# Patient Record
Sex: FEMALE | Race: WHITE | Employment: OTHER | URBAN - METROPOLITAN AREA
[De-identification: names, ages, dates, MRNs, and addresses within clinical notes are randomized per-mention and may not be internally consistent; named-entity substitution may affect disease eponyms.]

---

## 2017-01-06 ENCOUNTER — OFFICE VISIT (OUTPATIENT)
Dept: INTERNAL MEDICINE CLINIC | Age: 77
End: 2017-01-06

## 2017-01-06 VITALS
WEIGHT: 198 LBS | HEIGHT: 61 IN | HEART RATE: 78 BPM | SYSTOLIC BLOOD PRESSURE: 130 MMHG | BODY MASS INDEX: 37.38 KG/M2 | TEMPERATURE: 98.2 F | RESPIRATION RATE: 14 BRPM | OXYGEN SATURATION: 96 % | DIASTOLIC BLOOD PRESSURE: 70 MMHG

## 2017-01-06 DIAGNOSIS — I48.92 PAROXYSMAL ATRIAL FLUTTER (HCC): ICD-10-CM

## 2017-01-06 DIAGNOSIS — R29.6 FREQUENT FALLS: Primary | ICD-10-CM

## 2017-01-06 NOTE — PATIENT INSTRUCTIONS

## 2017-01-06 NOTE — LETTER
[address] [facility] Proxy Access Authorization Form Name: Andrew Agudelo Patient Email: [email or web address] Patient YOB: 1940 Patient MRN: [id number] Name of Proxy: Rosio Del Rio Proxy Email: [email or web address] Proxy Address: [address]  [address] Proxy : 1965 Proxy SSN:  By signing this Open English Proxy Access Authorization Form (this Authorization), I understand that I am giving permission to the [address] [place] [address], and its controlled affiliates that operate one or more hospitals or physician practices located in [place], [place], [place], [place] or [place] ([facility]) to disclose confidential health information contained about me through Open English to the person whose name is designated above (my Proxy). I understand that [employer] is a web-based service through which some (but not all) of the information contained in my [employer] record [facility]) (to the extent that I have an EMR) may be accessed, and that Open English sometimes shows a summary or description and not the actual entries in my EMR. I understand that by signing this Authorization, my Proxy will be given electronic access through Open English to all confidential health information about me that is available through [address], including confidential health information about me that under most circumstances my Proxy would not be able to access without my permission. I understand that I am not required to name a Proxy or sign this Authorization. I further understand that Bonnie Sis may not condition treatment or payment on my willingness to sign this Authorization unless the specific circumstances under which such conditioning is permitted by law are applicable and are set forth in this Authorization.  
 
I understand that this Authorization is valid unless and until I revoke it.  I understand that I have the right to revoke this Authorization at any time, but that my revocation will not be effective until delivered in writing to [facility] at the following address:  
 
[address] If I choose to revoke this Authorization, I understand that my revocation will not be effective as to any MyChart information already disclosed to my Proxy pursuant to this Authorization. I understand that MyChart access is a privilege, not a right, and that my Proxy must agree to comply with the MyChart Terms and Conditions of Patient Use (the Terms and Conditions). [facility] will provide my Proxy a special activation code and instructions for accessing confidential health information about me in [address] The first time my Proxy uses the special activation code, my Proxy must review and accept the Terms and Conditions and the Proxy Disclaimer. If my Proxy does not accept and at all times comply with the Terms and Conditions or does not accept the Proxy Disclaimer each time my Proxy accesses MyChart, I understand that [facility] may deny my Proxy access or revoke my Proxys to access confidential health information about me in [address] I also understand that [facility] may deny my Proxy access or revoke my Proxys access for any reason and at any time in [facility] sole discretion. I understand that my Proxy must sign the Acknowledgement set forth below if my Proxy is in the office with me at the time I complete this request.  If my Proxy is not in the office with me, I understand that my Proxy will be mailed a Proxy Identification Verification for Access to XO1 form at the address I have designated above, and that my Proxy must complete and return the form to [facility] before [facility] will take any additional steps to give my Proxy access information about me in [address] A copy of this Authorization and a notation concerning my Proxy shall be included in my original health records. I understand that confidential health information about me disclosed in MyChart to my Proxy pursuant to this Authorization might be redisclosed by my Proxy and may, as a result of such disclosure, no longer be protected to the same extent as such confidential health information was protected by law while solely in the possession of Nakul Crump. Signature of Patient or Legal Guardian Date (MM/DD/YYYY) Printed Name of Patient or Legal Guardian Relationship (if not self) ACKNOWLEDGEMENT TO BE COMPLETED BY PROXY IF IN OFFICE: 
I acknowledge and agree that the above information, including my name, e-mail address, date of birth, Social Security Number, and mailing address are true and correct. I further agree to comply with the Terms and Conditions and Proxy Disclaimer. Proxy Signature Date (MM/DD/YYYY) Printed Name of Proxy Identification Document:   
 
__ s License/Government Issued ID   
__ Passport   
__ Picture ID & Social Security Card Identification Document Number _______________________________ Expiration Date ______________

## 2017-01-06 NOTE — PROGRESS NOTES
Chief Complaint   Patient presents with    Staple Removal     s/p fall in [place] seen in ED and had staples placed to laceration on back of scalp

## 2017-01-06 NOTE — MR AVS SNAPSHOT
Visit Information Date & Time Provider Department Dept. Phone Encounter #  
 1/6/2017  2:00 PM Estelle Harris MD [facility] Internal Medicine [phone] [id number] Upcoming Health Maintenance Date Due ZOSTER VACCINE AGE 60> 6/14/1999 Pneumococcal 65+ Low/Medium Risk (2 of 2 - PPSV23) 4/13/2016 MEDICARE YEARLY EXAM 3/25/2017 GLAUCOMA SCREENING Q2Y 10/1/2017 DTaP/Tdap/Td series (2 - Td) 11/1/2026 Allergies as of 1/6/2017  Review Complete On: 1/6/2017 By: Blaze Cowan LPN Severity Noted Reaction Type Reactions Codeine  11/23/2009    Rash Neomycin  11/23/2009    Rash Polysporin [Bacitracin-polymyxin B]  11/23/2009    Rash Protonix [Pantoprazole]  11/23/2009    Other (comments) Gi upset Current Immunizations  Reviewed on 3/26/2015 Name Date Influenza High Dose Vaccine PF 11/4/2016, 10/7/2015 Influenza Vaccine Split 10/14/2012 Influenza Vaccine Whole 10/15/2011 Pneumococcal Conjugate (PCV-13) 4/13/2015 Tdap 11/1/2016 Not reviewed this visit You Were Diagnosed With   
  
 Codes Comments Frequent falls    -  Primary ICD-10-CM: R29.6 ICD-9-CM: V15.88 Paroxysmal atrial flutter (HCC)     ICD-10-CM: I48.92 
ICD-9-CM: 427.32 Vitals BP Pulse Temp Resp Height(growth percentile) Weight(growth percentile) 130/70 78 98.2 °F (36.8 °C) (Oral) 14 5' 0.75\" (1.543 m) 198 lb (89.8 kg) SpO2 BMI OB Status Smoking Status 96% 37.72 kg/m2 Hysterectomy Former Smoker BMI and BSA Data Body Mass Index Body Surface Area  
 37.72 kg/m 2 1.96 m 2 Preferred Pharmacy Pharmacy Name Phone [facility] [phone] [facility] [phone] Your Updated Medication List  
  
   
This list is accurate as of: 1/6/17  2:55 PM.  Always use your most recent med list.  
  
  
  
  
 aspirin 81 mg tablet Take  by mouth. Benefiber Clear 3 gram/3.5 gram Powd Generic drug:  wheat dextrin Take  by mouth.  
  
 calcium citrate-vitamin d3 315-200 mg-unit Tab Commonly known as:  CITRACAL+D Take 1 Tab by mouth daily (with breakfast). DULoxetine 60 mg capsule Commonly known as:  CYMBALTA TAKE 1 CAPSULE EVERY DAY  
  
 famotidine 40 mg tablet Commonly known as:  PEPCID Take 40 mg by mouth daily. FLORASTOR PO Take  by mouth. fluticasone 50 mcg/actuation nasal spray Commonly known as:  Imagene Poot 2 Sprays by Both Nostrils route daily. lisinopril 30 mg tablet Commonly known as:  PRINIVIL, ZESTRIL  
TAKE 1 TABLET BY MOUTH EVERY DAY  
  
 LUTEIN PO Take  by mouth.  
  
 meloxicam 15 mg tablet Commonly known as:  MOBIC  
TAKE ONE TABLET BY MOUTH ONCE DAILY MIRALAX 17 gram packet Generic drug:  polyethylene glycol Take 17 g by mouth daily. MULTIVITAMIN PO Take  by mouth. BRANDON 128 2 % ophthalmic solution Generic drug:  sodium chloride Administer 1 Drop to both eyes as needed. oxybutynin 5 mg tablet Commonly known as:  TVFKUWEI Take 1 Tab by mouth three (3) times daily. sotalol 120 mg tablet Commonly known as:  Mylinda Saint Take 120 mg by mouth two (2) times a day. SYNTHROID 150 mcg tablet Generic drug:  levothyroxine Take 1 Tab by mouth Daily (before breakfast). timolol 0.5 % ophthalmic solution Commonly known as:  TIMOPTIC Administer 1 Drop to right eye daily. VISION-ELTON PRESERVE PO Take  by mouth. We Performed the Following REFERRAL TO PHYSICAL THERAPY [CQF65 Custom] Comments:  
 Please evaluate patient for frequent falls. Referral Information Referral ID Referred By Referred To  
  
 [id number] Juana BLOCK Not Available Visits Status Start Date End Date 1 New Request 1/6/17 1/6/18 If your referral has a status of pending review or denied, additional information will be sent to support the outcome of this decision. Patient Instructions Preventing Falls: Care Instructions Your Care Instructions Getting around your home safely can be a challenge if you have injuries or health problems that make it easy for you to fall. Loose rugs and furniture in walkways are among the dangers for many older people who have problems walking or who have poor eyesight. People who have conditions such as arthritis, osteoporosis, or dementia also have to be careful not to fall. You can make your home safer with a few simple measures. Follow-up care is a key part of your treatment and safety. Be sure to make and go to all appointments, and call your doctor if you are having problems. It's also a good idea to know your test results and keep a list of the medicines you take. How can you care for yourself at home? Taking care of yourself · You may get dizzy if you do not drink enough water. To prevent dehydration, drink plenty of fluids, enough so that your urine is light yellow or clear like water. Choose water and other caffeine-free clear liquids. If you have kidney, heart, or liver disease and have to limit fluids, talk with your doctor before you increase the amount of fluids you drink. · Exercise regularly to improve your strength, muscle tone, and balance. Walk if you can. Swimming may be a good choice if you cannot walk easily. · Have your vision and hearing checked each year or any time you notice a change. If you have trouble seeing and hearing, you might not be able to avoid objects and could lose your balance. · Know the side effects of the medicines you take. Ask your doctor or pharmacist whether the medicines you take can affect your balance. Sleeping pills or sedatives can affect your balance. · Limit the amount of alcohol you drink. Alcohol can impair your balance and other senses.  
· Ask your doctor whether calluses or corns on your feet need to be removed. If you wear loose-fitting shoes because of calluses or corns, you can lose your balance and fall. · Talk to your doctor if you have numbness in your feet. Preventing falls at home · Remove raised doorway thresholds, throw rugs, and clutter. Repair loose carpet or raised areas in the floor. · Move furniture and electrical cords to keep them out of walking paths. · Use nonskid floor wax, and wipe up spills right away, especially on ceramic tile floors. · If you use a walker or cane, put rubber tips on it. If you use crutches, clean the bottoms of them regularly with an abrasive pad, such as steel wool. · Keep your house well lit, especially [place], and outside walkways. Use night-lights in areas such as hallways and bathrooms. Add extra light switches or use remote switches (such as switches that go on or off when you clap your hands) to make it easier to turn lights on if you have to get up during the night. · Install sturdy handrails on stairways. · Move items in your cabinets so that the things you use a lot are on the lower shelves (about waist level). · Keep a cordless phone and a flashlight with new batteries by your bed. If possible, put a phone in each of the main rooms of your house, or carry a cell phone in case you fall and cannot reach a phone. Or, you can wear a device around your neck or wrist. You push a button that sends a signal for help. · Wear low-heeled shoes that fit well and give your feet good support. Use footwear with nonskid soles. Check the heels and soles of your shoes for wear. Repair or replace worn heels or soles. · Do not wear socks without shoes on wood floors. · Walk on the grass when the sidewalks are slippery. If you live in an area that gets snow and ice in the winter, sprinkle salt on slippery steps and sidewalks. Preventing falls in the bath · Install grab bars and nonskid mats inside and outside your shower or tub and near the toilet and sinks. · Use shower chairs and bath benches. · Use a hand-held shower head that will allow you to sit while showering. · Get into a tub or shower by putting the weaker leg in first. Get out of a tub or shower with your strong side first. 
· Repair loose toilet seats and consider installing a raised toilet seat to make getting on and off the toilet easier. · Keep your bathroom door unlocked while you are in the shower. Where can you learn more? Go to http://franki-genet.info/. Enter [phone] in the search box to learn more about \"Preventing Falls: Care Instructions. \" Current as of: August 4, 2016 Content Version: 11.1 © 6920-8038 [employer]WARE, [employer]. Care instructions adapted under license by [employer] (which disclaims liability or warranty for this information). If you have questions about a medical condition or this instruction, always ask your healthcare professional. [place] [address] any warranty or liability for your use of this information. Introducing [facility]! Dear Dayna Rico: Thank you for requesting a MakeGamesWithUs account. Our records indicate that you have previously registered for a MakeGamesWithUs account but its currently inactive. Please call our MakeGamesWithUs support line at [phone]. Additional Information If you have questions, please visit the Frequently Asked Questions section of the MakeGamesWithUs website at [email or web address] Remember, MakeGamesWithUs is NOT to be used for urgent needs. For medical emergencies, dial 911. Now available from your iPhone and Android! Please provide this summary of care documentation to your next provider. Your primary care clinician is listed as Lyssa [phone] If you have any questions after today's visit, please call [phone].

## 2017-01-06 NOTE — LETTER
January 6, 2017 Dani Foley [phone] Dear Hunter Vu: Thank you for requesting access to Movea. Please follow the instructions below to securely access and download your online medical record. Movea allows you to send messages to your doctor, view your test results, renew your prescriptions, schedule appointments, and more. How Do I Sign Up? 1. In your internet browser, go to [email or web address]. 2. Click on the First Time User? Click Here link in the Sign In box. You will see the New Member Sign Up page. 3. Enter your Movea Access Code exactly as it appears below. You will not need to use this code after youve completed the sign-up process. If you do not sign up before the expiration date, you must request a new code. Movea Access Code: [email or web address] Expires: 4/6/2017  3:09 PM  
 
4. Enter the last four digits of your Social Security Number (xxxx) and Date of Birth (mm/dd/yyyy) as indicated and click Submit. You will be taken to the next sign-up page. 5. Create a Movea ID. This will be your Movea login ID and cannot be changed, so think of one that is secure and easy to remember. 6. Create a Movea password. You can change your password at any time. 7. Enter your Password Reset Question and Answer. This can be used at a later time if you forget your password. 8. Enter your e-mail address. You will receive e-mail notification when new information is available in [address]. 9. Click Sign Up. You can now view and download portions of your medical record. 10. Click the Download Summary menu link to download a portable copy of your medical information. Additional Information If you have questions, please visit the Frequently Asked Questions section of the Movea website at [email or web address] Remember, Movea is NOT to be used for urgent needs. For medical emergencies, dial 911. Now available from your iPhone and Android! Sincerely, [employer]

## 2017-01-07 NOTE — PROGRESS NOTES
HPI:  Claudio Mckinney is a 67y.o. year old female who is here for a visit after a recent ER visit for a fall. She was in [place] and fell backwards in the tub and hit her head on 12/28. She suffered a laceration to the head and 10 staples were placed there. She did not lose her consciousness and felt OK otherwise. Has suffered 10 falls or so over the last 12 months when she has lost her balance on each occasion. Feels unsteady often. No headache or dizziness. No chest pain or SOB. No cough or wheeze. No change in bowels or bladder. No numbness or tingling or weakness. Some ongoing pain in the lower back asn well as the knees. Has seen the cardiology MD as well as ortho. She is somewhat confused about her meds and her list does not include sotalol but she is taking 1-2 tramadol at night for pain as well as melatonin. Several days of cough with clear sputum and no SOB or wheeze. Past Medical History   Diagnosis Date    Arthritis     Basal cell carcinoma 05/20/2012    Calculus of kidney     Cancer ([facility] [address]      skin    Cancer ([facility] [address]      thyroid    Glaucoma 4/30/2010    Hypertension     OA (osteoarthritis) 4/30/2010    Psoriasis 4/30/2010       Past Surgical History   Procedure Laterality Date    Hx knee replacement      Pr thyroidectomy      Hx cholecystectomy      Hx tonsillectomy      Hx cataract removal       bilat    Hx dilation and curettage      Hx hysterectomy      Hx craniotomy      Hx knee arthroscopy      Hx wrist fracture tx Left 8/21/15       Prior to Admission medications    Medication Sig Start Date End Date Taking? Authorizing Provider   oxybutynin (DITROPAN) 5 mg tablet Take 1 Tab by mouth three (3) times daily.  11/17/16  Yes Alonso Calderón III, MD   DULoxetine (CYMBALTA) 60 mg capsule TAKE 1 CAPSULE EVERY DAY 11/3/16  Yes Alonso Calderón III, MD   meloxicam (MOBIC) 15 mg tablet TAKE ONE TABLET BY MOUTH ONCE DAILY 10/24/16  Yes Alonso Calderón III, MD   famotidine (PEPCID) 40 mg tablet Take 40 mg by mouth daily. Yes Historical Provider   LUTEIN PO Take  by mouth. Yes Historical Provider   lisinopril (PRINIVIL, ZESTRIL) 30 mg tablet TAKE 1 TABLET BY MOUTH EVERY DAY 12/20/15  Yes Aspen Alex III, MD   sotalol (BETAPACE) 120 mg tablet Take 120 mg by mouth two (2) times a day. Yes Emi Osler, MD   calcium citrate-vitamin d3 (CITRACAL+D) 315-200 mg-unit tab Take 1 Tab by mouth daily (with breakfast). Yes Historical Provider   SACCHAROMYCES BOULARDII (FLORASTOR PO) Take  by mouth. Yes Historical Provider   BETA-CAROTENE,A, W-C & E/ZN/CU (VISION-ELTON PRESERVE PO) Take  by mouth. Yes Historical Provider   wheat dextrin (BENEFIBER CLEAR) 3 gram/3.5 gram powd Take  by mouth. Yes Historical Provider   polyethylene glycol (MIRALAX) 17 gram packet Take 17 g by mouth daily. Yes Historical Provider   fluticasone (FLONASE) 50 mcg/actuation nasal spray 2 Sprays by Both Nostrils route daily. 9/9/15  Yes Aspen Alex III, MD   sodium chloride (BRANDON 128) 2 % ophthalmic solution Administer 1 Drop to both eyes as needed. Yes Historical Provider   SYNTHROID 150 mcg tablet Take 1 Tab by mouth Daily (before breakfast). 5/16/13  Yes Aspen Alex III, MD   timolol (TIMOPTIC) 0.5 % ophthalmic solution Administer 1 Drop to right eye daily. 12/17/10  Yes Historical Provider   aspirin 81 mg Tab Take  by mouth. Yes Historical Provider   MULTIVITAMINS (MULTIVITAMIN PO) Take  by mouth. Yes Historical Provider       Social History     Social History    Marital status:      Spouse name: N/A    Number of children: N/A    Years of education: N/A     Occupational History    Not on file.      Social History Main Topics    Smoking status: Former Smoker     Packs/day: 0.50     Years: 4.00     Types: Cigarettes     Quit date: 1/1/1961    Smokeless tobacco: Never Used    Alcohol use No    Drug use: No    Sexual activity: Not on file     Other Topics Concern    Not on file Social History Narrative          ROS  Per HPI    Visit Vitals    /70    Pulse 78    Temp 98.2 °F (36.8 °C) (Oral)    Resp 14    Ht 5' 0.75\" (1.543 m)    Wt 198 lb (89.8 kg)    SpO2 96%    BMI 37.72 kg/m2         Physical Exam   Physical Examination: General appearance - alert, well appearing, and in no distress  Mouth - mucous membranes moist, pharynx normal without lesions  Neck - supple, no significant adenopathy  Lymphatics - no palpable lymphadenopathy, no hepatosplenomegaly  Chest - clear to auscultation, no wheezes, rales or rhonchi, symmetric air entry  Heart - normal rate and regular rhythm  Abdomen - soft, nontender, nondistended, no masses or organomegaly  Back exam - tenderness noted along the muscles of the back. Neurological - alert, oriented, normal speech, no focal findings or movement disorder noted, motor and sensory grossly normal bilaterally  Musculoskeletal - abnormal exam of both knees with DJD changes  Extremities - peripheral pulses normal, no pedal edema, no clubbing or cyanosis  8 large staples removed from the posterior scalp, wound healed well and no bleeding. Assessment/Plan:  Walker Castillo was seen today for staple removal.    Diagnoses and all orders for this visit:    Frequent falls - will stop the tramadol and the melatonin and discussed this in detail with the daughter. Also instructed her to go through her mom's meds and be sure the list is correct and she is taking them correctly. -     REFERRAL TO PHYSICAL THERAPY    Paroxysmal atrial flutter (HCC) - in [place]. Viral URI - will use mucinex as needed    DJD of the knee and back - referral back to ortho to see. Follow-up Disposition: as needed. Advised her to call back or return to office if symptoms worsen/change/persist.  Discussed expected course/resolution/complications of diagnosis in detail with patient. Medication risks/benefits/costs/interactions/alternatives discussed with patient.   She was given an after visit summary which includes diagnoses, current medications, & vitals. She expressed understanding with the diagnosis and plan.

## 2017-01-12 ENCOUNTER — OFFICE VISIT (OUTPATIENT)
Dept: INTERNAL MEDICINE CLINIC | Age: 77
End: 2017-01-12

## 2017-01-12 VITALS
WEIGHT: 198 LBS | OXYGEN SATURATION: 98 % | TEMPERATURE: 98.4 F | SYSTOLIC BLOOD PRESSURE: 122 MMHG | RESPIRATION RATE: 16 BRPM | BODY MASS INDEX: 37.38 KG/M2 | DIASTOLIC BLOOD PRESSURE: 70 MMHG | HEIGHT: 61 IN | HEART RATE: 80 BPM

## 2017-01-12 DIAGNOSIS — J06.9 VIRAL URI: Primary | ICD-10-CM

## 2017-01-12 RX ORDER — CODEINE PHOSPHATE AND GUAIFENESIN 10; 100 MG/5ML; MG/5ML
10 SOLUTION ORAL
Qty: 200 ML | Refills: 0 | Status: SHIPPED | OUTPATIENT
Start: 2017-01-12 | End: 2017-02-03 | Stop reason: SDUPTHER

## 2017-01-12 NOTE — PROGRESS NOTES
HISTORY OF PRESENT ILLNESS  Eloy Roberts is a 67 y.o. female. URI    The history is provided by the patient. This is a new problem. Episode onset: 5 d. There has been no fever. Associated symptoms include rhinorrhea and cough. Pertinent negatives include no abdominal pain, no diarrhea, no nausea, no vomiting, no sinus pain, no sore throat and no wheezing. Treatments tried: robitussin dm. The treatment provided mild relief. Review of Systems   Constitutional: Positive for fatigue. HENT: Positive for rhinorrhea. Negative for sore throat. Respiratory: Positive for cough. Negative for wheezing. Gastrointestinal: Negative for abdominal pain, diarrhea, nausea and vomiting. Physical Exam   Constitutional: No distress. HENT:   Right Ear: Tympanic membrane and ear canal normal.   Left Ear: Tympanic membrane and ear canal normal.   Nose: Right sinus exhibits no maxillary sinus tenderness and no frontal sinus tenderness. Left sinus exhibits no maxillary sinus tenderness and no frontal sinus tenderness. Mouth/Throat: Posterior oropharyngeal edema and posterior oropharyngeal erythema present. No oropharyngeal exudate. Eyes: Conjunctivae are normal. Right eye exhibits no discharge. Left eye exhibits no discharge. Neck: Neck supple. Cardiovascular: Normal rate and regular rhythm. Exam reveals no gallop and no friction rub. No murmur heard. Pulmonary/Chest: Effort normal and breath sounds normal.   Lymphadenopathy:     She has no cervical adenopathy. Nursing note and vitals reviewed. ASSESSMENT and PLAN  Nery Clifford was seen today for cough and fatigue. Diagnoses and all orders for this visit:    Viral URI  -     guaiFENesin-codeine (ROBITUSSIN AC) 100-10 mg/5 mL solution; Take 10 mL by mouth four (4) times daily as needed for Cough (severe cough). Max Daily Amount: 40 mL.    - Continue other medications in addition to current changes , robitussin dm for milder cough    This has been fully explained to the patient, who indicates understanding.

## 2017-01-12 NOTE — PATIENT INSTRUCTIONS

## 2017-01-24 ENCOUNTER — OFFICE VISIT (OUTPATIENT)
Dept: NEUROLOGY | Age: 77
End: 2017-01-24

## 2017-01-24 VITALS
RESPIRATION RATE: 18 BRPM | SYSTOLIC BLOOD PRESSURE: 136 MMHG | BODY MASS INDEX: 36.96 KG/M2 | DIASTOLIC BLOOD PRESSURE: 70 MMHG | TEMPERATURE: 97 F | WEIGHT: 194 LBS | HEART RATE: 59 BPM | OXYGEN SATURATION: 98 %

## 2017-01-24 DIAGNOSIS — R20.2 NUMBNESS AND TINGLING OF BOTH LEGS: ICD-10-CM

## 2017-01-24 DIAGNOSIS — R29.6 FALLS FREQUENTLY: ICD-10-CM

## 2017-01-24 DIAGNOSIS — S06.0X0A CONCUSSION, WITHOUT LOSS OF CONSCIOUSNESS, INITIAL ENCOUNTER: ICD-10-CM

## 2017-01-24 DIAGNOSIS — R20.0 NUMBNESS AND TINGLING OF BOTH LEGS: ICD-10-CM

## 2017-01-24 DIAGNOSIS — R29.6 FALLS FREQUENTLY: Primary | ICD-10-CM

## 2017-01-24 DIAGNOSIS — E55.9 VITAMIN D DEFICIENCY: ICD-10-CM

## 2017-01-24 NOTE — PROGRESS NOTES
Chief Complaint   Patient presents with    Fall       Referred by: Dr. Daniel Han      HPI    79-year-old woman here because of falls. She is here with her . They tell me in the past 18-24 months she has had recurrent falls without trigger or prodrome. She has had falls walking up the stairs or stepping down from curbs. She has hit her head several times but no loss of consciousness. She has fell down a flight of stairs before. Tendency to fall forwards or backwards  randomly. She takes Ditropan for nearly 18 months or longer. Last fall was end of December for which she went to a [place] emergency room. 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 She is denying any complaints of headache but does feel numbness and tingling in both feet. Review of Systems   Musculoskeletal: Positive for falls. Neurological: Positive for tingling and sensory change. All other systems reviewed and are negative. Past Medical History   Diagnosis Date    Arthritis     Basal cell carcinoma 05/20/2012    Calculus of kidney     Cancer ([facility] [address]      skin    Cancer ([facility] [address]      thyroid    Glaucoma 4/30/2010    Hypertension     OA (osteoarthritis) 4/30/2010    Psoriasis 4/30/2010     Family History   Problem Relation Age of Onset    Hypertension Mother     Heart Disease Mother     Heart Disease Father     Hypertension Father     Elevated Lipids Father     Heart Disease Brother     Psychiatric Disorder Brother     Diabetes Brother      Social History     Social History    Marital status:      Spouse name: N/A    Number of children: N/A    Years of education: N/A     Occupational History    Not on file.      Social History Main Topics    Smoking status: Former Smoker     Packs/day: 0.50     Years: 4.00     Types: Cigarettes     Quit date: 1/1/1961    Smokeless tobacco: Never Used    Alcohol use No    Drug use: No    Sexual activity: Not on file     Other Topics Concern    Not on file     Social History Narrative     Current Outpatient Prescriptions   Medication Sig    oxybutynin (DITROPAN) 5 mg tablet Take 1 Tab by mouth three (3) times daily.  DULoxetine (CYMBALTA) 60 mg capsule TAKE 1 CAPSULE EVERY DAY    meloxicam (MOBIC) 15 mg tablet TAKE ONE TABLET BY MOUTH ONCE DAILY    famotidine (PEPCID) 40 mg tablet Take 40 mg by mouth daily.  LUTEIN PO Take  by mouth.  lisinopril (PRINIVIL, ZESTRIL) 30 mg tablet TAKE 1 TABLET BY MOUTH EVERY DAY    sotalol (BETAPACE) 120 mg tablet Take 120 mg by mouth two (2) times a day.  calcium citrate-vitamin d3 (CITRACAL+D) 315-200 mg-unit tab Take 1 Tab by mouth daily (with breakfast).  SACCHAROMYCES BOULARDII (FLORASTOR PO) Take  by mouth.  wheat dextrin (BENEFIBER CLEAR) 3 gram/3.5 gram powd Take  by mouth.  polyethylene glycol (MIRALAX) 17 gram packet Take 17 g by mouth daily.  fluticasone (FLONASE) 50 mcg/actuation nasal spray 2 Sprays by Both Nostrils route daily.  sodium chloride (BRANDON 128) 2 % ophthalmic solution Administer 1 Drop to both eyes as needed.  SYNTHROID 150 mcg tablet Take 1 Tab by mouth Daily (before breakfast).  timolol (TIMOPTIC) 0.5 % ophthalmic solution Administer 1 Drop to right eye daily.  aspirin 81 mg Tab Take  by mouth.  MULTIVITAMINS (MULTIVITAMIN PO) Take  by mouth.  GUAIFENESIN/DEXTROMETHORPHAN (ROBITUSSIN DM MAX PO) Take  by mouth.  guaiFENesin-codeine (ROBITUSSIN AC) 100-10 mg/5 mL solution Take 10 mL by mouth four (4) times daily as needed for Cough (severe cough). Max Daily Amount: 40 mL.  BETA-CAROTENE,A, W-C & E/ZN/CU (VISION-ELTON PRESERVE PO) Take  by mouth. No current facility-administered medications for this visit.       Allergies   Allergen Reactions    Codeine Rash    Neomycin Rash    Polysporin [Bacitracin-Polymyxin B] Rash    Protonix [Pantoprazole] Other (comments)     Gi upset         Neurologic Exam     Mental Status   Oriented to person, place, and time.   Attention: normal.   Speech: speech is normal   Level of consciousness: alert    Cranial Nerves   Cranial nerves II through XII intact. CN III, IV, VI   Nystagmus: right (also torsional in upgaze)   Nystagmus type: horizontal and slow    Motor Exam   Muscle bulk: normal  Overall muscle tone: normal    Strength   Strength 5/5 throughout. Sensory Exam   Right leg light touch: decreased from toes  Left leg light touch: decreased from toes    Gait, Coordination, and Reflexes     Gait  Gait: (Good tempo but slightly ataxic especially on the turns non-shuffling)    Coordination   Romberg: negative    Tremor   Resting tremor: absent    Reflexes   Right brachioradialis: 2+  Left brachioradialis: 2+  Right biceps: 2+  Left biceps: 2+  Right triceps: 2+  Left triceps: 2+  Right patellar: 2+  Left patellar: 2+  Right achilles: 1+  Left achilles: 1+    Physical Exam   Constitutional: She is oriented to person, place, and time. She appears well-developed and well-nourished. Cardiovascular: Normal rate. Pulmonary/Chest: Effort normal.   Neurological: She is oriented to person, place, and time. She has normal strength. She has a normal Romberg Test.   Reflex Scores:       Tricep reflexes are 2+ on the right side and 2+ on the left side. Bicep reflexes are 2+ on the right side and 2+ on the left side. Brachioradialis reflexes are 2+ on the right side and 2+ on the left side. Patellar reflexes are 2+ on the right side and 2+ on the left side. Achilles reflexes are 1+ on the right side and 1+ on the left side. Skin: Skin is warm and dry. Psychiatric: Her speech is normal.   Vitals reviewed.     Visit Vitals    /70    Pulse (!) 59    Temp 97 °F (36.1 °C)    Resp 18    Wt 88 kg (194 lb)    SpO2 98%    BMI 36.96 kg/m2       Lab Results  Component Value Date/Time   WBC 6.7 06/07/2014 10:27 AM   HGB 13.1 06/07/2014 10:27 AM   HCT 40.5 06/07/2014 10:27 AM   PLATELET 160 76/43/8951 10:27 AM   MCV 96 06/07/2014 10:27 AM       Lab Results  Component Value Date/Time   Hemoglobin A1c, External 6.1 11/19/2014   Glucose 103 06/07/2014 10:27 AM   LDL, calculated 93 06/07/2014 10:27 AM   Creatinine (POC) 1.0 11/25/2009 10:00 AM   Creatinine 0.78 06/07/2014 10:27 AM      Lab Results  Component Value Date/Time   Cholesterol, total 166 06/07/2014 10:27 AM   HDL Cholesterol 51 06/07/2014 10:27 AM   LDL, calculated 93 06/07/2014 10:27 AM   Triglyceride 112 06/07/2014 10:27 AM   CHOL/HDL Ratio 3.8 01/08/2010 10:35 AM       Lab Results  Component Value Date/Time   ALT 20 06/07/2014 10:27 AM   AST 16 06/07/2014 10:27 AM   Alk. phosphatase 72 06/07/2014 10:27 AM   Bilirubin, total 0.3 06/07/2014 10:27 AM          CT Results (maximum last 3): Results from Abstract encounter on 01/13/17   CT HEAD WO CONT  Results from Abstract encounter on 06/20/12   CT ABD PELV W CONT    MRI Results (maximum last 3): Results from [place] encounter on 07/25/14   MRI LUMB SPINE WO CONT   Narrative **Final Report**      ICD Codes / Adm. Diagnosis: 724.02  733.90 / Spinal stenosis, lumbar region    Examination:  MRI L SPINE WO CON  - [id number] - Jul 25 2014  1:27PM  Accession No:  [id number]  Reason:  Spinal stenosis      REPORT:  CLINICAL HISTORY: Pain    INDICATION: Lower back pain    COMPARISON: None    TECHNIQUE: MR imaging of the lumbar spine was performed with sagittal T1,   T2, STIR;  axial T1, T2. Contrast was not administered. FINDINGS:  Disc degenerative changes in the lower thoracic spine with disc desiccation   loss of disc height at T11-T12, T12-L1. There are mild disc protrusions at   T10-11, T11-T12 and T12-L1. There is no spinal canal or foraminal stenosis. Abdominal aorta is normal in caliber. No fracture or dislocation. . Vertebral   body heights are maintained. Degenerative marrow signal changes. .  The conus   medullaris terminates at L1-L2. Grade 1 anterolisthesis of L5 on S1   measuring 9 mm. Increase signal intensity at L1-L2 adjacent to the disc   space likely degenerative in nature. L1/2:  Facet arthropathy. Moderate broad-based disc protrusion which extends   into the foramina. Mild central canal stenosis. The foramina are patent. L2/3:  The spinal canal and neuroforamina are widely patent. L3/4:  The spinal canal and neuroforamina are widely patent. L4/5:  Disc desiccation and loss of disc height. Facet arthropathy. The   spinal canal is patent. The foramina are patent. L5/S1:  Grade 1 anterolisthesis of L5 on S1 9 mm. Uncovering of the   intervertebral disc space. Disc desiccation and loss of disc height. Severe   bilateral foraminal stenoses. The spinal canal is widely patent. .    The visualized musculature and intraperitoneal structures are within normal   limits       IMPRESSION:  Mild multilevel disc and facet degenerative change with grade 1   anterolisthesis of L5 on S1. There is mild central canal stenosis at L1-L2. Grade 1 anterolisthesis of L5 on S1 9 mm with severe bilateral foraminal   stenosis          Signing/Reading Doctor: Hoda Figueroa ([id number])    Approved: Hoda Figueroa ([id number])  Jul 26 2014  5:29PM                                    Results from Hospital Encounter encounter on 07/28/10   MRI SPINE CERVICAL WITHOUT CONTRAST   Narrative Final Report      ICD Codes / Adm. Diagnosis:    /   723. 1BF CERVICAL PAIN (NECK) P  Examination:  MRI C SPINE  CON  - [id number] - Jul 28 2010  9:36AM  Accession No:  [id number]  Reason:  723. 1BF CERVICAL PAIN (NECK) P      REPORT:  Multiplanar noncontrast MRI of the cervical spine is performed with T1, T2   and STIR sequences. Direct comparison is made to prior plain radiographs of the cervical spine   dated October 2007. The cervicomedullary junction is normal without evidence of tonsillar   ectopia. Cord signal is normal throughout the cervical spine and visualized   upper thoracic levels.  Vertebral body height, marrow signal and alignment is   normal and there is no acute fracture or subluxation. C2-C3: There is no significant central canal stenosis or neural foraminal   narrowing. C3-C4: There is no significant central canal stenosis or neural foraminal   narrowing. C4-C5: There is a broad-based disc osteophyte complex which effaces the   ventral thecal sac. There is uncovertebral joint hypertrophy. There is mild   to moderate moderate bilateral neural foraminal narrowing. C5-C6: There is a small broad-based posterior disc osteophyte complex which   partially effaces the ventral thecal sac. There is no neural foraminal   narrowing. C6-C7: There is no significant central canal stenosis or neural foraminal   narrowing. C7-T1:There is no significant central canal stenosis or neural foraminal   narrowing. IMPRESSION: C4-C5 and C5-C6 degenerative changes, as described above. Interpreting/Reading Doctor: BRUNA HIGUERA ([id number])  Transcribed: n/a on 07/28/2010  Approved: BRUNA Daniels ([id number])  07/28/2010          Distribution:  Attending Doctor: Bindu Cheek  Alternate Doctor: Bindu Cheek            PET Results (maximum last 3): No results found for this or any previous visit. Assessment and Plan   Torey Harding was seen today for fall. Diagnoses and all orders for this visit:    Falls frequently  -     MRI BRAIN W WO CONT; Future  -     VITAMIN B12 & FOLATE  -     METHYLMALONIC ACID  -     VITAMIN D, 1, 25 DIHYDROXY  -     GAMMOPATHY EVAL, SPEP/MATTHEW, IG QT/FLC; Future    Concussion, without loss of consciousness, initial encounter  -     MRI BRAIN W WO CONT; Future  -     VITAMIN B12 & FOLATE  -     METHYLMALONIC ACID  -     VITAMIN D, 1, 25 DIHYDROXY  -     GAMMOPATHY EVAL, SPEP/MATTHEW, IG QT/FLC;  Future    Numbness and tingling of both legs  -     MRI BRAIN W WO CONT; Future  -     VITAMIN B12 & FOLATE  -     METHYLMALONIC ACID  -     VITAMIN D, 1, 25 DIHYDROXY  -     GAMMOPATHY EVAL, SPEP/MATTHEW, IG QT/FLC; Future    Vitamin D deficiency   -     VITAMIN D, 1, 25 DIHYDROXY      58-year-old woman with frequent falls in the past 2 years. This could coincide with Ditropan use. I would recommend her primary care doctor discontinue this medication and consider alternative methods for urinary control. This medication has significant anticholinergic effects that can lead to falls in the elderly. She is also complaining of symptoms suggestive of a neuropathy in both feet. Blood work ordered looking for typical causes. She may be developing a sensory ataxia of her gait as a result of this. Her gait today is a little unstable but appropriate. No signs of neurodegenerative disease on exam status Parkinson's. Will consider EMG after blood work done. I am going to order an MRI given the multiple falls and head injuries. Head CT was abnormal done last month. Need to clarify this further. She also has some nystagmus on exam that could be central in origin. I would like to see her in about 6 weeks. A notice of this visit/encounter being completed has been sent electronically to the patient's PCP and/or referring provider.      [address] [place] [address], [facility] [address]  Diplomate [employer]

## 2017-01-24 NOTE — LETTER
1/24/2017 Patient:  Reva Soto YOB: 1940 Date of Visit: 1/24/2017 Dear Terrance Gibson MD 
[address] [place] [address] Dr Mar[address] [facility] [phone] VIA In Basket : 
 
 
I was requested by Terrance Gibson MD to evaluate Ms. Nimesh Cisse  for Chief Complaint Patient presents with  Fall Lisa Torres I am recommending the following:  
 
Tameka Ocasio was seen today for fall. Diagnoses and all orders for this visit: 
 
Falls frequently -     MRI BRAIN W WO CONT; Future -     VITAMIN B12 & FOLATE 
-     METHYLMALONIC ACID 
-     VITAMIN D, 1, 25 DIHYDROXY 
-     GAMMOPATHY EVAL, SPEP/MATTHEW, IG QT/FLC; Future Concussion, without loss of consciousness, initial encounter -     MRI BRAIN W WO CONT; Future -     VITAMIN B12 & FOLATE 
-     METHYLMALONIC ACID 
-     VITAMIN D, 1, 25 DIHYDROXY 
-     GAMMOPATHY EVAL, SPEP/MATTHEW, IG QT/FLC; Future Numbness and tingling of both legs -     MRI BRAIN W WO CONT; Future -     VITAMIN B12 & FOLATE 
-     METHYLMALONIC ACID 
-     VITAMIN D, 1, 25 DIHYDROXY 
-     GAMMOPATHY EVAL, SPEP/MATTHEW, IG QT/FLC; Future Vitamin D deficiency  
-     VITAMIN D, 1, 25 DIHYDROXY 
 
 
 
---------------------------------------------------------------------------------------------------------------------- Below is my encounter: Chief Complaint Patient presents with  Fall Referred by: Dr. Anabel Jaramillo HPI 
 
25-year-old woman here because of falls. She is here with her . They tell me in the past 18-24 months she has had recurrent falls without trigger or prodrome. She has had falls walking up the stairs or stepping down from curbs. She has hit her head several times but no loss of consciousness. She has fell down a flight of stairs before. Tendency to fall forwards or backwards  randomly. She takes Ditropan for nearly 18 months or longer.   Last fall was end of December for which she went to a [facility] emergency room. 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 She is denying any complaints of headache but does feel numbness and tingling in both feet. Review of Systems Musculoskeletal: Positive for falls. Neurological: Positive for tingling and sensory change. All other systems reviewed and are negative. Past Medical History Diagnosis Date  Arthritis  Basal cell carcinoma 05/20/2012  Calculus of kidney  Cancer ([facility] [address] skin  Cancer ([facility] [address] thyroid  Glaucoma 4/30/2010  Hypertension  OA (osteoarthritis) 4/30/2010  Psoriasis 4/30/2010 Family History Problem Relation Age of Onset  Hypertension Mother  Heart Disease Mother  Heart Disease Father  Hypertension Father  Elevated Lipids Father  Heart Disease Brother  Psychiatric Disorder Brother  Diabetes Brother Social History Social History  Marital status:  Spouse name: N/A  
 Number of children: N/A  
 Years of education: N/A Occupational History  Not on file. Social History Main Topics  Smoking status: Former Smoker Packs/day: 0.50 Years: 4.00 Types: Cigarettes Quit date: 1/1/1961  Smokeless tobacco: Never Used  Alcohol use No  
 Drug use: No  
 Sexual activity: Not on file Other Topics Concern  Not on file Social History Narrative Current Outpatient Prescriptions Medication Sig  
 oxybutynin (DITROPAN) 5 mg tablet Take 1 Tab by mouth three (3) times daily.  DULoxetine (CYMBALTA) 60 mg capsule TAKE 1 CAPSULE EVERY DAY  meloxicam (MOBIC) 15 mg tablet TAKE ONE TABLET BY MOUTH ONCE DAILY  famotidine (PEPCID) 40 mg tablet Take 40 mg by mouth daily.  LUTEIN PO Take  by mouth.  lisinopril (PRINIVIL, ZESTRIL) 30 mg tablet TAKE 1 TABLET BY MOUTH EVERY DAY  sotalol (BETAPACE) 120 mg tablet Take 120 mg by mouth two (2) times a day.  calcium citrate-vitamin d3 (CITRACAL+D) 315-200 mg-unit tab Take 1 Tab by mouth daily (with breakfast).  SACCHAROMYCES BOULARDII (FLORASTOR PO) Take  by mouth.  wheat dextrin (BENEFIBER CLEAR) 3 gram/3.5 gram powd Take  by mouth.  polyethylene glycol (MIRALAX) 17 gram packet Take 17 g by mouth daily.  fluticasone (FLONASE) 50 mcg/actuation nasal spray 2 Sprays by Both Nostrils route daily.  sodium chloride (BRANDON 128) 2 % ophthalmic solution Administer 1 Drop to both eyes as needed.  SYNTHROID 150 mcg tablet Take 1 Tab by mouth Daily (before breakfast).  timolol (TIMOPTIC) 0.5 % ophthalmic solution Administer 1 Drop to right eye daily.  aspirin 81 mg Tab Take  by mouth.  MULTIVITAMINS (MULTIVITAMIN PO) Take  by mouth.  GUAIFENESIN/DEXTROMETHORPHAN (ROBITUSSIN DM MAX PO) Take  by mouth.  guaiFENesin-codeine (ROBITUSSIN AC) 100-10 mg/5 mL solution Take 10 mL by mouth four (4) times daily as needed for Cough (severe cough). Max Daily Amount: 40 mL.  BETA-CAROTENE,A, W-C & E/ZN/CU (VISION-ELTON PRESERVE PO) Take  by mouth. No current facility-administered medications for this visit. Allergies Allergen Reactions  Codeine Rash  Neomycin Rash  Polysporin [Bacitracin-Polymyxin B] Rash  Protonix [Pantoprazole] Other (comments) Gi upset Neurologic Exam  
 
Mental Status Oriented to person, place, and time. Attention: normal.  
Speech: speech is normal  
Level of consciousness: alert Cranial Nerves Cranial nerves II through XII intact. CN III, IV, VI Nystagmus: right (also torsional in upgaze) Nystagmus type: horizontal and slow Motor Exam  
Muscle bulk: normal 
Overall muscle tone: normal 
 
Strength Strength 5/5 throughout. Sensory Exam  
Right leg light touch: decreased from toes Left leg light touch: decreased from toes Gait, Coordination, and Reflexes Gait Gait: (Good tempo but slightly ataxic especially on the turns non-shuffling) Coordination Romberg: negative Tremor Resting tremor: absent Reflexes Right brachioradialis: 2+ Left brachioradialis: 2+ Right biceps: 2+ Left biceps: 2+ Right triceps: 2+ Left triceps: 2+ Right patellar: 2+ Left patellar: 2+ Right achilles: 1+ Left achilles: 1+ Physical Exam  
Constitutional: She is oriented to person, place, and time. She appears well-developed and well-nourished. Cardiovascular: Normal rate. Pulmonary/Chest: Effort normal.  
Neurological: She is oriented to person, place, and time. She has normal strength. She has a normal Romberg Test.  
Reflex Scores: 
     Tricep reflexes are 2+ on the right side and 2+ on the left side. Bicep reflexes are 2+ on the right side and 2+ on the left side. Brachioradialis reflexes are 2+ on the right side and 2+ on the left side. Patellar reflexes are 2+ on the right side and 2+ on the left side. Achilles reflexes are 1+ on the right side and 1+ on the left side. Skin: Skin is warm and dry. Psychiatric: Her speech is normal.  
Vitals reviewed. Visit Vitals  /70  Pulse (!) 59  Temp 97 °F (36.1 °C)  Resp 18  Wt 88 kg (194 lb)  SpO2 98%  BMI 36.96 kg/m2 Lab Results Component Value Date/Time WBC 6.7 06/07/2014 10:27 AM  
HGB 13.1 06/07/2014 10:27 AM  
HCT 40.5 06/07/2014 10:27 AM  
PLATELET 443 59/02/8786 10:27 AM  
MCV 96 06/07/2014 10:27 AM  
 
 
Lab Results Component Value Date/Time Hemoglobin A1c, External 6.1 11/19/2014 Glucose 103 06/07/2014 10:27 AM  
LDL, calculated 93 06/07/2014 10:27 AM  
Creatinine (POC) 1.0 11/25/2009 10:00 AM  
Creatinine 0.78 06/07/2014 10:27 AM  
  
Lab Results Component Value Date/Time Cholesterol, total 166 06/07/2014 10:27 AM  
HDL Cholesterol 51 06/07/2014 10:27 AM  
LDL, calculated 93 06/07/2014 10:27 AM  
Triglyceride 112 06/07/2014 10:27 AM  
CHOL/HDL Ratio 3.8 01/08/2010 10:35 AM  
 
 
Lab Results Component Value Date/Time ALT 20 06/07/2014 10:27 AM  
AST 16 06/07/2014 10:27 AM  
Alk. phosphatase 72 06/07/2014 10:27 AM  
Bilirubin, total 0.3 06/07/2014 10:27 AM  
 
  
 
CT Results (maximum last 3): Results from Abstract encounter on 01/13/17 CT HEAD WO CONT Results from Abstract encounter on 06/20/12 CT ABD PELV W CONT 
 
MRI Results (maximum last 3): Results from [facility] Encounter encounter on 07/25/14 MRI LUMB SPINE WO CONT Narrative **Final Report** 
 
 
ICD Codes / Adm. Diagnosis: 724.02  733.90 / Spinal stenosis, lumbar region Examination:  MRI L SPINE WO CON  - [id number] - Jul 25 2014  1:27PM 
Accession No:  [id number] Reason:  Spinal stenosis REPORT: 
CLINICAL HISTORY: Pain INDICATION: Lower back pain COMPARISON: None TECHNIQUE: MR imaging of the lumbar spine was performed with sagittal T1,  
T2, STIR;  axial T1, T2. Contrast was not administered. FINDINGS: 
Disc degenerative changes in the lower thoracic spine with disc desiccation  
loss of disc height at T11-T12, T12-L1. There are mild disc protrusions at  
T10-11, T11-T12 and T12-L1. There is no spinal canal or foraminal stenosis. Abdominal aorta is normal in caliber. No fracture or dislocation. . Vertebral  
body heights are maintained. Degenerative marrow signal changes. .  The conus  
medullaris terminates at L1-L2. Grade 1 anterolisthesis of L5 on S1  
measuring 9 mm. Increase signal intensity at L1-L2 adjacent to the disc  
space likely degenerative in nature. L1/2:  Facet arthropathy. Moderate broad-based disc protrusion which extends  
into the foramina. Mild central canal stenosis. The foramina are patent. L2/3:  The spinal canal and neuroforamina are widely patent. L3/4:  The spinal canal and neuroforamina are widely patent. L4/5:  Disc desiccation and loss of disc height. Facet arthropathy. The  
spinal canal is patent. The foramina are patent. L5/S1:  Grade 1 anterolisthesis of L5 on S1 9 mm. Uncovering of the  
intervertebral disc space. Disc desiccation and loss of disc height. Severe  
bilateral foraminal stenoses. The spinal canal is widely patent. Gretel Boo The visualized musculature and intraperitoneal structures are within normal  
limits IMPRESSION: 
Mild multilevel disc and facet degenerative change with grade 1  
anterolisthesis of L5 on S1. There is mild central canal stenosis at L1-L2. Grade 1 anterolisthesis of L5 on S1 9 mm with severe bilateral foraminal  
stenosis Signing/Reading Doctor: Tony Whitaker ([id number]) Kelsey Loya ([id number])  Jul 26 2014  5:29PM                         
 
 
   
 
Results from [facility] [place] on 07/28/10 MRI SPINE CERVICAL WITHOUT CONTRAST Narrative Final Report ICD Codes / Adm. Diagnosis:    /   723. 1BF CERVICAL PAIN (NECK) P Examination:  MRI C SPINE WO CON  - [id number] - Jul 28 2010  9:36AM 
Accession No:  [id number] Reason:  723. 1BF CERVICAL PAIN (NECK) P 
 
 
REPORT: 
Multiplanar noncontrast MRI of the cervical spine is performed with T1, T2  
and STIR sequences. Direct comparison is made to prior plain radiographs of the cervical spine  
dated October 2007. The cervicomedullary junction is normal without evidence of tonsillar  
ectopia. Cord signal is normal throughout the cervical spine and visualized  
upper thoracic levels. Vertebral body height, marrow signal and alignment is  
normal and there is no acute fracture or subluxation. C2-C3: There is no significant central canal stenosis or neural foraminal  
narrowing. C3-C4: There is no significant central canal stenosis or neural foraminal  
narrowing. C4-C5: There is a broad-based disc osteophyte complex which effaces the  
ventral thecal sac. There is uncovertebral joint hypertrophy. There is mild  
to moderate moderate bilateral neural foraminal narrowing. C5-C6: There is a small broad-based posterior disc osteophyte complex which  
partially effaces the ventral thecal sac. There is no neural foraminal  
narrowing. C6-C7: There is no significant central canal stenosis or neural foraminal  
narrowing. C7-T1:There is no significant central canal stenosis or neural foraminal  
narrowing. IMPRESSION: C4-C5 and C5-C6 degenerative changes, as described above. Interpreting/Reading Doctor: BRUNA De La Cruz ([id number]) Transcribed: n/a on 07/28/2010 Approved: BRUNA De La rCuz ([id number])  07/28/2010 Distribution:  Attending Doctor: Ana Harper Alternate Doctor: Ana Harper 
 
   
 
 
PET Results (maximum last 3): No results found for this or any previous visit. Assessment and Plan Falls frequently -     MRI BRAIN W WO CONT; Future -     VITAMIN B12 & FOLATE 
-     METHYLMALONIC ACID 
-     VITAMIN D, 1, 25 DIHYDROXY 
-     GAMMOPATHY EVAL, SPEP/MATTHEW, IG QT/FLC; Future Concussion, without loss of consciousness, initial encounter -     MRI BRAIN W WO CONT; Future -     VITAMIN B12 & FOLATE 
-     METHYLMALONIC ACID 
-     VITAMIN D, 1, 25 DIHYDROXY 
-     GAMMOPATHY EVAL, SPEP/MATTHEW, IG QT/FLC; Future Numbness and tingling of both legs -     MRI BRAIN W WO CONT; Future -     VITAMIN B12 & FOLATE 
-     METHYLMALONIC ACID 
-     VITAMIN D, 1, 25 DIHYDROXY 
-     GAMMOPATHY EVAL, SPEP/MATTHEW, IG QT/FLC; Future Vitamin D deficiency  
-     VITAMIN D, 1, 25 DIHYDROXY 
 
48-year-old woman with frequent falls in the past 2 years. This could coincide with Ditropan use. I would recommend her primary care doctor discontinue this medication and consider alternative methods for urinary control. This medication has significant anticholinergic effects that can lead to falls in the elderly. She is also complaining of symptoms suggestive of a neuropathy in both feet. Blood work ordered looking for typical causes.   She may be developing a sensory ataxia of her gait as a result of this. Her gait today is a little unstable but appropriate. No signs of neurodegenerative disease on exam status Parkinson's. Will consider EMG after blood work done. I am going to order an MRI given the multiple falls and head injuries. Head CT was abnormal done last month. Need to clarify this further. She also has some nystagmus on exam that could be central in origin. I would like to see her in about 6 weeks. A notice of this visit/encounter being completed has been sent electronically to the patient's PCP and/or referring provider. Thank you for giving me the opportunity to assist in the care of Ms. Nimesh Cisse. If you have questions, please do not hesitate to contact me. Sincerely, [phone] [facility], DO Neurologist 
[employer]

## 2017-01-24 NOTE — MR AVS SNAPSHOT
Visit Information Date & Time Provider Department Dept. Phone Encounter #  
 1/24/2017  3:00  [facility], [address] [facility] at [address] [id number] Follow-up Instructions Return in about 6 weeks (around 3/7/2017). Routing History Upcoming Health Maintenance Date Due ZOSTER VACCINE AGE 60> 6/14/1999 Pneumococcal 65+ Low/Medium Risk (2 of 2 - PPSV23) 4/13/2016 MEDICARE YEARLY EXAM 3/25/2017 GLAUCOMA SCREENING Q2Y 10/1/2017 DTaP/Tdap/Td series (2 - Td) 11/1/2026 Allergies as of 1/24/2017  Review Complete On: 1/24/2017 By: Richard Otero LPN Severity Noted Reaction Type Reactions Codeine  11/23/2009    Rash Neomycin  11/23/2009    Rash Polysporin [Bacitracin-polymyxin B]  11/23/2009    Rash Protonix [Pantoprazole]  11/23/2009    Other (comments) Gi upset Current Immunizations  Reviewed on 3/26/2015 Name Date Influenza High Dose Vaccine PF 11/4/2016, 10/7/2015 Influenza Vaccine Split 10/14/2012 Influenza Vaccine Whole 10/15/2011 Pneumococcal Conjugate (PCV-13) 4/13/2015 Tdap 11/1/2016 Not reviewed this visit You Were Diagnosed With   
  
 Codes Comments Falls frequently    -  Primary ICD-10-CM: R29.6 ICD-9-CM: V15.88 Concussion, without loss of consciousness, initial encounter     ICD-10-CM: S06.0X0A 
ICD-9-CM: 850.0 Numbness and tingling of both legs     ICD-10-CM: R20.2 ICD-9-CM: 930. 0 Vitamin D deficiency     ICD-10-CM: E55.9 ICD-9-CM: 268.9 Vitals BP Pulse Temp Resp Weight(growth percentile) SpO2  
 136/70 (!) 59 97 °F (36.1 °C) 18 194 lb (88 kg) 98% BMI OB Status Smoking Status 36.96 kg/m2 Hysterectomy Former Smoker Vitals History BMI and BSA Data Body Mass Index Body Surface Area  
 36.96 kg/m 2 1.94 m 2 Preferred Pharmacy Pharmacy Name Phone [id number] [place] [id number] [facility] AT [address] [place] [address] [phone] Your Updated Medication List  
  
   
This list is accurate as of: 1/24/17  3:45 PM.  Always use your most recent med list.  
  
  
  
  
 aspirin 81 mg tablet Take  by mouth. Benefiber Clear 3 gram/3.5 gram Powd Generic drug:  wheat dextrin Take  by mouth.  
  
 calcium citrate-vitamin d3 315-200 mg-unit Tab Commonly known as:  CITRACAL+D Take 1 Tab by mouth daily (with breakfast). DULoxetine 60 mg capsule Commonly known as:  CYMBALTA TAKE 1 CAPSULE EVERY DAY  
  
 famotidine 40 mg tablet Commonly known as:  PEPCID Take 40 mg by mouth daily. FLORASTOR PO Take  by mouth. fluticasone 50 mcg/actuation nasal spray Commonly known as:  Alfredo Cyphers 2 Sprays by Both Nostrils route daily. guaiFENesin-codeine 100-10 mg/5 mL solution Commonly known as:  ROBITUSSIN AC Take 10 mL by mouth four (4) times daily as needed for Cough (severe cough). Max Daily Amount: 40 mL. lisinopril 30 mg tablet Commonly known as:  PRINIVIL, ZESTRIL  
TAKE 1 TABLET BY MOUTH EVERY DAY  
  
 LUTEIN PO Take  by mouth.  
  
 meloxicam 15 mg tablet Commonly known as:  MOBIC  
TAKE ONE TABLET BY MOUTH ONCE DAILY MIRALAX 17 gram packet Generic drug:  polyethylene glycol Take 17 g by mouth daily. MULTIVITAMIN PO Take  by mouth. BRANDON 128 2 % ophthalmic solution Generic drug:  sodium chloride Administer 1 Drop to both eyes as needed. oxybutynin 5 mg tablet Commonly known as:  BLWARJOO Take 1 Tab by mouth three (3) times daily. ROBITUSSIN DM MAX PO Take  by mouth.  
  
 sotalol 120 mg tablet Commonly known as:  [place] Morale Take 120 mg by mouth two (2) times a day. SYNTHROID 150 mcg tablet Generic drug:  levothyroxine Take 1 Tab by mouth Daily (before breakfast). timolol 0.5 % ophthalmic solution Commonly known as:  TIMOPTIC  
 Administer 1 Drop to right eye daily. VISION-ELTON PRESERVE PO Take  by mouth. We Performed the Following METHYLMALONIC ACID [55398 CPT(R)] VITAMIN B12 & FOLATE [79926 CPT(R)] VITAMIN D, 1, 25 DIHYDROXY [58942 CPT(R)] Follow-up Instructions Return in about 6 weeks (around 3/7/2017). To-Do List   
 01/24/2017 Lab:  GAMMOPATHY EVAL, SPEP/MATTHEW, IG QT/FLC   
  
 01/24/2017 Imaging:  MRI BRAIN W WO CONT Patient Instructions PRESCRIPTION REFILL POLICY [facility] Statement to Patients April 1, 2014 In an effort to ensure the large volume of patient prescription refills is processed in the most efficient and expeditious manner, we are asking our patients to assist us by calling your Pharmacy for all prescription refills, this will include also your  Mail Order Pharmacy. The pharmacy will contact our office electronically to continue the refill process. Please do not wait until the last minute to call your pharmacy. We need at least 48 hours (2days) to fill prescriptions. We also encourage you to call your pharmacy before going to  your prescription to make sure it is ready. With regard to controlled substance prescription refill requests (narcotic refills) that need to be picked up at our office, we ask your cooperation by providing us with at least 72 hours (3days) notice that you will need a refill. We will not refill narcotic prescription refill requests after 4:00pm on any weekday, Monday through Thursday, or after 2:00pm on Fridays, or on the weekends. We encourage everyone to explore another way of getting your prescription refill request processed using [employer], our patient web portal through our electronic medical record system.  [employer] srlt is an efficient and effective way to communicate your medication request directly to the office and downloadable as an lyndsay on your smart phone . M[employer] also features a review functionality that allows you to view your medication list as well as leave messages for your physician. Are you ready to get connected? If so please review the attatched instructions or speak to any of our staff to get you set up right away! Thank you so much for your cooperation. Should you have any questions please contact our Practice Administrator. The Physicians and Staff,  [facility] Thank you for choosing [facility] and [facility] for your  
 
care. You may receive a survey about your visit. We appreciate you taking time  
 
to complete this survey as we use your feedback to improve our services. We  
 
realize we are not perfect, but we strive to provide excellent care. Preventing Falls: After Your Visit Your Care Instructions Getting around your home safely can be a challenge if you have injuries or health problems that make it easy for you to fall. Loose rugs and furniture in walkways are among the dangers for many older people who have problems walking or who have poor eyesight. People who have conditions such as arthritis, osteoporosis, or dementia also have to be careful not to fall. You can make your home safer with a few simple measures. Follow-up care is a key part of your treatment and safety. Be sure to make and go to all appointments, and call your doctor if you are having problems. It's also a good idea to know your test results and keep a list of the medicines you take. How can you care for yourself at home? Taking care of yourself You may get dizzy if you do not drink enough water. To prevent dehydration, drink plenty of fluids, enough so that your urine is light yellow or clear like water. Choose water and other caffeine-free clear liquids.  If you have kidney, heart, or liver disease and have to limit fluids, talk with your doctor before you increase the amount of fluids you drink. Exercise regularly to improve your strength, muscle tone, and balance. Walk if you can. Swimming may be a good choice if you cannot walk easily. Have your vision and hearing checked each year or any time you notice a change. If you have trouble seeing and hearing, you might not be able to avoid objects and could lose your balance. Know the side effects of the medicines you take. Ask your doctor or pharmacist whether the medicines you take can affect your balance. Sleeping pills or sedatives can affect your balance. Limit the amount of alcohol you drink. Alcohol can impair your balance and other senses. Ask your doctor whether calluses or corns on your feet need to be removed. If you wear loose-fitting shoes because of calluses or corns, you can lose your balance and fall. Talk to your doctor if you have numbness in your feet. Preventing falls at home Remove raised doorway thresholds, throw rugs, and clutter. Repair loose carpet or raised areas in the floor. Move furniture and electrical cords to keep them out of walking paths. Use nonskid floor wax, and wipe up spills right away, especially on ceramic tile floors. If you use a walker or cane, put rubber tips on it. If you use crutches, clean the bottoms of them regularly with an abrasive pad, such as steel wool. Keep your house well lit, especially stairways, porches, and outside walkways. Use night-lights in areas such as hallways and bathrooms. Add extra light switches or use remote switches (such as switches that go on or off when you clap your hands) to make it easier to turn lights on if you have to get up during the night. Install sturdy handrails on stairways. Move items in your cabinets so that the things you use a lot are on the lower shelves (about waist level). Keep a cordless phone and a flashlight with new batteries by your bed.  If possible, put a phone in each of the main rooms of your house, or carry a cell phone in case you fall and cannot reach a phone. Or, you can wear a device around your neck or wrist. You push a button that sends a signal for help. Wear low-heeled shoes that fit well and give your feet good support. Use footwear with nonskid soles. Check the heels and soles of your shoes for wear. Repair or replace worn heels or soles. Do not wear socks without shoes on wood floors. Walk on the grass when the sidewalks are slippery. If you live in an area that gets snow and ice in the winter, sprinkle salt on slippery steps and sidewalks. Preventing falls in the bath Install grab bars and nonskid mats inside and outside your shower or tub and near the toilet and sinks. Use shower chairs and bath benches. Use a hand-held shower head that will allow you to sit while showering. Get into a tub or shower by putting the weaker leg in first. Get out of a tub or shower with your strong side first.  
Repair loose toilet seats and consider installing a raised toilet seat to make getting on and off the toilet easier. Keep your bathroom door unlocked while you are in the shower. Where can you learn more? Go to Click Bus.be Enter [phone] [id number] [phone] in the search box to learn more about \"Preventing Falls: After Your Visit. \"   
© 7144-5837 [employer]. Care instructions adapted under license by Ludmila Carreon (which disclaims liability or warranty for this information). This care instruction is for use with your licensed healthcare professional. If you have questions about a medical condition or this instruction, always ask your healthcare professional. [place] [address] any warranty or liability for your use of this information. Content Version: 10.1.999094; Current as of: May 15, 2013 Preventing Falls: Care Instructions Your Care Instructions Getting around your home safely can be a challenge if you have injuries or health problems that make it easy for you to fall. Loose rugs and furniture in walkways are among the dangers for many older people who have problems walking or who have poor eyesight. People who have conditions such as arthritis, osteoporosis, or dementia also have to be careful not to fall. You can make your home safer with a few simple measures. Follow-up care is a key part of your treatment and safety. Be sure to make and go to all appointments, and call your doctor if you are having problems. It's also a good idea to know your test results and keep a list of the medicines you take. How can you care for yourself at home? Taking care of yourself · You may get dizzy if you do not drink enough water. To prevent dehydration, drink plenty of fluids, enough so that your urine is light yellow or clear like water. Choose water and other caffeine-free clear liquids. If you have kidney, heart, or liver disease and have to limit fluids, talk with your doctor before you increase the amount of fluids you drink. · Exercise regularly to improve your strength, muscle tone, and balance. Walk if you can. Swimming may be a good choice if you cannot walk easily. · Have your vision and hearing checked each year or any time you notice a change. If you have trouble seeing and hearing, you might not be able to avoid objects and could lose your balance. · Know the side effects of the medicines you take. Ask your doctor or pharmacist whether the medicines you take can affect your balance. Sleeping pills or sedatives can affect your balance. · Limit the amount of alcohol you drink. Alcohol can impair your balance and other senses. · Ask your doctor whether calluses or corns on your feet need to be removed. If you wear loose-fitting shoes because of calluses or corns, you can lose your balance and fall. · Talk to your doctor if you have numbness in your feet. Preventing falls at home · Remove raised doorway thresholds, throw rugs, and clutter. Repair loose carpet or raised areas in the floor. · Move furniture and electrical cords to keep them out of walking paths. · Use nonskid floor wax, and wipe up spills right away, especially on ceramic tile floors. · If you use a walker or cane, put rubber tips on it. If you use crutches, clean the bottoms of them regularly with an abrasive pad, such as steel wool. · Keep your house well lit, especially Cloretta Thuy, and outside walkways. Use night-lights in areas such as hallways and bathrooms. Add extra light switches or use remote switches (such as switches that go on or off when you clap your hands) to make it easier to turn lights on if you have to get up during the night. · Install sturdy handrails on stairways. · Move items in your cabinets so that the things you use a lot are on the lower shelves (about waist level). · Keep a cordless phone and a flashlight with new batteries by your bed. If possible, put a phone in each of the main rooms of your house, or carry a cell phone in case you fall and cannot reach a phone. Or, you can wear a device around your neck or wrist. You push a button that sends a signal for help. · Wear low-heeled shoes that fit well and give your feet good support. Use footwear with nonskid soles. Check the heels and soles of your shoes for wear. Repair or replace worn heels or soles. · Do not wear socks without shoes on wood floors. · Walk on the grass when the sidewalks are slippery. If you live in an area that gets snow and ice in the winter, sprinkle salt on slippery steps and sidewalks. Preventing falls in the bath · Install grab bars and nonskid mats inside and outside your shower or tub and near the toilet and sinks. · Use shower chairs and bath benches. · Use a hand-held shower head that will allow you to sit while showering. · Get into a tub or shower by putting the weaker leg in first. Get out of a tub or shower with your strong side first. 
· Repair loose toilet seats and consider installing a raised toilet seat to make getting on and off the toilet easier. · Keep your bathroom door unlocked while you are in the shower. Where can you learn more? Go to http://franki-genet.info/. Enter [id number] [id number] [phone] in the search box to learn more about \"Preventing Falls: Care Instructions. \" Current as of: August 4, 2016 Content Version: 11.1 © 2859-0174 [employer]. Care instructions adapted under license by [employer] (which disclaims liability or warranty for this information). If you have questions about a medical condition or this instruction, always ask your healthcare professional. [facility] any warranty or liability for your use of this information. Introducing [facility]! Estrella Wright introduces [employer] patient portal. Now you can access parts of your medical record, email your doctor's office, and request medication refills online. 1. In your internet browser, go to [email or web address] - [email or web address]. [email or web address]/flux - neutrinity 2. Click on the First Time User? Click Here link in the Sign In box. You will see the New Member Sign Up page. 3. Enter your CaseRails Access Code exactly as it appears below. You will not need to use this code after youve completed the sign-up process. If you do not sign up before the expiration date, you must request a new code. · CaseRails Access Code: [email or web address] Expires: 4/6/2017  3:09 PM 
 
4. Enter the last four digits of your Social Security Number (xxxx) and Date of Birth (mm/dd/yyyy) as indicated and click Submit. You will be taken to the next sign-up page. 5. Create a CaseRails ID.  This will be your CaseRails login ID and cannot be changed, so think of one that is secure and easy to remember. 6. Create a [employer] password. You can change your password at any time. 7. Enter your Password Reset Question and Answer. This can be used at a later time if you forget your password. 8. Enter your e-mail address. You will receive e-mail notification when new information is available in [address] 9. Click Sign Up. You can now view and download portions of your medical record. 10. Click the Download Summary menu link to download a portable copy of your medical information. If you have questions, please visit the Frequently Asked Questions section of the [employer] website. Remember, [employer] is NOT to be used for urgent needs. For medical emergencies, dial 911. Now available from your iPhone and Android! Please provide this summary of care documentation to your next provider. Your primary care clinician is listed as [place] [phone] If you have any questions after today's visit, please call [phone].

## 2017-01-24 NOTE — PATIENT INSTRUCTIONS
[facility]   Statement to Patients  April 1, 2014      In an effort to ensure the large volume of patient prescription refills is processed in the most efficient and expeditious manner, we are asking our patients to assist us by calling your Pharmacy for all prescription refills, this will include also your  Mail Order Pharmacy. The pharmacy will contact our office electronically to continue the refill process. Please do not wait until the last minute to call your pharmacy. We need at least 48 hours (2days) to fill prescriptions. We also encourage you to call your pharmacy before going to  your prescription to make sure it is ready. With regard to controlled substance prescription refill requests (narcotic refills) that need to be picked up at our office, we ask your cooperation by providing us with at least 72 hours (3days) notice that you will need a refill. We will not refill narcotic prescription refill requests after 4:00pm on any weekday, Monday through Thursday, or after 2:00pm on Fridays, or on the weekends. We encourage everyone to explore another way of getting your prescription refill request processed using [employer]max[employer], our patient web portal through our electronic medical record system. [employer]maxio is an efficient and effective way to communicate your medication request directly to the office and  downloadable as an lyndsay on your smart phone . [employer]io also features a review functionality that allows you to view your medication list as well as leave messages for your physician. Are you ready to get connected? If so please review the attatched instructions or speak to any of our staff to get you set up right away! Thank you so much for your cooperation. Should you have any questions please contact our Practice Administrator.     The Physicians and Staff,  [facility]     Thank you for choosing [facility] for your     care. You may receive a survey about your visit. We appreciate you taking time     to complete this survey as we use your feedback to improve our services. We     realize we are not perfect, but we strive to provide excellent care. Preventing Falls: After Your Visit   Your Care Instructions   Getting around your home safely can be a challenge if you have injuries or health problems that make it easy for you to fall. Loose rugs and furniture in walkways are among the dangers for many older people who have problems walking or who have poor eyesight. People who have conditions such as arthritis, osteoporosis, or dementia also have to be careful not to fall. You can make your home safer with a few simple measures. Follow-up care is a key part of your treatment and safety. Be sure to make and go to all appointments, and call your doctor if you are having problems. It's also a good idea to know your test results and keep a list of the medicines you take. How can you care for yourself at home? Taking care of yourself   You may get dizzy if you do not drink enough water. To prevent dehydration, drink plenty of fluids, enough so that your urine is light yellow or clear like water. Choose water and other caffeine-free clear liquids. If you have kidney, heart, or liver disease and have to limit fluids, talk with your doctor before you increase the amount of fluids you drink. Exercise regularly to improve your strength, muscle tone, and balance. Walk if you can. Swimming may be a good choice if you cannot walk easily. Have your vision and hearing checked each year or any time you notice a change. If you have trouble seeing and hearing, you might not be able to avoid objects and could lose your balance. Know the side effects of the medicines you take. Ask your doctor or pharmacist whether the medicines you take can affect your balance.  Sleeping pills or sedatives can affect your balance. Limit the amount of alcohol you drink. Alcohol can impair your balance and other senses. Ask your doctor whether calluses or corns on your feet need to be removed. If you wear loose-fitting shoes because of calluses or corns, you can lose your balance and fall. Talk to your doctor if you have numbness in your feet. Preventing falls at home   Remove raised doorway thresholds, throw rugs, and clutter. Repair loose carpet or raised areas in the floor. Move furniture and electrical cords to keep them out of walking paths. Use nonskid floor wax, and wipe up spills right away, especially on ceramic tile floors. If you use a walker or cane, put rubber tips on it. If you use crutches, clean the bottoms of them regularly with an abrasive pad, such as steel wool. Keep your house well lit, especially stairways, porches, and outside walkways. Use night-lights in areas such as hallways and bathrooms. Add extra light switches or use remote switches (such as switches that go on or off when you clap your hands) to make it easier to turn lights on if you have to get up during the night. Install sturdy handrails on stairways. Move items in your cabinets so that the things you use a lot are on the lower shelves (about waist level). Keep a cordless phone and a flashlight with new batteries by your bed. If possible, put a phone in each of the main rooms of your house, or carry a cell phone in case you fall and cannot reach a phone. Or, you can wear a device around your neck or wrist. You push a button that sends a signal for help. Wear low-heeled shoes that fit well and give your feet good support. Use footwear with nonskid soles. Check the heels and soles of your shoes for wear. Repair or replace worn heels or soles. Do not wear socks without shoes on wood floors. Walk on the grass when the sidewalks are slippery.  If you live in an area that gets snow and ice in the winter, sprinkle salt on slippery steps and sidewalks. Preventing falls in the bath   Install grab bars and nonskid mats inside and outside your shower or tub and near the toilet and sinks. Use shower chairs and bath benches. Use a hand-held shower head that will allow you to sit while showering. Get into a tub or shower by putting the weaker leg in first. Get out of a tub or shower with your strong side first.   Repair loose toilet seats and consider installing a raised toilet seat to make getting on and off the toilet easier. Keep your bathroom door unlocked while you are in the shower. Where can you learn more? Go to "[employer].be   Enter G117 in the search box to learn more about \"Preventing Falls: After Your Visit. \"    © 1523-1640 [employer]. Care instructions adapted under license by [employer] (which disclaims liability or warranty for this information). This care instruction is for use with your licensed healthcare professional. If you have questions about a medical condition or this instruction, always ask your healthcare professional. [address] any warranty or liability for your use of this information. Content Version: 10.1.098204; Current as of: May 15, 2013                 Preventing Falls: Care Instructions  Your Care Instructions  Getting around your home safely can be a challenge if you have injuries or health problems that make it easy for you to fall. Loose rugs and furniture in walkways are among the dangers for many older people who have problems walking or who have poor eyesight. People who have conditions such as arthritis, osteoporosis, or dementia also have to be careful not to fall. You can make your home safer with a few simple measures. Follow-up care is a key part of your treatment and safety. Be sure to make and go to all appointments, and call your doctor if you are having problems.  It's also a good idea to know your test results and keep a list of the medicines you take. How can you care for yourself at home? Taking care of yourself  · You may get dizzy if you do not drink enough water. To prevent dehydration, drink plenty of fluids, enough so that your urine is light yellow or clear like water. Choose water and other caffeine-free clear liquids. If you have kidney, heart, or liver disease and have to limit fluids, talk with your doctor before you increase the amount of fluids you drink. · Exercise regularly to improve your strength, muscle tone, and balance. Walk if you can. Swimming may be a good choice if you cannot walk easily. · Have your vision and hearing checked each year or any time you notice a change. If you have trouble seeing and hearing, you might not be able to avoid objects and could lose your balance. · Know the side effects of the medicines you take. Ask your doctor or pharmacist whether the medicines you take can affect your balance. Sleeping pills or sedatives can affect your balance. · Limit the amount of alcohol you drink. Alcohol can impair your balance and other senses. · Ask your doctor whether calluses or corns on your feet need to be removed. If you wear loose-fitting shoes because of calluses or corns, you can lose your balance and fall. · Talk to your doctor if you have numbness in your feet. Preventing falls at home  · Remove raised doorway thresholds, throw rugs, and clutter. Repair loose carpet or raised areas in the floor. · Move furniture and electrical cords to keep them out of walking paths. · Use nonskid floor wax, and wipe up spills right away, especially on ceramic tile floors. · If you use a walker or cane, put rubber tips on it. If you use crutches, clean the bottoms of them regularly with an abrasive pad, such as steel wool. · Keep your house well lit, especially [place], and outside walkways. Use night-lights in areas such as hallways and bathrooms.  Add extra light switches or use remote switches (such as switches that go on or off when you clap your hands) to make it easier to turn lights on if you have to get up during the night. · Install sturdy handrails on stairways. · Move items in your cabinets so that the things you use a lot are on the lower shelves (about waist level). · Keep a cordless phone and a flashlight with new batteries by your bed. If possible, put a phone in each of the main rooms of your house, or carry a cell phone in case you fall and cannot reach a phone. Or, you can wear a device around your neck or wrist. You push a button that sends a signal for help. · Wear low-heeled shoes that fit well and give your feet good support. Use footwear with nonskid soles. Check the heels and soles of your shoes for wear. Repair or replace worn heels or soles. · Do not wear socks without shoes on wood floors. · Walk on the grass when the sidewalks are slippery. If you live in an area that gets snow and ice in the winter, sprinkle salt on slippery steps and sidewalks. Preventing falls in the bath  · Install grab bars and nonskid mats inside and outside your shower or tub and near the toilet and sinks. · Use shower chairs and bath benches. · Use a hand-held shower head that will allow you to sit while showering. · Get into a tub or shower by putting the weaker leg in first. Get out of a tub or shower with your strong side first.  · Repair loose toilet seats and consider installing a raised toilet seat to make getting on and off the toilet easier. · Keep your bathroom door unlocked while you are in the shower. Where can you learn more? Go to http://franki-genet.info/. Enter 0476 [id number] in the search box to learn more about \"Preventing Falls: Care Instructions. \"  Current as of: August 4, 2016  Content Version: 11.1  © 0189-4184 [employer].  Care instructions adapted under license by [employer] (which disclaims liability or warranty for this information). If you have questions about a medical condition or this instruction, always ask your healthcare professional. [place] [address] any warranty or liability for your use of this information.

## 2017-01-24 NOTE — COMMUNICATION BODY
Chief Complaint   Patient presents with    Fall       Referred by: Dr. Bill Manzano      HPI    65-year-old woman here because of falls. She is here with her . They tell me in the past 18-24 months she has had recurrent falls without trigger or prodrome. She has had falls walking up the stairs or stepping down from curbs. She has hit her head several times but no loss of consciousness. She has fell down a flight of stairs before. Tendency to fall forwards or backwards  randomly. She takes Ditropan for nearly 18 months or longer. Last fall was end of December for which she went to a [place] emergency room. 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 She is denying any complaints of headache but does feel numbness and tingling in both feet. Review of Systems   Musculoskeletal: Positive for falls. Neurological: Positive for tingling and sensory change. All other systems reviewed and are negative. Past Medical History   Diagnosis Date    Arthritis     Basal cell carcinoma 05/20/2012    Calculus of kidney     Cancer ([facility] [address]      skin    Cancer ([facility] [address]      thyroid    Glaucoma 4/30/2010    Hypertension     OA (osteoarthritis) 4/30/2010    Psoriasis 4/30/2010     Family History   Problem Relation Age of Onset    Hypertension Mother     Heart Disease Mother     Heart Disease Father     Hypertension Father     Elevated Lipids Father     Heart Disease Brother     Psychiatric Disorder Brother     Diabetes Brother      Social History     Social History    Marital status:      Spouse name: N/A    Number of children: N/A    Years of education: N/A     Occupational History    Not on file.      Social History Main Topics    Smoking status: Former Smoker     Packs/day: 0.50     Years: 4.00     Types: Cigarettes     Quit date: 1/1/1961    Smokeless tobacco: Never Used    Alcohol use No    Drug use: No    Sexual activity: Not on file     Other Topics Concern    Not on file     Social History Narrative     Current Outpatient Prescriptions   Medication Sig    oxybutynin (DITROPAN) 5 mg tablet Take 1 Tab by mouth three (3) times daily.  DULoxetine (CYMBALTA) 60 mg capsule TAKE 1 CAPSULE EVERY DAY    meloxicam (MOBIC) 15 mg tablet TAKE ONE TABLET BY MOUTH ONCE DAILY    famotidine (PEPCID) 40 mg tablet Take 40 mg by mouth daily.  LUTEIN PO Take  by mouth.  lisinopril (PRINIVIL, ZESTRIL) 30 mg tablet TAKE 1 TABLET BY MOUTH EVERY DAY    sotalol (BETAPACE) 120 mg tablet Take 120 mg by mouth two (2) times a day.  calcium citrate-vitamin d3 (CITRACAL+D) 315-200 mg-unit tab Take 1 Tab by mouth daily (with breakfast).  SACCHAROMYCES BOULARDII (FLORASTOR PO) Take  by mouth.  wheat dextrin (BENEFIBER CLEAR) 3 gram/3.5 gram powd Take  by mouth.  polyethylene glycol (MIRALAX) 17 gram packet Take 17 g by mouth daily.  fluticasone (FLONASE) 50 mcg/actuation nasal spray 2 Sprays by Both Nostrils route daily.  sodium chloride (BRANDON 128) 2 % ophthalmic solution Administer 1 Drop to both eyes as needed.  SYNTHROID 150 mcg tablet Take 1 Tab by mouth Daily (before breakfast).  timolol (TIMOPTIC) 0.5 % ophthalmic solution Administer 1 Drop to right eye daily.  aspirin 81 mg Tab Take  by mouth.  MULTIVITAMINS (MULTIVITAMIN PO) Take  by mouth.  GUAIFENESIN/DEXTROMETHORPHAN (ROBITUSSIN DM MAX PO) Take  by mouth.  guaiFENesin-codeine (ROBITUSSIN AC) 100-10 mg/5 mL solution Take 10 mL by mouth four (4) times daily as needed for Cough (severe cough). Max Daily Amount: 40 mL.  BETA-CAROTENE,A, W-C & E/ZN/CU (VISION-ELTON PRESERVE PO) Take  by mouth. No current facility-administered medications for this visit.       Allergies   Allergen Reactions    Codeine Rash    Neomycin Rash    Polysporin [Bacitracin-Polymyxin B] Rash    Protonix [Pantoprazole] Other (comments)     Gi upset         Neurologic Exam     Mental Status   Oriented to person, place, and time.   Attention: normal.   Speech: speech is normal   Level of consciousness: alert    Cranial Nerves   Cranial nerves II through XII intact. CN III, IV, VI   Nystagmus: right (also torsional in upgaze)   Nystagmus type: horizontal and slow    Motor Exam   Muscle bulk: normal  Overall muscle tone: normal    Strength   Strength 5/5 throughout. Sensory Exam   Right leg light touch: decreased from toes  Left leg light touch: decreased from toes    Gait, Coordination, and Reflexes     Gait  Gait: (Good tempo but slightly ataxic especially on the turns non-shuffling)    Coordination   Romberg: negative    Tremor   Resting tremor: absent    Reflexes   Right brachioradialis: 2+  Left brachioradialis: 2+  Right biceps: 2+  Left biceps: 2+  Right triceps: 2+  Left triceps: 2+  Right patellar: 2+  Left patellar: 2+  Right achilles: 1+  Left achilles: 1+    Physical Exam   Constitutional: She is oriented to person, place, and time. She appears well-developed and well-nourished. Cardiovascular: Normal rate. Pulmonary/Chest: Effort normal.   Neurological: She is oriented to person, place, and time. She has normal strength. She has a normal Romberg Test.   Reflex Scores:       Tricep reflexes are 2+ on the right side and 2+ on the left side. Bicep reflexes are 2+ on the right side and 2+ on the left side. Brachioradialis reflexes are 2+ on the right side and 2+ on the left side. Patellar reflexes are 2+ on the right side and 2+ on the left side. Achilles reflexes are 1+ on the right side and 1+ on the left side. Skin: Skin is warm and dry. Psychiatric: Her speech is normal.   Vitals reviewed.     Visit Vitals    /70    Pulse (!) 59    Temp 97 °F (36.1 °C)    Resp 18    Wt 88 kg (194 lb)    SpO2 98%    BMI 36.96 kg/m2       Lab Results  Component Value Date/Time   WBC 6.7 06/07/2014 10:27 AM   HGB 13.1 06/07/2014 10:27 AM   HCT 40.5 06/07/2014 10:27 AM   PLATELET 647 11/14/7518 10:27 AM   MCV 96 06/07/2014 10:27 AM       Lab Results  Component Value Date/Time   Hemoglobin A1c, External 6.1 11/19/2014   Glucose 103 06/07/2014 10:27 AM   LDL, calculated 93 06/07/2014 10:27 AM   Creatinine (POC) 1.0 11/25/2009 10:00 AM   Creatinine 0.78 06/07/2014 10:27 AM      Lab Results  Component Value Date/Time   Cholesterol, total 166 06/07/2014 10:27 AM   HDL Cholesterol 51 06/07/2014 10:27 AM   LDL, calculated 93 06/07/2014 10:27 AM   Triglyceride 112 06/07/2014 10:27 AM   CHOL/HDL Ratio 3.8 01/08/2010 10:35 AM       Lab Results  Component Value Date/Time   ALT 20 06/07/2014 10:27 AM   AST 16 06/07/2014 10:27 AM   Alk. phosphatase 72 06/07/2014 10:27 AM   Bilirubin, total 0.3 06/07/2014 10:27 AM          CT Results (maximum last 3): Results from Abstract encounter on 01/13/17   CT HEAD WO CONT  Results from Abstract encounter on 06/20/12   CT ABD PELV W CONT    MRI Results (maximum last 3): Results from [place] encounter on 07/25/14   MRI LUMB SPINE WO CONT   Narrative **Final Report**      ICD Codes / Adm. Diagnosis: 724.02  733.90 / Spinal stenosis, lumbar region    Examination:  MRI L SPINE WO CON  - [id number] - Jul 25 2014  1:27PM  Accession No:  [id number]  Reason:  Spinal stenosis      REPORT:  CLINICAL HISTORY: Pain    INDICATION: Lower back pain    COMPARISON: None    TECHNIQUE: MR imaging of the lumbar spine was performed with sagittal T1,   T2, STIR;  axial T1, T2. Contrast was not administered. FINDINGS:  Disc degenerative changes in the lower thoracic spine with disc desiccation   loss of disc height at T11-T12, T12-L1. There are mild disc protrusions at   T10-11, T11-T12 and T12-L1. There is no spinal canal or foraminal stenosis. Abdominal aorta is normal in caliber. No fracture or dislocation. . Vertebral   body heights are maintained. Degenerative marrow signal changes. .  The conus   medullaris terminates at L1-L2. Grade 1 anterolisthesis of L5 on S1   measuring 9 mm. Increase signal intensity at L1-L2 adjacent to the disc   space likely degenerative in nature. L1/2:  Facet arthropathy. Moderate broad-based disc protrusion which extends   into the foramina. Mild central canal stenosis. The foramina are patent. L2/3:  The spinal canal and neuroforamina are widely patent. L3/4:  The spinal canal and neuroforamina are widely patent. L4/5:  Disc desiccation and loss of disc height. Facet arthropathy. The   spinal canal is patent. The foramina are patent. L5/S1:  Grade 1 anterolisthesis of L5 on S1 9 mm. Uncovering of the   intervertebral disc space. Disc desiccation and loss of disc height. Severe   bilateral foraminal stenoses. The spinal canal is widely patent. .    The visualized musculature and intraperitoneal structures are within normal   limits       IMPRESSION:  Mild multilevel disc and facet degenerative change with grade 1   anterolisthesis of L5 on S1. There is mild central canal stenosis at L1-L2. Grade 1 anterolisthesis of L5 on S1 9 mm with severe bilateral foraminal   stenosis          Signing/Reading Doctor: Everardo Epstein ([id number])    Approved: Everardo Epstein ([id number])  Jul 26 2014  5:29PM                                    Results from Hospital Encounter encounter on 07/28/10   MRI SPINE CERVICAL WITHOUT CONTRAST   Narrative Final Report      ICD Codes / Adm. Diagnosis:    /   723. 1BF CERVICAL PAIN (NECK) P  Examination:  MRI C SPINE  CON  - [id number] - Jul 28 2010  9:36AM  Accession No:  [id number]  Reason:  723. 1BF CERVICAL PAIN (NECK) P      REPORT:  Multiplanar noncontrast MRI of the cervical spine is performed with T1, T2   and STIR sequences. Direct comparison is made to prior plain radiographs of the cervical spine   dated October 2007. The cervicomedullary junction is normal without evidence of tonsillar   ectopia. Cord signal is normal throughout the cervical spine and visualized   upper thoracic levels.  Vertebral body height, marrow signal and alignment is   normal and there is no acute fracture or subluxation. C2-C3: There is no significant central canal stenosis or neural foraminal   narrowing. C3-C4: There is no significant central canal stenosis or neural foraminal   narrowing. C4-C5: There is a broad-based disc osteophyte complex which effaces the   ventral thecal sac. There is uncovertebral joint hypertrophy. There is mild   to moderate moderate bilateral neural foraminal narrowing. C5-C6: There is a small broad-based posterior disc osteophyte complex which   partially effaces the ventral thecal sac. There is no neural foraminal   narrowing. C6-C7: There is no significant central canal stenosis or neural foraminal   narrowing. C7-T1:There is no significant central canal stenosis or neural foraminal   narrowing. IMPRESSION: C4-C5 and C5-C6 degenerative changes, as described above. Interpreting/Reading Doctor: BRUNA HIGUERA ([id number])  Transcribed: n/a on 07/28/2010  Approved: BRUNA Bowens ([id number])  07/28/2010          Distribution:  Attending Doctor: Sonia Crain  Alternate Doctor: Sonia Crain            PET Results (maximum last 3): No results found for this or any previous visit. Assessment and Plan   Nery Clifford was seen today for fall. Diagnoses and all orders for this visit:    Falls frequently  -     MRI BRAIN W WO CONT; Future  -     VITAMIN B12 & FOLATE  -     METHYLMALONIC ACID  -     VITAMIN D, 1, 25 DIHYDROXY  -     GAMMOPATHY EVAL, SPEP/MATTHEW, IG QT/FLC; Future    Concussion, without loss of consciousness, initial encounter  -     MRI BRAIN W WO CONT; Future  -     VITAMIN B12 & FOLATE  -     METHYLMALONIC ACID  -     VITAMIN D, 1, 25 DIHYDROXY  -     GAMMOPATHY EVAL, SPEP/MATTHEW, IG QT/FLC;  Future    Numbness and tingling of both legs  -     MRI BRAIN W WO CONT; Future  -     VITAMIN B12 & FOLATE  -     METHYLMALONIC ACID  -     VITAMIN D, 1, 25 DIHYDROXY  -     GAMMOPATHY EVAL, SPEP/MATTHEW, IG QT/FLC; Future    Vitamin D deficiency   -     VITAMIN D, 1, 25 DIHYDROXY      65-year-old woman with frequent falls in the past 2 years. This could coincide with Ditropan use. I would recommend her primary care doctor discontinue this medication and consider alternative methods for urinary control. This medication has significant anticholinergic effects that can lead to falls in the elderly. She is also complaining of symptoms suggestive of a neuropathy in both feet. Blood work ordered looking for typical causes. She may be developing a sensory ataxia of her gait as a result of this. Her gait today is a little unstable but appropriate. No signs of neurodegenerative disease on exam status Parkinson's. Will consider EMG after blood work done. I am going to order an MRI given the multiple falls and head injuries. Head CT was abnormal done last month. Need to clarify this further. She also has some nystagmus on exam that could be central in origin. I would like to see her in about 6 weeks. A notice of this visit/encounter being completed has been sent electronically to the patient's PCP and/or referring provider.      [address] [place] [address], [facility] [address]  Diplomate GE

## 2017-01-27 LAB
1,25(OH)2D3 SERPL-MCNC: 17.2 PG/ML (ref 19.9–79.3)
ALBUMIN SERPL ELPH-MCNC: 3.6 G/DL (ref 2.9–4.4)
ALBUMIN/GLOB SERPL: 1.3 {RATIO} (ref 0.7–1.7)
ALPHA1 GLOB SERPL ELPH-MCNC: 0.3 G/DL (ref 0–0.4)
ALPHA2 GLOB SERPL ELPH-MCNC: 0.8 G/DL (ref 0.4–1)
B-GLOBULIN SERPL ELPH-MCNC: 1.1 G/DL (ref 0.7–1.3)
FOLATE SERPL-MCNC: >20 NG/ML
GAMMA GLOB SERPL ELPH-MCNC: 0.8 G/DL (ref 0.4–1.8)
GLOBULIN SER-MCNC: 3 G/DL (ref 2.2–3.9)
IGA SERPL-MCNC: 229 MG/DL (ref 64–422)
IGG SERPL-MCNC: 750 MG/DL (ref 700–1600)
IGM SERPL-MCNC: 45 MG/DL (ref 26–217)
INTERPRETATION SERPL IEP-IMP: ABNORMAL
KAPPA LC FREE SER-MCNC: 22.56 MG/L (ref 3.3–19.4)
KAPPA LC FREE/LAMBDA FREE SER: 1.28 {RATIO} (ref 0.26–1.65)
LAMBDA LC FREE SERPL-MCNC: 17.64 MG/L (ref 5.71–26.3)
M PROTEIN SERPL ELPH-MCNC: ABNORMAL G/DL
METHYLMALONATE SERPL-SCNC: 164 NMOL/L (ref 0–378)
PLEASE NOTE:, 149534: ABNORMAL
PROT SERPL-MCNC: 6.6 G/DL (ref 6–8.5)
VIT B12 SERPL-MCNC: 855 PG/ML (ref 211–946)

## 2017-01-31 ENCOUNTER — HOSPITAL ENCOUNTER (OUTPATIENT)
Dept: MRI IMAGING | Age: 77
Discharge: HOME | End: 2017-01-31
Attending: PSYCHIATRY & NEUROLOGY
Payer: MEDICARE

## 2017-01-31 ENCOUNTER — HOSPITAL ENCOUNTER (OUTPATIENT)
Dept: MAMMOGRAPHY | Age: 77
Discharge: HOME | End: 2017-01-31
Attending: INTERNAL MEDICINE
Payer: MEDICARE

## 2017-01-31 VITALS — BODY MASS INDEX: 35.24 KG/M2 | WEIGHT: 185 LBS

## 2017-01-31 DIAGNOSIS — R20.0 NUMBNESS AND TINGLING OF BOTH LEGS: ICD-10-CM

## 2017-01-31 DIAGNOSIS — R20.2 NUMBNESS AND TINGLING OF BOTH LEGS: ICD-10-CM

## 2017-01-31 DIAGNOSIS — Z12.31 ENCOUNTER FOR MAMMOGRAM TO ESTABLISH BASELINE MAMMOGRAM: ICD-10-CM

## 2017-01-31 DIAGNOSIS — S06.0X0A CONCUSSION, WITHOUT LOSS OF CONSCIOUSNESS, INITIAL ENCOUNTER: ICD-10-CM

## 2017-01-31 DIAGNOSIS — R29.6 FALLS FREQUENTLY: ICD-10-CM

## 2017-01-31 LAB — CREAT BLD-MCNC: 0.7 MG/DL (ref 0.6–1.3)

## 2017-01-31 PROCEDURE — 77067 SCR MAMMO BI INCL CAD: CPT

## 2017-01-31 PROCEDURE — 74011250636 HC RX REV CODE- 250/636: Performed by: PSYCHIATRY & NEUROLOGY

## 2017-01-31 PROCEDURE — A9585 GADOBUTROL INJECTION: HCPCS | Performed by: PSYCHIATRY & NEUROLOGY

## 2017-01-31 PROCEDURE — 82565 ASSAY OF CREATININE: CPT

## 2017-01-31 PROCEDURE — 70553 MRI BRAIN STEM W/O & W/DYE: CPT

## 2017-01-31 RX ADMIN — GADOBUTROL 8.5 ML: 604.72 INJECTION INTRAVENOUS at 15:08

## 2017-02-03 ENCOUNTER — TELEPHONE (OUTPATIENT)
Dept: INTERNAL MEDICINE CLINIC | Age: 77
End: 2017-02-03

## 2017-02-03 ENCOUNTER — TELEPHONE (OUTPATIENT)
Dept: NEUROLOGY | Age: 77
End: 2017-02-03

## 2017-02-03 DIAGNOSIS — J06.9 VIRAL URI: ICD-10-CM

## 2017-02-03 RX ORDER — CODEINE PHOSPHATE AND GUAIFENESIN 10; 100 MG/5ML; MG/5ML
10 SOLUTION ORAL
Qty: 100 ML | Refills: 0 | OUTPATIENT
Start: 2017-02-03 | End: 2017-08-10 | Stop reason: ALTCHOICE

## 2017-02-03 NOTE — TELEPHONE ENCOUNTER
Patient asking for a refill of the robitussin for nighttime coughing. Saw Dr. Avila Gone for bronchitis on 1/12 and was given this med. He refused the refill request on 2/2. Please let patient know if Dr. Raejean Collet will refill.

## 2017-02-08 RX ORDER — MELOXICAM 15 MG/1
15 TABLET ORAL DAILY
Qty: 90 TAB | Refills: 0 | Status: SHIPPED | OUTPATIENT
Start: 2017-02-08 | End: 2017-05-27 | Stop reason: SDUPTHER

## 2017-02-16 RX ORDER — ACETAMINOPHEN 500 MG
2000 TABLET ORAL 2 TIMES DAILY
Qty: 90 CAP | Refills: 1 | COMMUNITY
Start: 2017-02-16

## 2017-02-16 NOTE — TELEPHONE ENCOUNTER
Spoke with pt  Inez De La Cruz who is on HIPPA. 2 pt identifiers. Advised that pt does not need rx for Vit D, to take 2000 iu Vit D daily.

## 2017-02-17 ENCOUNTER — TELEPHONE (OUTPATIENT)
Dept: INTERNAL MEDICINE CLINIC | Age: 77
End: 2017-02-17

## 2017-02-24 ENCOUNTER — TELEPHONE (OUTPATIENT)
Dept: INTERNAL MEDICINE CLINIC | Age: 77
End: 2017-02-24

## 2017-02-24 NOTE — TELEPHONE ENCOUNTER
Patient was told not to take the incontinence medication by her neurologist.  Had the MRI and is now going to PT. Is having to get up every hour to hour and a half at night to urinate and sometimes her bowels move. Wonders if there is anything she can take/use to help.

## 2017-02-27 NOTE — TELEPHONE ENCOUNTER
Spoke with pt and advised that she needs to contact her Neurologist to discuss why med was d/c. She will contact them.

## 2017-03-03 ENCOUNTER — OFFICE VISIT (OUTPATIENT)
Dept: NEUROLOGY | Age: 77
End: 2017-03-03

## 2017-03-03 VITALS
DIASTOLIC BLOOD PRESSURE: 76 MMHG | OXYGEN SATURATION: 98 % | RESPIRATION RATE: 18 BRPM | SYSTOLIC BLOOD PRESSURE: 140 MMHG | HEART RATE: 58 BPM

## 2017-03-03 DIAGNOSIS — N39.498 OTHER URINARY INCONTINENCE: ICD-10-CM

## 2017-03-03 DIAGNOSIS — E55.9 VITAMIN D DEFICIENCY: ICD-10-CM

## 2017-03-03 DIAGNOSIS — R20.0 NUMBNESS AND TINGLING OF BOTH LEGS: Primary | ICD-10-CM

## 2017-03-03 DIAGNOSIS — R20.2 NUMBNESS AND TINGLING OF BOTH LEGS: Primary | ICD-10-CM

## 2017-03-03 DIAGNOSIS — S06.0X0S CONCUSSION, WITHOUT LOSS OF CONSCIOUSNESS, SEQUELA: ICD-10-CM

## 2017-03-03 NOTE — MR AVS SNAPSHOT
Visit Information Date & Time Provider Department Dept. Phone Encounter #  
 3/3/2017 11:15 AM DO Ric Alvarenga [facility] at [place] [id number]  Follow-up Instructions Return in about 3 months (around 6/3/2017), or for EMG and at 3 mow. Routing History Upcoming Health Maintenance Date Due ZOSTER VACCINE AGE 60> 6/14/1999 Pneumococcal 65+ Low/Medium Risk (2 of 2 - PPSV23) 4/13/2016 MEDICARE YEARLY EXAM 3/25/2017 GLAUCOMA SCREENING Q2Y 2/21/2019 DTaP/Tdap/Td series (2 - Td) 11/1/2026 Allergies as of 3/3/2017  Review Complete On: 3/3/2017 By: Kenny Zepeda LPN Severity Noted Reaction Type Reactions Codeine  11/23/2009    Rash Neomycin  11/23/2009    Rash Polysporin [Bacitracin-polymyxin B]  11/23/2009    Rash Protonix [Pantoprazole]  11/23/2009    Other (comments) Gi upset Current Immunizations  Reviewed on 3/26/2015 Name Date Influenza High Dose Vaccine PF 11/4/2016, 10/7/2015 Influenza Vaccine Split 10/14/2012 Influenza Vaccine Whole 10/15/2011 Pneumococcal Conjugate (PCV-13) 4/13/2015 Tdap 11/1/2016 Not reviewed this visit You Were Diagnosed With   
  
 Codes Comments Numbness and tingling of both legs    -  Primary ICD-10-CM: R20.2 ICD-9-CM: 206. 0 Vitamin D deficiency     ICD-10-CM: E55.9 ICD-9-CM: 268.9 Concussion, without loss of consciousness, sequela ([facility] [address]     ICD-10-CM: S06.0X0S 
ICD-9-CM: 907.0 Other urinary incontinence     ICD-10-CM: A44.970 ICD-9-CM: 788.39 Vitals BP  
  
  
  
  
  
 [address] Preferred Pharmacy Pharmacy Name Phone [phone] [facility] [phone] Your Updated Medication List  
  
   
This list is accurate as of: 3/3/17 11:22 AM.  Always use your most recent med list.  
  
  
  
  
 aspirin 81 mg tablet Take  by mouth. Benefiber Clear 3 gram/3.5 gram Powd Generic drug:  wheat dextrin Take  by mouth.  
  
 calcium citrate-vitamin d3 315-200 mg-unit Tab Commonly known as:  CITRACAL+D Take 1 Tab by mouth daily (with breakfast). Cholecalciferol (Vitamin D3) 2,000 unit Cap capsule Commonly known as:  VITAMIN D3 Take 2,000 Units by mouth two (2) times a day. DULoxetine 60 mg capsule Commonly known as:  CYMBALTA TAKE 1 CAPSULE EVERY DAY  
  
 famotidine 40 mg tablet Commonly known as:  PEPCID Take 40 mg by mouth daily. FLORASTOR PO Take  by mouth. fluticasone 50 mcg/actuation nasal spray Commonly known as:  Renny Riser 2 Sprays by Both Nostrils route daily. guaiFENesin-codeine 100-10 mg/5 mL solution Commonly known as:  ROBITUSSIN AC Take 10 mL by mouth four (4) times daily as needed for Cough (severe cough). Max Daily Amount: 40 mL. lisinopril 30 mg tablet Commonly known as:  PRINIVIL, ZESTRIL  
TAKE 1 TABLET BY MOUTH EVERY DAY  
  
 LUTEIN PO Take  by mouth.  
  
 meloxicam 15 mg tablet Commonly known as:  MOBIC Take 1 Tab by mouth daily. MIRALAX 17 gram packet Generic drug:  polyethylene glycol Take 17 g by mouth daily. MULTIVITAMIN PO Take  by mouth. BRANDON 128 2 % ophthalmic solution Generic drug:  sodium chloride Administer 1 Drop to both eyes as needed. oxybutynin 5 mg tablet Commonly known as:  WVNQLZTG Take 1 Tab by mouth three (3) times daily. ROBITUSSIN DM MAX PO Take  by mouth.  
  
 sotalol 120 mg tablet Commonly known as:  Danielle Ricks Take 120 mg by mouth two (2) times a day. SYNTHROID 150 mcg tablet Generic drug:  levothyroxine Take 1 Tab by mouth Daily (before breakfast). timolol 0.5 % ophthalmic solution Commonly known as:  TIMOPTIC Administer 1 Drop to right eye daily. VISION-ELTON PRESERVE PO Take  by mouth. We Performed the Following REFERRAL TO UROGYNECOLOGY [id number] CPT(R)] Comments:  
 Patient with urinary incontinence. To discuss any other possible intervention beyond medication. Follow-up Instructions Return in about 3 months (around 6/3/2017), or for EMG and at 3 mow. To-Do List   
 03/03/2017 Neurology:  EMG NCV MOTOR WITH F/WAVE PER NERVE Referral Information Referral ID Referred By Referred To  
  
 [id number] Leila Lizama MD   
   [address] [facility] [place], [address] Phone: [phone] Fax: [phone] Visits Status Start Date End Date 1 New Request 3/3/17 3/3/18 If your referral has a status of pending review or denied, additional information will be sent to support the outcome of this decision. Patient Instructions PRESCRIPTION REFILL POLICY [facility] Statement to Patients April 1, 2014 In an effort to ensure the large volume of patient prescription refills is processed in the most efficient and expeditious manner, we are asking our patients to assist us by calling your Pharmacy for all prescription refills, this will include also your  Mail Order Pharmacy. The pharmacy will contact our office electronically to continue the refill process. Please do not wait until the last minute to call your pharmacy. We need at least 48 hours (2days) to fill prescriptions. We also encourage you to call your pharmacy before going to  your prescription to make sure it is ready. With regard to controlled substance prescription refill requests (narcotic refills) that need to be picked up at our office, we ask your cooperation by providing us with at least 72 hours (3days) notice that you will need a refill. We will not refill narcotic prescription refill requests after 4:00pm on any weekday, Monday through Thursday, or after 2:00pm on Fridays, or on the weekends. We encourage everyone to explore another way of getting your prescription refill request processed using [employer], our patient web portal through our electronic medical record system. [employer] is an efficient and effective way to communicate your medication request directly to the office and  downloadable as an lyndsay on your smart phone . [employer] also features a review functionality that allows you to view your medication list as well as leave messages for your physician. Are you ready to get connected? If so please review the attatched instructions or speak to any of our staff to get you set up right away! Thank you so much for your cooperation. Should you have any questions please contact our Practice Administrator. The Physicians and Staff,  [facility] Neurology [facility] Thank you for choosing [facility] and [facility] for your  
 
care. You may receive a survey about your visit. We appreciate you taking time  
 
to complete this survey as we use your feedback to improve our services. We  
 
realize we are not perfect, but we strive to provide excellent care. Introducing [facility] & HEALTH SERVICES! Dear Liana Burton: Thank you for requesting a [employer] account. Our records indicate that you already have an active [employer] account. You can access your account anytime at [email or web address] Did you know that you can access your hospital and ER discharge instructions at any time in [employer]? You can also review all of your test results from your hospital stay or ER visit. Additional Information If you have questions, please visit the Frequently Asked Questions section of the [employer] website at [email or web address] Remember, [employer] is NOT to be used for urgent needs. For medical emergencies, dial 911. Now available from your iPhone and Android! Please provide this summary of care documentation to your next provider. Your primary care clinician is listed as [phone] If you have any questions after today's visit, please call [phone].

## 2017-03-03 NOTE — PATIENT INSTRUCTIONS
[facility] Neurology Clinic   Statement to Patients  April 1, 2014      In an effort to ensure the large volume of patient prescription refills is processed in the most efficient and expeditious manner, we are asking our patients to assist us by calling your Pharmacy for all prescription refills, this will include also your  Mail Order Pharmacy. The pharmacy will contact our office electronically to continue the refill process. Please do not wait until the last minute to call your pharmacy. We need at least 48 hours (2days) to fill prescriptions. We also encourage you to call your pharmacy before going to  your prescription to make sure it is ready. With regard to controlled substance prescription refill requests (narcotic refills) that need to be picked up at our office, we ask your cooperation by providing us with at least 72 hours (3days) notice that you will need a refill. We will not refill narcotic prescription refill requests after 4:00pm on any weekday, Monday through Thursday, or after 2:00pm on Fridays, or on the weekends. We encourage everyone to explore another way of getting your prescription refill request processed using [employer], our patient web portal through our electronic medical record system. [employer] is an efficient and effective way to communicate your medication request directly to the office and  downloadable as an lyndsay on your smart phone . [employer] also features a review functionality that allows you to view your medication list as well as leave messages for your physician. Are you ready to get connected? If so please review the attatched instructions or speak to any of our staff to get you set up right away! Thank you so much for your cooperation. Should you have any questions please contact our Practice Administrator.     The Physicians and Staff,  [facility]     Thank you for choosing [facility] and [facility] for your     care. You may receive a survey about your visit. We appreciate you taking time     to complete this survey as we use your feedback to improve our services. We     realize we are not perfect, but we strive to provide excellent care.

## 2017-03-03 NOTE — PROGRESS NOTES
Chief Complaint   Patient presents with    Fall       HPI    Davion Mars is a 54-year-old woman here with her  to follow-up. I began evaluating her after she had multiple falls with subsequent concussions. She is currently participating in concussion rehab. Since her last visit she completed the MRI brain which shows some mild age-related changes only. Blood work revealing a notable vitamin D deficiency for which she is replacing over-the-counter. She did stop Ditropan and she thinks it might have helped her balance slightly. She still has numbness in her feet that sometimes can become painful or uncomfortable. Sometimes she uses Vicks topical and that seems to have some benefit. Review of Systems   Genitourinary: Positive for urgency. Neurological: Positive for sensory change. All other systems reviewed and are negative. Past Medical History:   Diagnosis Date    Arthritis     Basal cell carcinoma 05/20/2012    Calculus of kidney     Cancer ([facility] [address]     skin    Cancer ([facility] [address]     thyroid    Glaucoma 4/30/2010    Hypertension     OA (osteoarthritis) 4/30/2010    Psoriasis 4/30/2010     Family History   Problem Relation Age of Onset    Hypertension Mother     Heart Disease Mother     Heart Disease Father     Hypertension Father     Elevated Lipids Father     Heart Disease Brother     Psychiatric Disorder Brother     Diabetes Brother     Breast Cancer Maternal Aunt     Breast Cancer Paternal Aunt      Social History     Social History    Marital status:      Spouse name: N/A    Number of children: N/A    Years of education: N/A     Occupational History    Not on file.      Social History Main Topics    Smoking status: Former Smoker     Packs/day: 0.50     Years: 4.00     Types: Cigarettes     Quit date: 1/1/1961    Smokeless tobacco: Never Used    Alcohol use No    Drug use: No    Sexual activity: Not on file     Other Topics Concern    Not on file     Social History Narrative     Allergies   Allergen Reactions    Codeine Rash    Neomycin Rash    Polysporin [Bacitracin-Polymyxin B] Rash    Protonix [Pantoprazole] Other (comments)     Gi upset         Current Outpatient Prescriptions   Medication Sig    Cholecalciferol, Vitamin D3, (VITAMIN D3) 2,000 unit cap capsule Take 2,000 Units by mouth two (2) times a day.  meloxicam (MOBIC) 15 mg tablet Take 1 Tab by mouth daily.  guaiFENesin-codeine (ROBITUSSIN AC) 100-10 mg/5 mL solution Take 10 mL by mouth four (4) times daily as needed for Cough (severe cough). Max Daily Amount: 40 mL.  GUAIFENESIN/DEXTROMETHORPHAN (ROBITUSSIN DM MAX PO) Take  by mouth.  oxybutynin (DITROPAN) 5 mg tablet Take 1 Tab by mouth three (3) times daily.  DULoxetine (CYMBALTA) 60 mg capsule TAKE 1 CAPSULE EVERY DAY    famotidine (PEPCID) 40 mg tablet Take 40 mg by mouth daily.  LUTEIN PO Take  by mouth.  lisinopril (PRINIVIL, ZESTRIL) 30 mg tablet TAKE 1 TABLET BY MOUTH EVERY DAY    sotalol (BETAPACE) 120 mg tablet Take 120 mg by mouth two (2) times a day.  calcium citrate-vitamin d3 (CITRACAL+D) 315-200 mg-unit tab Take 1 Tab by mouth daily (with breakfast).  SACCHAROMYCES BOULARDII (FLORASTOR PO) Take  by mouth.  BETA-CAROTENE,A, W-C & E/ZN/CU (VISION-ELTON PRESERVE PO) Take  by mouth.  wheat dextrin (BENEFIBER CLEAR) 3 gram/3.5 gram powd Take  by mouth.  polyethylene glycol (MIRALAX) 17 gram packet Take 17 g by mouth daily.  fluticasone (FLONASE) 50 mcg/actuation nasal spray 2 Sprays by Both Nostrils route daily.  sodium chloride (BRANDON 128) 2 % ophthalmic solution Administer 1 Drop to both eyes as needed.  SYNTHROID 150 mcg tablet Take 1 Tab by mouth Daily (before breakfast).  timolol (TIMOPTIC) 0.5 % ophthalmic solution Administer 1 Drop to right eye daily.  aspirin 81 mg Tab Take  by mouth.  MULTIVITAMINS (MULTIVITAMIN PO) Take  by mouth.      No current facility-administered medications for this visit. Neurologic Exam     Mental Status        WD/WN adult in NAD, normal grooming  VSS  A&O x 3    PERRL, nonicteric  Face is symmetric, tongue midline  Speech is fluent and clear  No limb ataxia. No abnl movements. Moving all extemities spontaneously and symmetric  Slightly wide cautious gait with single-point cane, good tempo. Absent vibratory sensation distal toes bilaterally      CVS RRR  Lungs nonlabored  Skin is warm and dry         Visit Vitals    /76    Pulse (!) 58    Resp 18    SpO2 98%       Assessment and Plan   Fabby Fair was seen today for fall. Diagnoses and all orders for this visit:    Numbness and tingling of both legs  -     EMG NCV MOTOR WITH F/WAVE PER NERVE; Future    Vitamin D deficiency     Concussion, without loss of consciousness, sequela ([address]    Other urinary incontinence  -     REFERRAL TO UROGYNECOLOGY      76-year-old woman who is having several falls and subsequent concussions. By discontinuing Ditropan for period of time she did feel some increased ability. Ditropan has anticholinergic effect which can lead to increased falls in the elderly. However, I also suspect an underlying peripheral neuropathy contributing. We will obtain a nerve conduction study. I am going to send her to urogynecology for any opinion if there is other intervention to control her incontinence aside from medication. She would prefer to avoid returning Ditropan if possible but I did discuss with her that if she needs to return to it for quality of life purposes it is reasonable but to be aware of the falls risk. She will follow-up for the EMG and for medication management. I reviewed and decided to continue the current medications.       [address] [facility], [facility] [address]  Diplomate BERTN

## 2017-03-08 ENCOUNTER — TELEPHONE (OUTPATIENT)
Dept: NEUROLOGY | Age: 77
End: 2017-03-08

## 2017-03-08 NOTE — TELEPHONE ENCOUNTER
Pt calling, received a phone call from [facility] to schedule an emg, and scheduled an appt for 3/14 @ 11:30 also has an emg scheduled for 3/13 with Dr. Randee Osler on 3/13 @ 8am, would rather take the later appt time at [facility] but wanted what Dr. Randee Osler would rather have her do. Please call to discuss.

## 2017-03-09 NOTE — TELEPHONE ENCOUNTER
Pt would like to keep her 8:00am appt with Dr. Richard Mallory unless something later come up that day.

## 2017-03-13 ENCOUNTER — OFFICE VISIT (OUTPATIENT)
Dept: NEUROLOGY | Age: 77
End: 2017-03-13

## 2017-03-13 VITALS
RESPIRATION RATE: 18 BRPM | HEIGHT: 60 IN | HEART RATE: 60 BPM | BODY MASS INDEX: 36.32 KG/M2 | WEIGHT: 185 LBS | OXYGEN SATURATION: 98 % | SYSTOLIC BLOOD PRESSURE: 140 MMHG | DIASTOLIC BLOOD PRESSURE: 76 MMHG | TEMPERATURE: 98.3 F

## 2017-03-13 DIAGNOSIS — G60.8 POLYNEUROPATHY, PERIPHERAL SENSORIMOTOR AXONAL: Primary | ICD-10-CM

## 2017-03-13 NOTE — MR AVS SNAPSHOT
Visit Information Date & Time Provider Department Dept. Phone Encounter #  
 3/13/2017  8:00 AM  [facility] at [address] [id number] Follow-up Instructions Routing History Your Appointments 6/9/2017  9:40 AM  
Follow Up with DO Cole Hickman [facility] at [facility] [address]) Appt Note: 3 months F/U. .$0CP. .3/3/17. Oz donato  
 [address] [facility] [address]  
[phone]  
  
   
 [address]  [address] Upcoming Health Maintenance Date Due ZOSTER VACCINE AGE 60> 6/14/1999 Pneumococcal 65+ Low/Medium Risk (2 of 2 - PPSV23) 4/13/2016 MEDICARE YEARLY EXAM 3/25/2017 GLAUCOMA SCREENING Q2Y 2/21/2019 DTaP/Tdap/Td series (2 - Td) 11/1/2026 Allergies as of 3/13/2017  Review Complete On: 3/3/2017 By: Raul Dinero DO Severity Noted Reaction Type Reactions Codeine  11/23/2009    Rash Neomycin  11/23/2009    Rash Polysporin [Bacitracin-polymyxin B]  11/23/2009    Rash Protonix [Pantoprazole]  11/23/2009    Other (comments) Gi upset Current Immunizations  Reviewed on 3/26/2015 Name Date Influenza High Dose Vaccine PF 11/4/2016, 10/7/2015 Influenza Vaccine Split 10/14/2012 Influenza Vaccine Whole 10/15/2011 Pneumococcal Conjugate (PCV-13) 4/13/2015 Tdap 11/1/2016 Not reviewed this visit You Were Diagnosed With   
  
 Codes Comments Polyneuropathy, peripheral sensorimotor axonal    -  Primary ICD-10-CM: G60.8 ICD-9-CM: 356.8 Vitals BP Pulse Temp Resp Height(growth percentile) Weight(growth percentile) 140/76 (BP 1 Location: Left arm, BP Patient Position: Sitting) 60 98.3 °F (36.8 °C) (Oral) 18 5' (1.524 m) 185 lb (83.9 kg) SpO2 BMI OB Status Smoking Status 98% 36.13 kg/m2 Hysterectomy Former Smoker BMI and BSA Data Body Mass Index Body Surface Area  
 36.13 kg/m 2 1.88 m 2 Preferred Pharmacy Pharmacy Name Phone [phone] [facility] [phone] Your Updated Medication List  
  
   
This list is accurate as of: 3/13/17  9:02 AM.  Always use your most recent med list.  
  
  
  
  
 aspirin 81 mg tablet Take  by mouth. Benefiber Clear 3 gram/3.5 gram Powd Generic drug:  wheat dextrin Take  by mouth.  
  
 calcium citrate-vitamin d3 315-200 mg-unit Tab Commonly known as:  CITRACAL+D Take 1 Tab by mouth daily (with breakfast). Cholecalciferol (Vitamin D3) 2,000 unit Cap capsule Commonly known as:  VITAMIN D3 Take 2,000 Units by mouth two (2) times a day. DULoxetine 60 mg capsule Commonly known as:  CYMBALTA TAKE 1 CAPSULE EVERY DAY  
  
 famotidine 40 mg tablet Commonly known as:  PEPCID Take 40 mg by mouth daily. FLORASTOR PO Take  by mouth. fluticasone 50 mcg/actuation nasal spray Commonly known as:  Imagene Poot 2 Sprays by Both Nostrils route daily. guaiFENesin-codeine 100-10 mg/5 mL solution Commonly known as:  ROBITUSSIN AC Take 10 mL by mouth four (4) times daily as needed for Cough (severe cough). Max Daily Amount: 40 mL. lisinopril 30 mg tablet Commonly known as:  PRINIVIL, ZESTRIL  
TAKE 1 TABLET BY MOUTH EVERY DAY  
  
 LUTEIN PO Take  by mouth.  
  
 meloxicam 15 mg tablet Commonly known as:  MOBIC Take 1 Tab by mouth daily. MIRALAX 17 gram packet Generic drug:  polyethylene glycol Take 17 g by mouth daily. MULTIVITAMIN PO Take  by mouth. BRANDON 128 2 % ophthalmic solution Generic drug:  sodium chloride Administer 1 Drop to both eyes as needed. oxybutynin 5 mg tablet Commonly known as:  IEQVBVQW Take 1 Tab by mouth three (3) times daily. ROBITUSSIN DM MAX PO Take  by mouth.  
  
 sotalol 120 mg tablet Commonly known as:  Mylinda Saint  
 Take 120 mg by mouth two (2) times a day. SYNTHROID 150 mcg tablet Generic drug:  levothyroxine Take 1 Tab by mouth Daily (before breakfast). timolol 0.5 % ophthalmic solution Commonly known as:  TIMOPTIC Administer 1 Drop to right eye daily. VISION-ELTON PRESERVE PO Take  by mouth. Introducing \[facility] [place] [facility]\"" & HEALTH SERVICES! Dear Walker Castillo: Thank you for requesting a [employer] account. Our records indicate that you have previously registered for a [employer] account but its currently inactive. Please call our [employer] support line at [phone]. Additional Information If you have questions, please visit the Frequently Asked Questions section of the [employer] website at [email or web address] Remember, [employer] is NOT to be used for urgent needs. For medical emergencies, dial 911. Now available from your iPhone and Android! Please provide this summary of care documentation to your next provider. Your primary care clinician is listed as Lyssa [phone] If you have any questions after today's visit, please call [phone].

## 2017-03-13 NOTE — LETTER
3/13/2017 Patient:  Omero Leung YOB: 1940 Date of Visit: 3/13/2017 Dear Nicole Lazo MD 
[address] [place] [address] [address] [place] [address] VIA In Basket : 
 
 
I was requested by Nicole Lazo MD to evaluate Ms. Sarah Balderas  for Chief Complaint Patient presents with  Procedure EMG Oris Bell I am recommending the following:  
 
Prisca Ledesma was seen today for procedure. Diagnoses and all orders for this visit: 
 
Polyneuropathy, peripheral sensorimotor axonal 
 
 
 
---------------------------------------------------------------------------------------------------------------------- Below is my encounter: 
 
  
 
 
[facility] [address] [place] [address], [address] [facility] [address] Phone [phone] Fax [phone] Test Date:  3/13/2017 Patient: Sarah Balderas : 1940 Physician: Baltazar Goodpasture. Edmonson, DO Sex: Female Height: 5' 0\" Ref Phys: Baltazar Goodpasture. Edmonson, DO ID#: [id number] Weight: 185 lbs. Technician: Flor Carroll Patient Complaints: 
b/l lower w/f wave Patient History / Exam: BLE painful numbness. See neuro notes NCV & EMG Findings: 
Evaluation of the right peroneal motor nerve showed prolonged distal onset latency (7.9 ms), reduced amplitude (1.2 mV), and decreased conduction velocity (Poplt-B Fib, 27 m/s). The left tibial motor and the right tibial motor nerves showed decreased conduction velocity (Knee-Ankle, L32, R33 m/s). Absent BL SNAPs. All F Wave latencies were within normal limits. All F Wave left vs. right side latency differences were within normal limits. Impression: 
There is EDx e/o a sensorimotor axonal, R>L, with demyelinating features. Recommendations: 
Cont current management. F/u in 2-3 mos 
 
___________________________ Elizabeth Pradhan, DO 
NEUROLOGIST Nerve Conduction Studies Anti Sensory Summary Table Stim Site NR Peak (ms) Norm Peak (ms) P-T Amp (µV) Norm P-T Amp Site1 Site2 Delta-P (ms) Dist (cm) Keaton (m/s) Norm Keaton (m/s) Left Sup Peroneal Anti Sensory (Ant Lat Mall)  21.4°C  
14 cm NR  <4.4  >5.0 14 cm Ant Lat Mall  14.0  >32 Right Sup Peroneal Anti Sensory (Ant Lat Mall)  21.4°C  
14 cm NR  <4.4  >5.0 14 cm Ant Lat Mall  14.0  >32 Left Sural Anti Sensory (Lat Mall)  21.3°C Calf NR  <4.0  >5.0 Calf Lat Mall  14.0  >35 Right Sural Anti Sensory (Lat Mall)  21.5°C Calf NR  <4.0  >5.0 Calf Lat Mall  14.0  >35 Motor Summary Table Stim Site NR Onset (ms) Norm Onset (ms) O-P Amp (mV) Norm O-P Amp Site1 Site2 Delta-0 (ms) Dist (cm) Keaton (m/s) Norm Keaton (m/s) Left Peroneal Motor (Ext Dig Brev)  21.3°C Ankle    3.6 <6.1 2.5 >2.5 B Fib Ankle 7.1 34.0 48 >38 B Fib    10.7  2.1  Poplt B Fib 2.2 10.0 45 >40 Poplt    12.9  1.5 Right Peroneal Motor (Ext Dig Brev)  21.6°C Ankle    7.9 <6.1 1.2 >2.5 B Fib Ankle 5.9 32.0 54 >38 B Fib    13.8  0.6  Poplt B Fib 3.7 10.0 27 >40 Poplt    17.5  0.5 Left Tibial Motor (Abd King Brev)  21.3°C Ankle    3.6 <6.1 9.0 >3.0 Knee Ankle 10.4 33.0 32 >35 Knee    14.0  5.6 Right Tibial Motor (Abd King Brev)  21.6°C Ankle    4.1 <6.1 7.3 >3.0 Knee Ankle 10.0 33.0 33 >35 Knee    14.1  2.9 F Wave Studies NR F-Lat (ms) Lat Norm (ms) L-R F-Lat (ms) L-R Lat Norm Left Tibial (Mrkrs) (Abd Hallucis)  21.3°C  
   52.55 <61 0.85 <5.7 Right Tibial (Mrkrs) (Abd Hallucis)  21.5°C  
   51.71 <61 0.85 <5.7 Waveforms: Thank you for giving me the opportunity to assist in the care of Ms. Marijo Brittle. If you have questions, please do not hesitate to contact me. Sincerely, Venkat Yang,  Neurologist 
Diplomate ABPN

## 2017-03-13 NOTE — COMMUNICATION BODY
[address] [facility] Neurology [facility] [place]  [address] [place] [address] [address] V[address]  Phone [phone] Fax [phone]  Test Date:  3/13/2017    Patient: Ej Knapp : 1940 Physician: Pardeep Pradhan DO   Sex: Female Height: 5' 0\" Ref Phys: Pardeep Pradhan DO   ID#: [id number] Weight: 185 lbs. Technician: Dottie Sandoval     Patient Complaints:  b/l lower w/f wave    Patient History / Exam:  BLE painful numbness. See neuro notes      NCV & EMG Findings:  Evaluation of the right peroneal motor nerve showed prolonged distal onset latency (7.9 ms), reduced amplitude (1.2 mV), and decreased conduction velocity (Poplt-B Fib, 27 m/s). The left tibial motor and the right tibial motor nerves showed decreased conduction velocity (Knee-Ankle, L32, R33 m/s). Absent BL SNAPs. All F Wave latencies were within normal limits. All F Wave left vs. right side latency differences were within normal limits. Impression:  There is EDx e/o a sensorimotor axonal, R>L, with demyelinating features. Recommendations:  Cont current management. F/u in 2-3 mos    ___________________________  [id number] LISSA Pradhan DO  NEUROLOGIST        Nerve Conduction Studies  Anti Sensory Summary Table     Stim Site NR Peak (ms) Norm Peak (ms) P-T Amp (µV) Norm P-T Amp Site1 Site2 Delta-P (ms) Dist (cm) Keaton (m/s) Norm Keaton (m/s)   Left Sup Peroneal Anti Sensory (Ant Lat Mall)  21.4°C   14 cm NR  <4.4  >5.0 14 cm Ant Lat Mall  14.0  >32   Right Sup Peroneal Anti Sensory (Ant Lat Mall)  21.4°C   14 cm NR  <4.4  >5.0 14 cm Ant Lat Mall  14.0  >32   Left Sural Anti Sensory (Lat Mall)  21.3°C   Calf NR  <4.0  >5.0 Calf Lat Mall  14.0  >35   Right Sural Anti Sensory (Lat Mall)  21.5°C   Calf NR  <4.0  >5.0 Calf Lat Mall  14.0  >35     Motor Summary Table     Stim Site NR Onset (ms) Norm Onset (ms) O-P Amp (mV) Norm O-P Amp Site1 Site2 Delta-0 (ms) Dist (cm) Keaton (m/s) Norm Keaton (m/s) Left Peroneal Motor (Ext Dig Brev)  21.3°C   Ankle    3.6 <6.1 2.5 >2.5 B Fib Ankle 7.1 34.0 48 >38   B Fib    10.7  2.1  Poplt B Fib 2.2 10.0 45 >40   Poplt    12.9  1.5          Right Peroneal Motor (Ext Dig Brev)  21.6°C   Ankle    7.9 <6.1 1.2 >2.5 B Fib Ankle 5.9 32.0 54 >38   B Fib    13.8  0.6  Poplt B Fib 3.7 10.0 27 >40   Poplt    17.5  0.5          Left Tibial Motor (Abd King Brev)  21.3°C   Ankle    3.6 <6.1 9.0 >3.0 Knee Ankle 10.4 33.0 32 >35   Knee    14.0  5.6          Right Tibial Motor (Abd King Brev)  21.6°C   Ankle    4.1 <6.1 7.3 >3.0 Knee Ankle 10.0 33.0 33 >35   Knee    14.1  2.9            F Wave Studies     NR F-Lat (ms) Lat Norm (ms) L-R F-Lat (ms) L-R Lat Norm   Left Tibial (Mrkrs) (Abd Hallucis)  21.3°C      52.55 <61 0.85 <5.7   Right Tibial (Mrkrs) (Abd Hallucis)  21.5°C      51.71 <61 0.85 <5.7         Waveforms:

## 2017-03-13 NOTE — PROCEDURES
93 [facility]  [address] [facility] , [address] [place] [address]  Phone [phone] Fax [phone]  Test Date:  3/13/2017    Patient: Jo-Ann Laguerre : 1940 Physician: Hannah Pradhan DO   Sex: Female Height: 5' 0\" Ref Phys: Hannah Pradhan DO   ID#: [id number] Weight: 185 lbs. Technician: Anahy Benjamin     Patient Complaints:  b/l lower w/f wave    Patient History / Exam:  BLE painful numbness. See neuro notes      NCV & EMG Findings:  Evaluation of the right peroneal motor nerve showed prolonged distal onset latency (7.9 ms), reduced amplitude (1.2 mV), and decreased conduction velocity (Poplt-B Fib, 27 m/s). The left tibial motor and the right tibial motor nerves showed decreased conduction velocity (Knee-Ankle, L32, R33 m/s). Absent BL SNAPs. All F Wave latencies were within normal limits. All F Wave left vs. right side latency differences were within normal limits. Impression:  There is EDx e/o a sensorimotor axonal, R>L, with demyelinating features. Recommendations:  Cont current management. F/u in 2-3 mos    ___________________________  [id number] LISSA Pradhan DO  NEUROLOGIST        Nerve Conduction Studies  Anti Sensory Summary Table     Stim Site NR Peak (ms) Norm Peak (ms) P-T Amp (µV) Norm P-T Amp Site1 Site2 Delta-P (ms) Dist (cm) Keaton (m/s) Norm Keaton (m/s)   Left Sup Peroneal Anti Sensory (Ant Lat Mall)  21.4°C   14 cm NR  <4.4  >5.0 14 cm Ant Lat Mall  14.0  >32   Right Sup Peroneal Anti Sensory (Ant Lat Mall)  21.4°C   14 cm NR  <4.4  >5.0 14 cm Ant Lat Mall  14.0  >32   Left Sural Anti Sensory (Lat Mall)  21.3°C   Calf NR  <4.0  >5.0 Calf Lat Mall  14.0  >35   Right Sural Anti Sensory (Lat Mall)  21.5°C   Calf NR  <4.0  >5.0 Calf Lat Mall  14.0  >35     Motor Summary Table     Stim Site NR Onset (ms) Norm Onset (ms) O-P Amp (mV) Norm O-P Amp Site1 Site2 Delta-0 (ms) Dist (cm) Keaton (m/s) Norm Keaton (m/s) Left Peroneal Motor (Ext Dig Brev)  21.3°C   Ankle    3.6 <6.1 2.5 >2.5 B Fib Ankle 7.1 34.0 48 >38   B Fib    10.7  2.1  Poplt B Fib 2.2 10.0 45 >40   Poplt    12.9  1.5          Right Peroneal Motor (Ext Dig Brev)  21.6°C   Ankle    7.9 <6.1 1.2 >2.5 B Fib Ankle 5.9 32.0 54 >38   B Fib    13.8  0.6  Poplt B Fib 3.7 10.0 27 >40   Poplt    17.5  0.5          Left Tibial Motor (Abd King Brev)  21.3°C   Ankle    3.6 <6.1 9.0 >3.0 Knee Ankle 10.4 33.0 32 >35   Knee    14.0  5.6          Right Tibial Motor (Abd King Brev)  21.6°C   Ankle    4.1 <6.1 7.3 >3.0 Knee Ankle 10.0 33.0 33 >35   Knee    14.1  2.9            F Wave Studies     NR F-Lat (ms) Lat Norm (ms) L-R F-Lat (ms) L-R Lat Norm   Left Tibial (Mrkrs) (Abd Hallucis)  21.3°C      52.55 <61 0.85 <5.7   Right Tibial (Mrkrs) (Abd Hallucis)  21.5°C      51.71 <61 0.85 <5.7         Waveforms:

## 2017-03-14 ENCOUNTER — TELEPHONE (OUTPATIENT)
Dept: NEUROLOGY | Age: 77
End: 2017-03-14

## 2017-04-05 RX ORDER — FLUTICASONE PROPIONATE 50 MCG
2 SPRAY, SUSPENSION (ML) NASAL DAILY
Qty: 3 BOTTLE | Refills: 1 | Status: SHIPPED | OUTPATIENT
Start: 2017-04-05 | End: 2018-03-14 | Stop reason: SDUPTHER

## 2017-04-05 NOTE — TELEPHONE ENCOUNTER
Orders Placed This Encounter    fluticasone (FLONASE) 50 mcg/actuation nasal spray     Si Sprays by Both Nostrils route daily. Dispense:  3 Bottle     Refill:  1     The above medication refills were approved via verbal order by Dr. Daniel Jones III.

## 2017-04-20 ENCOUNTER — TELEPHONE (OUTPATIENT)
Dept: NEUROLOGY | Age: 77
End: 2017-04-20

## 2017-04-20 NOTE — TELEPHONE ENCOUNTER
Pt is having headaches ever since she had her MRI, almost constant. Pt wanted to call to see what the Dr suggested. Takes tylenol twice per day. Never had headaches before.

## 2017-05-08 DIAGNOSIS — J06.9 VIRAL URI: ICD-10-CM

## 2017-05-08 RX ORDER — CODEINE PHOSPHATE AND GUAIFENESIN 10; 100 MG/5ML; MG/5ML
10 SOLUTION ORAL
Qty: 100 ML | Refills: 0 | OUTPATIENT
Start: 2017-05-08

## 2017-05-29 RX ORDER — MELOXICAM 15 MG/1
TABLET ORAL
Qty: 90 TAB | Refills: 0 | Status: SHIPPED | OUTPATIENT
Start: 2017-05-29 | End: 2017-09-11 | Stop reason: SDUPTHER

## 2017-06-09 ENCOUNTER — OFFICE VISIT (OUTPATIENT)
Dept: NEUROLOGY | Age: 77
End: 2017-06-09

## 2017-06-09 VITALS
RESPIRATION RATE: 18 BRPM | HEART RATE: 65 BPM | WEIGHT: 185 LBS | DIASTOLIC BLOOD PRESSURE: 80 MMHG | BODY MASS INDEX: 36.13 KG/M2 | SYSTOLIC BLOOD PRESSURE: 142 MMHG | OXYGEN SATURATION: 98 %

## 2017-06-09 DIAGNOSIS — E55.9 VITAMIN D DEFICIENCY: ICD-10-CM

## 2017-06-09 DIAGNOSIS — R29.6 FALLS FREQUENTLY: ICD-10-CM

## 2017-06-09 DIAGNOSIS — N39.498 OTHER URINARY INCONTINENCE: ICD-10-CM

## 2017-06-09 DIAGNOSIS — G60.8 POLYNEUROPATHY, PERIPHERAL SENSORIMOTOR AXONAL: Primary | ICD-10-CM

## 2017-06-09 NOTE — PATIENT INSTRUCTIONS

## 2017-06-09 NOTE — PROGRESS NOTES
Chief Complaint   Patient presents with    Neuropathy       HPI    Treva Kelly is a 42-year-old woman here to follow-up. I have been seeing her for neuropathy. She has also vitamin D deficiency. We completed a nerve conduction study last visit which showed sensorimotor axonal neuropathy. Since her last visit she has been participating in physical therapy with some improvement. She is also seeing urogynecology. No longer taking Ditropan. 1 of her physicians prescribed her nitroglycerin to use rectally and for some reason she thinks it is helping her neuropathy particularly tingling. One new event has occurred and in the last 2 weeks she actually fell out of bed. She was having some type of vivid dream and rolled off the bed landing she thinks on her left side. Ever since then she has had some low-grade achiness to the left hip and thigh. She is also been feeling somewhat unsteady more than normal and has resorted to using her walker for security. She still gets occasional mild headaches but they are dull and nagging. No vision changes. Responds to pressure to temples. Review of Systems   Musculoskeletal: Positive for falls. Gillie Shadia from bed   Neurological: Positive for tingling. All other systems reviewed and are negative.       Past Medical History:   Diagnosis Date    Arthritis     Basal cell carcinoma 05/20/2012    Calculus of kidney     Cancer ([address]     skin    Cancer ([facility] [address] [facility]     thyroid    Glaucoma 4/30/2010    Hypertension     OA (osteoarthritis) 4/30/2010    Psoriasis 4/30/2010     Family History   Problem Relation Age of Onset    Hypertension Mother     Heart Disease Mother     Heart Disease Father     Hypertension Father     Elevated Lipids Father     Heart Disease Brother     Psychiatric Disorder Brother     Diabetes Brother     Breast Cancer Maternal Aunt     Breast Cancer Paternal Aunt      Social History     Social History    Marital status:      Spouse name: N/A    Number of children: N/A    Years of education: N/A     Occupational History    Not on file. Social History Main Topics    Smoking status: Former Smoker     Packs/day: 0.50     Years: 4.00     Types: Cigarettes     Quit date: 1/1/1961    Smokeless tobacco: Never Used    Alcohol use No    Drug use: No    Sexual activity: Not on file     Other Topics Concern    Not on file     Social History Narrative     Allergies   Allergen Reactions    Codeine Rash    Neomycin Rash    Polysporin [Bacitracin-Polymyxin B] Rash    Protonix [Pantoprazole] Other (comments)     Gi upset         Current Outpatient Prescriptions   Medication Sig    meloxicam (MOBIC) 15 mg tablet TAKE ONE TABLET BY MOUTH ONCE DAILY    fluticasone (FLONASE) 50 mcg/actuation nasal spray 2 Sprays by Both Nostrils route daily.  Cholecalciferol, Vitamin D3, (VITAMIN D3) 2,000 unit cap capsule Take 2,000 Units by mouth two (2) times a day.  guaiFENesin-codeine (ROBITUSSIN AC) 100-10 mg/5 mL solution Take 10 mL by mouth four (4) times daily as needed for Cough (severe cough). Max Daily Amount: 40 mL.  GUAIFENESIN/DEXTROMETHORPHAN (ROBITUSSIN DM MAX PO) Take  by mouth.  oxybutynin (DITROPAN) 5 mg tablet Take 1 Tab by mouth three (3) times daily.  DULoxetine (CYMBALTA) 60 mg capsule TAKE 1 CAPSULE EVERY DAY    famotidine (PEPCID) 40 mg tablet Take 40 mg by mouth daily.  LUTEIN PO Take  by mouth.  lisinopril (PRINIVIL, ZESTRIL) 30 mg tablet TAKE 1 TABLET BY MOUTH EVERY DAY    sotalol (BETAPACE) 120 mg tablet Take 120 mg by mouth two (2) times a day.  calcium citrate-vitamin d3 (CITRACAL+D) 315-200 mg-unit tab Take 1 Tab by mouth daily (with breakfast).  SACCHAROMYCES BOULARDII (FLORASTOR PO) Take  by mouth.  BETA-CAROTENE,A, W-C & E/ZN/CU (VISION-ELTON PRESERVE PO) Take  by mouth.  wheat dextrin (BENEFIBER CLEAR) 3 gram/3.5 gram powd Take  by mouth.     polyethylene glycol (MIRALAX) 17 gram packet Take 17 g by mouth daily.  sodium chloride (BRANDON 128) 2 % ophthalmic solution Administer 1 Drop to both eyes as needed.  SYNTHROID 150 mcg tablet Take 1 Tab by mouth Daily (before breakfast). (Patient taking differently: Take 137 mcg by mouth Daily (before breakfast). )    timolol (TIMOPTIC) 0.5 % ophthalmic solution Administer 1 Drop to right eye daily.  aspirin 81 mg Tab Take  by mouth.  MULTIVITAMINS (MULTIVITAMIN PO) Take  by mouth. No current facility-administered medications for this visit. Neurologic Exam     Mental Status        WD/WN adult in NAD, normal grooming  VSS  A&O x 3    PERRL, nonicteric  Face is symmetric, tongue midline  Speech is fluent and clear  No limb ataxia. No abnl movements. Moving all extemities spontaneously and symmetric  Wide cautious gait. Using a 4 point walker today since her fall. CVS RRR  Lungs nonlabored  Skin is warm and dry         Visit Vitals    /80    Pulse 65    Resp 18    Wt 83.9 kg (185 lb)    SpO2 98%    BMI 36.13 kg/m2       Assessment and Plan   Treva Kelly was seen today for neuropathy. Diagnoses and all orders for this visit:    Polyneuropathy, peripheral sensorimotor axonal    Vitamin D deficiency     Other urinary incontinence    Falls frequently      54-year-old woman with peripheral neuropathy likely idiopathic. Evaluation for neuropathy has been unrevealing with the exception of vitamin D deficiency. She is having some relief unsure why if this is due to nitroglycerin or discontinuation of Ditropan or other factors. She is going to continue with her therapies ongoing. We discussed her left hip and thigh pain. I would like her to see her primary care doctor about that soon. She does have vitamin D deficiency and after a fall I will be worried for an occult fracture. No need for medication changes from neurology. She still seeing urogynecology for intervention of her urinary symptoms.   I would like to see her in 6 months. I reviewed and decided to continue the current medications.       2 [facility], [address]  Diplomate ABPN

## 2017-06-09 NOTE — MR AVS SNAPSHOT
Visit Information Date & Time Provider Department Dept. Phone Encounter #  
 6/9/2017  9:40 AM DO [facility] at [address] [id number] Follow-up Instructions Return in about 6 months (around 12/9/2017). Routing History Upcoming Health Maintenance Date Due ZOSTER VACCINE AGE 60> 6/14/1999 Pneumococcal 65+ Low/Medium Risk (2 of 2 - PPSV23) 4/13/2016 MEDICARE YEARLY EXAM 3/25/2017 INFLUENZA AGE 9 TO ADULT 8/1/2017 GLAUCOMA SCREENING Q2Y 2/21/2019 DTaP/Tdap/Td series (2 - Td) 11/1/2026 Allergies as of 6/9/2017  Review Complete On: 3/13/2017 By: Ghazala Mcnally DO Severity Noted Reaction Type Reactions Codeine  11/23/2009    Rash Neomycin  11/23/2009    Rash Polysporin [Bacitracin-polymyxin B]  11/23/2009    Rash Protonix [Pantoprazole]  11/23/2009    Other (comments) Gi upset Current Immunizations  Reviewed on 3/26/2015 Name Date Influenza High Dose Vaccine PF 11/4/2016, 10/7/2015 Influenza Vaccine Split 10/14/2012 Influenza Vaccine Whole 10/15/2011 Pneumococcal Conjugate (PCV-13) 4/13/2015 Tdap 11/1/2016 Not reviewed this visit You Were Diagnosed With   
  
 Codes Comments Polyneuropathy, peripheral sensorimotor axonal    -  Primary ICD-10-CM: G60.8 ICD-9-CM: 356.8 Vitamin D deficiency     ICD-10-CM: E55.9 ICD-9-CM: 268.9 Other urinary incontinence     ICD-10-CM: K38.783 ICD-9-CM: 788.39 Falls frequently     ICD-10-CM: R29.6 ICD-9-CM: V15.88 Vitals BP Pulse Resp Weight(growth percentile) SpO2 BMI  
 142/80 65 18 185 lb (83.9 kg) 98% 36.13 kg/m2 OB Status Smoking Status Hysterectomy Former Smoker BMI and BSA Data Body Mass Index Body Surface Area  
 36.13 kg/m 2 1.88 m 2 Preferred Pharmacy Pharmacy Name Phone  [facility] [id number] [facility], [address] [phone] Your Updated Medication List  
  
   
This list is accurate as of: 6/9/17  9:53 AM.  Always use your most recent med list.  
  
  
  
  
 aspirin 81 mg tablet Take  by mouth. Benefiber Clear 3 gram/3.5 gram Powd Generic drug:  wheat dextrin Take  by mouth.  
  
 calcium citrate-vitamin d3 315-200 mg-unit Tab Commonly known as:  CITRACAL+D Take 1 Tab by mouth daily (with breakfast). Cholecalciferol (Vitamin D3) 2,000 unit Cap capsule Commonly known as:  VITAMIN D3 Take 2,000 Units by mouth two (2) times a day. DULoxetine 60 mg capsule Commonly known as:  CYMBALTA TAKE 1 CAPSULE EVERY DAY  
  
 famotidine 40 mg tablet Commonly known as:  PEPCID Take 40 mg by mouth daily. FLORASTOR PO Take  by mouth. fluticasone 50 mcg/actuation nasal spray Commonly known as:  Kassy Stapleton 2 Sprays by Both Nostrils route daily. guaiFENesin-codeine 100-10 mg/5 mL solution Commonly known as:  ROBITUSSIN AC Take 10 mL by mouth four (4) times daily as needed for Cough (severe cough). Max Daily Amount: 40 mL. lisinopril 30 mg tablet Commonly known as:  PRINIVIL, ZESTRIL  
TAKE 1 TABLET BY MOUTH EVERY DAY  
  
 LUTEIN PO Take  by mouth.  
  
 meloxicam 15 mg tablet Commonly known as:  MOBIC  
TAKE ONE TABLET BY MOUTH ONCE DAILY MIRALAX 17 gram packet Generic drug:  polyethylene glycol Take 17 g by mouth daily. MULTIVITAMIN PO Take  by mouth. BRANDON 128 2 % ophthalmic solution Generic drug:  sodium chloride Administer 1 Drop to both eyes as needed. oxybutynin 5 mg tablet Commonly known as:  TRBKOWOM Take 1 Tab by mouth three (3) times daily. ROBITUSSIN DM MAX PO Take  by mouth.  
  
 sotalol 120 mg tablet Commonly known as:  Leliseon Jaun Take 120 mg by mouth two (2) times a day. SYNTHROID 150 mcg tablet Generic drug:  levothyroxine Take 1 Tab by mouth Daily (before breakfast). timolol 0.5 % ophthalmic solution Commonly known as:  TIMOPTIC Administer 1 Drop to right eye daily. VISION-ELTON PRESERVE PO Take  by mouth. Follow-up Instructions Return in about 6 months (around 12/9/2017). Patient Instructions A Healthy Lifestyle: Care Instructions Your Care Instructions A healthy lifestyle can help you feel good, stay at a healthy weight, and have plenty of energy for both work and play. A healthy lifestyle is something you can share with your whole family. A healthy lifestyle also can lower your risk for serious health problems, such as high blood pressure, heart disease, and diabetes. You can follow a few steps listed below to improve your health and the health of your family. Follow-up care is a key part of your treatment and safety. Be sure to make and go to all appointments, and call your doctor if you are having problems. Its also a good idea to know your test results and keep a list of the medicines you take. How can you care for yourself at home? · Do not eat too much sugar, fat, or fast foods. You can still have dessert and treats now and then. The goal is moderation. · Start small to improve your eating habits. Pay attention to portion sizes, drink less juice and soda pop, and eat more fruits and vegetables. ¨ Eat a healthy amount of food. A 3-ounce serving of meat, for example, is about the size of a deck of cards. Fill the rest of your plate with vegetables and whole grains. ¨ Limit the amount of soda and sports drinks you have every day. Drink more water when you are thirsty. ¨ Eat at least 5 servings of fruits and vegetables every day. It may seem like a lot, but it is not hard to reach this goal. A serving or helping is 1 piece of fruit, 1 cup of vegetables, or 2 cups of leafy, raw vegetables.  Have an apple or some carrot sticks as an afternoon snack instead of a candy bar. Try to have fruits and/or vegetables at every meal. 
· Make exercise part of your daily routine. You may want to start with simple activities, such as walking, bicycling, or slow swimming. Try to be active 30 to 60 minutes every day. You do not need to do all 30 to 60 minutes all at once. For example, you can exercise 3 times a day for 10 or 20 minutes. Moderate exercise is safe for most people, but it is always a good idea to talk to your doctor before starting an exercise program. 
· Keep moving. Yam[employer] the lawn, work in the garden, or [employer]. Take the stairs instead of the elevator at work. · If you smoke, quit. People who smoke have an increased risk for heart attack, stroke, cancer, and other lung illnesses. Quitting is hard, but there are ways to boost your chance of quitting tobacco for good. ¨ Use nicotine gum, patches, or lozenges. ¨ Ask your doctor about stop-smoking programs and medicines. ¨ Keep trying. In addition to reducing your risk of diseases in the future, you will notice some benefits soon after you stop using tobacco. If you have shortness of breath or asthma symptoms, they will likely get better within a few weeks after you quit. · Limit how much alcohol you drink. Moderate amounts of alcohol (up to 2 drinks a day for men, 1 drink a day for women) are okay. But drinking too much can lead to liver problems, high blood pressure, and other health problems. Family health If you have a family, there are many things you can do together to improve your health. · Eat meals together as a family as often as possible. · Eat healthy foods. This includes fruits, vegetables, lean meats and dairy, and whole grains. · Include your family in your fitness plan. Most people think of activities such as jogging or tennis as the way to fitness, but there are many ways you and your family can be more active.  Anything that makes you breathe hard and gets your heart pumping is exercise. Here are some tips: 
¨ Walk to do errands or to take your child to school or the bus. ¨ Go for a family bike ride after dinner instead of watching TV. Where can you learn more? Go to http://franki-genet.info/. Enter G907 in the search box to learn more about \"A Healthy Lifestyle: Care Instructions. \" Current as of: July 26, 2016 Content Version: 11.2 © 3133-3322 [employer]. Care instructions adapted under license by [employer] (which disclaims liability or warranty for this information). If you have questions about a medical condition or this instruction, always ask your healthcare professional. [address] any warranty or liability for your use of this information. Introducing [facility]! Dear Missael Lamb: Thank you for requesting a [employer] account. Our records indicate that you have previously registered for a SingOn account but its currently inactive. Please call our [employer]On support line at [phone]. Additional Information If you have questions, please visit the Frequently Asked Questions section of the [employer]On website at [email or web address] Remember, SingOn is NOT to be used for urgent needs. For medical emergencies, dial 911. Now available from your iPhone and Android! Please provide this summary of care documentation to your next provider. Your primary care clinician is listed as Lyssa [phone] If you have any questions after today's visit, please call [phone].

## 2017-07-05 RX ORDER — DULOXETIN HYDROCHLORIDE 60 MG/1
CAPSULE, DELAYED RELEASE ORAL
Qty: 90 CAP | Refills: 1 | Status: SHIPPED | COMMUNITY
Start: 2017-07-05 | End: 2018-01-07 | Stop reason: SDUPTHER

## 2017-07-05 NOTE — TELEPHONE ENCOUNTER
Orders Placed This Encounter    DULoxetine (CYMBALTA) 60 mg capsule     Sig: TAKE 1 CAPSULE EVERY DAY     Dispense:  90 Cap     Refill:  1     The above medication refills were approved via verbal order by Dr. Cleveland Alonso III.

## 2017-07-24 ENCOUNTER — TELEPHONE (OUTPATIENT)
Dept: INTERNAL MEDICINE CLINIC | Age: 77
End: 2017-07-24

## 2017-07-24 NOTE — TELEPHONE ENCOUNTER
Pt would like to see if you could call her in some pain medication for her back and hip pain.  Please call to discuss

## 2017-08-09 ENCOUNTER — TELEPHONE (OUTPATIENT)
Dept: INTERNAL MEDICINE CLINIC | Age: 77
End: 2017-08-09

## 2017-08-10 ENCOUNTER — OFFICE VISIT (OUTPATIENT)
Dept: INTERNAL MEDICINE CLINIC | Age: 77
End: 2017-08-10

## 2017-08-10 ENCOUNTER — HOSPITAL ENCOUNTER (OUTPATIENT)
Dept: LAB | Age: 77
Discharge: HOME | End: 2017-08-10
Payer: MEDICARE

## 2017-08-10 VITALS
OXYGEN SATURATION: 99 % | TEMPERATURE: 97.8 F | DIASTOLIC BLOOD PRESSURE: 72 MMHG | SYSTOLIC BLOOD PRESSURE: 142 MMHG | WEIGHT: 209 LBS | RESPIRATION RATE: 16 BRPM | HEIGHT: 60 IN | BODY MASS INDEX: 41.03 KG/M2 | HEART RATE: 58 BPM

## 2017-08-10 DIAGNOSIS — R51.9 CHRONIC NONINTRACTABLE HEADACHE, UNSPECIFIED HEADACHE TYPE: ICD-10-CM

## 2017-08-10 DIAGNOSIS — I10 ESSENTIAL HYPERTENSION: Primary | ICD-10-CM

## 2017-08-10 DIAGNOSIS — G89.29 CHRONIC NONINTRACTABLE HEADACHE, UNSPECIFIED HEADACHE TYPE: ICD-10-CM

## 2017-08-10 DIAGNOSIS — E03.9 ACQUIRED HYPOTHYROIDISM: ICD-10-CM

## 2017-08-10 PROCEDURE — 85651 RBC SED RATE NONAUTOMATED: CPT

## 2017-08-10 PROCEDURE — 86141 C-REACTIVE PROTEIN HS: CPT

## 2017-08-10 PROCEDURE — 85025 COMPLETE CBC W/AUTO DIFF WBC: CPT

## 2017-08-10 PROCEDURE — 84439 ASSAY OF FREE THYROXINE: CPT

## 2017-08-10 PROCEDURE — 80053 COMPREHEN METABOLIC PANEL: CPT

## 2017-08-10 PROCEDURE — 36415 COLL VENOUS BLD VENIPUNCTURE: CPT

## 2017-08-10 PROCEDURE — 84443 ASSAY THYROID STIM HORMONE: CPT

## 2017-08-10 NOTE — MR AVS SNAPSHOT
Visit Information Date & Time Provider Department Dept. Phone Encounter #  
 8/10/2017 10:00 AM Quirino Khan MD [address] [address] Internal Medicine [phone] [id number] Your Appointments 12/1/2017  1:40 PM  
Follow Up with [facility] DO Rashida Dinero [facility]) Appt Note: 6 month f/u 6/9/17  
 [address] [place] [address]  
[phone]  
  
   
 [address]  [address] Upcoming Health Maintenance Date Due ZOSTER VACCINE AGE 60> 4/14/1999 Pneumococcal 65+ Low/Medium Risk (2 of 2 - PPSV23) 4/13/2016 MEDICARE YEARLY EXAM 3/25/2017 INFLUENZA AGE 9 TO ADULT 8/1/2017 GLAUCOMA SCREENING Q2Y 2/21/2019 DTaP/Tdap/Td series (2 - Td) 11/1/2026 Allergies as of 8/10/2017  Review Complete On: 8/10/2017 By: Yovany Elmore LPN Severity Noted Reaction Type Reactions Codeine  11/23/2009    Rash Neomycin  11/23/2009    Rash Polysporin [Bacitracin-polymyxin B]  11/23/2009    Rash Protonix [Pantoprazole]  11/23/2009    Other (comments) Gi upset Current Immunizations  Reviewed on 3/26/2015 Name Date Influenza High Dose Vaccine PF 11/4/2016, 10/7/2015 Influenza Vaccine Split 10/14/2012 Influenza Vaccine Whole 10/15/2011 Pneumococcal Conjugate (PCV-13) 4/13/2015 Tdap 11/1/2016 Not reviewed this visit You Were Diagnosed With   
  
 Codes Comments Essential hypertension    -  Primary ICD-10-CM: I10 
ICD-9-CM: 401.9 Acquired hypothyroidism     ICD-10-CM: E03.9 ICD-9-CM: 333. 9 Chronic nonintractable headache, unspecified headache type     ICD-10-CM: R51 ICD-9-CM: 983. 0 Vitals BP Pulse Temp Resp Height(growth percentile) Weight(growth percentile) 142/72 (!) 58 97.8 °F (36.6 °C) (Oral) 16 5' (1.524 m) 209 lb (94.8 kg) SpO2 BMI OB Status Smoking Status 99% 40.82 kg/m2 Hysterectomy Former Smoker Vitals History BMI and BSA Data Body Mass Index Body Surface Area  
 40.82 kg/m 2 2 m 2 Preferred Pharmacy [facility] [place] [facility] [place] [id number] [facility] [address] [place] [address] [phone] Your Updated Medication List  
  
   
This list is accurate as of: 8/10/17 11:02 AM.  Always use your most recent med list.  
  
  
  
  
 aspirin 81 mg tablet Take  by mouth. Benefiber Clear 3 gram/3.5 gram Powd Generic drug:  wheat dextrin Take  by mouth.  
  
 calcium citrate-vitamin d3 315-200 mg-unit Tab Commonly known as:  CITRACAL+D Take 1 Tab by mouth daily (with breakfast). Cholecalciferol (Vitamin D3) 2,000 unit Cap capsule Commonly known as:  VITAMIN D3 Take 2,000 Units by mouth two (2) times a day. DULoxetine 60 mg capsule Commonly known as:  CYMBALTA TAKE 1 CAPSULE EVERY DAY  
  
 FLORASTOR PO Take  by mouth. fluticasone 50 mcg/actuation nasal spray Commonly known as:  Jeanine Courser 2 Sprays by Both Nostrils route daily. lisinopril 30 mg tablet Commonly known as:  PRINIVIL, ZESTRIL  
TAKE 1 TABLET BY MOUTH EVERY DAY  
  
 LUTEIN PO Take  by mouth.  
  
 meloxicam 15 mg tablet Commonly known as:  MOBIC  
TAKE ONE TABLET BY MOUTH ONCE DAILY MIRALAX 17 gram packet Generic drug:  polyethylene glycol Take 17 g by mouth daily. MULTIVITAMIN PO Take  by mouth. BRANDON 128 2 % ophthalmic solution Generic drug:  sodium chloride Administer 1 Drop to both eyes as needed. MYRBETRIQ 50 mg ER tablet Generic drug:  mirabegron ER Take 50 mg by mouth daily. nitroglycerin 0.4 % (w/w) ointment Commonly known as:  RECTIV Insert  into rectum every twelve (12) hours every twelve (12) hours. sotalol 120 mg tablet Commonly known as:  Daniel Christine Take 120 mg by mouth two (2) times a day. SYNTHROID 150 mcg tablet Generic drug:  levothyroxine Take 1 Tab by mouth Daily (before breakfast). timolol 0.5 % ophthalmic solution Commonly known as:  TIMOPTIC Administer 1 Drop to right eye daily. VISION-ELTON PRESERVE PO Take  by mouth. We Performed the Following CBC WITH AUTOMATED DIFF [53402 CPT(R)] CRP, HIGH SENSITIVITY [24012 CPT(R)] METABOLIC PANEL, COMPREHENSIVE [82168 CPT(R)] SED RATE (ESR) [id number] CPT(R)] T4, FREE [id number] CPT(R)] TSH 3RD GENERATION [62572 CPT(R)] Patient Instructions Neuropathic Pain: Care Instructions Your Care Instructions Neuropathic pain is caused by pressure on or damage to your nerves. It's often simply called nerve pain. Some people feel this type of pain all the time. For others, it comes and goes. Diabetes, shingles, or an injury can cause nerve pain. Many people say the pain feels sharp, burning, or stabbing. But some people feel it as a dull ache. In some cases, it makes your skin very sensitive. So touch, pressure, and other sensations that did not hurt before may now cause pain. It's important to know that this kind of pain is real and can affect your quality of life. It's also important to know that treatment can help. Treatment includes pain medicines, exercise, and physical therapy. Medicines can help reduce the number of pain signals that travel over the nerves. This can make the painful areas less sensitive. It can also help you sleep better and improve your mood. But medicines are only one part of successful treatment. Most people do best with more than one kind of treatment. Your doctor may recommend that you try cognitive-behavioral therapy and stress management. Or, if needed, you may decide to try to quit smoking, lower your blood pressure, or better control blood sugar. These kinds of healthy changes can also make a difference. If you feel that your treatment is not working, talk to your doctor.  And be sure to tell your doctor if you think you might be depressed or anxious. These are common problems that can also be treated. Follow-up care is a key part of your treatment and safety. Be sure to make and go to all appointments, and call your doctor if you are having problems. It's also a good idea to know your test results and keep a list of the medicines you take. How can you care for yourself at home? · Be safe with medicines. Read and follow all instructions on the label. ¨ If the doctor gave you a prescription medicine for pain, take it as prescribed. ¨ If you are not taking a prescription pain medicine, ask your doctor if you can take an over-the-counter medicine. · Save hard tasks for days when you have less pain. Follow a hard task with an easy task. And remember to take breaks. · Relax, and reduce stress. You may want to try deep breathing or meditation. These can help. · Keep moving. Gentle, daily exercise can help reduce pain. Your doctor or physical therapist can tell you what type of exercise is best for you. This may include walking, swimming, and stationary biking. It may also include stretches and range-of-motion exercises. · Try heat, cold packs, and massage. · Get enough sleep. Constant pain can make you more tired. If the pain makes it hard to sleep, talk with your doctor. · Think positively. Your thoughts can affect your pain. Do fun things to distract yourself from the pain. See a movie, read a book, listen to music, or spend time with a friend. · Keep a pain diary. Try to write down how strong your pain is and what it feels like. Also try to notice and write down how your moods, thoughts, sleep, activities, and medicine affect your pain. These notes can help you and your doctor find the best ways to treat your pain. Reducing constipation caused by pain medicine Pain medicines often cause constipation. To reduce constipation: · Include fruits, vegetables, beans, and whole grains in your diet each day. These foods are high in fiber. · Drink plenty of fluids, enough so that your urine is light yellow or clear like water. If you have kidney, heart, or liver disease and have to limit fluids, talk with your doctor before you increase the amount of fluids you drink. · Get some exercise every day. Build up slowly to 30 to 60 minutes a day on 5 or more days of the week. · Take a fiber supplement, such as Citrucel or Metamucil, every day if needed. Read and follow all instructions on the label. · Schedule time each day for a bowel movement. Having a daily routine may help. Take your time and do not strain when having a bowel movement. · Ask your doctor about a laxative. The goal is to have one easy bowel movement every 1 to 2 days. Do not let constipation go untreated for more than 3 days. When should you call for help? Call your doctor now or seek immediate medical care if: 
· You feel sad, anxious, or hopeless for more than a few days. This could mean you are depressed. Depression is common in people who have a lot of pain. But it can be treated. · You have trouble with bowel movements, such as: 
¨ No bowel movement in 3 days. ¨ Blood in the anal area, in your stool, or on the toilet paper. ¨ Diarrhea for more than 24 hours. Watch closely for changes in your health, and be sure to contact your doctor if: 
· Your pain is getting worse. · You can't sleep because of pain. · You are very worried or anxious about your pain. · You have trouble taking your pain medicine. · You have any concerns about your pain medicine or its side effects. · You have vomiting or cramps for more than 2 hours. Where can you learn more? Go to http://franki-genet.info/. Enter E069 in the search box to learn more about \"Neuropathic Pain: Care Instructions. \" Current as of: October 14, 2016 Content Version: 11.3 © 1435-0856 Healthwise, Incorporated. Care instructions adapted under license by [employer] (which disclaims liability or warranty for this information). If you have questions about a medical condition or this instruction, always ask your healthcare professional. [address] any warranty or liability for your use of this information. Knee (Pes Anserine) Bursitis: Exercises Your Care Instructions Here are some examples of typical rehabilitation exercises for your condition. Start each exercise slowly. Ease off the exercise if you start to have pain. Your doctor or physical therapist will tell you when you can start these exercises and which ones will work best for you. How to do the exercises Heel slide 1. Lie on your back with your affected knee straight. Your good knee should be bent. 2. Bend your affected knee by sliding your heel across the floor and toward your buttock until you feel a gentle stretch in your knee. 3. Hold for about 6 seconds, and then slowly straighten your knee. 4. Repeat 8 to 12 times. [employer] 1. Sit with your affected leg straight and supported on the floor or a firm bed. Place a small, rolled-up towel under your affected knee. Your other leg should be bent, with that foot flat on the floor. 2. Tighten the thigh muscles of your affected leg by pressing the back of your knee down into the towel. 3. Hold for about 6 seconds, then rest for up to 10 seconds. 4. Repeat 8 to 12 times. Straight-leg raises to the front 1. Lie on your back with your good knee bent so that your foot rests flat on the floor. Your affected leg should be straight. Make sure that your low back has a normal curve. You should be able to slip your hand in between the floor and the small of your back, with your palm touching the floor and your back touching the back of your hand.  
2. Tighten the thigh muscles in your affected leg by pressing the back of your knee flat down to the floor. Hold your knee straight. 3. Keeping the thigh muscles tight and your leg straight, lift your affected leg up so that your heel is about 12 inches off the floor. 4. Hold for about 6 seconds, then lower slowly. Rest for up to 10 seconds between repetitions. 5. Repeat 8 to 12 times. Follow-up care is a key part of your treatment and safety. Be sure to make and go to all appointments, and call your doctor if you are having problems. It's also a good idea to know your test results and keep a list of the medicines you take. Where can you learn more? Go to http://franki-genet.info/. Enter Q408 in the search box to learn more about \"Knee (Pes Anserine) Bursitis: Exercises. \" Current as of: March 21, 2017 Content Version: 11.3 © 4395-4986 [employer]. Care instructions adapted under license by [employer] (which disclaims liability or warranty for this information). If you have questions about a medical condition or this instruction, always ask your healthcare professional. [place] [address] any warranty or liability for your use of this information. Introducing [facility] & HEALTH [facility]! Dear Korey Guerrero: Thank you for requesting a [employer] (acquired by [employer]) account. Our records indicate that you have previously registered for a [employer] (ac[employer]) account but its currently inactive. Please call our [employer] ([employer]) support line at [phone]. Additional Information If you have questions, please visit the Frequently Asked Questions section of the [employer] (ac[employer]) website at [email or web address] Remember, [employer] (acquired by [employer]) is NOT to be used for urgent needs. For medical emergencies, dial 911. Now available from your iPhone and Android! Please provide this summary of care documentation to your next provider. Your primary care clinician is listed as [place] [phone]  If you have any questions after today's visit, please call [phone].

## 2017-08-10 NOTE — PROGRESS NOTES
HPI:  Raji Black is a 65y.o. year old female who is here for a routine visit:    Has been diagnosed with neuropathy in the feet recently from neurology but no meds tried. Has ongoing issues with pain in the feet as well as in the medial knees bilaterally. Has not been taking tylenol recently. Her recent B12 levels were fine and low vitamin D level. She has seen the cardiology MD as well. No headache or dizziness. No chest pain or SOB. No change in bowels or bladder. No fever or chills. Past Medical History:   Diagnosis Date    Arthritis     Basal cell carcinoma 05/20/2012    Calculus of kidney     [facility] ([facility]     skin    [facility]     thyroid    Glaucoma 4/30/2010    Hypertension     OA (osteoarthritis) 4/30/2010    Psoriasis 4/30/2010       Past Surgical History:   Procedure Laterality Date    HX CATARACT REMOVAL      bilat    HX CHOLECYSTECTOMY      HX CRANIOTOMY      HX DILATION AND CURETTAGE      HX HYSTERECTOMY      HX KNEE ARTHROSCOPY      HX KNEE REPLACEMENT      HX TONSILLECTOMY      HX WRIST FRACTURE TX Left 8/21/15    THYROIDECTOMY         Prior to Admission medications    Medication Sig Start Date End Date Taking? Authorizing Provider   mirabegron ER (MYRBETRIQ) 50 mg ER tablet Take 50 mg by mouth daily. Yes Historical Provider   nitroglycerin (RECTIV) 0.4 % (w/w) ointment Insert  into rectum every twelve (12) hours every twelve (12) hours. Yes Historical Provider   DULoxetine (CYMBALTA) 60 mg capsule TAKE 1 CAPSULE EVERY DAY 7/5/17  Yes Marcell Montiel III, MD   meloxicam (MOBIC) 15 mg tablet TAKE ONE TABLET BY MOUTH ONCE DAILY 5/29/17  Yes Stevie Weir MD   fluticasone (FLONASE) 50 mcg/actuation nasal spray 2 Sprays by Both Nostrils route daily. 4/5/17  Yes Stevie Weir MD   Cholecalciferol, Vitamin D3, (VITAMIN D3) 2,000 unit cap capsule Take 2,000 Units by mouth two (2) times a day. 2/16/17  Yes Stevie Weir MD   LUTEIN PO Take  by mouth. Yes Historical Provider   lisinopril (PRINIVIL, ZESTRIL) 30 mg tablet TAKE 1 TABLET BY MOUTH EVERY DAY  Patient taking differently: TAKE 10mg BY MOUTH EVERY DAY 12/20/15  Yes Lai Fletcher III, MD   sotalol (BETAPACE) 120 mg tablet Take 120 mg by mouth two (2) times a day. Yes Tushar Leung MD   calcium citrate-vitamin d3 (CITRACAL+D) 315-200 mg-unit tab Take 1 Tab by mouth daily (with breakfast). Yes Historical Provider   SACCHAROMYCES BOULARDII (FLORASTOR PO) Take  by mouth. Yes Historical Provider   BETA-CAROTENE,A, W-C & E/ZN/CU (VISION-ELTON PRESERVE PO) Take  by mouth. Yes Historical Provider   wheat dextrin (BENEFIBER CLEAR) 3 gram/3.5 gram powd Take  by mouth. Yes Historical Provider   polyethylene glycol (MIRALAX) 17 gram packet Take 17 g by mouth daily. Yes Historical Provider   sodium chloride (BRANDON 128) 2 % ophthalmic solution Administer 1 Drop to both eyes as needed. Yes Historical Provider   SYNTHROID 150 mcg tablet Take 1 Tab by mouth Daily (before breakfast). Patient taking differently: Take 137 mcg by mouth Daily (before breakfast). 5/16/13  Yes Lai Fletcher III, MD   timolol (TIMOPTIC) 0.5 % ophthalmic solution Administer 1 Drop to right eye daily. 12/17/10  Yes Historical Provider   aspirin 81 mg Tab Take  by mouth. Yes Historical Provider   MULTIVITAMINS (MULTIVITAMIN PO) Take  by mouth. Yes Historical Provider       Social History     Social History    Marital status:      Spouse name: N/A    Number of children: N/A    Years of education: N/A     Occupational History    Not on file. Social History Main Topics    Smoking status: Former Smoker     Packs/day: 0.50     Years: 4.00     Types: Cigarettes     Quit date: 1/1/1961    Smokeless tobacco: Never Used    Alcohol use No    Drug use: No    Sexual activity: Not on file     Other Topics Concern    Not on file     Social History Narrative          ROS  Per HPI.  Some ongoing neck and shoulder pain with daily headache. Visit Vitals    /72    Pulse (!) 58    Temp 97.8 °F (36.6 °C) (Oral)    Resp 16    Ht 5' (1.524 m)    Wt 209 lb (94.8 kg)    SpO2 99%    BMI 40.82 kg/m2         Physical Exam   Physical Examination: General appearance - alert, well appearing, and in no distress  Chest - clear to auscultation, no wheezes, rales or rhonchi, symmetric air entry  Heart - normal rate, regular rhythm, normal S1, S2, no murmurs, rubs, clicks or gallops  Abdomen - soft, nontender, nondistended, no masses or organomegaly  Musculoskeletal - abnormal exam of both knees with tenderness over the pes anserine bursa bilaterally as well as mild medial joint line tenderness and crepitus. Extremities - peripheral pulses normal, no pedal edema, no clubbing or cyanosis      Assessment/Plan:  Diagnoses and all orders for this visit:    1. Essential hypertension - BP well controlled. -     METABOLIC PANEL, COMPREHENSIVE  -     CBC WITH AUTOMATED DIFF    2. Acquired hypothyroidism - appears euthyroid. Check levels and adjust meds  -     TSH 3RD GENERATION  -     T4, FREE    3. Chronic nonintractable headache, unspecified headache type - Likely DJD in the neck but will get labs for PMR and consider further evaluation.  -     SED RATE (ESR)  -     CRP, HIGH SENSITIVITY  4. Neuropathy - will continue cymbalta for now  5, Pes anserine bursa - will use stretches and let me know if not better. Continue the PT she is doing now. Follow-up Disposition: Not on File       Advised her to call back or return to office if symptoms worsen/change/persist.  Discussed expected course/resolution/complications of diagnosis in detail with patient. Medication risks/benefits/costs/interactions/alternatives discussed with patient. She was given an after visit summary which includes diagnoses, current medications, & vitals. She expressed understanding with the diagnosis and plan.

## 2017-08-10 NOTE — PATIENT INSTRUCTIONS
Neuropathic Pain: Care Instructions  Your Care Instructions  Neuropathic pain is caused by pressure on or damage to your nerves. It's often simply called nerve pain. Some people feel this type of pain all the time. For others, it comes and goes. Diabetes, shingles, or an injury can cause nerve pain. Many people say the pain feels sharp, burning, or stabbing. But some people feel it as a dull ache. In some cases, it makes your skin very sensitive. So touch, pressure, and other sensations that did not hurt before may now cause pain. It's important to know that this kind of pain is real and can affect your quality of life. It's also important to know that treatment can help. Treatment includes pain medicines, exercise, and physical therapy. Medicines can help reduce the number of pain signals that travel over the nerves. This can make the painful areas less sensitive. It can also help you sleep better and improve your mood. But medicines are only one part of successful treatment. Most people do best with more than one kind of treatment. Your doctor may recommend that you try cognitive-behavioral therapy and stress management. Or, if needed, you may decide to try to quit smoking, lower your blood pressure, or better control blood sugar. These kinds of healthy changes can also make a difference. If you feel that your treatment is not working, talk to your doctor. And be sure to tell your doctor if you think you might be depressed or anxious. These are common problems that can also be treated. Follow-up care is a key part of your treatment and safety. Be sure to make and go to all appointments, and call your doctor if you are having problems. It's also a good idea to know your test results and keep a list of the medicines you take. How can you care for yourself at home? · Be safe with medicines. Read and follow all instructions on the label.   ¨ If the doctor gave you a prescription medicine for pain, take it as prescribed. ¨ If you are not taking a prescription pain medicine, ask your doctor if you can take an over-the-counter medicine. · Save hard tasks for days when you have less pain. Follow a hard task with an easy task. And remember to take breaks. · Relax, and reduce stress. You may want to try deep breathing or meditation. These can help. · Keep moving. Gentle, daily exercise can help reduce pain. Your doctor or physical therapist can tell you what type of exercise is best for you. This may include walking, swimming, and stationary biking. It may also include stretches and range-of-motion exercises. · Try heat, cold packs, and massage. · Get enough sleep. Constant pain can make you more tired. If the pain makes it hard to sleep, talk with your doctor. · Think positively. Your thoughts can affect your pain. Do fun things to distract yourself from the pain. See a movie, read a book, listen to music, or spend time with a friend. · Keep a pain diary. Try to write down how strong your pain is and what it feels like. Also try to notice and write down how your moods, thoughts, sleep, activities, and medicine affect your pain. These notes can help you and your doctor find the best ways to treat your pain. Reducing constipation caused by pain medicine  Pain medicines often cause constipation. To reduce constipation:  · Include fruits, vegetables, beans, and whole grains in your diet each day. These foods are high in fiber. · Drink plenty of fluids, enough so that your urine is light yellow or clear like water. If you have kidney, heart, or liver disease and have to limit fluids, talk with your doctor before you increase the amount of fluids you drink. · Get some exercise every day. Build up slowly to 30 to 60 minutes a day on 5 or more days of the week. · Take a fiber supplement, such as Citrucel or Metamucil, every day if needed. Read and follow all instructions on the label.   · Schedule time each day for a bowel movement. Having a daily routine may help. Take your time and do not strain when having a bowel movement. · Ask your doctor about a laxative. The goal is to have one easy bowel movement every 1 to 2 days. Do not let constipation go untreated for more than 3 days. When should you call for help? Call your doctor now or seek immediate medical care if:  · You feel sad, anxious, or hopeless for more than a few days. This could mean you are depressed. Depression is common in people who have a lot of pain. But it can be treated. · You have trouble with bowel movements, such as:  ¨ No bowel movement in 3 days. ¨ Blood in the anal area, in your stool, or on the toilet paper. ¨ Diarrhea for more than 24 hours. Watch closely for changes in your health, and be sure to contact your doctor if:  · Your pain is getting worse. · You can't sleep because of pain. · You are very worried or anxious about your pain. · You have trouble taking your pain medicine. · You have any concerns about your pain medicine or its side effects. · You have vomiting or cramps for more than 2 hours. Where can you learn more? Go to http://franki-genet.info/. Enter H667 in the search box to learn more about \"Neuropathic Pain: Care Instructions. \"  Current as of: October 14, 2016  Content Version: 11.3  © 2695-0469 [employer]. Care instructions adapted under license by [employer] (which disclaims liability or warranty for this information). If you have questions about a medical condition or this instruction, always ask your healthcare professional. [place] [address] any warranty or liability for your use of this information. Knee (Pes Anserine) Bursitis: Exercises  Your Care Instructions  Here are some examples of typical rehabilitation exercises for your condition. Start each exercise slowly. Ease off the exercise if you start to have pain.   Your doctor or physical therapist will tell you when you can start these exercises and which ones will work best for you. How to do the exercises  Heel slide    1. Lie on your back with your affected knee straight. Your good knee should be bent. 2. Bend your affected knee by sliding your heel across the floor and toward your buttock until you feel a gentle stretch in your knee. 3. Hold for about 6 seconds, and then slowly straighten your knee. 4. Repeat 8 to 12 times. Quad sets    1. Sit with your affected leg straight and supported on the floor or a firm bed. Place a small, rolled-up towel under your affected knee. Your other leg should be bent, with that foot flat on the floor. 2. Tighten the thigh muscles of your affected leg by pressing the back of your knee down into the towel. 3. Hold for about 6 seconds, then rest for up to 10 seconds. 4. Repeat 8 to 12 times. Straight-leg raises to the front    1. Lie on your back with your good knee bent so that your foot rests flat on the floor. Your affected leg should be straight. Make sure that your low back has a normal curve. You should be able to slip your hand in between the floor and the small of your back, with your palm touching the floor and your back touching the back of your hand. 2. Tighten the thigh muscles in your affected leg by pressing the back of your knee flat down to the floor. Hold your knee straight. 3. Keeping the thigh muscles tight and your leg straight, lift your affected leg up so that your heel is about 12 inches off the floor. 4. Hold for about 6 seconds, then lower slowly. Rest for up to 10 seconds between repetitions. 5. Repeat 8 to 12 times. Follow-up care is a key part of your treatment and safety. Be sure to make and go to all appointments, and call your doctor if you are having problems. It's also a good idea to know your test results and keep a list of the medicines you take. Where can you learn more?   Go to http://franki-genet.info/. Enter J646 in the search box to learn more about \"Knee (Pes Anserine) Bursitis: Exercises. \"  Current as of: March 21, 2017  Content Version: 11.3  © 6363-1054 [employer]. Care instructions adapted under license by [employer] (which disclaims liability or warranty for this information). If you have questions about a medical condition or this instruction, always ask your healthcare professional. [place] [address] any warranty or liability for your use of this information.

## 2017-08-10 NOTE — PROGRESS NOTES
Chief Complaint   Patient presents with    Foot Pain    Hip Pain     1. Have you been to the ER, urgent care clinic since your last visit? Hospitalized since your last visit? No    2. Have you seen or consulted any other health care providers outside of the [address] [place] [address] since your last visit? Include any pap smears or colon screening.  Yes When: June 2017 Where: Dr. Justice Cortes, Dr. Marika Valentino Reason for visit: Neuro, Uro-Gyn

## 2017-08-11 LAB
ALBUMIN SERPL-MCNC: 4.4 G/DL (ref 3.5–4.8)
ALBUMIN/GLOB SERPL: 1.9 {RATIO} (ref 1.2–2.2)
ALP SERPL-CCNC: 71 IU/L (ref 39–117)
ALT SERPL-CCNC: 14 IU/L (ref 0–32)
AST SERPL-CCNC: 14 IU/L (ref 0–40)
BASOPHILS # BLD AUTO: 0.1 X10E3/UL (ref 0–0.2)
BASOPHILS NFR BLD AUTO: 1 %
BILIRUB SERPL-MCNC: 0.4 MG/DL (ref 0–1.2)
BUN SERPL-MCNC: 28 MG/DL (ref 8–27)
BUN/CREAT SERPL: 39 (ref 12–28)
CALCIUM SERPL-MCNC: 9.7 MG/DL (ref 8.7–10.3)
CHLORIDE SERPL-SCNC: 99 MMOL/L (ref 96–106)
CO2 SERPL-SCNC: 25 MMOL/L (ref 18–29)
CREAT SERPL-MCNC: 0.72 MG/DL (ref 0.57–1)
CRP SERPL HS-MCNC: 5.14 MG/L (ref 0–3)
EOSINOPHIL # BLD AUTO: 0.1 X10E3/UL (ref 0–0.4)
EOSINOPHIL NFR BLD AUTO: 2 %
ERYTHROCYTE [DISTWIDTH] IN BLOOD BY AUTOMATED COUNT: 14.1 % (ref 12.3–15.4)
ERYTHROCYTE [SEDIMENTATION RATE] IN BLOOD BY WESTERGREN METHOD: 4 MM/HR (ref 0–40)
GLOBULIN SER CALC-MCNC: 2.3 G/DL (ref 1.5–4.5)
GLUCOSE SERPL-MCNC: 95 MG/DL (ref 65–99)
HCT VFR BLD AUTO: 40.2 % (ref 34–46.6)
HGB BLD-MCNC: 13.3 G/DL (ref 11.1–15.9)
IMM GRANULOCYTES # BLD: 0 X10E3/UL (ref 0–0.1)
IMM GRANULOCYTES NFR BLD: 0 %
LYMPHOCYTES # BLD AUTO: 1.5 X10E3/UL (ref 0.7–3.1)
LYMPHOCYTES NFR BLD AUTO: 20 %
MCH RBC QN AUTO: 31.8 PG (ref 26.6–33)
MCHC RBC AUTO-ENTMCNC: 33.1 G/DL (ref 31.5–35.7)
MCV RBC AUTO: 96 FL (ref 79–97)
MONOCYTES # BLD AUTO: 0.5 X10E3/UL (ref 0.1–0.9)
MONOCYTES NFR BLD AUTO: 7 %
NEUTROPHILS # BLD AUTO: 5.5 X10E3/UL (ref 1.4–7)
NEUTROPHILS NFR BLD AUTO: 70 %
PLATELET # BLD AUTO: 242 X10E3/UL (ref 150–379)
POTASSIUM SERPL-SCNC: 4.9 MMOL/L (ref 3.5–5.2)
PROT SERPL-MCNC: 6.7 G/DL (ref 6–8.5)
RBC # BLD AUTO: 4.18 X10E6/UL (ref 3.77–5.28)
SODIUM SERPL-SCNC: 141 MMOL/L (ref 134–144)
T4 FREE SERPL-MCNC: 1.78 NG/DL (ref 0.82–1.77)
TSH SERPL DL<=0.005 MIU/L-ACNC: 0.65 UIU/ML (ref 0.45–4.5)
WBC # BLD AUTO: 7.7 X10E3/UL (ref 3.4–10.8)

## 2017-08-14 ENCOUNTER — TELEPHONE (OUTPATIENT)
Dept: INTERNAL MEDICINE CLINIC | Age: 77
End: 2017-08-14

## 2017-08-14 NOTE — TELEPHONE ENCOUNTER
The patient would like a call right away she said she feels  Hot all the time.  She would like to discuss labs

## 2017-09-06 ENCOUNTER — TELEPHONE (OUTPATIENT)
Dept: INTERNAL MEDICINE CLINIC | Age: 77
End: 2017-09-06

## 2017-09-11 RX ORDER — MELOXICAM 15 MG/1
TABLET ORAL
Qty: 90 TAB | Refills: 0 | Status: SHIPPED | OUTPATIENT
Start: 2017-09-11 | End: 2017-12-03 | Stop reason: SDUPTHER

## 2017-11-06 ENCOUNTER — TELEPHONE (OUTPATIENT)
Dept: NEUROLOGY | Age: 77
End: 2017-11-06

## 2017-11-07 ENCOUNTER — TELEPHONE (OUTPATIENT)
Dept: NEUROLOGY | Age: 77
End: 2017-11-07

## 2017-11-07 NOTE — TELEPHONE ENCOUNTER
I assume she is referring to \"prevagen\" and this is a supplement over-the-counter that I do not know the exact ingredients.  I do have some patients to take it. Hermelinda Pablo I cannot comment how it will interfere with other medications since it has never been studied.  The website for this medication states the same as they do not know how it may interact with prescription medications.  If she does take this I would recommend she do so at her own risk and if she has any side effects she should stop the medication.  If she does take it, when she comes for her appointment please bring the bottle.  (Routing comment)

## 2017-11-13 NOTE — TELEPHONE ENCOUNTER
Pt said she is wondering information about Prevagen. She said she is having a hard time with her short term memory.  Please call back

## 2017-11-13 NOTE — TELEPHONE ENCOUNTER
I have had many patients ask me about this over-the-counter supplement. I do not know the exact ingredients nor the risk of interaction with prescription medications. I do have patients who have chosen to take this supplement. Unfortunately I cannot comment on the effectiveness or side effect profile. If her memory is becoming a concern she is welcome to discuss here in clinic.

## 2017-11-28 ENCOUNTER — OFFICE VISIT (OUTPATIENT)
Dept: NEUROLOGY | Age: 77
End: 2017-11-28

## 2017-11-28 VITALS
TEMPERATURE: 98.9 F | HEIGHT: 60 IN | WEIGHT: 210 LBS | HEART RATE: 59 BPM | BODY MASS INDEX: 41.23 KG/M2 | OXYGEN SATURATION: 96 % | RESPIRATION RATE: 18 BRPM | SYSTOLIC BLOOD PRESSURE: 130 MMHG | DIASTOLIC BLOOD PRESSURE: 68 MMHG

## 2017-11-28 DIAGNOSIS — R41.3 MEMORY LOSS, SHORT TERM: Primary | ICD-10-CM

## 2017-11-28 DIAGNOSIS — G60.8 POLYNEUROPATHY, PERIPHERAL SENSORIMOTOR AXONAL: ICD-10-CM

## 2017-11-28 DIAGNOSIS — E55.9 VITAMIN D DEFICIENCY: ICD-10-CM

## 2017-11-28 PROBLEM — E66.01 OBESITY, MORBID (HCC): Status: ACTIVE | Noted: 2017-11-28

## 2017-11-28 NOTE — PROGRESS NOTES
Chief Complaint   Patient presents with    Headache    Memory Loss    Extremity Weakness     neuropathy follow up     Pt reports she is receiving Percutaneous Tibial Nerve Stimulation for incontienence.

## 2017-11-28 NOTE — MR AVS SNAPSHOT
Visit Information Date & Time Provider Department Dept. Phone Encounter #  
 11/28/2017 11:40 AM Stacy Leal DO [facility] at [address] [id number] Follow-up Instructions Return in about 3 months (around 2/28/2018). Routing History Upcoming Health Maintenance Date Due ZOSTER VACCINE AGE 60> 4/14/1999 Pneumococcal 65+ Low/Medium Risk (2 of 2 - PPSV23) 4/13/2016 MEDICARE YEARLY EXAM 3/25/2017 GLAUCOMA SCREENING Q2Y 2/21/2019 DTaP/Tdap/Td series (2 - Td) 11/1/2026 Allergies as of 11/28/2017  Review Complete On: 11/28/2017 By: Dimas Sol LPN Severity Noted Reaction Type Reactions Codeine  11/23/2009    Rash Neomycin  11/23/2009    Rash Polysporin [Bacitracin-polymyxin B]  11/23/2009    Rash Protonix [Pantoprazole]  11/23/2009    Other (comments) Gi upset Current Immunizations  Reviewed on 3/26/2015 Name Date Influenza High Dose Vaccine PF 9/26/2017 12:00 AM, 11/4/2016, 10/7/2015 Influenza Vaccine Split 10/14/2012 Influenza Vaccine Whole 10/15/2011 Pneumococcal Conjugate (PCV-13) 4/13/2015 Tdap 11/1/2016 Not reviewed this visit You Were Diagnosed With   
  
 Codes Comments Memory loss, short term    -  Primary ICD-10-CM: R41.3 ICD-9-CM: 780.93 Vitamin D deficiency     ICD-10-CM: E55.9 ICD-9-CM: 268.9 Polyneuropathy, peripheral sensorimotor axonal     ICD-10-CM: G60.8 ICD-9-CM: 356.8 Vitals BP Pulse Temp Resp Height(growth percentile) Weight(growth percentile) 130/68 (!) 59 98.9 °F (37.2 °C) (Oral) 18 5' (1.524 m) 210 lb (95.3 kg) SpO2 BMI OB Status Smoking Status 96% 41.01 kg/m2 Hysterectomy Former Smoker BMI and BSA Data Body Mass Index Body Surface Area 41.01 kg/m 2 2.01 m 2 Preferred Pharmacy Pharmacy Name Phone [facility] [phone] [facility] [phone] Your Updated Medication List  
  
   
This list is accurate as of: 11/28/17 12:03 PM.  Always use your most recent med list.  
  
  
  
  
 aspirin 81 mg tablet Take  by mouth. Benefiber Clear 3 gram/3.5 gram Powd Generic drug:  wheat dextrin Take  by mouth.  
  
 calcium citrate-vitamin d3 315-200 mg-unit Tab Commonly known as:  CITRACAL+D Take 1 Tab by mouth daily (with breakfast). Cholecalciferol (Vitamin D3) 2,000 unit Cap capsule Commonly known as:  VITAMIN D3 Take 2,000 Units by mouth two (2) times a day. DULoxetine 60 mg capsule Commonly known as:  CYMBALTA TAKE 1 CAPSULE EVERY DAY  
  
 FLORASTOR PO Take  by mouth. fluticasone 50 mcg/actuation nasal spray Commonly known as:  Raymond Landis 2 Sprays by Both Nostrils route daily. lisinopril 30 mg tablet Commonly known as:  PRINIVIL, ZESTRIL  
TAKE 1 TABLET BY MOUTH EVERY DAY  
  
 LUTEIN PO Take  by mouth.  
  
 meloxicam 15 mg tablet Commonly known as:  MOBIC  
TAKE ONE TABLET BY MOUTH ONCE DAILY MIRALAX 17 gram packet Generic drug:  polyethylene glycol Take 17 g by mouth daily. MULTIVITAMIN PO Take  by mouth. BRANDON 128 2 % ophthalmic solution Generic drug:  sodium chloride Administer 1 Drop to both eyes as needed. MYRBETRIQ 50 mg ER tablet Generic drug:  mirabegron ER Take 50 mg by mouth daily. nitroglycerin 0.4 % (w/w) ointment Commonly known as:  RECTIV Insert  into rectum every twelve (12) hours every twelve (12) hours. sotalol 120 mg tablet Commonly known as:  Gabrielle Maria Take 120 mg by mouth two (2) times a day. SYNTHROID 150 mcg tablet Generic drug:  levothyroxine Take 1 Tab by mouth Daily (before breakfast). timolol 0.5 % ophthalmic solution Commonly known as:  TIMOPTIC Administer 1 Drop to right eye daily. VISION-ELTON PRESERVE PO Take  by mouth. We Performed the Following REFERRAL TO PSYCHOLOGY [AMO51 Custom] VITAMIN B12 & FOLATE [63934 CPT(R)] VITAMIN D, 1, 25 DIHYDROXY [90693 CPT(R)] Follow-up Instructions Return in about 3 months (around 2/28/2018). Referral Information Referral ID Referred By Referred To  
  
 [id number] [facility]UN[facility]   
   [place] [address] [address], [place] [address] Phone: [phone] Fax: [phone] Visits Status Start Date End Date 1 New Request 11/28/17 11/28/18 If your referral has a status of pending review or denied, additional information will be sent to support the outcome of this decision. Introducing \[facility]\"" & HEALTH SERVICES! Dear Lanie Ruffin: Thank you for requesting a [employer] account. Our records indicate that you already have an active [employer] account. You can access your account anytime at [email or web address] Did you know that you can access your hospital and ER discharge instructions at any time in [employer]? You can also review all of your test results from your hospital stay or ER visit. Additional Information If you have questions, please visit the Frequently Asked Questions section of the [employer] website at [email or web address] Remember, [employer] is NOT to be used for urgent needs. For medical emergencies, dial 911. Now available from your iPhone and Android! Please provide this summary of care documentation to your next provider. Your primary care clinician is listed as Lyssa [id number] If you have any questions after today's visit, please call [phone].

## 2017-11-28 NOTE — PROGRESS NOTES
Chief Complaint   Patient presents with    Headache    Memory Loss    Extremity Weakness     neuropathy follow up       HPI    Torey Harding is a 74-year-old woman here to follow-up. I typically see her for peripheral neuropathy. She is here today because of a new concern which is memory loss. Her  is also here which tells me that he has noticed more difficulty with short-term memory. She may ask the same question more than once. She forgets things easily. ADLs are intact. Conversations were the same. She does have a history of vitamin D deficiency. MRI done earlier this year with some mild ischemic changes. She is getting occasional headaches but not severe enough she wants to take medication. She has a lot of aches and pains in her back at night. Review of Systems   Neurological: Positive for tingling and headaches. Psychiatric/Behavioral: Positive for memory loss. All other systems reviewed and are negative. Past Medical History:   Diagnosis Date    Arthritis     Basal cell carcinoma 05/20/2012    Calculus of kidney     Cancer ([facility] [address]     skin    Cancer ([facility] [address] [facility]     thyroid    Glaucoma 4/30/2010    Hypertension     OA (osteoarthritis) 4/30/2010    Psoriasis 4/30/2010     Family History   Problem Relation Age of Onset    Hypertension Mother     Heart Disease Mother     Heart Disease Father     Hypertension Father     Elevated Lipids Father     Heart Disease Brother     Psychiatric Disorder Brother     Diabetes Brother     Breast Cancer Maternal Aunt     Breast Cancer Paternal Aunt      Social History     Social History    Marital status:      Spouse name: N/A    Number of children: N/A    Years of education: N/A     Occupational History    Not on file.      Social History Main Topics    Smoking status: Former Smoker     Packs/day: 0.50     Years: 4.00     Types: Cigarettes     Quit date: 1/1/1961    Smokeless tobacco: Never Used    Alcohol use No    Drug use: No    Sexual activity: Not on file     Other Topics Concern    Not on file     Social History Narrative     Allergies   Allergen Reactions    Codeine Rash    Neomycin Rash    Polysporin [Bacitracin-Polymyxin B] Rash    Protonix [Pantoprazole] Other (comments)     Gi upset         Current Outpatient Prescriptions   Medication Sig    meloxicam (MOBIC) 15 mg tablet TAKE ONE TABLET BY MOUTH ONCE DAILY    mirabegron ER (MYRBETRIQ) 50 mg ER tablet Take 50 mg by mouth daily.  DULoxetine (CYMBALTA) 60 mg capsule TAKE 1 CAPSULE EVERY DAY    fluticasone (FLONASE) 50 mcg/actuation nasal spray 2 Sprays by Both Nostrils route daily.  Cholecalciferol, Vitamin D3, (VITAMIN D3) 2,000 unit cap capsule Take 2,000 Units by mouth two (2) times a day.  LUTEIN PO Take  by mouth.  lisinopril (PRINIVIL, ZESTRIL) 30 mg tablet TAKE 1 TABLET BY MOUTH EVERY DAY (Patient taking differently: TAKE 10mg BY MOUTH EVERY DAY)    sotalol (BETAPACE) 120 mg tablet Take 120 mg by mouth two (2) times a day.  calcium citrate-vitamin d3 (CITRACAL+D) 315-200 mg-unit tab Take 1 Tab by mouth daily (with breakfast).  BETA-CAROTENE,A, W-C & E/ZN/CU (VISION-ELTON PRESERVE PO) Take  by mouth.  SYNTHROID 150 mcg tablet Take 1 Tab by mouth Daily (before breakfast). (Patient taking differently: Take 137 mcg by mouth Daily (before breakfast). )    aspirin 81 mg Tab Take  by mouth.  nitroglycerin (RECTIV) 0.4 % (w/w) ointment Insert  into rectum every twelve (12) hours every twelve (12) hours.  SACCHAROMYCES BOULARDII (FLORASTOR PO) Take  by mouth.  wheat dextrin (BENEFIBER CLEAR) 3 gram/3.5 gram powd Take  by mouth.  polyethylene glycol (MIRALAX) 17 gram packet Take 17 g by mouth daily.  sodium chloride (BRANDON 128) 2 % ophthalmic solution Administer 1 Drop to both eyes as needed.  timolol (TIMOPTIC) 0.5 % ophthalmic solution Administer 1 Drop to right eye daily.  MULTIVITAMINS (MULTIVITAMIN PO) Take  by mouth. No current facility-administered medications for this visit. Neurologic Exam     Mental Status        WD/WN adult in NAD, normal grooming  VSS  A&O x 3    PERRL, nonicteric  Face is symmetric, tongue midline  Speech is fluent and clear  No limb ataxia. No abnl movements. Moving all extemities spontaneously and symmetric  Normal gait    CVS RRR  Lungs nonlabored  Skin is warm and dry         Visit Vitals    /68    Pulse (!) 59    Temp 98.9 °F (37.2 °C) (Oral)    Resp 18    Ht 5' (1.524 m)    Wt 95.3 kg (210 lb)    SpO2 96%    BMI 41.01 kg/m2       Assessment and Plan   Diagnoses and all orders for this visit:    1. Memory loss, short term  -     VITAMIN B12 & FOLATE  -     VITAMIN D, 1, 25 DIHYDROXY  -     REFERRAL TO PSYCHOLOGY    2. Vitamin D deficiency   -     VITAMIN B12 & FOLATE  -     VITAMIN D, 1, 25 DIHYDROXY  -     REFERRAL TO PSYCHOLOGY    3. Polyneuropathy, peripheral sensorimotor axonal      16-year-old woman complaining of memory loss worsening since our last visit. These seem to be short-term issues possibly more retrieval in origin. MRI was done earlier this year which is acceptable. I am going to send her for formal neuropsychological testing which she agrees. Check B12 and vitamin D since she is deficient. I would like to see her after the testing is done.         Tanisha Vargas, [address]  Diplomate ABPN

## 2017-11-29 LAB
1,25(OH)2D3 SERPL-MCNC: 21.5 PG/ML (ref 19.9–79.3)
FOLATE SERPL-MCNC: >20 NG/ML
VIT B12 SERPL-MCNC: 808 PG/ML (ref 211–946)

## 2017-12-04 RX ORDER — MELOXICAM 15 MG/1
15 TABLET ORAL DAILY
Qty: 90 TAB | Refills: 0 | Status: SHIPPED | OUTPATIENT
Start: 2017-12-04 | End: 2018-03-28 | Stop reason: SDUPTHER

## 2017-12-04 NOTE — TELEPHONE ENCOUNTER
From: Anastacia Morales  To: Daniel Colvin MD  Sent: 12/3/2017 10:51 PM EST  Subject: Medication Renewal Request    Original authorizing provider: MD Anastacia Oneal.  Andrew would like a refill of the following medications:  meloxicam (MOBIC) 15 mg tablet Daniel Colvin MD]    Preferred pharmacy: [facility] [address] [place] [address]    Comment:

## 2018-01-08 RX ORDER — DULOXETIN HYDROCHLORIDE 60 MG/1
CAPSULE, DELAYED RELEASE ORAL
Qty: 90 CAP | Refills: 1 | Status: SHIPPED | OUTPATIENT
Start: 2018-01-08 | End: 2018-08-04 | Stop reason: SDUPTHER

## 2018-03-14 RX ORDER — FLUTICASONE PROPIONATE 50 MCG
2 SPRAY, SUSPENSION (ML) NASAL DAILY
Qty: 3 BOTTLE | Refills: 1 | Status: SHIPPED | COMMUNITY
Start: 2018-03-14 | End: 2019-02-20 | Stop reason: SDUPTHER

## 2018-03-14 NOTE — TELEPHONE ENCOUNTER
Orders Placed This Encounter    fluticasone (FLONASE) 50 mcg/actuation nasal spray     Si Sprays by Both Nostrils route daily. Dispense:  3 Bottle     Refill:  1     The above medication refills were approved via verbal order by Dr. Inessa Cespedes III.

## 2018-03-28 RX ORDER — MELOXICAM 15 MG/1
TABLET ORAL
Qty: 90 TAB | Refills: 0 | Status: SHIPPED | OUTPATIENT
Start: 2018-03-28 | End: 2018-07-19 | Stop reason: SDUPTHER

## 2018-07-18 RX ORDER — LEVOTHYROXINE SODIUM 150 MCG
150 TABLET ORAL
Qty: 90 TAB | Refills: 0 | Status: SHIPPED | COMMUNITY
Start: 2018-07-18 | End: 2018-07-19 | Stop reason: CLARIF

## 2018-07-18 NOTE — TELEPHONE ENCOUNTER
Orders Placed This Encounter    SYNTHROID 150 mcg tablet     Sig: Take 1 Tab by mouth Daily (before breakfast). Dispense:  90 Tab     Refill:  0     The above medication refills were approved via verbal order by Dr. Tamanna Johnson III.

## 2018-07-19 RX ORDER — LEVOTHYROXINE SODIUM 137 UG/1
137 TABLET ORAL
Qty: 90 TAB | Refills: 1 | Status: SHIPPED | OUTPATIENT
Start: 2018-07-19 | End: 2018-10-29 | Stop reason: CLARIF

## 2018-07-19 RX ORDER — MELOXICAM 15 MG/1
TABLET ORAL
Qty: 90 TAB | Refills: 0 | Status: SHIPPED | OUTPATIENT
Start: 2018-07-19 | End: 2018-10-11 | Stop reason: SDUPTHER

## 2018-07-19 NOTE — TELEPHONE ENCOUNTER
Orders Placed This Encounter    SYNTHROID 137 mcg tablet     Sig: Take 137 mcg by mouth Daily (before breakfast). Dispense:  90 Tab     Refill:  1     The above medication refills were approved via verbal order by Dr. Carol Ann Alcazar III.

## 2018-08-06 RX ORDER — DULOXETIN HYDROCHLORIDE 60 MG/1
CAPSULE, DELAYED RELEASE ORAL
Qty: 90 CAP | Refills: 1 | Status: SHIPPED | COMMUNITY
Start: 2018-08-06 | End: 2018-11-02 | Stop reason: ALTCHOICE

## 2018-08-06 NOTE — TELEPHONE ENCOUNTER
Orders Placed This Encounter    DULoxetine (CYMBALTA) 60 mg capsule     Sig: TAKE 1 CAPSULE EVERY DAY     Dispense:  90 Cap     Refill:  1     The above medication refills were approved via verbal order by Dr. Apolinar Cruzist III.

## 2018-10-12 RX ORDER — MELOXICAM 15 MG/1
15 TABLET ORAL DAILY
Qty: 90 TAB | Refills: 1 | Status: SHIPPED | COMMUNITY
Start: 2018-10-12 | End: 2019-04-15 | Stop reason: SDUPTHER

## 2018-10-12 NOTE — TELEPHONE ENCOUNTER
Orders Placed This Encounter    meloxicam (MOBIC) 15 mg tablet     Sig: Take 1 Tab by mouth daily. Dispense:  90 Tab     Refill:  1     The above medication refills were approved via verbal order by Dr. Zackary Armenta III.

## 2018-10-29 RX ORDER — LEVOTHYROXINE SODIUM 137 UG/1
137 TABLET ORAL
Qty: 90 TAB | Refills: 1 | Status: CANCELLED | OUTPATIENT
Start: 2018-10-29

## 2018-10-29 RX ORDER — LEVOTHYROXINE SODIUM 137 UG/1
137 TABLET ORAL
Qty: 90 TAB | Refills: 0 | Status: SHIPPED | OUTPATIENT
Start: 2018-10-29 | End: 2018-11-28 | Stop reason: DRUGHIGH

## 2018-10-29 NOTE — TELEPHONE ENCOUNTER
Orders Placed This Encounter    levothyroxine (SYNTHROID) 137 mcg tablet     Sig: Take 137 mcg by mouth Daily (before breakfast). Dispense:  90 Tab     Refill:  0     The above medication refills were approved via verbal order by Dr. Whitley Brown III.

## 2018-10-29 NOTE — TELEPHONE ENCOUNTER
Pt states she needs the gernaric brand is less expensive than Name Brand.   Pt needs to p/u at local store and wants to be advise - [phone]

## 2018-11-02 ENCOUNTER — OFFICE VISIT (OUTPATIENT)
Dept: INTERNAL MEDICINE CLINIC | Age: 78
End: 2018-11-02

## 2018-11-02 VITALS
RESPIRATION RATE: 16 BRPM | DIASTOLIC BLOOD PRESSURE: 80 MMHG | BODY MASS INDEX: 41.43 KG/M2 | TEMPERATURE: 98.2 F | HEIGHT: 60 IN | HEART RATE: 58 BPM | OXYGEN SATURATION: 96 % | SYSTOLIC BLOOD PRESSURE: 143 MMHG | WEIGHT: 211 LBS

## 2018-11-02 DIAGNOSIS — E78.2 MIXED HYPERLIPIDEMIA: ICD-10-CM

## 2018-11-02 DIAGNOSIS — H40.10X0 OPEN-ANGLE GLAUCOMA, UNSPECIFIED GLAUCOMA STAGE, UNSPECIFIED LATERALITY, UNSPECIFIED OPEN-ANGLE GLAUCOMA TYPE: ICD-10-CM

## 2018-11-02 DIAGNOSIS — I10 ESSENTIAL HYPERTENSION: ICD-10-CM

## 2018-11-02 DIAGNOSIS — G89.29 CHRONIC NONINTRACTABLE HEADACHE, UNSPECIFIED HEADACHE TYPE: ICD-10-CM

## 2018-11-02 DIAGNOSIS — E55.9 VITAMIN D DEFICIENCY: ICD-10-CM

## 2018-11-02 DIAGNOSIS — E03.9 ACQUIRED HYPOTHYROIDISM: ICD-10-CM

## 2018-11-02 DIAGNOSIS — R51.9 CHRONIC NONINTRACTABLE HEADACHE, UNSPECIFIED HEADACHE TYPE: ICD-10-CM

## 2018-11-02 DIAGNOSIS — I48.92 PAROXYSMAL ATRIAL FLUTTER (HCC): ICD-10-CM

## 2018-11-02 DIAGNOSIS — L40.9 PSORIASIS: Primary | ICD-10-CM

## 2018-11-02 RX ORDER — DULOXETIN HYDROCHLORIDE 30 MG/1
30 CAPSULE, DELAYED RELEASE ORAL DAILY
Qty: 30 CAP | Refills: 2 | Status: SHIPPED | OUTPATIENT
Start: 2018-11-02 | End: 2018-11-08 | Stop reason: DRUGHIGH

## 2018-11-02 RX ORDER — SODIUM CHLORIDE 50 MG/ML
1 SOLUTION/ DROPS OPHTHALMIC 2 TIMES DAILY
Refills: 4 | COMMUNITY
Start: 2018-09-10 | End: 2022-06-05

## 2018-11-03 NOTE — PROGRESS NOTES
HPI: 
Radha Rick is a 77y.o. year old female who is here for a routine visit: 
 
Here for routine follow-up visit after a years absence. She has been generally doing fairly well. She has noted some increased issues with neuropathy in her feet. She is been off of Cymbalta now for quite some time and stopped it due to cost.  She is seeing her dermatologist regularly. They have adjusted she try Remicade. She is used that in the past with good success. She wanted to get my opinion about its use. She has not been checking her blood pressures. Her weight is up a pound. She has not been exercising regularly. Denies any change in bowel or bladder habits. She is up-to-date with urology, gynecology, dermatology, cardiology, and gastroenterology. Past Medical History:  
Diagnosis Date  Arthritis  Basal cell carcinoma 05/20/2012  Calculus of kidney  Cancer ([facility] [address] skin  Cancer ([facility] [address] thyroid  Glaucoma 4/30/2010  Hypertension  OA (osteoarthritis) 4/30/2010  Psoriasis 4/30/2010 Past Surgical History:  
Procedure Laterality Date  HX CATARACT REMOVAL    
 bilat  HX CHOLECYSTECTOMY  HX CRANIOTOMY  HX DILATION AND CURETTAGE    
 HX HYSTERECTOMY  HX KNEE ARTHROSCOPY    
 HX KNEE REPLACEMENT    
 HX TONSILLECTOMY  HX WRIST FRACTURE TX Left 8/21/15  THYROIDECTOMY Prior to Admission medications Medication Sig Start Date End Date Taking? Authorizing Provider  
varicella-zoster recombinant, PF, (SHINGRIX, PF,) 50 mcg/0.5 mL susr injection 0.5 mL by IntraMUSCular route once for 1 dose. 11/2/18 11/2/18 Yes Michele Salinas MD  
krill/om-3/dha/epa/phospho/ast (MEGARED OMEGA-3 KRILL OIL PO) Take  by mouth. Yes Provider, Historical  
glucosam/chond/hyalu/CF borate (MOVE FREE JOINT HEALTH PO) Take  by mouth.    Yes Provider, Historical  
sodium chloride (BRANDON-5) 5 % ophthalmic solution INSTILL 1 DROP INTO LEFT EYE 3 TIMES A DAY 9/10/18  Yes Provider, Historical  
DULoxetine (CYMBALTA) 30 mg capsule Take 1 Cap by mouth daily. 11/2/18  Yes Fan Quinteros MD  
levothyroxine (SYNTHROID) 137 mcg tablet Take 137 mcg by mouth Daily (before breakfast). 10/29/18  Yes Fan Quinteros MD  
meloxicam (MOBIC) 15 mg tablet Take 1 Tab by mouth daily. 10/12/18  Yes Fan Quinteros MD  
fluticasone (FLONASE) 50 mcg/actuation nasal spray 2 Sprays by Both Nostrils route daily. 3/14/18  Yes Fan Quinteros MD  
Cholecalciferol, Vitamin D3, (VITAMIN D3) 2,000 unit cap capsule Take 2,000 Units by mouth two (2) times a day. 2/16/17  Yes Fan Quinteros MD  
lisinopril (PRINIVIL, ZESTRIL) 30 mg tablet TAKE 1 TABLET BY MOUTH EVERY DAY Patient taking differently: TAKE 10mg BY MOUTH EVERY DAY 12/20/15  Yes Fan Quinteros MD  
sotalol (BETAPACE) 120 mg tablet Take 120 mg by mouth two (2) times a day. Yes Daphne Larkin MD  
timolol (TIMOPTIC) 0.5 % ophthalmic solution Administer 1 Drop to right eye daily. 12/17/10  Yes Provider, Historical  
aspirin 81 mg Tab Take  by mouth. Yes Provider, Historical  
MULTIVITAMINS (MULTIVITAMIN PO) Take  by mouth. Yes Provider, Historical  
nitroglycerin (RECTIV) 0.4 % (w/w) ointment Insert  into rectum every twelve (12) hours every twelve (12) hours. Provider, Historical  
SACCHAROMYCES BOULARDII (FLORASTOR PO) Take  by mouth. Provider, Historical  
wheat dextrin (BENEFIBER CLEAR) 3 gram/3.5 gram powd Take  by mouth. Provider, Historical  
polyethylene glycol (MIRALAX) 17 gram packet Take 17 g by mouth daily. Provider, Historical  
 
 
Social History Socioeconomic History  Marital status:  Spouse name: Not on file  Number of children: Not on file  Years of education: Not on file  Highest education level: Not on file Social Needs  Financial resource strain: Not on file  Food insecurity - worry: Not on file  Food insecurity - inability: Not on file  Transportation needs - medical: Not on file  Transportation needs - non-medical: Not on file Occupational History  Not on file Tobacco Use  Smoking status: Former Smoker Packs/day: 0.50 Years: 4.00 Pack years: 2.00 Types: Cigarettes Last attempt to quit: 1961 Years since quittin.9  Smokeless tobacco: Never Used Substance and Sexual Activity  Alcohol use: No  
  Alcohol/week: 0.0 oz  Drug use: No  
 Sexual activity: Not on file Other Topics Concern  Not on file Social History Narrative  Not on file ROS Per HPI Visit Vitals /80 Pulse (!) 58 Temp 98.2 °F (36.8 °C) (Oral) Resp 16 Ht 5' (1.524 m) Wt 211 lb (95.7 kg) SpO2 96% BMI 41.21 kg/m² Physical Exam  
Physical Examination: General appearance - alert, well appearing, and in no distress Mouth - mucous membranes moist, pharynx normal without lesions Neck - supple, no significant adenopathy Lymphatics - no palpable lymphadenopathy, no hepatosplenomegaly Chest - clear to auscultation, no wheezes, rales or rhonchi, symmetric air entry Heart - normal rate, regular rhythm, normal S1, S2, no murmurs, rubs, clicks or gallops Abdomen - soft, nontender, nondistended, no masses or organomegaly Neurological - alert, oriented, normal speech, no focal findings or movement disorder noted Musculoskeletal - no joint tenderness, deformity or swelling Extremities - peripheral pulses normal, no pedal edema, no clubbing or cyanosis Assessment/Plan: 
Diagnoses and all orders for this visit: 1. Psoriasis -per dermatology. I did suggest to her that Remicade was not a bad option and she might consider restarting. 2. Essential hypertension -pressure reasonably controlled. Continue current meds she is tolerating well.  
-     CBC WITH AUTOMATED DIFF 
-     METABOLIC PANEL, COMPREHENSIVE 
 
 3. Acquired hypothyroidism -appears euthyroid. Check levels and adjust meds. -     TSH AND FREE T4 
 
4. Mixed hyperlipidemia -LDL goal of 100. Tolerating statins well. Will check labs to be sure that are stable. -     LIPID PANEL 5. Open-angle glaucoma, unspecified glaucoma stage, unspecified laterality, unspecified open-angle glaucoma type 6. Paroxysmal atrial flutter (HCC) -no recurrence. Seeing cardiology for follow-up. 7. Chronic nonintractable headache, unspecified headache type -discussed Botox injections and she will consider. 8. Vitamin D deficiency 
-     VITAMIN D, 25 HYDROXY 9.  Neuropathywill restart low-dose dose Cymbalta. It appears to be much less expensive now and she will let me know how that goes in the next month. Other orders 
-     varicella-zoster recombinant, PF, (SHINGRIX, PF,) 50 mcg/0.5 mL susr injection; 0.5 mL by IntraMUSCular route once for 1 dose. -     DULoxetine (CYMBALTA) 30 mg capsule; Take 1 Cap by mouth daily. Follow-up Disposition: 
Return for Wellness Visit. Advised her to call back or return to office if symptoms worsen/change/persist. 
Discussed expected course/resolution/complications of diagnosis in detail with patient. Medication risks/benefits/costs/interactions/alternatives discussed with patient. She was given an after visit summary which includes diagnoses, current medications, & vitals. She expressed understanding with the diagnosis and plan.

## 2018-11-05 ENCOUNTER — TELEPHONE (OUTPATIENT)
Dept: INTERNAL MEDICINE CLINIC | Age: 78
End: 2018-11-05

## 2018-11-05 NOTE — TELEPHONE ENCOUNTER
Patient states Dr. Corinne Kim sent in a rx for 30mg Duloxetine when she was here on Friday. She is already taking 60mg which he prescribed. She thought Dr. Corinne Kim said something about putting her on Evista. Could someone please call and let her know what to do.

## 2018-11-08 ENCOUNTER — TELEPHONE (OUTPATIENT)
Dept: INTERNAL MEDICINE CLINIC | Age: 78
End: 2018-11-08

## 2018-11-08 RX ORDER — DULOXETIN HYDROCHLORIDE 60 MG/1
60 CAPSULE, DELAYED RELEASE ORAL DAILY
Qty: 90 CAP | Refills: 1 | COMMUNITY
Start: 2018-11-08 | End: 2019-03-08 | Stop reason: SDUPTHER

## 2018-11-08 NOTE — TELEPHONE ENCOUNTER
Pt called back to state the other medicine she use to take was:  RX: Evista 60 mg  This is one Pt thinks  was going [facility]  Pt - [phone]

## 2018-11-08 NOTE — TELEPHONE ENCOUNTER
[phone]    Please call to discuss the medication Duloxetine 30mg .  She was already taking this med and is wondering should she be taking it twice

## 2018-11-08 NOTE — TELEPHONE ENCOUNTER
Spoke with pt who states that at her visit she did not tell [facility] at her appt that the cymbalta was too expensive and she stopped it. She didn't understand why a rx for 30 mg was sent to [facility]. [facility] checked the price for the 30 mg while she was here found it to be less expensive and they discussed starting back on that. She just received a mailorder of [facility] for the 60 mg.  Per [facility], that is fine to start back or continue whatever the real case may be???

## 2018-11-19 ENCOUNTER — HOSPITAL ENCOUNTER (OUTPATIENT)
Dept: LAB | Age: 78
Discharge: HOME | End: 2018-11-19
Payer: MEDICARE

## 2018-11-19 PROCEDURE — 80053 COMPREHEN METABOLIC PANEL: CPT

## 2018-11-19 PROCEDURE — 85025 COMPLETE CBC W/AUTO DIFF WBC: CPT

## 2018-11-19 PROCEDURE — 82306 VITAMIN D 25 HYDROXY: CPT

## 2018-11-19 PROCEDURE — 80061 LIPID PANEL: CPT

## 2018-11-19 PROCEDURE — 36415 COLL VENOUS BLD VENIPUNCTURE: CPT

## 2018-11-19 PROCEDURE — 84443 ASSAY THYROID STIM HORMONE: CPT

## 2018-11-20 LAB
25(OH)D3+25(OH)D2 SERPL-MCNC: 34.8 NG/ML (ref 30–100)
ALBUMIN SERPL-MCNC: 4.4 G/DL (ref 3.5–4.8)
ALBUMIN/GLOB SERPL: 1.9 {RATIO} (ref 1.2–2.2)
ALP SERPL-CCNC: 71 IU/L (ref 39–117)
ALT SERPL-CCNC: 17 IU/L (ref 0–32)
AST SERPL-CCNC: 18 IU/L (ref 0–40)
BASOPHILS # BLD AUTO: 0 X10E3/UL (ref 0–0.2)
BASOPHILS NFR BLD AUTO: 1 %
BILIRUB SERPL-MCNC: 0.5 MG/DL (ref 0–1.2)
BUN SERPL-MCNC: 27 MG/DL (ref 8–27)
BUN/CREAT SERPL: 36 (ref 12–28)
CALCIUM SERPL-MCNC: 9.7 MG/DL (ref 8.7–10.3)
CHLORIDE SERPL-SCNC: 99 MMOL/L (ref 96–106)
CHOLEST SERPL-MCNC: 200 MG/DL (ref 100–199)
CO2 SERPL-SCNC: 27 MMOL/L (ref 20–29)
CREAT SERPL-MCNC: 0.74 MG/DL (ref 0.57–1)
EOSINOPHIL # BLD AUTO: 0.1 X10E3/UL (ref 0–0.4)
EOSINOPHIL NFR BLD AUTO: 2 %
ERYTHROCYTE [DISTWIDTH] IN BLOOD BY AUTOMATED COUNT: 14 % (ref 12.3–15.4)
GLOBULIN SER CALC-MCNC: 2.3 G/DL (ref 1.5–4.5)
GLUCOSE SERPL-MCNC: 90 MG/DL (ref 65–99)
HCT VFR BLD AUTO: 39.4 % (ref 34–46.6)
HDLC SERPL-MCNC: 51 MG/DL
HGB BLD-MCNC: 13.3 G/DL (ref 11.1–15.9)
IMM GRANULOCYTES # BLD: 0 X10E3/UL (ref 0–0.1)
IMM GRANULOCYTES NFR BLD: 0 %
LDLC SERPL CALC-MCNC: 121 MG/DL (ref 0–99)
LYMPHOCYTES # BLD AUTO: 1.4 X10E3/UL (ref 0.7–3.1)
LYMPHOCYTES NFR BLD AUTO: 18 %
MCH RBC QN AUTO: 32.5 PG (ref 26.6–33)
MCHC RBC AUTO-ENTMCNC: 33.8 G/DL (ref 31.5–35.7)
MCV RBC AUTO: 96 FL (ref 79–97)
MONOCYTES # BLD AUTO: 0.7 X10E3/UL (ref 0.1–0.9)
MONOCYTES NFR BLD AUTO: 9 %
NEUTROPHILS # BLD AUTO: 5.3 X10E3/UL (ref 1.4–7)
NEUTROPHILS NFR BLD AUTO: 70 %
PLATELET # BLD AUTO: 230 X10E3/UL (ref 150–379)
POTASSIUM SERPL-SCNC: 4.9 MMOL/L (ref 3.5–5.2)
PROT SERPL-MCNC: 6.7 G/DL (ref 6–8.5)
RBC # BLD AUTO: 4.09 X10E6/UL (ref 3.77–5.28)
SODIUM SERPL-SCNC: 141 MMOL/L (ref 134–144)
T4 FREE SERPL-MCNC: 1.68 NG/DL (ref 0.82–1.77)
TRIGL SERPL-MCNC: 139 MG/DL (ref 0–149)
TSH SERPL DL<=0.005 MIU/L-ACNC: 4.83 UIU/ML (ref 0.45–4.5)
VLDLC SERPL CALC-MCNC: 28 MG/DL (ref 5–40)
WBC # BLD AUTO: 7.5 X10E3/UL (ref 3.4–10.8)

## 2018-11-28 ENCOUNTER — TELEPHONE (OUTPATIENT)
Dept: INTERNAL MEDICINE CLINIC | Age: 78
End: 2018-11-28

## 2018-11-28 RX ORDER — LEVOTHYROXINE SODIUM 150 UG/1
150 TABLET ORAL
Qty: 90 TAB | Refills: 1 | Status: SHIPPED | COMMUNITY
Start: 2018-11-28 | End: 2019-05-11 | Stop reason: SDUPTHER

## 2018-11-28 NOTE — TELEPHONE ENCOUNTER
Spoke with pt spouse who is on hipaa. 2 pt identifiers. Advised that thyroid not at goal. To increase levothyroxine to 150 mcg daily and repeat labs in 6 mos. Rx for new dose sent to [facility] per request. Verbalized understanding. Orders Placed This Encounter    levothyroxine (SYNTHROID) 150 mcg tablet     Sig: Take 1 Tab by mouth Daily (before breakfast). Dispense:  90 Tab     Refill:  1     The above orders were approved via [facility] per Dr. Brett Tai III.

## 2018-11-28 NOTE — TELEPHONE ENCOUNTER
----- Message from Felix Mantilla MD sent at 11/20/2018 11:42 AM EST -----  Increase synthroid to 150. Labs in 6 months. Other labs are OK.

## 2018-12-20 ENCOUNTER — TELEPHONE (OUTPATIENT)
Dept: INTERNAL MEDICINE CLINIC | Age: 78
End: 2018-12-20

## 2018-12-20 NOTE — TELEPHONE ENCOUNTER
[phone]    The patient is not sure if she took her Synthroid tablet this morning.  Should she take 1 just in case

## 2018-12-20 NOTE — TELEPHONE ENCOUNTER
Verified patient identity with two identifiers. Spoke with patient by phone. Per Dr. Judith Muñoz: Fine to take an extra, resume normal routine tomorrow. Patient verbalized understanding.

## 2018-12-20 NOTE — TELEPHONE ENCOUNTER
Patient states she wakes up @ 6 AM to take her thyroid pill. She then turns the bottle upside down to let her know she took it and resumes sleep. This morning she woke up late and the bottle was turned upside down but she has no memory of actually waking up @ 6 to take the medication. She is afraid with her history of thyroid cancer that she will harm something if she misses a dose and wants to know if she can go ahead and possibly take it again or possibly just want to resume normal routine tomorrow.

## 2019-02-20 RX ORDER — FLUTICASONE PROPIONATE 50 MCG
SPRAY, SUSPENSION (ML) NASAL
Qty: 48 G | Refills: 1 | Status: SHIPPED | OUTPATIENT
Start: 2019-02-20 | End: 2020-05-18 | Stop reason: SDUPTHER

## 2019-03-02 ENCOUNTER — TELEPHONE (OUTPATIENT)
Dept: INTERNAL MEDICINE CLINIC | Age: 79
End: 2019-03-02

## 2019-03-08 RX ORDER — DULOXETIN HYDROCHLORIDE 60 MG/1
60 CAPSULE, DELAYED RELEASE ORAL DAILY
Qty: 30 CAP | Refills: 0 | Status: SHIPPED | OUTPATIENT
Start: 2019-03-08 | End: 2019-03-08 | Stop reason: SDUPTHER

## 2019-03-08 RX ORDER — DULOXETIN HYDROCHLORIDE 60 MG/1
CAPSULE, DELAYED RELEASE ORAL
Qty: 90 CAP | Refills: 1 | Status: SHIPPED | OUTPATIENT
Start: 2019-03-08 | End: 2019-10-22 | Stop reason: SDUPTHER

## 2019-03-08 NOTE — TELEPHONE ENCOUNTER
Pt wants this script sent to [facility].  Pt will not get Mail Order till about Next Friday. Needs short script.   Advise - 113-

## 2019-03-08 NOTE — TELEPHONE ENCOUNTER
Orders Placed This Encounter    DULoxetine (CYMBALTA) 60 mg capsule     Sig: Take 1 Cap by mouth daily. Dispense:  30 Cap     Refill:  0     The above orders were approved via [facility] per Dr. Rachel Ulloa, III.

## 2019-04-15 RX ORDER — MELOXICAM 15 MG/1
TABLET ORAL
Qty: 90 TAB | Refills: 1 | Status: SHIPPED | OUTPATIENT
Start: 2019-04-15 | End: 2019-10-29 | Stop reason: SDUPTHER

## 2019-05-13 RX ORDER — LEVOTHYROXINE SODIUM 150 MCG
TABLET ORAL
Qty: 90 TAB | Refills: 1 | Status: SHIPPED | OUTPATIENT
Start: 2019-05-13 | End: 2019-11-19 | Stop reason: SDUPTHER

## 2019-07-11 RX ORDER — DULOXETIN HYDROCHLORIDE 60 MG/1
CAPSULE, DELAYED RELEASE ORAL
Qty: 30 CAP | Refills: 0 | Status: SHIPPED | OUTPATIENT
Start: 2019-07-11 | End: 2019-08-27 | Stop reason: SDUPTHER

## 2019-08-28 RX ORDER — DULOXETIN HYDROCHLORIDE 60 MG/1
CAPSULE, DELAYED RELEASE ORAL
Qty: 30 CAP | Refills: 0 | Status: SHIPPED | OUTPATIENT
Start: 2019-08-28 | End: 2019-09-16 | Stop reason: SDUPTHER

## 2019-09-17 RX ORDER — DULOXETIN HYDROCHLORIDE 60 MG/1
CAPSULE, DELAYED RELEASE ORAL
Qty: 30 CAP | Refills: 0 | Status: SHIPPED | OUTPATIENT
Start: 2019-09-17 | End: 2019-10-09 | Stop reason: SDUPTHER

## 2019-10-07 NOTE — PATIENT INSTRUCTIONS

## 2019-10-09 ENCOUNTER — HOSPITAL ENCOUNTER (OUTPATIENT)
Dept: LAB | Age: 79
Discharge: HOME | End: 2019-10-09
Payer: MEDICARE

## 2019-10-09 ENCOUNTER — OFFICE VISIT (OUTPATIENT)
Dept: INTERNAL MEDICINE CLINIC | Age: 79
End: 2019-10-09

## 2019-10-09 VITALS
HEART RATE: 59 BPM | WEIGHT: 216 LBS | SYSTOLIC BLOOD PRESSURE: 169 MMHG | BODY MASS INDEX: 42.41 KG/M2 | TEMPERATURE: 98.2 F | RESPIRATION RATE: 20 BRPM | OXYGEN SATURATION: 97 % | HEIGHT: 60 IN | DIASTOLIC BLOOD PRESSURE: 75 MMHG

## 2019-10-09 DIAGNOSIS — I10 ESSENTIAL HYPERTENSION: Primary | ICD-10-CM

## 2019-10-09 DIAGNOSIS — E03.9 ACQUIRED HYPOTHYROIDISM: ICD-10-CM

## 2019-10-09 DIAGNOSIS — E78.2 MIXED HYPERLIPIDEMIA: ICD-10-CM

## 2019-10-09 DIAGNOSIS — R41.3 MEMORY LOSS: ICD-10-CM

## 2019-10-09 PROCEDURE — 85025 COMPLETE CBC W/AUTO DIFF WBC: CPT

## 2019-10-09 PROCEDURE — 82607 VITAMIN B-12: CPT

## 2019-10-09 PROCEDURE — 84443 ASSAY THYROID STIM HORMONE: CPT

## 2019-10-09 PROCEDURE — 36415 COLL VENOUS BLD VENIPUNCTURE: CPT

## 2019-10-09 PROCEDURE — 80053 COMPREHEN METABOLIC PANEL: CPT

## 2019-10-09 RX ORDER — OMEPRAZOLE 20 MG/1
20 CAPSULE, DELAYED RELEASE ORAL DAILY
COMMUNITY
End: 2021-07-07 | Stop reason: ALTCHOICE

## 2019-10-09 RX ORDER — BETAMETHASONE DIPROPIONATE 0.5 MG/G
CREAM TOPICAL
Refills: 4 | COMMUNITY
Start: 2019-09-25 | End: 2021-07-07 | Stop reason: ALTCHOICE

## 2019-10-09 NOTE — PROGRESS NOTES
HPI:  Spring De Dios is a [de-identified]y.o. year old female who is here for a routine visit:    Presents for follow-up visit. She has not been seen here in quite some time. She notes some headaches she describes as an ache across the back of the neck. They are mild and she does not take any medication for it. She has had a CT scan of the head in the past revealing chronic small vessel changes otherwise negative. Her  and she both note some short-term memory issues and she is concerned about that. Denies any falls or injuries. Denies chest pains or shortness of breath. Denies any change in bowel habits. She is seeing urology for her bladder and recently was treated for urinary tract infection. Her  noted no change in her memory with that. She does have a follow-up visit coming up in December with urology. Denies any dysuria or hematuria. No unusual muscle aches or joint aches. Past Medical History:   Diagnosis Date    Arthritis     Basal cell carcinoma 05/20/2012    Calculus of kidney     Cancer ([facility] [address]     skin    Cancer ([facility] [address]     thyroid    Glaucoma 4/30/2010    Hypertension     OA (osteoarthritis) 4/30/2010    Psoriasis 4/30/2010       Past Surgical History:   Procedure Laterality Date    HX CATARACT REMOVAL      bilat    HX CHOLECYSTECTOMY      HX CRANIOTOMY      HX DILATION AND CURETTAGE      HX HYSTERECTOMY      HX KNEE ARTHROSCOPY      HX KNEE REPLACEMENT      HX TONSILLECTOMY      HX WRIST FRACTURE TX Left 8/21/15    THYROIDECTOMY         Prior to Admission medications    Medication Sig Start Date End Date Taking? Authorizing Provider   omeprazole (PRILOSEC) 20 mg capsule Take 20 mg by mouth daily. Yes Provider, Historical   vit A/vit C/vit E/zinc/copper (PRESERVISION AREDS PO) Take  by mouth.    Yes Provider, Historical   betamethasone dipropionate (DIPROSONE) 0.05 % topical cream APPLY CREAM TOPICALLY TO PSORIASIS AREAS ONCE DAILY FOR 14 DAYS; REPEAT AS NEEDED; DO NOT APPLY TO FACE. 9/25/19  Yes Provider, Historical   SYNTHROID 150 mcg tablet TAKE 1 TABLET EVERY DAY BEFORE BREAKFAST 5/13/19  Yes Lara Bertrand MD   meloxicam (MOBIC) 15 mg tablet TAKE 1 TABLET EVERY DAY 4/15/19  Yes Romayne Lynn III, MD   DULoxetine (CYMBALTA) 60 mg capsule TAKE 1 CAPSULE EVERY DAY 3/8/19  Yes Lara Bertrand MD   glucosam/chond/hyalu/CF borate ([facility] FREE JOINT [facility] PO) Take  by mouth. Yes Provider, Historical   nitroglycerin (RECTIV) 0.4 % (w/w) ointment Insert  into rectum every twelve (12) hours every twelve (12) hours. Yes Provider, Historical   Cholecalciferol, Vitamin D3, (VITAMIN D3) 2,000 unit cap capsule Take 2,000 Units by mouth two (2) times a day. 2/16/17  Yes Lara Bertrand MD   lisinopril (PRINIVIL, ZESTRIL) 30 mg tablet TAKE 1 TABLET BY MOUTH EVERY DAY  Patient taking differently: TAKE 10mg BY MOUTH EVERY DAY 12/20/15  Yes Romayne Lynn III, MD   sotalol (BETAPACE) 120 mg tablet Take 120 mg by mouth two (2) times a day. Yes Susy Sam MD   aspirin 81 mg Tab Take  by mouth. Yes Provider, Historical   MULTIVITAMINS (MULTIVITAMIN PO) Take  by mouth. Yes Provider, Historical   DULoxetine (CYMBALTA) 60 mg capsule TAKE 1 CAPSULE BY MOUTH ONCE DAILY (  PT  NEEDS  APPT  ) 9/17/19 10/9/19  Romayne Lynn III, MD   fluticasone (FLONASE) 50 mcg/actuation nasal spray USE 2 SPRAYS IN EACH NOSTRIL EVERY DAY 2/20/19   Romayne Lynn III, MD   krill/om-3/dha/epa/phospho/ast (MEGARED OMEGA-3 KRILL OIL PO) Take  by mouth. Provider, Historical   sodium chloride (BRANDON-5) 5 % ophthalmic solution INSTILL 1 DROP INTO LEFT EYE 3 TIMES A DAY 9/10/18   Provider, Historical   SACCHAROMYCES BOULARDII (FLORASTOR PO) Take  by mouth. Provider, Historical   wheat dextrin (BENEFIBER CLEAR) 3 gram/3.5 gram powd Take  by mouth. Provider, Historical   polyethylene glycol (MIRALAX) 17 gram packet Take 17 g by mouth daily.     Provider, Historical   timolol (TIMOPTIC) 0.5 % ophthalmic solution Administer 1 Drop to right eye daily.  12/17/10   Provider, Historical       Social History     Socioeconomic History    Marital status:      Spouse name: Not on file    Number of children: Not on file    Years of education: Not on file    Highest education level: Not on file   Occupational History    Not on file   Social Needs    Financial resource strain: Not on file    Food insecurity:     Worry: Not on file     Inability: Not on file    Transportation needs:     Medical: Not on file     Non-medical: Not on file   Tobacco Use    Smoking status: Former Smoker     Packs/day: 0.50     Years: 4.00     Pack years: 2.00     Types: Cigarettes     Last attempt to quit: 1961     Years since quittin.8    Smokeless tobacco: Never Used   Substance and Sexual Activity    Alcohol use: No     Alcohol/week: 0.0 standard drinks    Drug use: No    Sexual activity: Not on file   Lifestyle    Physical activity:     Days per week: Not on file     Minutes per session: Not on file    Stress: Not on file   Relationships    Social connections:     Talks on phone: Not on file     Gets together: Not on file     Attends Evangelical service: Not on file     Active member of club or organization: Not on file     Attends meetings of clubs or organizations: Not on file     Relationship status: Not on file    Intimate partner violence:     Fear of current or ex partner: Not on file     Emotionally abused: Not on file     Physically abused: Not on file     Forced sexual activity: Not on file   Other Topics Concern    Not on file   Social History Narrative    Not on file          ROS  Per HPI    Visit Vitals  /75   Pulse (!) 59   Temp 98.2 °F (36.8 °C) (Oral)   Resp 20   Ht 5' (1.524 m)   Wt 216 lb (98 kg)   SpO2 97%   BMI 42.18 kg/m²         Physical Exam   Physical Examination: General appearance - alert, well appearing, and in no distress  Mouth - mucous membranes moist, pharynx normal without lesions  Neck - supple, no significant adenopathy  Lymphatics - no palpable lymphadenopathy, no hepatosplenomegaly  Chest - clear to auscultation, no wheezes, rales or rhonchi, symmetric air entry  Heart - normal rate, regular rhythm, normal S1, S2, no murmurs, rubs, clicks or gallops  Abdomen - soft, nontender, nondistended, no masses or organomegaly  Musculoskeletal - no joint tenderness, deformity or swelling  Extremities - peripheral pulses normal, no pedal edema, no clubbing or cyanosis      Assessment/Plan:  Diagnoses and all orders for this visit:    1. Essential hypertension -blood pressure up today. She will monitor this at home and let me know if readings are high.  -     METABOLIC PANEL, COMPREHENSIVE    2. Acquired hypothyroidism-previous TSH elevated. Will recheck levels and adjust as needed. -     METABOLIC PANEL, COMPREHENSIVE  -     TSH AND FREE T4  -     CBC WITH AUTOMATED DIFF    3. Mixed hyperlipidemia -diet controlled. 4. Memory loss-likely multifactorial but includes small vessel disease. Check B12 and neuropsych testing. Consider medication pending those results. -     METABOLIC PANEL, COMPREHENSIVE  -     VITAMIN B12  -     REFERRAL TO NEUROPSYCHOLOGY       Follow-up and Dispositions    · Return if symptoms worsen or fail to improve. Advised her to call back or return to office if symptoms worsen/change/persist.  Discussed expected course/resolution/complications of diagnosis in detail with patient. Medication risks/benefits/costs/interactions/alternatives discussed with patient. She was given an after visit summary which includes diagnoses, current medications, & vitals. She expressed understanding with the diagnosis and plan.

## 2019-10-10 LAB
ALBUMIN SERPL-MCNC: 4.3 G/DL (ref 3.5–4.7)
ALBUMIN/GLOB SERPL: 2 {RATIO} (ref 1.2–2.2)
ALP SERPL-CCNC: 69 IU/L (ref 39–117)
ALT SERPL-CCNC: 14 IU/L (ref 0–32)
AST SERPL-CCNC: 14 IU/L (ref 0–40)
BASOPHILS # BLD AUTO: 0.1 X10E3/UL (ref 0–0.2)
BASOPHILS NFR BLD AUTO: 1 %
BILIRUB SERPL-MCNC: 0.3 MG/DL (ref 0–1.2)
BUN SERPL-MCNC: 28 MG/DL (ref 8–27)
BUN/CREAT SERPL: 39 (ref 12–28)
CALCIUM SERPL-MCNC: 9.6 MG/DL (ref 8.7–10.3)
CHLORIDE SERPL-SCNC: 99 MMOL/L (ref 96–106)
CO2 SERPL-SCNC: 26 MMOL/L (ref 20–29)
CREAT SERPL-MCNC: 0.72 MG/DL (ref 0.57–1)
EOSINOPHIL # BLD AUTO: 0.1 X10E3/UL (ref 0–0.4)
EOSINOPHIL NFR BLD AUTO: 2 %
ERYTHROCYTE [DISTWIDTH] IN BLOOD BY AUTOMATED COUNT: 13.1 % (ref 12.3–15.4)
GLOBULIN SER CALC-MCNC: 2.1 G/DL (ref 1.5–4.5)
GLUCOSE SERPL-MCNC: 98 MG/DL (ref 65–99)
HCT VFR BLD AUTO: 38 % (ref 34–46.6)
HGB BLD-MCNC: 13 G/DL (ref 11.1–15.9)
IMM GRANULOCYTES # BLD AUTO: 0 X10E3/UL (ref 0–0.1)
IMM GRANULOCYTES NFR BLD AUTO: 1 %
LYMPHOCYTES # BLD AUTO: 1.3 X10E3/UL (ref 0.7–3.1)
LYMPHOCYTES NFR BLD AUTO: 16 %
MCH RBC QN AUTO: 31.7 PG (ref 26.6–33)
MCHC RBC AUTO-ENTMCNC: 34.2 G/DL (ref 31.5–35.7)
MCV RBC AUTO: 93 FL (ref 79–97)
MONOCYTES # BLD AUTO: 0.7 X10E3/UL (ref 0.1–0.9)
MONOCYTES NFR BLD AUTO: 9 %
NEUTROPHILS # BLD AUTO: 6.1 X10E3/UL (ref 1.4–7)
NEUTROPHILS NFR BLD AUTO: 71 %
PLATELET # BLD AUTO: 208 X10E3/UL (ref 150–450)
POTASSIUM SERPL-SCNC: 4.8 MMOL/L (ref 3.5–5.2)
PROT SERPL-MCNC: 6.4 G/DL (ref 6–8.5)
RBC # BLD AUTO: 4.1 X10E6/UL (ref 3.77–5.28)
SODIUM SERPL-SCNC: 140 MMOL/L (ref 134–144)
T4 FREE SERPL-MCNC: 1.62 NG/DL (ref 0.82–1.77)
TSH SERPL DL<=0.005 MIU/L-ACNC: 1.72 UIU/ML (ref 0.45–4.5)
VIT B12 SERPL-MCNC: 945 PG/ML (ref 232–1245)
WBC # BLD AUTO: 8.4 X10E3/UL (ref 3.4–10.8)

## 2019-10-22 NOTE — TELEPHONE ENCOUNTER
Dandy Rhodes (Self) [phone] (M)     Pt calling back and would like this rx sent to [facility] mail order not [facility].

## 2019-10-23 RX ORDER — DULOXETIN HYDROCHLORIDE 60 MG/1
60 CAPSULE, DELAYED RELEASE ORAL DAILY
Qty: 90 CAP | Refills: 1 | Status: SHIPPED | COMMUNITY
Start: 2019-10-23 | End: 2020-04-30

## 2019-10-23 NOTE — TELEPHONE ENCOUNTER
Orders Placed This Encounter    DULoxetine (CYMBALTA) 60 mg capsule     Sig: Take 1 Cap by mouth daily. Dispense:  90 Cap     Refill:  1     The above orders were approved via [facility] per Dr. Lissett Jensen III.

## 2019-10-29 RX ORDER — MELOXICAM 15 MG/1
TABLET ORAL
Qty: 90 TAB | Refills: 1 | Status: SHIPPED | OUTPATIENT
Start: 2019-10-29 | End: 2020-04-30

## 2019-11-19 RX ORDER — LEVOTHYROXINE SODIUM 150 MCG
TABLET ORAL
Qty: 90 TAB | Refills: 0 | Status: SHIPPED | OUTPATIENT
Start: 2019-11-19 | End: 2020-02-18

## 2020-01-23 ENCOUNTER — TELEPHONE (OUTPATIENT)
Dept: INTERNAL MEDICINE CLINIC | Age: 80
End: 2020-01-23

## 2020-01-23 NOTE — TELEPHONE ENCOUNTER
[phone]      Pt can not get a appt with Dr Vina Brunner until May.  Please call to discuss what she should do

## 2020-01-23 NOTE — TELEPHONE ENCOUNTER
Spoke with pt and advised that she should keep her appt with Dr. Vina Brunner. She could call every week to see if there is a cancellation. Verbalized understanding.

## 2020-02-18 RX ORDER — LEVOTHYROXINE SODIUM 150 MCG
TABLET ORAL
Qty: 90 TAB | Refills: 0 | Status: SHIPPED | OUTPATIENT
Start: 2020-02-18 | End: 2020-05-20

## 2020-04-30 RX ORDER — MELOXICAM 15 MG/1
TABLET ORAL
Qty: 90 TAB | Refills: 1 | Status: SHIPPED | OUTPATIENT
Start: 2020-04-30 | End: 2020-10-30

## 2020-04-30 RX ORDER — DULOXETIN HYDROCHLORIDE 60 MG/1
CAPSULE, DELAYED RELEASE ORAL
Qty: 90 CAP | Refills: 1 | Status: SHIPPED | OUTPATIENT
Start: 2020-04-30 | End: 2020-10-30

## 2020-05-02 ENCOUNTER — TELEPHONE (OUTPATIENT)
Dept: INTERNAL MEDICINE CLINIC | Age: 80
End: 2020-05-02

## 2020-05-02 NOTE — TELEPHONE ENCOUNTER
Sat. 6:38 PM  Call from patient c/o earache, and message states she wants something sent to her pharmacy. Attempted to return call 6:53 PM, but call went straight to voicemail and mail-box is full. Another attempt several minutes later - same result. Again an attempt at 7 pm - same result. Next attempt : 7:15 PM - still goes straight to voicemail which is full. 7:25 pm - patient called me. She reports her R ear has been sore for several days, and that \"I pick at it\". No bleeding or drainage. No feeling of swelling. It \"just hurts\". This is a recurrent problem, although not frequently. Hearing is not effected. No recent cold. She tried heat one time which did help. Plan: Heat and/or cold compress   Her  will check with pharmacist about a topical ointment (she is allergic to Polysporin, Bacitracin and Neomycin).

## 2020-05-18 RX ORDER — FLUTICASONE PROPIONATE 50 MCG
SPRAY, SUSPENSION (ML) NASAL
Qty: 48 G | Refills: 1 | Status: SHIPPED | OUTPATIENT
Start: 2020-05-18 | End: 2021-04-13

## 2020-05-18 NOTE — TELEPHONE ENCOUNTER
Requested Prescriptions     Pending Prescriptions Disp Refills    fluticasone propionate (FLONASE) 50 mcg/actuation nasal spray 48 g 1     [address] [facility], . [address]      Pt  said that they would like a copy of the results from dr jose office for the test that his wife did     [phone]

## 2020-05-20 RX ORDER — LEVOTHYROXINE SODIUM 150 MCG
TABLET ORAL
Qty: 90 TAB | Refills: 0 | Status: SHIPPED | OUTPATIENT
Start: 2020-05-20 | End: 2020-08-25

## 2020-07-31 DIAGNOSIS — I10 ESSENTIAL HYPERTENSION: Primary | ICD-10-CM

## 2020-07-31 RX ORDER — LISINOPRIL 30 MG/1
TABLET ORAL
Qty: 90 TAB | Refills: 1 | Status: CANCELLED | OUTPATIENT
Start: 2020-07-31

## 2020-07-31 RX ORDER — LISINOPRIL 30 MG/1
TABLET ORAL
Qty: 90 TAB | Refills: 1 | Status: SHIPPED | OUTPATIENT
Start: 2020-07-31 | End: 2020-08-05 | Stop reason: SDUPTHER

## 2020-07-31 NOTE — TELEPHONE ENCOUNTER
Requested Prescriptions     Pending Prescriptions Disp Refills    lisinopriL (PRINIVIL, ZESTRIL) 30 mg tablet 90 Tab [address]    appt 08/12/20

## 2020-08-05 RX ORDER — LISINOPRIL 30 MG/1
30 TABLET ORAL DAILY
Qty: 90 TAB | Refills: 1 | Status: SHIPPED | OUTPATIENT
Start: 2020-08-05 | End: 2020-08-14 | Stop reason: SDUPTHER

## 2020-08-05 NOTE — TELEPHONE ENCOUNTER
Orders Placed This Encounter    lisinopriL (PRINIVIL, ZESTRIL) 30 mg tablet     Sig: Take 1 Tab by mouth daily. TAKE 10mg BY MOUTH EVERY DAY     Dispense:  90 Tab     Refill:  1     Corrected Rx. The above orders were approved via [facility] per Dr. Vicki Fajardo III.

## 2020-08-14 RX ORDER — LISINOPRIL 30 MG/1
30 TABLET ORAL DAILY
Qty: 90 TAB | Refills: 1 | Status: SHIPPED | OUTPATIENT
Start: 2020-08-14 | End: 2020-08-17 | Stop reason: SDUPTHER

## 2020-08-17 ENCOUNTER — TELEPHONE (OUTPATIENT)
Dept: INTERNAL MEDICINE CLINIC | Age: 80
End: 2020-08-17

## 2020-08-17 RX ORDER — LISINOPRIL 30 MG/1
30 TABLET ORAL DAILY
Qty: 90 TAB | Refills: 1 | Status: SHIPPED | OUTPATIENT
Start: 2020-08-17 | End: 2020-10-15 | Stop reason: SDUPTHER

## 2020-08-17 NOTE — TELEPHONE ENCOUNTER
Patient's  called to get wife's lisinopril script straight. She has never taken 30mg, only 10mg, 1 tab per day. Why was it changed to 30mg? Please call.

## 2020-08-17 NOTE — TELEPHONE ENCOUNTER
Spoke with patients . Reviewed with MD and chart that patient has been prescribed 30 mg for years and patient to be taking 30 mg daily per Dr. Joshua Huggins. He thinks he must have an old bottle. Advised pt reported at visit she was only taking 10 mg but she may have been confused due to her dementia. He denies any pill cutting. Pt  verbalized understanding.

## 2020-08-25 RX ORDER — LEVOTHYROXINE SODIUM 150 MCG
TABLET ORAL
Qty: 90 TAB | Refills: 0 | Status: SHIPPED | OUTPATIENT
Start: 2020-08-25 | End: 2020-11-09

## 2020-08-31 ENCOUNTER — TELEPHONE (OUTPATIENT)
Dept: INTERNAL MEDICINE CLINIC | Age: 80
End: 2020-08-31

## 2020-08-31 NOTE — TELEPHONE ENCOUNTER
Butch Morales [facility]) [phone] (M)     pt's  wants dr Glenny Currie to know that pt had a fall and is [facility] neurosurgery dept and needs to stay a few more nights. He says that he has never gotten the results of test she had for dementia and wonders what they were? Do not respond via my chart.

## 2020-09-02 ENCOUNTER — TELEPHONE (OUTPATIENT)
Dept: INTERNAL MEDICINE CLINIC | Age: 80
End: 2020-09-02

## 2020-09-02 NOTE — TELEPHONE ENCOUNTER
Spoke with [facility] and advised that yes Dr. Bejarano Alma will follow pt also advised pt is scheduled for next week to see PCP.     Future Appointments   Date Time Provider Heide Diaz   9/9/2020  3:00 PM Kimber Alanis MD [facility]

## 2020-09-02 NOTE — TELEPHONE ENCOUNTER
Patient scheduled for 30 minute hospital f/u and to go over neuropysch testing below:    Future Appointments   Date Time Provider Heide Diaz   9/9/2020  3:00 PM Joshua Sanchez MD Located within [facility]

## 2020-09-04 ENCOUNTER — OFFICE VISIT (OUTPATIENT)
Dept: INTERNAL MEDICINE CLINIC | Age: 80
End: 2020-09-04
Payer: MEDICARE

## 2020-09-04 VITALS
BODY MASS INDEX: 43.39 KG/M2 | WEIGHT: 221 LBS | TEMPERATURE: 97.8 F | HEIGHT: 60 IN | RESPIRATION RATE: 20 BRPM | OXYGEN SATURATION: 95 % | DIASTOLIC BLOOD PRESSURE: 64 MMHG | SYSTOLIC BLOOD PRESSURE: 117 MMHG | HEART RATE: 58 BPM

## 2020-09-04 DIAGNOSIS — E78.2 MIXED HYPERLIPIDEMIA: ICD-10-CM

## 2020-09-04 DIAGNOSIS — F03.90 DEMENTIA WITHOUT BEHAVIORAL DISTURBANCE, UNSPECIFIED DEMENTIA TYPE: Primary | ICD-10-CM

## 2020-09-04 DIAGNOSIS — Z23 NEEDS FLU SHOT: ICD-10-CM
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
## 2020-09-04 DIAGNOSIS — E03.9 ACQUIRED HYPOTHYROIDISM: ICD-10-CM

## 2020-09-04 DIAGNOSIS — I10 ESSENTIAL HYPERTENSION: ICD-10-CM

## 2020-09-04 PROCEDURE — 1090F PRES/ABSN URINE INCON ASSESS: CPT | Performed by: INTERNAL MEDICINE

## 2020-09-04 PROCEDURE — G0008 ADMIN INFLUENZA VIRUS VAC: HCPCS | Performed by: INTERNAL MEDICINE

## 2020-09-04 PROCEDURE — 99214 OFFICE O/P EST MOD 30 MIN: CPT | Performed by: INTERNAL MEDICINE

## 2020-09-04 PROCEDURE — G8432 DEP SCR NOT DOC, RNG: HCPCS | Performed by: INTERNAL MEDICINE

## 2020-09-04 PROCEDURE — 90694 VACC AIIV4 NO PRSRV 0.5ML IM: CPT | Performed by: INTERNAL MEDICINE

## 2020-09-04 PROCEDURE — G8427 DOCREV CUR MEDS BY ELIG CLIN: HCPCS | Performed by: INTERNAL MEDICINE

## 2020-09-04 PROCEDURE — 1100F PTFALLS ASSESS-DOCD GE2>/YR: CPT | Performed by: INTERNAL MEDICINE

## 2020-09-04 PROCEDURE — G0463 HOSPITAL OUTPT CLINIC VISIT: HCPCS | Performed by: INTERNAL MEDICINE

## 2020-09-04 PROCEDURE — G8752 SYS BP LESS 140: HCPCS | Performed by: INTERNAL MEDICINE

## 2020-09-04 PROCEDURE — G8754 DIAS BP LESS 90: HCPCS | Performed by: INTERNAL MEDICINE

## 2020-09-04 PROCEDURE — G8536 NO DOC ELDER MAL SCRN: HCPCS | Performed by: INTERNAL MEDICINE

## 2020-09-04 PROCEDURE — 3288F FALL RISK ASSESSMENT DOCD: CPT | Performed by: INTERNAL MEDICINE

## 2020-09-04 PROCEDURE — G8417 CALC BMI ABV UP PARAM F/U: HCPCS | Performed by: INTERNAL MEDICINE

## 2020-09-04 PROCEDURE — G8399 PT W/DXA RESULTS DOCUMENT: HCPCS | Performed by: INTERNAL MEDICINE

## 2020-09-04 RX ORDER — DONEPEZIL HYDROCHLORIDE 5 MG/1
5 TABLET, FILM COATED ORAL
Qty: 30 TAB | Refills: 0 | Status: SHIPPED | OUTPATIENT
Start: 2020-09-04 | End: 2020-10-14 | Stop reason: DRUGHIGH

## 2020-09-04 RX ORDER — CAPSAICIN 0.033 %
CREAM (GRAM) TOPICAL
COMMUNITY
Start: 2020-09-04 | End: 2022-06-04

## 2020-09-05 NOTE — PROGRESS NOTES
HPI:  Ronnell Ruiz is a 79y.o. year old female who is here for a routine visit:    Presents for a follow-up visit. She recently had a fall striking the left side of her head when she got up in the middle of the night for an unknown reason. She was hospitalized and had an extensive evaluation which was negative. She previously had neuropsych testing about 3 months ago revealing some significant memory loss. She has had previous lab work revealing no metabolic causes for dementia. She has had previous CT scan of the head revealing some volume loss but no other acute changes. Her daughter and  have noted increasing issues with short-term memory loss. She does have physical therapy, Occupational Therapy, and nursing that is treating her at home right now. This is the first fall she has had in many months. A review of systems by her history is completely negative. Her daughter does note some dyspnea on exertion with activity. No PND or orthopnea. No nausea or vomiting. Her  is not sure when she saw her cardiologist or dermatologist last.  She does have a dermatology visit scheduled for next month. Past Medical History:   Diagnosis Date    Arthritis     Basal cell carcinoma 05/20/2012    Calculus of kidney     Cancer ([facility]     skin    Cancer ([facility]     thyroid    Glaucoma 4/30/2010    Hypertension     OA (osteoarthritis) 4/30/2010    Psoriasis 4/30/2010       Past Surgical History:   Procedure Laterality Date    HX CATARACT REMOVAL      bilat    HX CHOLECYSTECTOMY      HX CRANIOTOMY      HX DILATION AND CURETTAGE      HX HYSTERECTOMY      HX KNEE ARTHROSCOPY      HX KNEE REPLACEMENT      HX TONSILLECTOMY      HX WRIST FRACTURE TX Left 8/21/15    THYROIDECTOMY         Prior to Admission medications    Medication Sig Start Date End Date Taking? Authorizing Provider   donepeziL (ARICEPT) 5 mg tablet Take 1 Tab by mouth nightly.  9/4/20  Yes Zhen Bruno MD capsaicin (Zostrix) 0.033 % crea by Apply Externally route. 9/4/20  Yes Cal Oshea MD   Synthroid 150 mcg tablet TAKE 1 TABLET EVERY DAY BEFORE BREAKFAST 8/25/20  Yes Cal Oshea MD   lisinopriL (PRINIVIL, ZESTRIL) 30 mg tablet Take 1 Tab by mouth daily. 8/17/20  Yes Cal Oshea MD   DULoxetine (CYMBALTA) 60 mg capsule TAKE 1 CAPSULE EVERY DAY 4/30/20  Yes Esequiel Sanchez III, MD   meloxicam (MOBIC) 15 mg tablet TAKE 1 TABLET EVERY DAY 4/30/20  Yes Cal Oshea MD   vit A/vit C/vit E/zinc/copper (PRESERVISION AREDS PO) Take 2 Tabs by mouth. Yes Provider, Historical   betamethasone dipropionate (DIPROSONE) 0.05 % topical cream APPLY CREAM TOPICALLY TO PSORIASIS AREAS ONCE DAILY FOR 14 DAYS; REPEAT AS NEEDED; DO NOT APPLY TO FACE. 9/25/19  Yes Provider, Historical   glucosam/chond/hyalu/CF borate (MOVE FREE [employer] PO) Take  by mouth. Yes Provider, Historical   nitroglycerin (RECTIV) 0.4 % (w/w) ointment Insert  into rectum every twelve (12) hours every twelve (12) hours. Yes Provider, Historical   Cholecalciferol, Vitamin D3, (VITAMIN D3) 2,000 unit cap capsule Take 2,000 Units by mouth two (2) times a day. 2/16/17  Yes Cal Oshea MD   sotalol (BETAPACE) 120 mg tablet Take 120 mg by mouth two (2) times a day. Yes Felicia Cisse MD   timolol (TIMOPTIC) 0.5 % ophthalmic solution Administer 1 Drop to right eye daily. 12/17/10  Yes Provider, Historical   MULTIVITAMINS (MULTIVITAMIN PO) Take  by mouth. Yes Provider, Historical   fluticasone propionate (FLONASE) 50 mcg/actuation nasal spray USE 2 SPRAYS IN EACH NOSTRIL EVERY DAY 5/18/20   Esequiel Sanchez III, MD   omeprazole (PRILOSEC) 20 mg capsule Take 20 mg by mouth daily. Provider, Historical   krill/om-3/dha/epa/phospho/ast (MEGARED OMEGA-3 KRILL OIL PO) Take  by mouth.     Provider, Historical   sodium chloride (BRANDON-5) 5 % ophthalmic solution INSTILL 1 DROP INTO LEFT EYE 3 TIMES A DAY 9/10/18 Provider, Historical   SACCHAROMYCES BOULARDII (FLORASTOR PO) Take  by mouth. Provider, Historical   wheat dextrin (BENEFIBER CLEAR) 3 gram/3.5 gram powd Take  by mouth. Provider, Historical   polyethylene glycol (MIRALAX) 17 gram packet Take 17 g by mouth daily. Provider, Historical   aspirin 81 mg Tab Take  by mouth.     Provider, Historical       Social History     Socioeconomic History    Marital status:      Spouse name: Not on file    Number of children: Not on file    Years of education: Not on file    Highest education level: Not on file   Occupational History    Not on file   Social Needs    Financial resource strain: Not on file    Food insecurity     Worry: Not on file     Inability: Not on file    Transportation needs     Medical: Not on file     Non-medical: Not on file   Tobacco Use    Smoking status: Former Smoker     Packs/day: 0.50     Years: 4.00     Pack years: 2.00     Types: Cigarettes     Last attempt to quit: 1961     Years since quittin.7    Smokeless tobacco: Never Used   Substance and Sexual Activity    Alcohol use: No     Alcohol/week: 0.0 standard drinks    Drug use: No    Sexual activity: Not on file   Lifestyle    Physical activity     Days per week: Not on file     Minutes per session: Not on file    Stress: Not on file   Relationships    Social connections     Talks on phone: Not on file     Gets together: Not on file     Attends Yarsani service: Not on file     Active member of club or organization: Not on file     Attends meetings of clubs or organizations: Not on file     Relationship status: Not on file    Intimate partner violence     Fear of current or ex partner: Not on file     Emotionally abused: Not on file     Physically abused: Not on file     Forced sexual activity: Not on file   Other Topics Concern    Not on file   Social History Narrative    Not on file          ROS  Per HPI    Visit Vitals  /64   Pulse (!) 58   Temp 97.8 °F (36.6 °C) (Temporal)   Resp 20   Ht 5' (1.524 m)   Wt 221 lb (100.2 kg)   SpO2 95%   BMI 43.16 kg/m²         Physical Exam   Physical Examination: General appearance - alert, well appearing, and in no distress  Mouth - mucous membranes moist, pharynx normal without lesions  Neck - supple, no significant adenopathy  Chest - clear to auscultation, no wheezes, rales or rhonchi, symmetric air entry  Heart - normal rate and regular rhythm  Abdomen - soft, nontender, nondistended, no masses or organomegaly  Neurological - alert, oriented, normal speech, no focal findings or movement disorder noted  Extremities - peripheral pulses normal, no pedal edema, no clubbing or cyanosis      Assessment/Plan:  Diagnoses and all orders for this visit:    1. Dementia without behavioral disturbance, unspecified dementia type (HCC)discussed options for treatment. Will like to do Aricept 5 mg for a month. Increase the dose if she is tolerating that in 1 month. 2. Needs flu shot  -     FLU (FLUAD QUAD INFLUENZA VACCINE,QUAD,ADJUVANTED)    3. Fall, subsequent encountercontinue physical therapy, OT, and follow-up with the surgeon who saw her for laceration of her scalp in 1 week. 4. Mixed hyperlipidemiafollow-up labs every year. 5. Essential hypertensionblood pressure well controlled. Encouraged him to make a follow-up visit with cardiology for her dyspnea. 6. Acquired hypothyroidism recent labs revealing euthyroid status. Continue current meds for that. Other orders  -     donepeziL (ARICEPT) 5 mg tablet; Take 1 Tab by mouth nightly. Follow-up and Dispositions    · Return in about 1 month (around 10/4/2020). Advised her to call back or return to office if symptoms worsen/change/persist.  Discussed expected course/resolution/complications of diagnosis in detail with patient. Medication risks/benefits/costs/interactions/alternatives discussed with patient.   She was given an after visit summary which includes diagnoses, current medications, & vitals. She expressed understanding with the diagnosis and plan.

## 2020-10-11 ENCOUNTER — TELEPHONE (OUTPATIENT)
Dept: INTERNAL MEDICINE CLINIC | Age: 80
End: 2020-10-11

## 2020-10-11 NOTE — TELEPHONE ENCOUNTER
On Call Note         Reason for call:   Patient has been discharged from the hospital and [facility]  requests verbal order to resume visits. Verbal order given, they will follow up Monday.

## 2020-10-14 RX ORDER — DONEPEZIL HYDROCHLORIDE 10 MG/1
10 TABLET, FILM COATED ORAL
Qty: 30 TAB | Refills: 1 | Status: SHIPPED | OUTPATIENT
Start: 2020-10-14 | End: 2020-12-04 | Stop reason: SDUPTHER

## 2020-10-15 ENCOUNTER — TELEPHONE (OUTPATIENT)
Dept: INTERNAL MEDICINE CLINIC | Age: 80
End: 2020-10-15

## 2020-10-15 RX ORDER — LISINOPRIL 30 MG/1
30 TABLET ORAL DAILY
Qty: 90 TAB | Refills: 1 | Status: SHIPPED | OUTPATIENT
Start: 2020-10-15 | End: 2021-04-13

## 2020-10-15 RX ORDER — LISINOPRIL 30 MG/1
30 TABLET ORAL DAILY
Qty: 90 TAB | Refills: 1 | Status: CANCELLED | OUTPATIENT
Start: 2020-10-15

## 2020-10-15 NOTE — TELEPHONE ENCOUNTER
Dr Adore Ko   Received: Yesterday   Message Contents   [facility] Front Office Pool               General Message/Vendor Calls     Caller's first and last name: [facility]       Reason for call: Brent Lindsay is reporting that pt's BP reading today was 170/60 and was given a Lisinopril tablet and will monitor again tomorrow, if there any changes made,caller would like to be notified.  Erika Souza is reporting that pt was hospitalized at [facility], Discharged 10/09/20 Dx- UTI       Callback required yes/no and why: yes       Best contact number(s):[phone]       Details to clarify the request:      From karmen

## 2020-10-22 ENCOUNTER — TELEPHONE (OUTPATIENT)
Dept: INTERNAL MEDICINE CLINIC | Age: 80
End: 2020-10-22

## 2020-10-22 NOTE — TELEPHONE ENCOUNTER
Spoke with Lesia Flores with [facility]. She wanted to make Dr. Johnson Renner aware of patients high blood pressure today of 170/70 says she is taking her lisinopril 30 mg in the mornings. Also noted swelling of legs yesterday after working all day they advised rest and elevation and Lesia Flores states they are better today. She also reports that the patient is urinating more frequently only at night. Does Dr. Johnson Renner want to make any medication changes or give any orders? Patient has f/u with PCP on 10/29.

## 2020-10-28 ENCOUNTER — TELEPHONE (OUTPATIENT)
Dept: INTERNAL MEDICINE CLINIC | Age: 80
End: 2020-10-28

## 2020-10-28 NOTE — TELEPHONE ENCOUNTER
Wyatt Mandujano with [facility] [phone]     Patient has a skin breakdown in groin area. Would Dr. Yony Hays agree with ordering Nystatin powder?

## 2020-10-29 NOTE — TELEPHONE ENCOUNTER
Patient seen for virtual visit today - rx sent to [facility] on [address] per patient request.

## 2020-10-29 NOTE — TELEPHONE ENCOUNTER
Attempted to contact [facility], unsuccessful.    -Left message advising rx done and pt aware - to return call if questions.

## 2020-10-30 RX ORDER — DULOXETIN HYDROCHLORIDE 60 MG/1
CAPSULE, DELAYED RELEASE ORAL
Qty: 90 CAP | Refills: 1 | Status: SHIPPED | OUTPATIENT
Start: 2020-10-30 | End: 2021-04-20

## 2020-10-30 RX ORDER — MELOXICAM 15 MG/1
TABLET ORAL
Qty: 90 TAB | Refills: 1 | Status: SHIPPED | OUTPATIENT
Start: 2020-10-30 | End: 2021-05-26

## 2020-11-09 RX ORDER — LEVOTHYROXINE SODIUM 150 MCG
TABLET ORAL
Qty: 90 TAB | Refills: 0 | Status: SHIPPED | OUTPATIENT
Start: 2020-11-09 | End: 2021-02-23

## 2020-11-10 ENCOUNTER — TELEPHONE (OUTPATIENT)
Dept: INTERNAL MEDICINE CLINIC | Age: 80
End: 2020-11-10

## 2020-11-10 NOTE — TELEPHONE ENCOUNTER
Spoke with Milan Khalil with [facility], she states pt jsut completed course of Bactrim prescribed by urologist.  Pt is \"depressed\", sleeping a lot and does not get out of bed in morning. No fever or cough or signs of UTI. Milan Khalil has spoken with urology and obtained orders from them for urine and bloodwork as pt has upcoming procedure- botox in bladder. Aricept recently increased to 10mg. Will pass on concerns to [facility].

## 2020-11-10 NOTE — TELEPHONE ENCOUNTER
yeon with [address] [place] [address] [phone]     Requesting a call back regarding meds urologist put pt on for uti and some depression she is having.

## 2020-11-11 NOTE — TELEPHONE ENCOUNTER
Spoke with Olvin shabazz/ Agustina - advised Dr. Jeffrey Zazueta will await lab results of labs before decision on making changes - Olvin Thomson will have results forwarded to PCP when they are back.

## 2020-11-16 ENCOUNTER — TELEPHONE (OUTPATIENT)
Dept: INTERNAL MEDICINE CLINIC | Age: 80
End: 2020-11-16

## 2020-11-16 NOTE — TELEPHONE ENCOUNTER
Returned call to patients  [facility] [employer]. Advised no UTI, pt to force fluids and HH to repeat BMP in 2 weeks - he verbalized understanding.

## 2020-11-16 NOTE — TELEPHONE ENCOUNTER
Results reviewed by Dr. Caitlin Jean Baptiste - pt to force fluids and HH to repeat BMP in 2 weeks. Spoke with Diann Hodge and advised of above - they will obtain labs in 2 weeks. Spoke with patient and  in background - advised to force fluids and will repeat labs in 2 weeks. They verbalized understanding.

## 2020-12-04 ENCOUNTER — TELEPHONE (OUTPATIENT)
Dept: INTERNAL MEDICINE CLINIC | Age: 80
End: 2020-12-04

## 2020-12-04 RX ORDER — DONEPEZIL HYDROCHLORIDE 10 MG/1
10 TABLET, FILM COATED ORAL
Qty: 30 TAB | Refills: 1 | Status: SHIPPED | OUTPATIENT
Start: 2020-12-04 | End: 2021-02-08

## 2020-12-04 NOTE — TELEPHONE ENCOUNTER
Patients  returned call - advised she is on max dose, he verbalized understanding and requested a refill. Orders Placed This Encounter    donepeziL (ARICEPT) 10 mg tablet     Sig: Take 1 Tab by mouth nightly. Dispense:  30 Tab     Refill:  1     The above orders were approved via [facility] per Dr. Beatriz Marmolejo, III.

## 2020-12-04 NOTE — TELEPHONE ENCOUNTER
Pt wants to know if you want to refill aricept 10mg or increase the dosage her spouse said he did not see a difference  You can call him at [phone]

## 2020-12-15 DIAGNOSIS — G62.9 NEUROPATHY: Primary | ICD-10-CM

## 2020-12-15 RX ORDER — GABAPENTIN 100 MG/1
100 CAPSULE ORAL
Qty: 60 CAP | Refills: 0 | Status: SHIPPED
Start: 2020-12-15 | End: 2021-07-07 | Stop reason: SINTOL

## 2021-01-12 RX ORDER — SOTALOL HYDROCHLORIDE 120 MG/1
120 TABLET ORAL 2 TIMES DAILY
Qty: 180 TAB | Refills: 0 | Status: SHIPPED | OUTPATIENT
Start: 2021-01-12 | End: 2021-04-13

## 2021-01-12 NOTE — TELEPHONE ENCOUNTER
Requested Prescriptions     Pending Prescriptions Disp Refills    sotaloL (BETAPACE) 120 mg tablet       Sig: Take 1 Tab by mouth two (2) times a day.                [address], [address] [address]

## 2021-01-15 RX ORDER — SOTALOL HYDROCHLORIDE 120 MG/1
120 TABLET ORAL 2 TIMES DAILY
Qty: 180 TAB | Refills: 0 | OUTPATIENT
Start: 2021-01-15

## 2021-01-15 NOTE — TELEPHONE ENCOUNTER
Requested Prescriptions     Pending Prescriptions Disp Refills    sotaloL (BETAPACE) 120 mg tablet 180 Tab 0     Sig: Take 1 Tab by mouth two (2) times a day.                      [address], [address] [place] [address]

## 2021-02-08 RX ORDER — DONEPEZIL HYDROCHLORIDE 10 MG/1
TABLET, FILM COATED ORAL
Qty: 30 TAB | Refills: 0 | Status: SHIPPED | OUTPATIENT
Start: 2021-02-08 | End: 2021-03-08

## 2021-02-23 RX ORDER — LEVOTHYROXINE SODIUM 150 MCG
TABLET ORAL
Qty: 90 TAB | Refills: 0 | Status: SHIPPED | OUTPATIENT
Start: 2021-02-23 | End: 2021-07-02

## 2021-03-08 RX ORDER — DONEPEZIL HYDROCHLORIDE 10 MG/1
TABLET, FILM COATED ORAL
Qty: 30 TAB | Refills: 0 | Status: SHIPPED | OUTPATIENT
Start: 2021-03-08 | End: 2021-04-20

## 2021-04-13 RX ORDER — FLUTICASONE PROPIONATE 50 MCG
SPRAY, SUSPENSION (ML) NASAL
Qty: 48 G | Refills: 1 | Status: SHIPPED | OUTPATIENT
Start: 2021-04-13 | End: 2022-04-25 | Stop reason: SDUPTHER

## 2021-04-13 RX ORDER — SOTALOL HYDROCHLORIDE 120 MG/1
TABLET ORAL
Qty: 180 TAB | Refills: 0 | Status: SHIPPED | OUTPATIENT
Start: 2021-04-13 | End: 2021-07-07 | Stop reason: ALTCHOICE

## 2021-04-13 RX ORDER — LISINOPRIL 30 MG/1
TABLET ORAL
Qty: 90 TAB | Refills: 1 | Status: SHIPPED | OUTPATIENT
Start: 2021-04-13 | End: 2021-07-07 | Stop reason: ALTCHOICE

## 2021-04-20 RX ORDER — DONEPEZIL HYDROCHLORIDE 10 MG/1
TABLET, FILM COATED ORAL
Qty: 30 TAB | Refills: 0 | Status: SHIPPED | OUTPATIENT
Start: 2021-04-20 | End: 2021-04-29

## 2021-04-20 RX ORDER — DULOXETIN HYDROCHLORIDE 60 MG/1
CAPSULE, DELAYED RELEASE ORAL
Qty: 90 CAP | Refills: 1 | Status: SHIPPED | OUTPATIENT
Start: 2021-04-20 | End: 2021-11-15 | Stop reason: SDUPTHER

## 2021-04-29 RX ORDER — DONEPEZIL HYDROCHLORIDE 10 MG/1
TABLET, FILM COATED ORAL
Qty: 30 TAB | Refills: 0 | Status: SHIPPED | OUTPATIENT
Start: 2021-04-29 | End: 2021-06-20

## 2021-05-24 ENCOUNTER — PATIENT MESSAGE (OUTPATIENT)
Dept: INTERNAL MEDICINE CLINIC | Age: 81
End: 2021-05-24

## 2021-05-24 ENCOUNTER — TELEPHONE (OUTPATIENT)
Dept: INTERNAL MEDICINE CLINIC | Age: 81
End: 2021-05-24

## 2021-05-24 NOTE — TELEPHONE ENCOUNTER
Me  to Marychuy Muhammad    ST    5/24/21 10:24 AM  We have the following allergies listed on file for you:  Codeine, Neomycin, Polysporin, and Protonix  Thank you,  Orlin Mathew LPN      This MyChart message has not been read.   Me     ST    5/24/21 10:24 AM  Note           Allergies   Allergen Reactions    Codeine Rash    Neomycin Rash    Polysporin [Bacitracin-Polymyxin B] Rash    Protonix [Pantoprazole] Other (comments)       Gi upset

## 2021-05-24 NOTE — TELEPHONE ENCOUNTER
Patient would like to know what we have listed as what she is allergic to. Please send her a [employer] message with this information. Patient states we MAY respond via [employer].

## 2021-05-24 NOTE — TELEPHONE ENCOUNTER
Allergies   Allergen Reactions    Codeine Rash    Neomycin Rash    Polysporin [Bacitracin-Polymyxin B] Rash    Protonix [Pantoprazole] Other (comments)     Gi upset

## 2021-05-26 RX ORDER — MELOXICAM 15 MG/1
TABLET ORAL
Qty: 90 TABLET | Refills: 1 | Status: SHIPPED | OUTPATIENT
Start: 2021-05-26 | End: 2022-06-04

## 2021-06-20 RX ORDER — DONEPEZIL HYDROCHLORIDE 10 MG/1
TABLET, FILM COATED ORAL
Qty: 30 TABLET | Refills: 0 | Status: SHIPPED | OUTPATIENT
Start: 2021-06-20 | End: 2021-07-16

## 2021-07-02 RX ORDER — LEVOTHYROXINE SODIUM 150 MCG
TABLET ORAL
Qty: 90 TABLET | Refills: 0 | Status: SHIPPED | OUTPATIENT
Start: 2021-07-02 | End: 2021-09-27

## 2021-07-07 ENCOUNTER — OFFICE VISIT (OUTPATIENT)
Dept: INTERNAL MEDICINE CLINIC | Age: 81
End: 2021-07-07
Payer: MEDICARE

## 2021-07-07 VITALS
WEIGHT: 214 LBS | HEIGHT: 60 IN | OXYGEN SATURATION: 95 % | RESPIRATION RATE: 18 BRPM | BODY MASS INDEX: 42.01 KG/M2 | DIASTOLIC BLOOD PRESSURE: 70 MMHG | TEMPERATURE: 97.1 F | HEART RATE: 53 BPM | SYSTOLIC BLOOD PRESSURE: 138 MMHG

## 2021-07-07 DIAGNOSIS — F03.90 DEMENTIA WITHOUT BEHAVIORAL DISTURBANCE, UNSPECIFIED DEMENTIA TYPE: ICD-10-CM

## 2021-07-07 DIAGNOSIS — E03.9 ACQUIRED HYPOTHYROIDISM: ICD-10-CM

## 2021-07-07 DIAGNOSIS — I48.92 PAROXYSMAL ATRIAL FLUTTER (HCC): ICD-10-CM

## 2021-07-07 DIAGNOSIS — I10 ESSENTIAL HYPERTENSION: ICD-10-CM

## 2021-07-07 DIAGNOSIS — L40.9 PSORIASIS: ICD-10-CM

## 2021-07-07 DIAGNOSIS — E78.2 MIXED HYPERLIPIDEMIA: ICD-10-CM

## 2021-07-07 DIAGNOSIS — E66.01 OBESITY, CLASS III, BMI 40-49.9 (MORBID OBESITY) (HCC): ICD-10-CM

## 2021-07-07 DIAGNOSIS — R39.9 UTI SYMPTOMS: ICD-10-CM

## 2021-07-07 DIAGNOSIS — Z00.00 MEDICARE ANNUAL WELLNESS VISIT, SUBSEQUENT: Primary | ICD-10-CM

## 2021-07-07 LAB
BILIRUB UR QL STRIP: NEGATIVE
GLUCOSE UR-MCNC: NEGATIVE MG/DL
KETONES P FAST UR STRIP-MCNC: NORMAL MG/DL
PH UR STRIP: 5.5 [PH] (ref 4.6–8)
PROT UR QL STRIP: NORMAL
SP GR UR STRIP: NORMAL (ref 1–1.03)
UA UROBILINOGEN AMB POC: NORMAL (ref 0.2–1)
URINALYSIS CLARITY POC: CLEAR
URINALYSIS COLOR POC: YELLOW
URINE BLOOD POC: NEGATIVE
URINE LEUKOCYTES POC: NEGATIVE
URINE NITRITES POC: NEGATIVE

## 2021-07-07 PROCEDURE — 1100F PTFALLS ASSESS-DOCD GE2>/YR: CPT | Performed by: INTERNAL MEDICINE

## 2021-07-07 PROCEDURE — G8417 CALC BMI ABV UP PARAM F/U: HCPCS | Performed by: INTERNAL MEDICINE

## 2021-07-07 PROCEDURE — 81003 URINALYSIS AUTO W/O SCOPE: CPT | Performed by: INTERNAL MEDICINE

## 2021-07-07 PROCEDURE — G0439 PPPS, SUBSEQ VISIT: HCPCS | Performed by: INTERNAL MEDICINE

## 2021-07-07 PROCEDURE — 3288F FALL RISK ASSESSMENT DOCD: CPT | Performed by: INTERNAL MEDICINE

## 2021-07-07 PROCEDURE — 1090F PRES/ABSN URINE INCON ASSESS: CPT | Performed by: INTERNAL MEDICINE

## 2021-07-07 PROCEDURE — G0463 HOSPITAL OUTPT CLINIC VISIT: HCPCS | Performed by: INTERNAL MEDICINE

## 2021-07-07 PROCEDURE — G8754 DIAS BP LESS 90: HCPCS | Performed by: INTERNAL MEDICINE

## 2021-07-07 PROCEDURE — 99214 OFFICE O/P EST MOD 30 MIN: CPT | Performed by: INTERNAL MEDICINE

## 2021-07-07 PROCEDURE — G8752 SYS BP LESS 140: HCPCS | Performed by: INTERNAL MEDICINE

## 2021-07-07 PROCEDURE — G8432 DEP SCR NOT DOC, RNG: HCPCS | Performed by: INTERNAL MEDICINE

## 2021-07-07 PROCEDURE — G8427 DOCREV CUR MEDS BY ELIG CLIN: HCPCS | Performed by: INTERNAL MEDICINE

## 2021-07-07 PROCEDURE — G8536 NO DOC ELDER MAL SCRN: HCPCS | Performed by: INTERNAL MEDICINE

## 2021-07-07 PROCEDURE — G8399 PT W/DXA RESULTS DOCUMENT: HCPCS | Performed by: INTERNAL MEDICINE

## 2021-07-07 RX ORDER — SOTALOL HYDROCHLORIDE 120 MG/1
120 TABLET ORAL 2 TIMES DAILY
COMMUNITY
Start: 2021-07-07 | End: 2021-07-16

## 2021-07-07 RX ORDER — VALSARTAN 320 MG/1
320 TABLET ORAL DAILY
Qty: 90 TABLET | Refills: 1 | Status: SHIPPED | OUTPATIENT
Start: 2021-07-07 | End: 2021-12-28

## 2021-07-07 NOTE — PROGRESS NOTES
Chief Complaint   Patient presents with   [facility] Annual Wellness Visit         1. Have you been to the ER, urgent care clinic since your last visit? Hospitalized since your last visit? No    2. Have you seen or consulted any other health care providers outside of the [address] [place] [address] since your last visit? Include any pap smears or colon screening.  No

## 2021-07-08 LAB
AMORPH CRY URNS QL MICRO: ABNORMAL
APPEARANCE UR: ABNORMAL
BACTERIA URNS QL MICRO: ABNORMAL /HPF
BILIRUB UR QL: NEGATIVE
CAOX CRY URNS QL MICRO: ABNORMAL
COLOR UR: ABNORMAL
EPITH CASTS URNS QL MICRO: ABNORMAL /LPF
GLUCOSE UR STRIP.AUTO-MCNC: NEGATIVE MG/DL
HGB UR QL STRIP: NEGATIVE
KETONES UR QL STRIP.AUTO: NEGATIVE MG/DL
LEUKOCYTE ESTERASE UR QL STRIP.AUTO: ABNORMAL
NITRITE UR QL STRIP.AUTO: NEGATIVE
PH UR STRIP: 5.5 [PH] (ref 5–8)
PROT UR STRIP-MCNC: 300 MG/DL
RBC #/AREA URNS HPF: ABNORMAL /HPF (ref 0–5)
SP GR UR REFRACTOMETRY: 1.03 (ref 1–1.03)
UROBILINOGEN UR QL STRIP.AUTO: 0.2 EU/DL (ref 0.2–1)
WBC URNS QL MICRO: ABNORMAL /HPF (ref 0–4)

## 2021-07-08 NOTE — PROGRESS NOTES
This is the Subsequent Medicare Annual Wellness Exam, performed 12 months or more after the Initial AWV or the last Subsequent AWV    I have reviewed the patient's medical history in detail and updated the computerized patient record. Also here for follow-up of her health issues. Most of her history is provided by her daughter and her . Dementia has progressed to the point that she is unable to provide much meaningful history. She is eating and drinking well. She does have some dyspnea on exertion. They note a dry hacking cough that has been on a daily basis. No sputum production. No shortness of breath or wheeze other than with walking. No nausea or vomiting. Her bowel movements have been okay as best they can tell. She did recently see the dermatologist and has been treated for her psoriasis and a rash in her groin. ROS - Per HPi    Physical Examination: General appearance - alert, well appearing, and in no distress  Mouth - mucous membranes moist, pharynx normal without lesions  Neck - supple, no significant adenopathy  Lymphatics - no palpable lymphadenopathy, no hepatosplenomegaly  Chest - clear to auscultation, no wheezes, rales or rhonchi, symmetric air entry  Heart - normal rate and regular rhythm  Abdomen - soft, nontender, nondistended, no masses or organomegaly  Neurological - motor and sensory grossly normal bilaterally  Musculoskeletal - no joint tenderness, deformity or swelling  Extremities - peripheral pulses normal, no pedal edema, no clubbing or cyanosis        Assessment/Plan   Education and counseling provided:  Are appropriate based on today's review and evaluation  Influenza Vaccine  Screening Mammography -discussed whether to do more mammograms. Explained to the daughter and  that mammograms would continue until no intervention could be done for this due to her dementia.   Family will consider this and let me know if she is willing to proceed with more mammograms. Diabetes screening test    1. Mixed hyperlipidemiaLDL goal of 100. Check labs to be sure that that is met. -     METABOLIC PANEL, COMPREHENSIVE; Future  -     CBC WITH AUTOMATED DIFF; Future  -     LIPID PANEL; Future  2. Dementia without behavioral disturbance, unspecified dementia type (HCC)continue Aricept at current dose. -     AMB POC URINALYSIS DIP STICK AUTO W/O MICRO  3. Paroxysmal atrial flutter (HCC)stable on sotalol. We will see cardiology for follow-up. Provided the name of Dr. Cate Archuleta to her daughter. 4. Essential hypertensionblood pressure initially elevated. Cough likely related to ACE inhibitor. Will try switching that to an ARB and they will let me know if cough does not resolve. -     AMB POC URINALYSIS DIP STICK AUTO W/O MICRO  -     valsartan (DIOVAN) 320 mg tablet; Take 1 Tablet by mouth daily. , Normal, Disp-90 Tablet, R-1  5. Psoriasisper dermatology. 6. Acquired hypothyroidismappears euthyroid. Check levels and adjust meds as needed. -     TSH 3RD GENERATION; Future  -     T4, FREE; Future  7. Obesity, Class III, BMI 40-49.9 (morbid obesity) (HCC)improved on her diet. Depression Risk Factor Screening     3 most recent PHQ Screens 7/7/2021   PHQ Not Done -   Little interest or pleasure in doing things Not at all   Feeling down, depressed, irritable, or hopeless Not at all   Total Score PHQ 2 0       Alcohol Risk Screen    Do you average more than 1 drink per night or more than 7 drinks a week:  No    On any one occasion in the past three months have you have had more than 3 drinks containing alcohol:  No        Functional Ability and Level of Safety    Hearing: Hearing is good. Activities of Daily Living:   The home contains: handrails and grab bars  Patient needs help with:  phone, transportation, shopping, preparing meals, laundry, housework, managing medications, managing money, dressing, bathing, hygiene, bathroom needs and walking Ambulation: with mild difficulty     Fall Risk:  Fall Risk Assessment, last 12 mths 7/7/2021   Able to walk? Yes   Fall in past 12 months? 1   Do you feel unsteady? 0   Are you worried about falling 0   Number of falls in past 12 months 2   Fall with injury?  1      Abuse Screen:  Patient is not abused       Cognitive Screening    Has your family/caregiver stated any concerns about your memory: yes - on meds for this and progressive         Health Maintenance Due     Health Maintenance Due   Topic Date Due    Pneumococcal 65+ years (2 of 2 - PPSV23) 04/13/2016    Medicare Yearly Exam  03/14/2018       Patient Care Team   Patient Care Team:  Jose Joshi MD as PCP - General  Jose Joshi MD as PCP - [facility]  [facility] Provider  Chris Briggs MD (Ophthalmology)  Scarlett Lockhart MD as Physician (Orthopedic Surgery)  Raven Berman MD as Physician (Urology)  García Paredes MD as Physician (Gastroenterology)  Rodrick Khan MD as Physician (Dermatology)  Sam Gaspar MD as Physician (Endocrinology)  Gideon Lim MD as Physician (Cardiology)  Katy Carrasco MD (Ophthalmology)    History     Patient Active Problem List   Diagnosis Code    Psoriasis L40.9    Essential hypertension I10    Allergic rhinitis, cause unspecified J30.9    OA (osteoarthritis) M19.90    Glaucoma H40.9    Headache R51.9    Acquired hypothyroidism E03.9    Mixed hyperlipidemia E78.2    Paroxysmal atrial flutter ([address] I48.92    Advance care planning Z71.89    Advanced directives, counseling/discussion Z71.89    Obesity, morbid ([address] E66.01     Past Medical History:   Diagnosis Date    Arthritis     Basal cell carcinoma 05/20/2012    Calculus of kidney     Cancer ([address]     skin    Cancer ([address]     thyroid    Glaucoma 4/30/2010    Hypertension     OA (osteoarthritis) 4/30/2010    Psoriasis 4/30/2010      Past Surgical History:   Procedure Laterality Date    HX CATARACT REMOVAL      bilat    HX CHOLECYSTECTOMY      HX CRANIOTOMY      HX DILATION AND CURETTAGE      HX HYSTERECTOMY      HX KNEE ARTHROSCOPY      HX KNEE REPLACEMENT      HX TONSILLECTOMY      HX WRIST FRACTURE TX Left 8/21/15    ND THYROIDECTOMY       Current Outpatient Medications   Medication Sig Dispense Refill    sotaloL (BETAPACE) 120 mg tablet Take 1 Tablet by mouth two (2) times a day.  valsartan (DIOVAN) 320 mg tablet Take 1 Tablet by mouth daily. 90 Tablet 1    Synthroid 150 mcg tablet TAKE 1 TABLET EVERY DAY BEFORE BREAKFAST 90 Tablet 0    donepeziL (ARICEPT) 10 mg tablet TAKE ONE TABLET BY MOUTH ONCE NIGHTLY 30 Tablet 0    meloxicam (MOBIC) 15 mg tablet TAKE 1 TABLET EVERY DAY 90 Tablet 1    DULoxetine (CYMBALTA) 60 mg capsule TAKE 1 CAPSULE EVERY DAY 90 Cap 1    fluticasone propionate (FLONASE) 50 mcg/actuation nasal spray USE 2 SPRAYS IN EACH NOSTRIL EVERY DAY 48 g 1    capsaicin (Zostrix) 0.033 % crea by Apply Externally route.  vit A/vit C/vit E/zinc/copper (PRESERVISION AREDS PO) Take 2 Tabs by mouth.  glucosam/chond/hyalu/CF borate (MOVE FREE [employer] PO) Take  by mouth.  sodium chloride (BRANDON-5) 5 % ophthalmic solution INSTILL 1 DROP INTO LEFT EYE 3 TIMES A DAY  4    Cholecalciferol, Vitamin D3, (VITAMIN D3) 2,000 unit cap capsule Take 2,000 Units by mouth two (2) times a day. 90 Cap 1    timolol (TIMOPTIC) 0.5 % ophthalmic solution Administer 1 Drop to right eye daily.  aspirin 81 mg Tab Take  by mouth.  MULTIVITAMINS (MULTIVITAMIN PO) Take  by mouth.        Allergies   Allergen Reactions    Cephalexin Itching    Codeine Rash    Gabapentin Other (comments)    Neomycin Rash    Polysporin [Bacitracin-Polymyxin B] Rash    Protonix [Pantoprazole] Other (comments)     Gi upset       Family History   Problem Relation Age of Onset    Hypertension Mother     Heart Disease Mother     Heart Disease Father     Hypertension Father     Elevated Lipids Father     Heart Disease Brother     Psychiatric Disorder Brother     Diabetes Brother     Breast Cancer Maternal Aunt     Breast Cancer Paternal Aunt      Social History     Tobacco Use    Smoking status: Former Smoker     Packs/day: 0.50     Years: 4.00     Pack years: 2.00     Types: Cigarettes     Quit date: 1961     Years since quittin.5    Smokeless tobacco: Never Used   Substance Use Topics    Alcohol use: No     Alcohol/week: 0.0 standard drinks         Edi Valencia MD

## 2021-07-08 NOTE — PATIENT INSTRUCTIONS
Medicare Wellness Visit, Female     The best way to live healthy is to have a lifestyle where you eat a well-balanced diet, exercise regularly, limit alcohol use, and quit all forms of tobacco/nicotine, if applicable. Regular preventive services are another way to keep healthy. Preventive services (vaccines, screening tests, monitoring & exams) can help personalize your care plan, which helps you manage your own care. Screening tests can find health problems at the earliest stages, when they are easiest to treat. [facility] follows the current, evidence-based guidelines published by the [facility] ([facility]) when recommending preventive services for our patients. Because we follow these guidelines, sometimes recommendations change over time as research supports it. (For example, mammograms used to be recommended annually. Even though Medicare will still pay for an annual mammogram, the newer guidelines recommend a mammogram every two years for women of average risk). Of course, you and your doctor may decide to screen more often for some diseases, based on your risk and your co-morbidities (chronic disease you are already diagnosed with). Preventive services for you include:  - Medicare offers their members a free annual wellness visit, which is time for you and your primary care provider to discuss and plan for your preventive service needs. Take advantage of this benefit every year!  -All adults over the age of 72 should receive the recommended pneumonia vaccines. Current USPSTF guidelines recommend a series of two vaccines for the best pneumonia protection.   -All adults should have a flu vaccine yearly and a tetanus vaccine every 10 years.   -All adults age 48 and older should receive the shingles vaccines (series of two vaccines).       -All adults age 38-68 who are overweight should have a diabetes screening test once every three years.   -All adults born between [birth year] and 1966 should be screened once for Hepatitis C.  -Other screening tests and preventive services for persons with diabetes include: an eye exam to screen for diabetic retinopathy, a kidney function test, a foot exam, and stricter control over your cholesterol.   -Cardiovascular screening for adults with routine risk involves an electrocardiogram (ECG) at intervals determined by your doctor.   -Colorectal cancer screenings should be done for adults age 54-65 with no increased risk factors for colorectal cancer. There are a number of acceptable methods of screening for this type of cancer. Each test has its own benefits and drawbacks. Discuss with your doctor what is most appropriate for you during your annual wellness visit. The different tests include: colonoscopy (considered the best screening method), a fecal occult blood test, a fecal DNA test, and sigmoidoscopy.    -A bone mass density test is recommended when a woman turns 65 to screen for osteoporosis. This test is only recommended one time, as a screening. Some providers will use this same test as a disease monitoring tool if you already have osteoporosis. -Breast cancer screenings are recommended every other year for women of normal risk, age 54-69.  -Cervical cancer screenings for women over age 72 are only recommended with certain risk factors.      Here is a list of your current Health Maintenance items (your personalized list of preventive services) with a due date:  Health Maintenance Due   Topic Date Due    Pneumococcal Vaccine (2 of 2 - PPSV23) 04/13/2016    Annual Well Visit  03/14/2018

## 2021-07-09 LAB
BACTERIA SPEC CULT: NORMAL
CC UR VC: NORMAL
SERVICE CMNT-IMP: NORMAL

## 2021-07-15 ENCOUNTER — PATIENT MESSAGE (OUTPATIENT)
Dept: INTERNAL MEDICINE CLINIC | Age: 81
End: 2021-07-15

## 2021-07-15 ENCOUNTER — TELEPHONE (OUTPATIENT)
Dept: INTERNAL MEDICINE CLINIC | Age: 81
End: 2021-07-15

## 2021-07-16 RX ORDER — DONEPEZIL HYDROCHLORIDE 10 MG/1
TABLET, FILM COATED ORAL
Qty: 30 TABLET | Refills: 0 | Status: SHIPPED | OUTPATIENT
Start: 2021-07-16 | End: 2021-08-13

## 2021-07-16 RX ORDER — SOTALOL HYDROCHLORIDE 120 MG/1
TABLET ORAL
Qty: 180 TABLET | Refills: 2 | Status: SHIPPED | OUTPATIENT
Start: 2021-07-16 | End: 2022-06-04

## 2021-07-20 ENCOUNTER — TELEPHONE (OUTPATIENT)
Dept: INTERNAL MEDICINE CLINIC | Age: 81
End: 2021-07-20

## 2021-07-20 NOTE — TELEPHONE ENCOUNTER
Returned call to patient daughter Jamel Stratton. She wanted to know pt urine results. Advised it was negative for UTI. Pt having occasional incontinence. Daughter will call her Dr. Yasmany shabazz/ urology to see if she can be seen for this and ?botox.

## 2021-07-20 NOTE — TELEPHONE ENCOUNTER
Kaitlynn Dooley [facility]) [phone] (M)     Lab results. Patient states we MAY NOT respond via [employer].

## 2021-07-22 LAB
ALBUMIN SERPL-MCNC: 3.9 G/DL (ref 3.6–4.6)
ALBUMIN/GLOB SERPL: 1.8 {RATIO} (ref 1.2–2.2)
ALP SERPL-CCNC: 87 IU/L (ref 48–121)
ALT SERPL-CCNC: 17 IU/L (ref 0–32)
AST SERPL-CCNC: 14 IU/L (ref 0–40)
BASOPHILS # BLD AUTO: 0.1 X10E3/UL (ref 0–0.2)
BASOPHILS NFR BLD AUTO: 1 %
BILIRUB SERPL-MCNC: 0.3 MG/DL (ref 0–1.2)
BUN SERPL-MCNC: 25 MG/DL (ref 8–27)
BUN/CREAT SERPL: 33 (ref 12–28)
CALCIUM SERPL-MCNC: 9.3 MG/DL (ref 8.7–10.3)
CHLORIDE SERPL-SCNC: 98 MMOL/L (ref 96–106)
CHOLEST SERPL-MCNC: 209 MG/DL (ref 100–199)
CO2 SERPL-SCNC: 30 MMOL/L (ref 20–29)
CREAT SERPL-MCNC: 0.76 MG/DL (ref 0.57–1)
EOSINOPHIL # BLD AUTO: 0.1 X10E3/UL (ref 0–0.4)
EOSINOPHIL NFR BLD AUTO: 1 %
ERYTHROCYTE [DISTWIDTH] IN BLOOD BY AUTOMATED COUNT: 13.1 % (ref 11.7–15.4)
GLOBULIN SER CALC-MCNC: 2.2 G/DL (ref 1.5–4.5)
GLUCOSE SERPL-MCNC: 106 MG/DL (ref 65–99)
HCT VFR BLD AUTO: 37.6 % (ref 34–46.6)
HDLC SERPL-MCNC: 51 MG/DL
HGB BLD-MCNC: 13.1 G/DL (ref 11.1–15.9)
IMM GRANULOCYTES # BLD AUTO: 0 X10E3/UL (ref 0–0.1)
IMM GRANULOCYTES NFR BLD AUTO: 0 %
LDLC SERPL CALC-MCNC: 134 MG/DL (ref 0–99)
LYMPHOCYTES # BLD AUTO: 1.3 X10E3/UL (ref 0.7–3.1)
LYMPHOCYTES NFR BLD AUTO: 14 %
MCH RBC QN AUTO: 33.2 PG (ref 26.6–33)
MCHC RBC AUTO-ENTMCNC: 34.8 G/DL (ref 31.5–35.7)
MCV RBC AUTO: 95 FL (ref 79–97)
MONOCYTES # BLD AUTO: 0.7 X10E3/UL (ref 0.1–0.9)
MONOCYTES NFR BLD AUTO: 7 %
NEUTROPHILS # BLD AUTO: 7 X10E3/UL (ref 1.4–7)
NEUTROPHILS NFR BLD AUTO: 77 %
PLATELET # BLD AUTO: 233 X10E3/UL (ref 150–450)
POTASSIUM SERPL-SCNC: 4.7 MMOL/L (ref 3.5–5.2)
PROT SERPL-MCNC: 6.1 G/DL (ref 6–8.5)
RBC # BLD AUTO: 3.95 X10E6/UL (ref 3.77–5.28)
SODIUM SERPL-SCNC: 140 MMOL/L (ref 134–144)
T4 FREE SERPL-MCNC: 1.78 NG/DL (ref 0.82–1.77)
TRIGL SERPL-MCNC: 135 MG/DL (ref 0–149)
TSH SERPL DL<=0.005 MIU/L-ACNC: 4.35 UIU/ML (ref 0.45–4.5)
VLDLC SERPL CALC-MCNC: 24 MG/DL (ref 5–40)
WBC # BLD AUTO: 9.1 X10E3/UL (ref 3.4–10.8)

## 2021-08-13 RX ORDER — DONEPEZIL HYDROCHLORIDE 10 MG/1
TABLET, FILM COATED ORAL
Qty: 30 TABLET | Refills: 0 | Status: SHIPPED | OUTPATIENT
Start: 2021-08-13 | End: 2021-09-20

## 2021-09-20 RX ORDER — DONEPEZIL HYDROCHLORIDE 10 MG/1
TABLET, FILM COATED ORAL
Qty: 30 TABLET | Refills: 0 | Status: SHIPPED | OUTPATIENT
Start: 2021-09-20 | End: 2021-10-17

## 2021-09-27 RX ORDER — LEVOTHYROXINE SODIUM 150 MCG
TABLET ORAL
Qty: 90 TABLET | Refills: 0 | Status: SHIPPED | OUTPATIENT
Start: 2021-09-27 | End: 2021-12-28

## 2021-10-17 RX ORDER — DONEPEZIL HYDROCHLORIDE 10 MG/1
TABLET, FILM COATED ORAL
Qty: 30 TABLET | Refills: 0 | Status: SHIPPED | OUTPATIENT
Start: 2021-10-17 | End: 2021-11-12

## 2021-11-12 RX ORDER — DONEPEZIL HYDROCHLORIDE 10 MG/1
TABLET, FILM COATED ORAL
Qty: 90 TABLET | Refills: 3 | Status: SHIPPED | OUTPATIENT
Start: 2021-11-12

## 2021-11-15 RX ORDER — DULOXETIN HYDROCHLORIDE 60 MG/1
CAPSULE, DELAYED RELEASE ORAL
Qty: 90 CAPSULE | Refills: 1 | Status: SHIPPED | OUTPATIENT
Start: 2021-11-15 | End: 2022-04-25 | Stop reason: SDUPTHER

## 2021-11-15 NOTE — TELEPHONE ENCOUNTER
Requested Prescriptions     Pending Prescriptions Disp Refills    DULoxetine (CYMBALTA) 60 mg capsule 90 Capsule 1     Si Capsule daily.      [address] [facility], [address] [place] [address]

## 2021-12-28 DIAGNOSIS — I10 ESSENTIAL HYPERTENSION: ICD-10-CM

## 2021-12-28 RX ORDER — LEVOTHYROXINE SODIUM 150 MCG
TABLET ORAL
Qty: 90 TABLET | Refills: 0 | Status: SHIPPED | OUTPATIENT
Start: 2021-12-28 | End: 2022-03-20

## 2021-12-28 RX ORDER — VALSARTAN 320 MG/1
TABLET ORAL
Qty: 90 TABLET | Refills: 1 | Status: SHIPPED | OUTPATIENT
Start: 2021-12-28 | End: 2022-04-25 | Stop reason: SDUPTHER

## 2022-03-19 PROBLEM — E66.01 OBESITY, MORBID (HCC): Status: ACTIVE | Noted: 2017-11-28

## 2022-03-20 RX ORDER — LEVOTHYROXINE SODIUM 150 MCG
TABLET ORAL
Qty: 90 TABLET | Refills: 0 | Status: SHIPPED | OUTPATIENT
Start: 2022-03-20 | End: 2022-04-25 | Stop reason: SDUPTHER

## 2022-04-25 DIAGNOSIS — I10 ESSENTIAL HYPERTENSION: ICD-10-CM

## 2022-04-25 NOTE — TELEPHONE ENCOUNTER
Requested Prescriptions     Pending Prescriptions Disp Refills    fluticasone propionate (FLONASE) 50 mcg/actuation nasal spray 48 g 1    valsartan (DIOVAN) 320 mg tablet 90 Tablet 1     Si Tablet daily.  levothyroxine (Synthroid) 150 mcg tablet 90 Tablet 0     Sig: Take 1 Tablet by mouth Daily (before breakfast).     DULoxetine (CYMBALTA) 60 mg capsule 90 Capsule 1     Sig: TAKE [id number] [address] [place] [address] Mail Delivery - [place] [address]

## 2022-04-26 RX ORDER — VALSARTAN 320 MG/1
TABLET ORAL
Qty: 90 TABLET | Refills: 1 | Status: SHIPPED | OUTPATIENT
Start: 2022-04-26 | End: 2022-06-14

## 2022-04-26 RX ORDER — DULOXETIN HYDROCHLORIDE 60 MG/1
CAPSULE, DELAYED RELEASE ORAL
Qty: 90 CAPSULE | Refills: 1 | Status: SHIPPED | OUTPATIENT
Start: 2022-04-26 | End: 2022-06-14

## 2022-04-26 RX ORDER — LEVOTHYROXINE SODIUM 150 UG/1
150 TABLET ORAL
Qty: 90 TABLET | Refills: 0 | Status: SHIPPED | OUTPATIENT
Start: 2022-04-26 | End: 2022-06-03 | Stop reason: SDUPTHER

## 2022-04-26 RX ORDER — FLUTICASONE PROPIONATE 50 MCG
2 SPRAY, SUSPENSION (ML) NASAL DAILY
Qty: 48 G | Refills: 1 | Status: SHIPPED | OUTPATIENT
Start: 2022-04-26 | End: 2022-06-04

## 2022-04-26 NOTE — TELEPHONE ENCOUNTER
Chief Complaint   Patient presents with    Medication Refill     Last Appointment with Dr. Carla Ferguson:  7/7/2021  Future Appointments   Date Time Provider Heide Diaz   6/13/2022  2:30 PM MD PATRICE Candelario   7/11/2022  2:30 PM MD PATRICE Candelario BS AMB

## 2022-06-03 RX ORDER — LEVOTHYROXINE SODIUM 150 UG/1
150 TABLET ORAL
Qty: 90 TABLET | Refills: 0 | Status: SHIPPED | OUTPATIENT
Start: 2022-06-03

## 2022-06-03 NOTE — TELEPHONE ENCOUNTER
Requested Prescriptions     Pending Prescriptions Disp Refills    levothyroxine (Synthroid) 150 mcg tablet 90 Tablet 0     Sig: Take 1 Tablet by mouth Daily (before breakfast).              [address], [address] [place] [address]

## 2022-06-03 NOTE — TELEPHONE ENCOUNTER
Chief Complaint   Patient presents with    Medication Refill     Last Appointment with Dr. Carla Ferguson:  7/7/2021  Future Appointments   Date Time Provider Heide Diaz   7/11/2022  2:30 PM MD PATRICE Candelario AMB

## 2022-06-04 ENCOUNTER — APPOINTMENT (OUTPATIENT)
Dept: GENERAL RADIOLOGY | Age: 82
DRG: 189 | End: 2022-06-04
Attending: EMERGENCY MEDICINE
Payer: MEDICARE

## 2022-06-04 ENCOUNTER — APPOINTMENT (OUTPATIENT)
Dept: CT IMAGING | Age: 82
DRG: 189 | End: 2022-06-04
Attending: EMERGENCY MEDICINE
Payer: MEDICARE

## 2022-06-04 ENCOUNTER — HOSPITAL ENCOUNTER (INPATIENT)
Age: 82
LOS: 10 days | Discharge: NURSING, REHAB OR LONG-TERM CARE | DRG: 189 | End: 2022-06-14
Attending: EMERGENCY MEDICINE | Admitting: INTERNAL MEDICINE
Payer: MEDICARE

## 2022-06-04 DIAGNOSIS — R40.4 TRANSIENT ALTERATION OF AWARENESS: Primary | ICD-10-CM

## 2022-06-04 DIAGNOSIS — Z99.81 DEPENDENCE ON NOCTURNAL OXYGEN THERAPY: ICD-10-CM

## 2022-06-04 DIAGNOSIS — Z51.5 PALLIATIVE CARE BY SPECIALIST: ICD-10-CM

## 2022-06-04 DIAGNOSIS — J81.0 ACUTE PULMONARY EDEMA (HCC): ICD-10-CM

## 2022-06-04 DIAGNOSIS — R06.89 HYPERCAPNIA: ICD-10-CM

## 2022-06-04 DIAGNOSIS — Z71.89 DNR (DO NOT RESUSCITATE) DISCUSSION: ICD-10-CM

## 2022-06-04 DIAGNOSIS — Z71.89 GOALS OF CARE, COUNSELING/DISCUSSION: ICD-10-CM

## 2022-06-04 DIAGNOSIS — R41.89 COGNITIVE IMPAIRMENT: ICD-10-CM

## 2022-06-04 DIAGNOSIS — I48.91 ATRIAL FIBRILLATION WITH RAPID VENTRICULAR RESPONSE (HCC): ICD-10-CM

## 2022-06-04 DIAGNOSIS — N17.9 AKI (ACUTE KIDNEY INJURY) (HCC): ICD-10-CM

## 2022-06-04 DIAGNOSIS — Z91.81 HISTORY OF FALL: ICD-10-CM

## 2022-06-04 DIAGNOSIS — F03.90 DEMENTIA WITHOUT BEHAVIORAL DISTURBANCE, UNSPECIFIED DEMENTIA TYPE: ICD-10-CM

## 2022-06-04 DIAGNOSIS — R53.81 DEBILITY: ICD-10-CM

## 2022-06-04 PROBLEM — G93.40 ENCEPHALOPATHY ACUTE: Status: ACTIVE | Noted: 2022-06-04

## 2022-06-04 LAB
ALBUMIN SERPL-MCNC: 2.8 G/DL (ref 3.5–5)
ALBUMIN/GLOB SERPL: 0.8 {RATIO} (ref 1.1–2.2)
ALP SERPL-CCNC: 88 U/L (ref 45–117)
ALT SERPL-CCNC: 26 U/L (ref 12–78)
ANION GAP SERPL CALC-SCNC: 2 MMOL/L (ref 5–15)
ANION GAP SERPL CALC-SCNC: 3 MMOL/L (ref 5–15)
APPEARANCE UR: ABNORMAL
APTT PPP: 24.5 SEC (ref 22.1–31)
ARTERIAL PATENCY WRIST A: NO
ARTERIAL PATENCY WRIST A: NO
AST SERPL-CCNC: 15 U/L (ref 15–37)
BACTERIA URNS QL MICRO: ABNORMAL /HPF
BASE EXCESS BLDA CALC-SCNC: 12.8 MMOL/L
BASE EXCESS BLDA CALC-SCNC: 14.4 MMOL/L
BASE EXCESS BLDV CALC-SCNC: 8.6 MMOL/L
BASOPHILS # BLD: 0.1 K/UL (ref 0–0.1)
BASOPHILS # BLD: 0.1 K/UL (ref 0–0.1)
BASOPHILS NFR BLD: 1 % (ref 0–1)
BASOPHILS NFR BLD: 1 % (ref 0–1)
BDY SITE: ABNORMAL
BDY SITE: ABNORMAL
BILIRUB SERPL-MCNC: 0.4 MG/DL (ref 0.2–1)
BILIRUB UR QL: NEGATIVE
BNP SERPL-MCNC: 345 PG/ML
BUN SERPL-MCNC: 52 MG/DL (ref 6–20)
BUN SERPL-MCNC: 57 MG/DL (ref 6–20)
BUN/CREAT SERPL: 41 (ref 12–20)
BUN/CREAT SERPL: 45 (ref 12–20)
CALCIUM SERPL-MCNC: 9.5 MG/DL (ref 8.5–10.1)
CALCIUM SERPL-MCNC: 9.5 MG/DL (ref 8.5–10.1)
CHLORIDE SERPL-SCNC: 93 MMOL/L (ref 97–108)
CHLORIDE SERPL-SCNC: 94 MMOL/L (ref 97–108)
CO2 SERPL-SCNC: 41 MMOL/L (ref 21–32)
CO2 SERPL-SCNC: 43 MMOL/L (ref 21–32)
COLOR UR: ABNORMAL
COMMENT, HOLDF: NORMAL
CREAT SERPL-MCNC: 1.16 MG/DL (ref 0.55–1.02)
CREAT SERPL-MCNC: 1.4 MG/DL (ref 0.55–1.02)
D DIMER PPP FEU-MCNC: 1.29 MG/L FEU (ref 0–0.65)
DIFFERENTIAL METHOD BLD: ABNORMAL
DIFFERENTIAL METHOD BLD: ABNORMAL
EOSINOPHIL # BLD: 0.1 K/UL (ref 0–0.4)
EOSINOPHIL # BLD: 0.1 K/UL (ref 0–0.4)
EOSINOPHIL NFR BLD: 1 % (ref 0–7)
EOSINOPHIL NFR BLD: 1 % (ref 0–7)
EPITH CASTS URNS QL MICRO: ABNORMAL /LPF
ERYTHROCYTE [DISTWIDTH] IN BLOOD BY AUTOMATED COUNT: 14.3 % (ref 11.5–14.5)
ERYTHROCYTE [DISTWIDTH] IN BLOOD BY AUTOMATED COUNT: 14.3 % (ref 11.5–14.5)
FIO2 ON VENT: 35 %
GAS FLOW.O2 SETTING OXYMISER: 16 L/MIN
GLOBULIN SER CALC-MCNC: 3.7 G/DL (ref 2–4)
GLUCOSE SERPL-MCNC: 104 MG/DL (ref 65–100)
GLUCOSE SERPL-MCNC: 83 MG/DL (ref 65–100)
GLUCOSE UR STRIP.AUTO-MCNC: NEGATIVE MG/DL
HCO3 BLDA-SCNC: 41 MMOL/L (ref 22–26)
HCO3 BLDA-SCNC: 44 MMOL/L (ref 22–26)
HCO3 BLDV-SCNC: 40.7 MMOL/L (ref 23–28)
HCT VFR BLD AUTO: 38.2 % (ref 35–47)
HCT VFR BLD AUTO: 39.7 % (ref 35–47)
HGB BLD-MCNC: 11.9 G/DL (ref 11.5–16)
HGB BLD-MCNC: 12.3 G/DL (ref 11.5–16)
HGB UR QL STRIP: NEGATIVE
HYALINE CASTS URNS QL MICRO: ABNORMAL /LPF (ref 0–5)
IMM GRANULOCYTES # BLD AUTO: 0 K/UL (ref 0–0.04)
IMM GRANULOCYTES # BLD AUTO: 0 K/UL (ref 0–0.04)
IMM GRANULOCYTES NFR BLD AUTO: 0 % (ref 0–0.5)
IMM GRANULOCYTES NFR BLD AUTO: 0 % (ref 0–0.5)
INR PPP: 1.1 (ref 0.9–1.1)
IPAP/PIP, IPAPIP: 18
KETONES UR QL STRIP.AUTO: NEGATIVE MG/DL
LACTATE SERPL-SCNC: 0.8 MMOL/L (ref 0.4–2)
LEUKOCYTE ESTERASE UR QL STRIP.AUTO: NEGATIVE
LYMPHOCYTES # BLD: 1.1 K/UL (ref 0.8–3.5)
LYMPHOCYTES # BLD: 1.1 K/UL (ref 0.8–3.5)
LYMPHOCYTES NFR BLD: 11 % (ref 12–49)
LYMPHOCYTES NFR BLD: 11 % (ref 12–49)
MAGNESIUM SERPL-MCNC: 2.1 MG/DL (ref 1.6–2.4)
MCH RBC QN AUTO: 32.2 PG (ref 26–34)
MCH RBC QN AUTO: 32.3 PG (ref 26–34)
MCHC RBC AUTO-ENTMCNC: 31 G/DL (ref 30–36.5)
MCHC RBC AUTO-ENTMCNC: 31.2 G/DL (ref 30–36.5)
MCV RBC AUTO: 103.2 FL (ref 80–99)
MCV RBC AUTO: 104.2 FL (ref 80–99)
MONOCYTES # BLD: 0.8 K/UL (ref 0–1)
MONOCYTES # BLD: 1.2 K/UL (ref 0–1)
MONOCYTES NFR BLD: 11 % (ref 5–13)
MONOCYTES NFR BLD: 9 % (ref 5–13)
NEUTS SEG # BLD: 7.7 K/UL (ref 1.8–8)
NEUTS SEG # BLD: 7.8 K/UL (ref 1.8–8)
NEUTS SEG NFR BLD: 76 % (ref 32–75)
NEUTS SEG NFR BLD: 78 % (ref 32–75)
NITRITE UR QL STRIP.AUTO: NEGATIVE
NRBC # BLD: 0 K/UL (ref 0–0.01)
NRBC # BLD: 0 K/UL (ref 0–0.01)
NRBC BLD-RTO: 0 PER 100 WBC
NRBC BLD-RTO: 0 PER 100 WBC
PCO2 BLDA: 67 MMHG (ref 35–45)
PCO2 BLDA: 77 MMHG (ref 35–45)
PCO2 BLDV: 90.9 MMHG (ref 41–51)
PEEP RESPIRATORY: 5 CM[H2O]
PH BLDA: 7.37 [PH] (ref 7.35–7.45)
PH BLDA: 7.41 [PH] (ref 7.35–7.45)
PH BLDV: 7.26 [PH] (ref 7.32–7.42)
PH UR STRIP: 6.5 [PH] (ref 5–8)
PLATELET # BLD AUTO: 242 K/UL (ref 150–400)
PLATELET # BLD AUTO: 272 K/UL (ref 150–400)
PMV BLD AUTO: 11 FL (ref 8.9–12.9)
PMV BLD AUTO: 11.3 FL (ref 8.9–12.9)
PO2 BLDA: 50 MMHG (ref 80–100)
PO2 BLDA: 82 MMHG (ref 80–100)
PO2 BLDV: 43 MMHG (ref 25–40)
POTASSIUM SERPL-SCNC: 4 MMOL/L (ref 3.5–5.1)
POTASSIUM SERPL-SCNC: 4 MMOL/L (ref 3.5–5.1)
PROT SERPL-MCNC: 6.5 G/DL (ref 6.4–8.2)
PROT UR STRIP-MCNC: 30 MG/DL
PROTHROMBIN TIME: 11 SEC (ref 9–11.1)
RBC # BLD AUTO: 3.7 M/UL (ref 3.8–5.2)
RBC # BLD AUTO: 3.81 M/UL (ref 3.8–5.2)
RBC #/AREA URNS HPF: ABNORMAL /HPF (ref 0–5)
SAMPLES BEING HELD,HOLD: NORMAL
SAO2 % BLD: 84 % (ref 92–97)
SAO2 % BLD: 95 % (ref 92–97)
SAO2 % BLDV: 67.3 % (ref 65–88)
SAO2% DEVICE SAO2% SENSOR NAME: ABNORMAL
SAO2% DEVICE SAO2% SENSOR NAME: ABNORMAL
SERVICE CMNT-IMP: ABNORMAL
SERVICE CMNT-IMP: ABNORMAL
SODIUM SERPL-SCNC: 137 MMOL/L (ref 136–145)
SODIUM SERPL-SCNC: 139 MMOL/L (ref 136–145)
SP GR UR REFRACTOMETRY: 1.01 (ref 1–1.03)
SPECIMEN SITE: ABNORMAL
SPECIMEN SITE: ABNORMAL
SPECIMEN TYPE: ABNORMAL
T4 FREE SERPL-MCNC: 1.2 NG/DL (ref 0.8–1.5)
THERAPEUTIC RANGE,PTTT: NORMAL SECS (ref 58–77)
TROPONIN-HIGH SENSITIVITY: 19 NG/L (ref 0–51)
TSH SERPL DL<=0.05 MIU/L-ACNC: 7.92 UIU/ML (ref 0.36–3.74)
UROBILINOGEN UR QL STRIP.AUTO: 0.2 EU/DL (ref 0.2–1)
VENTILATION MODE VENT: ABNORMAL
VENTILATION MODE VENT: ABNORMAL
WBC # BLD AUTO: 10.3 K/UL (ref 3.6–11)
WBC # BLD AUTO: 9.8 K/UL (ref 3.6–11)
WBC URNS QL MICRO: ABNORMAL /HPF (ref 0–4)

## 2022-06-04 PROCEDURE — 87040 BLOOD CULTURE FOR BACTERIA: CPT

## 2022-06-04 PROCEDURE — 65270000046 HC RM TELEMETRY

## 2022-06-04 PROCEDURE — 81001 URINALYSIS AUTO W/SCOPE: CPT

## 2022-06-04 PROCEDURE — 74011000250 HC RX REV CODE- 250: Performed by: INTERNAL MEDICINE

## 2022-06-04 PROCEDURE — 87086 URINE CULTURE/COLONY COUNT: CPT

## 2022-06-04 PROCEDURE — 82803 BLOOD GASES ANY COMBINATION: CPT

## 2022-06-04 PROCEDURE — 96374 THER/PROPH/DIAG INJ IV PUSH: CPT

## 2022-06-04 PROCEDURE — 83605 ASSAY OF LACTIC ACID: CPT

## 2022-06-04 PROCEDURE — 85025 COMPLETE CBC W/AUTO DIFF WBC: CPT

## 2022-06-04 PROCEDURE — 85379 FIBRIN DEGRADATION QUANT: CPT

## 2022-06-04 PROCEDURE — 74011250636 HC RX REV CODE- 250/636: Performed by: EMERGENCY MEDICINE

## 2022-06-04 PROCEDURE — 83735 ASSAY OF MAGNESIUM: CPT

## 2022-06-04 PROCEDURE — 85610 PROTHROMBIN TIME: CPT

## 2022-06-04 PROCEDURE — 80053 COMPREHEN METABOLIC PANEL: CPT

## 2022-06-04 PROCEDURE — 94002 VENT MGMT INPAT INIT DAY: CPT

## 2022-06-04 PROCEDURE — 99285 EMERGENCY DEPT VISIT HI MDM: CPT

## 2022-06-04 PROCEDURE — 84484 ASSAY OF TROPONIN QUANT: CPT

## 2022-06-04 PROCEDURE — 36600 WITHDRAWAL OF ARTERIAL BLOOD: CPT

## 2022-06-04 PROCEDURE — 70450 CT HEAD/BRAIN W/O DYE: CPT

## 2022-06-04 PROCEDURE — 74011250636 HC RX REV CODE- 250/636: Performed by: INTERNAL MEDICINE

## 2022-06-04 PROCEDURE — 71045 X-RAY EXAM CHEST 1 VIEW: CPT

## 2022-06-04 PROCEDURE — 84439 ASSAY OF FREE THYROXINE: CPT

## 2022-06-04 PROCEDURE — 85730 THROMBOPLASTIN TIME PARTIAL: CPT

## 2022-06-04 PROCEDURE — 5A09557 ASSISTANCE WITH RESPIRATORY VENTILATION, GREATER THAN 96 CONSECUTIVE HOURS, CONTINUOUS POSITIVE AIRWAY PRESSURE: ICD-10-PCS | Performed by: INTERNAL MEDICINE

## 2022-06-04 PROCEDURE — 93005 ELECTROCARDIOGRAM TRACING: CPT

## 2022-06-04 PROCEDURE — 84443 ASSAY THYROID STIM HORMONE: CPT

## 2022-06-04 PROCEDURE — 83880 ASSAY OF NATRIURETIC PEPTIDE: CPT

## 2022-06-04 PROCEDURE — 36415 COLL VENOUS BLD VENIPUNCTURE: CPT

## 2022-06-04 RX ORDER — ONDANSETRON 2 MG/ML
4 INJECTION INTRAMUSCULAR; INTRAVENOUS
Status: DISCONTINUED | OUTPATIENT
Start: 2022-06-04 | End: 2022-06-14 | Stop reason: HOSPADM

## 2022-06-04 RX ORDER — ACETAMINOPHEN 650 MG/1
650 SUPPOSITORY RECTAL
Status: DISCONTINUED | OUTPATIENT
Start: 2022-06-04 | End: 2022-06-14 | Stop reason: HOSPADM

## 2022-06-04 RX ORDER — HEPARIN SODIUM 10000 [USP'U]/100ML
18-36 INJECTION, SOLUTION INTRAVENOUS
Status: DISCONTINUED | OUTPATIENT
Start: 2022-06-04 | End: 2022-06-07

## 2022-06-04 RX ORDER — ALBUTEROL SULFATE 1.25 MG/3ML
1.25 SOLUTION RESPIRATORY (INHALATION)
Status: DISCONTINUED | OUTPATIENT
Start: 2022-06-04 | End: 2022-06-14 | Stop reason: HOSPADM

## 2022-06-04 RX ORDER — VIBEGRON 75 MG/1
75 TABLET, FILM COATED ORAL EVERY EVENING
COMMUNITY
Start: 2022-05-30

## 2022-06-04 RX ORDER — POLYETHYLENE GLYCOL 3350 17 G/17G
17 POWDER, FOR SOLUTION ORAL DAILY PRN
Status: DISCONTINUED | OUTPATIENT
Start: 2022-06-04 | End: 2022-06-14 | Stop reason: HOSPADM

## 2022-06-04 RX ORDER — FUROSEMIDE 10 MG/ML
40 INJECTION INTRAMUSCULAR; INTRAVENOUS ONCE
Status: COMPLETED | OUTPATIENT
Start: 2022-06-04 | End: 2022-06-04

## 2022-06-04 RX ORDER — SODIUM CHLORIDE 0.9 % (FLUSH) 0.9 %
5-40 SYRINGE (ML) INJECTION EVERY 8 HOURS
Status: DISCONTINUED | OUTPATIENT
Start: 2022-06-04 | End: 2022-06-14 | Stop reason: HOSPADM

## 2022-06-04 RX ORDER — ONDANSETRON 4 MG/1
4 TABLET, ORALLY DISINTEGRATING ORAL
Status: DISCONTINUED | OUTPATIENT
Start: 2022-06-04 | End: 2022-06-04

## 2022-06-04 RX ORDER — TRIAMCINOLONE ACETONIDE 1 MG/G
0.1 CREAM TOPICAL 2 TIMES DAILY
COMMUNITY
Start: 2022-03-09 | End: 2022-10-27

## 2022-06-04 RX ORDER — HEPARIN SODIUM 1000 [USP'U]/ML
5000 INJECTION, SOLUTION INTRAVENOUS; SUBCUTANEOUS ONCE
Status: COMPLETED | OUTPATIENT
Start: 2022-06-04 | End: 2022-06-04

## 2022-06-04 RX ORDER — SODIUM CHLORIDE 0.9 % (FLUSH) 0.9 %
5-40 SYRINGE (ML) INJECTION AS NEEDED
Status: DISCONTINUED | OUTPATIENT
Start: 2022-06-04 | End: 2022-06-14 | Stop reason: HOSPADM

## 2022-06-04 RX ORDER — SOTALOL HYDROCHLORIDE 120 MG/1
120 TABLET ORAL 2 TIMES DAILY
COMMUNITY
End: 2022-06-14

## 2022-06-04 RX ORDER — ACETAMINOPHEN 325 MG/1
650 TABLET ORAL
Status: DISCONTINUED | OUTPATIENT
Start: 2022-06-04 | End: 2022-06-04

## 2022-06-04 RX ADMIN — FUROSEMIDE 40 MG: 10 INJECTION, SOLUTION INTRAVENOUS at 19:36

## 2022-06-04 RX ADMIN — Medication 10 ML: at 22:00

## 2022-06-04 RX ADMIN — HEPARIN SODIUM 18 UNITS/KG/HR: 10000 INJECTION, SOLUTION INTRAVENOUS at 23:45

## 2022-06-04 RX ADMIN — HEPARIN SODIUM 5000 UNITS: 1000 INJECTION INTRAVENOUS; SUBCUTANEOUS at 23:42

## 2022-06-04 NOTE — ED PROVIDER NOTES
71-year-old female with history of dementia, tension, hyperlipidemia, paroxysmal a flutter, psoriasis, basal cell CA, thyroid CA status post thyroidectomy was brought to the ED by EMS from home for AMS. History that is obtained from  and daughter. Family reports that at baseline, patient has short-term memory loss, but is able to state her name and ambulate with a walker. On 5/24, patient was started on torsemide due to fluid in her lungs. She was also started on home O2 (2 L nasal cannula) on 6/1. Family believes that patient has been having intermittent episodes of lethargy and decreased responsiveness since 6/1. Today, patient was weak and minimally responsive, prompting family to call 911. Patient unable to provide history due to clinical condition.            Past Medical History:   Diagnosis Date    Arthritis     Basal cell carcinoma 05/20/2012    Calculus of kidney     Cancer [address] [address]     skin    Cancer ([facility] [address]     thyroid    Glaucoma 4/30/2010    Hypertension     OA (osteoarthritis) 4/30/2010    Psoriasis 4/30/2010       Past Surgical History:   Procedure Laterality Date    HX CATARACT REMOVAL      bilat    HX CHOLECYSTECTOMY      HX CRANIOTOMY      HX DILATION AND CURETTAGE      HX HYSTERECTOMY      HX KNEE ARTHROSCOPY      HX KNEE REPLACEMENT      HX TONSILLECTOMY      HX WRIST FRACTURE TX Left 8/21/15    MN THYROIDECTOMY           Family History:   Problem Relation Age of Onset    Hypertension Mother     Heart Disease Mother     Heart Disease Father     Hypertension Father     Elevated Lipids Father     Heart Disease Brother     Psychiatric Disorder Brother     Diabetes Brother     Breast Cancer Maternal Aunt     Breast Cancer Paternal Aunt        Social History     Socioeconomic History    Marital status:      Spouse name: Not on file    Number of children: Not on file    Years of education: Not on file    Highest education level: Not on file   Occupational History    Not on file   Tobacco Use    Smoking status: Former Smoker     Packs/day: 0.50     Years: 4.00     Pack years: 2.00     Types: Cigarettes     Quit date: 1961     Years since quittin.4    Smokeless tobacco: Never Used   Vaping Use    Vaping Use: Never used   Substance and Sexual Activity    Alcohol use: No     Alcohol/week: 0.0 standard drinks    Drug use: No    Sexual activity: Not on file   Other Topics Concern    Not on file   Social History Narrative    Not on file     Social Determinants of Health     Financial Resource Strain:     Difficulty of Paying Living Expenses: Not on file   Food Insecurity:     Worried About Running Out of Food in the Last Year: Not on file    Harvinder of Food in the Last Year: Not on file   Transportation Needs:     Lack of Transportation (Medical): Not on file    Lack of Transportation (Non-Medical):  Not on file   Physical Activity:     Days of Exercise per Week: Not on file    Minutes of Exercise per Session: Not on file   Stress:     Feeling of Stress : Not on file   Social Connections:     Frequency of Communication with Friends and Family: Not on file    Frequency of Social Gatherings with Friends and Family: Not on file    Attends [employer] Services: Not on file    Active Member of [address] [place] [address] [employer] or Organizations: Not on file    Attends Club or Organization Meetings: Not on file    Marital Status: Not on file   Intimate Partner Violence:     Fear of Current or Ex-Partner: Not on file    Emotionally Abused: Not on file    Physically Abused: Not on file    Sexually Abused: Not on file   Housing Stability:     Unable to Pay for Housing in the Last Year: Not on file    Number of [place] in the Last Year: Not on file    Unstable Housing in the Last Year: Not on file         ALLERGIES: Cephalexin, Codeine, Gabapentin, Neomycin, Polysporin [bacitracin-polymyxin b], and Protonix [pantoprazole]    Review of Systems   Unable to perform ROS: Mental status change       Vitals:    06/04/22 1544 06/04/22 1548   BP: (!) 121/25    Pulse: (!) 51 (!) 49   Resp: 20    Temp: 98.3 °F (36.8 °C)    Weight: 95.7 kg (211 lb)    Height: 5' 7\" (1.702 m)             Physical Exam  Vitals and nursing note reviewed. Constitutional:       Comments: Lethargic, generalized weakness   HENT:      Head: Normocephalic and atraumatic. Mouth/Throat:      Mouth: Mucous membranes are moist.   Cardiovascular:      Rate and Rhythm: Regular rhythm. Bradycardia present. Heart sounds: Normal heart sounds. Pulmonary:      Effort: Pulmonary effort is normal.      Breath sounds: Rhonchi and rales present. Abdominal:      General: Bowel sounds are normal.      Tenderness: There is no abdominal tenderness. There is no guarding. Musculoskeletal:      Cervical back: Neck supple. Skin:     Comments: Diffuse psoriasis   Neurological:      Mental Status: She is lethargic. GCS: GCS eye subscore is 4. GCS verbal subscore is 4. GCS motor subscore is 6. MDM  Number of Diagnoses or Management Options  Acute pulmonary edema (HCC)  RAN (acute kidney injury) ([facility] [address]  Dementia without behavioral disturbance, unspecified dementia type ([address]  Hypercapnia  Transient alteration of awareness  Diagnosis management comments: Ddx: AMS, CVA, INFECTION, DEHYDRATION, ELECTROLYTE ABNORMALITY    39-year-old female with history of dementia, hypertension, hyperlipidemia, paroxysmal a flutter, thyroid CA status post thyroidectomy, basal cell CA, psoriasis to the ED by family for AMS after starting oxygen on 6/1. Patient found to have a PCO2 greater than 90 with concern for AMS secondary to hypercapnia. I have discussed the patient's advanced directives with the patient's  and daughter and they agree that she is DNR/DNI, but will accept NIMV. We will start BiPAP and admit to ICU. Pt with RAN but evidence of pulmonary vascular congestion on chest x-ray.   Will need gentle diuresis.        Amount and/or Complexity of Data Reviewed  Clinical lab tests: reviewed  Tests in the radiology section of CPT®: reviewed           Critical Care  Performed by: Mario Mahoney MD  Authorized by: Mario Mahoney MD     Critical care provider statement:     Critical care time (minutes):  50    Critical care was necessary to treat or prevent imminent or life-threatening deterioration of the following conditions:  Respiratory failure and CNS failure or compromise    Critical care was time spent personally by me on the following activities:  Development of treatment plan with patient or surrogate, discussions with consultants, evaluation of patient's response to treatment, examination of patient, obtaining history from patient or surrogate, ordering and performing treatments and interventions, ordering and review of laboratory studies, ordering and review of radiographic studies and re-evaluation of patient's condition    EKG    Date/Time: 6/4/2022 3:59 PM  Performed by: Mario Mahoney MD  Authorized by: Mario Mahoney MD     ECG reviewed by ED Physician in the absence of a cardiologist: yes    Previous ECG:     Previous ECG:  Unavailable  Interpretation:     Interpretation: abnormal    Rate:     ECG rate:  51    ECG rate assessment: bradycardic    Rhythm:     Rhythm: sinus bradycardia    Ectopy:     Ectopy: none    QRS:     QRS axis:  Normal  ST segments:     ST segments:  Normal  T waves:     T waves: normal    Q waves:     Q waves:  III and aVF  Other findings:     Other findings: LVH

## 2022-06-04 NOTE — Clinical Note
Status[de-identified] INPATIENT [101]   Type of Bed: Telemetry [19]   Cardiac Monitoring Required?: Yes   Inpatient Hospitalization Certified Necessary for the Following Reasons: 3.  Patient receiving treatment that can only be provided in an inpatient setting (further clarification in H&P documentation)   Admitting Diagnosis: Encephalopathy acute [229994]   Admitting Physician: Mouna Hoover [[id number]]   Attending Physician: Mouna Hoover [[id number]]   Estimated Length of Stay: 2 Midnights   Discharge Plan[de-identified] Home with Office Follow-up

## 2022-06-04 NOTE — ED TRIAGE NOTES
Per EMS patient arrives from home for a lift assist but then family wanted patient evaluated for AMS, hx of dementia. Patient started on fluid pill x 10 days ago. With increase altered mental status since then. Baseline on 2L of O2.

## 2022-06-05 ENCOUNTER — APPOINTMENT (OUTPATIENT)
Dept: GENERAL RADIOLOGY | Age: 82
DRG: 189 | End: 2022-06-05
Attending: INTERNAL MEDICINE
Payer: MEDICARE

## 2022-06-05 ENCOUNTER — APPOINTMENT (OUTPATIENT)
Dept: NON INVASIVE DIAGNOSTICS | Age: 82
DRG: 189 | End: 2022-06-05
Attending: INTERNAL MEDICINE
Payer: MEDICARE

## 2022-06-05 ENCOUNTER — APPOINTMENT (OUTPATIENT)
Dept: CT IMAGING | Age: 82
DRG: 189 | End: 2022-06-05
Attending: INTERNAL MEDICINE
Payer: MEDICARE

## 2022-06-05 LAB
ANION GAP SERPL CALC-SCNC: 4 MMOL/L (ref 5–15)
APTT PPP: 64.3 SEC (ref 22.1–31)
ATRIAL RATE: 51 BPM
BASE EXCESS BLDV CALC-SCNC: 11.7 MMOL/L
BASOPHILS # BLD: 0.1 K/UL (ref 0–0.1)
BASOPHILS # BLD: 0.1 K/UL (ref 0–0.1)
BASOPHILS NFR BLD: 1 % (ref 0–1)
BASOPHILS NFR BLD: 1 % (ref 0–1)
BUN SERPL-MCNC: 55 MG/DL (ref 6–20)
BUN/CREAT SERPL: 42 (ref 12–20)
CALCIUM SERPL-MCNC: 9.5 MG/DL (ref 8.5–10.1)
CALCULATED P AXIS, ECG09: 45 DEGREES
CALCULATED R AXIS, ECG10: -19 DEGREES
CALCULATED T AXIS, ECG11: 61 DEGREES
CHLORIDE SERPL-SCNC: 93 MMOL/L (ref 97–108)
CO2 SERPL-SCNC: 42 MMOL/L (ref 21–32)
CREAT SERPL-MCNC: 1.31 MG/DL (ref 0.55–1.02)
DIAGNOSIS, 93000: NORMAL
DIFFERENTIAL METHOD BLD: ABNORMAL
DIFFERENTIAL METHOD BLD: ABNORMAL
ECHO AO ASC DIAM: 3.5 CM
ECHO AO ASCENDING AORTA INDEX: 1.69 CM/M2
ECHO AV AREA PEAK VELOCITY: 1.9 CM2
ECHO AV AREA VTI: 2.1 CM2
ECHO AV AREA/BSA PEAK VELOCITY: 0.9 CM2/M2
ECHO AV AREA/BSA VTI: 1 CM2/M2
ECHO AV MEAN GRADIENT: 5 MMHG
ECHO AV MEAN VELOCITY: 1 M/S
ECHO AV PEAK GRADIENT: 10 MMHG
ECHO AV PEAK VELOCITY: 1.6 M/S
ECHO AV VELOCITY RATIO: 0.5
ECHO AV VTI: 32.6 CM
ECHO LA DIAMETER INDEX: 1.88 CM/M2
ECHO LA DIAMETER: 3.9 CM
ECHO LA VOL 2C: 50 ML (ref 22–52)
ECHO LA VOL 4C: 103 ML (ref 22–52)
ECHO LA VOL BP: 78 ML (ref 22–52)
ECHO LA VOL/BSA BIPLANE: 38 ML/M2 (ref 16–34)
ECHO LA VOLUME AREA LENGTH: 84 ML
ECHO LA VOLUME INDEX A2C: 24 ML/M2 (ref 16–34)
ECHO LA VOLUME INDEX A4C: 50 ML/M2 (ref 16–34)
ECHO LA VOLUME INDEX AREA LENGTH: 41 ML/M2 (ref 16–34)
ECHO LV E' LATERAL VELOCITY: 6 CM/S
ECHO LV E' SEPTAL VELOCITY: 5 CM/S
ECHO LV FRACTIONAL SHORTENING: 36 % (ref 28–44)
ECHO LV INTERNAL DIMENSION DIASTOLE INDEX: 2.27 CM/M2
ECHO LV INTERNAL DIMENSION DIASTOLIC: 4.7 CM (ref 3.9–5.3)
ECHO LV INTERNAL DIMENSION SYSTOLIC INDEX: 1.45 CM/M2
ECHO LV INTERNAL DIMENSION SYSTOLIC: 3 CM
ECHO LV IVSD: 1.2 CM (ref 0.6–0.9)
ECHO LV MASS 2D: 224.8 G (ref 67–162)
ECHO LV MASS INDEX 2D: 108.6 G/M2 (ref 43–95)
ECHO LV POSTERIOR WALL DIASTOLIC: 1.3 CM (ref 0.6–0.9)
ECHO LV RELATIVE WALL THICKNESS RATIO: 0.55
ECHO LVOT AREA: 4.2 CM2
ECHO LVOT AV VTI INDEX: 0.51
ECHO LVOT DIAM: 2.3 CM
ECHO LVOT MEAN GRADIENT: 1 MMHG
ECHO LVOT PEAK GRADIENT: 2 MMHG
ECHO LVOT PEAK VELOCITY: 0.8 M/S
ECHO LVOT STROKE VOLUME INDEX: 33.3 ML/M2
ECHO LVOT SV: 68.9 ML
ECHO LVOT VTI: 16.6 CM
ECHO MV A VELOCITY: 0.84 M/S
ECHO MV E DECELERATION TIME (DT): 205.8 MS
ECHO MV E VELOCITY: 0.61 M/S
ECHO MV E/A RATIO: 0.73
ECHO MV E/E' LATERAL: 10.17
ECHO MV E/E' RATIO (AVERAGED): 11.18
ECHO MV E/E' SEPTAL: 12.2
ECHO PV MAX VELOCITY: 1.1 M/S
ECHO PV PEAK GRADIENT: 5 MMHG
ECHO RV INTERNAL DIMENSION: 4.3 CM
ECHO RV TAPSE: 2 CM (ref 1.7–?)
ECHO RVOT PEAK GRADIENT: 3 MMHG
ECHO RVOT PEAK VELOCITY: 0.9 M/S
ECHO TV REGURGITANT MAX VELOCITY: 2.15 M/S
ECHO TV REGURGITANT PEAK GRADIENT: 19 MMHG
EOSINOPHIL # BLD: 0.1 K/UL (ref 0–0.4)
EOSINOPHIL # BLD: 0.1 K/UL (ref 0–0.4)
EOSINOPHIL NFR BLD: 1 % (ref 0–7)
EOSINOPHIL NFR BLD: 1 % (ref 0–7)
ERYTHROCYTE [DISTWIDTH] IN BLOOD BY AUTOMATED COUNT: 14.2 % (ref 11.5–14.5)
ERYTHROCYTE [DISTWIDTH] IN BLOOD BY AUTOMATED COUNT: 14.2 % (ref 11.5–14.5)
GLUCOSE BLD STRIP.AUTO-MCNC: 85 MG/DL (ref 65–117)
GLUCOSE SERPL-MCNC: 98 MG/DL (ref 65–100)
HCO3 BLDV-SCNC: 38.7 MMOL/L (ref 23–28)
HCT VFR BLD AUTO: 37.2 % (ref 35–47)
HCT VFR BLD AUTO: 38.6 % (ref 35–47)
HGB BLD-MCNC: 11.7 G/DL (ref 11.5–16)
HGB BLD-MCNC: 11.9 G/DL (ref 11.5–16)
IMM GRANULOCYTES # BLD AUTO: 0 K/UL (ref 0–0.04)
IMM GRANULOCYTES # BLD AUTO: 0 K/UL (ref 0–0.04)
IMM GRANULOCYTES NFR BLD AUTO: 0 % (ref 0–0.5)
IMM GRANULOCYTES NFR BLD AUTO: 0 % (ref 0–0.5)
LYMPHOCYTES # BLD: 1.2 K/UL (ref 0.8–3.5)
LYMPHOCYTES # BLD: 1.3 K/UL (ref 0.8–3.5)
LYMPHOCYTES NFR BLD: 12 % (ref 12–49)
LYMPHOCYTES NFR BLD: 14 % (ref 12–49)
MAGNESIUM SERPL-MCNC: 2.1 MG/DL (ref 1.6–2.4)
MCH RBC QN AUTO: 32.1 PG (ref 26–34)
MCH RBC QN AUTO: 32.2 PG (ref 26–34)
MCHC RBC AUTO-ENTMCNC: 30.8 G/DL (ref 30–36.5)
MCHC RBC AUTO-ENTMCNC: 31.5 G/DL (ref 30–36.5)
MCV RBC AUTO: 102.5 FL (ref 80–99)
MCV RBC AUTO: 104 FL (ref 80–99)
MONOCYTES # BLD: 0.9 K/UL (ref 0–1)
MONOCYTES # BLD: 0.9 K/UL (ref 0–1)
MONOCYTES NFR BLD: 10 % (ref 5–13)
MONOCYTES NFR BLD: 10 % (ref 5–13)
NEUTS SEG # BLD: 7.4 K/UL (ref 1.8–8)
NEUTS SEG # BLD: 7.4 K/UL (ref 1.8–8)
NEUTS SEG NFR BLD: 75 % (ref 32–75)
NEUTS SEG NFR BLD: 76 % (ref 32–75)
NRBC # BLD: 0 K/UL (ref 0–0.01)
NRBC # BLD: 0 K/UL (ref 0–0.01)
NRBC BLD-RTO: 0 PER 100 WBC
NRBC BLD-RTO: 0 PER 100 WBC
P-R INTERVAL, ECG05: 172 MS
PCO2 BLDV: 58.6 MMHG (ref 41–51)
PH BLDV: 7.43 [PH] (ref 7.32–7.42)
PLATELET # BLD AUTO: 231 K/UL (ref 150–400)
PLATELET # BLD AUTO: 237 K/UL (ref 150–400)
PMV BLD AUTO: 11.2 FL (ref 8.9–12.9)
PMV BLD AUTO: 11.4 FL (ref 8.9–12.9)
PO2 BLDV: 44 MMHG (ref 25–40)
POTASSIUM SERPL-SCNC: 3.9 MMOL/L (ref 3.5–5.1)
Q-T INTERVAL, ECG07: 474 MS
QRS DURATION, ECG06: 124 MS
QTC CALCULATION (BEZET), ECG08: 436 MS
RBC # BLD AUTO: 3.63 M/UL (ref 3.8–5.2)
RBC # BLD AUTO: 3.71 M/UL (ref 3.8–5.2)
SAO2 % BLDV: 78.9 % (ref 65–88)
SERVICE CMNT-IMP: ABNORMAL
SERVICE CMNT-IMP: NORMAL
SODIUM SERPL-SCNC: 139 MMOL/L (ref 136–145)
SPECIMEN TYPE: ABNORMAL
THERAPEUTIC RANGE,PTTT: ABNORMAL SECS (ref 58–77)
VENTRICULAR RATE, ECG03: 51 BPM
WBC # BLD AUTO: 9.6 K/UL (ref 3.6–11)
WBC # BLD AUTO: 9.8 K/UL (ref 3.6–11)

## 2022-06-05 PROCEDURE — 94003 VENT MGMT INPAT SUBQ DAY: CPT

## 2022-06-05 PROCEDURE — 80048 BASIC METABOLIC PNL TOTAL CA: CPT

## 2022-06-05 PROCEDURE — 94660 CPAP INITIATION&MGMT: CPT

## 2022-06-05 PROCEDURE — 93306 TTE W/DOPPLER COMPLETE: CPT

## 2022-06-05 PROCEDURE — 74011000250 HC RX REV CODE- 250: Performed by: INTERNAL MEDICINE

## 2022-06-05 PROCEDURE — 82962 GLUCOSE BLOOD TEST: CPT

## 2022-06-05 PROCEDURE — 94761 N-INVAS EAR/PLS OXIMETRY MLT: CPT

## 2022-06-05 PROCEDURE — 77010033678 HC OXYGEN DAILY

## 2022-06-05 PROCEDURE — 65610000006 HC RM INTENSIVE CARE

## 2022-06-05 PROCEDURE — 74011000250 HC RX REV CODE- 250: Performed by: HOSPITALIST

## 2022-06-05 PROCEDURE — 85730 THROMBOPLASTIN TIME PARTIAL: CPT

## 2022-06-05 PROCEDURE — 83735 ASSAY OF MAGNESIUM: CPT

## 2022-06-05 PROCEDURE — 74011250636 HC RX REV CODE- 250/636: Performed by: HOSPITALIST

## 2022-06-05 PROCEDURE — 82803 BLOOD GASES ANY COMBINATION: CPT

## 2022-06-05 PROCEDURE — 93306 TTE W/DOPPLER COMPLETE: CPT | Performed by: INTERNAL MEDICINE

## 2022-06-05 PROCEDURE — 71045 X-RAY EXAM CHEST 1 VIEW: CPT

## 2022-06-05 PROCEDURE — 36415 COLL VENOUS BLD VENIPUNCTURE: CPT

## 2022-06-05 PROCEDURE — 85025 COMPLETE CBC W/AUTO DIFF WBC: CPT

## 2022-06-05 RX ORDER — LORAZEPAM 2 MG/ML
0.5 INJECTION INTRAMUSCULAR
Status: DISCONTINUED | OUTPATIENT
Start: 2022-06-04 | End: 2022-06-05

## 2022-06-05 RX ORDER — SODIUM CHLORIDE 50 MG/ML
1 SOLUTION/ DROPS OPHTHALMIC 2 TIMES DAILY
Status: DISCONTINUED | OUTPATIENT
Start: 2022-06-05 | End: 2022-06-05

## 2022-06-05 RX ORDER — SODIUM CHLORIDE 20 MG/ML
1 SOLUTION OPHTHALMIC 2 TIMES DAILY
COMMUNITY

## 2022-06-05 RX ORDER — TIMOLOL MALEATE 5 MG/ML
1 SOLUTION/ DROPS OPHTHALMIC DAILY
Status: DISCONTINUED | OUTPATIENT
Start: 2022-06-05 | End: 2022-06-14 | Stop reason: HOSPADM

## 2022-06-05 RX ADMIN — Medication 10 ML: at 06:45

## 2022-06-05 RX ADMIN — TIMOLOL MALEATE 1 DROP: 5 SOLUTION/ DROPS OPHTHALMIC at 11:08

## 2022-06-05 RX ADMIN — ACETAZOLAMIDE 500 MG: 500 INJECTION, POWDER, LYOPHILIZED, FOR SOLUTION INTRAVENOUS at 11:10

## 2022-06-05 RX ADMIN — Medication 10 ML: at 14:41

## 2022-06-05 NOTE — ED NOTES
Lab reports possible contamination due to high result on PTT. Dr Demi Lopez notified of lab result and new blood noted in strong. New PTT obtained. No further orders at this time.

## 2022-06-05 NOTE — ED NOTES
Bedside/Verbal shift change report given to Jose Guadalupe Morley (oncoming nurse) by Chintan Matthew (offgoing nurse). Report included the following information SBAR, Kardex, ED Summary, [facility] and Recent Results.

## 2022-06-05 NOTE — ED NOTES
Verbal shift change report given to Cory Huerta (oncoming nurse) by Azar Yee RN (offgoing nurse). Report included the following information SBAR, Kardex, ED Summary, [facility] and Recent Results.

## 2022-06-05 NOTE — H&P
Hospitalist Admission Note    NAME: Dia Brooks   :  1940   MRN:  [id number]     Date/Time:  2022 10:09 PM    Patient PCP: Ene Worthy MD  ________________________________________________________________________  Chief complaint on admission:  Worsening dyspnea along with altered mental status with baseline dementia x2 days. Assessment:  Acute on chronic hypoxic/acute hypercarbic respiratory failure; likely multifactorial with hypoventilation and possible acute pulmonary embolism with multiple risk factors. Acute CHF; systolic versus diastolic. Sinus bradycardia with history of paroxysmal atrial fibrillation. Acute kidney injury. History of advanced dementia. History of hypertension. Possible paroxysmal atrial fibrillation being on sotalol; family unaware. History of thyroid cancer status post thyroidectomy; with subsequent hypothyroidism. History of psoriasis and basal cell carcinoma. DNR/DNI. Plans:  Patient is going to be admitted to the ICU for close monitoring while continuing BiPAP and empirically starting the patient on heparin drip for high suspicion for acute pulmonary embolism, while awaiting CTA of the chest with improvement of renal function and an echocardiogram for further assessment of left ventricular function and EF. Starting IV Lasix with close monitoring of daily weight and strict ANGELIKA's. Hold outpatient sotalol for noticeable bradycardia, get medical records from patient's primary cardiologist.  Started albuterol nebulizer as needed. Check TSH and routine labs in the morning including CBC and BMP with magnesium along with an ABG. HISTORY OF PRESENT ILLNESS:     56-year-old female with history of dementia, hypertension, hyperlipidemia, paroxysmal a flutter, psoriasis, basal cell CA, thyroid CA status post thyroidectomy was brought to the ED by EMS from home for AMS.   Patient does have a history of dementia and recently been diagnosed with CHF and started on diuretic and home oxygen after being noted to be hypoxic by the cardiologist.  Since starting the supplemental oxygen, according to the patient and family patient has been more withdrawn and less responsive. Past Medical History:   Diagnosis Date    Arthritis     Basal cell carcinoma 2012    Calculus of kidney     Cancer [facility] [address]     skin    Cancer ([facility] [address]     thyroid    Glaucoma 2010    Hypertension     OA (osteoarthritis) 2010    Psoriasis 2010      Past Surgical History:   Procedure Laterality Date    HX CATARACT REMOVAL      bilat    HX CHOLECYSTECTOMY      HX CRANIOTOMY      HX DILATION AND CURETTAGE      HX HYSTERECTOMY      HX KNEE ARTHROSCOPY      HX KNEE REPLACEMENT      HX TONSILLECTOMY      HX WRIST FRACTURE TX Left 8/21/15    NM THYROIDECTOMY       Social History     Tobacco Use    Smoking status: Former Smoker     Packs/day: 0.50     Years: 4.00     Pack years: 2.00     Types: Cigarettes     Quit date: 1961     Years since quittin.4    Smokeless tobacco: Never Used   Substance Use Topics    Alcohol use: No     Alcohol/week: 0.0 standard drinks      Family History   Problem Relation Age of Onset    Hypertension Mother     Heart Disease Mother     Heart Disease Father     Hypertension Father     Elevated Lipids Father     Heart Disease Brother     Psychiatric Disorder Brother     Diabetes Brother     Breast Cancer Maternal Aunt     Breast Cancer Paternal Aunt         Allergies   Allergen Reactions    Cephalexin Itching    Codeine Rash    Gabapentin Other (comments)    Neomycin Rash    Polysporin [Bacitracin-Polymyxin B] Rash    Protonix [Pantoprazole] Other (comments)     Gi upset        Prior to Admission medications    Medication Sig Start Date End Date Taking? Authorizing Provider   levothyroxine (Synthroid) 150 mcg tablet Take 1 Tablet by mouth Daily (before breakfast).  6/3/22  Yes Mateo Richards MD   fluticasone propionate (FLONASE) 50 mcg/actuation nasal spray 2 Sprays by Both Nostrils route daily. 4/26/22   Hung Walker MD   valsartan (DIOVAN) 320 mg tablet TAKE 1 TABLET EVERY DAY 4/26/22   Vicente Councilman III, MD   DULoxetine (CYMBALTA) 60 mg capsule TAKE 1 CAPSULE EVERY DAY 4/26/22   Vicente Councilman III, MD   donepeziL (ARICEPT) 10 mg tablet TAKE ONE TABLET BY MOUTH ONCE NIGHTLY 11/12/21   Vicente Councilman III, MD   meloxicam (MOBIC) 15 mg tablet TAKE 1 TABLET EVERY DAY 5/26/21   Vicente Councilman III, MD   capsaicin (Zostrix) 0.033 % crea by Apply Externally route. 9/4/20   Hung Walker MD   vit A/vit C/vit E/zinc/copper (PRESERVISION AREDS PO) Take 2 Tabs by mouth. Provider, Historical   glucosam/chond/hyalu/CF borate ([facility] PO) Take  by mouth. Provider, Historical   sodium chloride (BRANDON-5) 5 % ophthalmic solution INSTILL 1 DROP INTO LEFT EYE 3 TIMES A DAY 9/10/18   Provider, Historical   Cholecalciferol, Vitamin D3, (VITAMIN D3) 2,000 unit cap capsule Take 2,000 Units by mouth two (2) times a day. 2/16/17   Hung Walker MD   timolol (TIMOPTIC) 0.5 % ophthalmic solution Administer 1 Drop to right eye daily. 12/17/10   Provider, Historical   aspirin 81 mg Tab Take  by mouth. Provider, Historical   MULTIVITAMINS (MULTIVITAMIN PO) Take  by mouth.     Provider, Historical     REVIEW OF SYSTEMS:    Patient was not able to provide review of systems due to mental status change/acute illness  Visit Vitals  BP (!) 141/52   Pulse (!) 52   Temp 98.1 °F (36.7 °C)   Resp 14   Ht 5' 7\" (1.702 m)   Wt 95.7 kg (211 lb)   SpO2 97%   BMI 33.05 kg/m²     Physical Exam:    Gen: Well-developed, well-nourished, confused, in moderate respiratory distress  HEENT:  Pink conjunctivae, PERRL, hearing intact to voice, moist mucous membranes  Neck: Supple, without masses, thyroid non-tender  Resp: No accessory muscle use, clear breath sounds without wheezes rales or rhonchi  Card: No murmurs, normal S1, S2 without thrills, bruits or peripheral edema  Abd:  Soft, non-tender, non-distended, normoactive bowel sounds are present, no palpable organomegaly and no detectable hernias  Lymph:  No cervical or inguinal adenopathy  Musc: No cyanosis or clubbing  Neuro:  Does not follow commands, moving all limbs   Psych: Alert, not oriented x 3    LAB DATA REVIEWED:    Recent Results (from the past 24 hour(s))   SAMPLES BEING HELD    Collection Time: 06/04/22  3:59 PM   Result Value Ref Range    SAMPLES BEING HELD PBC     COMMENT        Add-on orders for these samples will be processed based on acceptable specimen integrity and analyte stability, which may vary by analyte. LACTIC ACID    Collection Time: 06/04/22  3:59 PM   Result Value Ref Range    Lactic acid 0.8 0.4 - 2.0 MMOL/L   TSH 3RD GENERATION    Collection Time: 06/04/22  3:59 PM   Result Value Ref Range    TSH 7.92 (H) 0.36 - 3.74 uIU/mL   CBC WITH AUTOMATED DIFF    Collection Time: 06/04/22  3:59 PM   Result Value Ref Range    WBC 10.3 3.6 - 11.0 K/uL    RBC 3.81 3.80 - 5.20 M/uL    HGB 12.3 11.5 - 16.0 g/dL    HCT 39.7 35.0 - 47.0 %    .2 (H) 80.0 - 99.0 FL    MCH 32.3 26.0 - 34.0 PG    MCHC 31.0 30.0 - 36.5 g/dL    RDW 14.3 11.5 - 14.5 %    PLATELET 871 898 - 547 K/uL    MPV 11.3 8.9 - 12.9 FL    NRBC 0.0 0  WBC    ABSOLUTE NRBC 0.00 0.00 - 0.01 K/uL    NEUTROPHILS 76 (H) 32 - 75 %    LYMPHOCYTES 11 (L) 12 - 49 %    MONOCYTES 11 5 - 13 %    EOSINOPHILS 1 0 - 7 %    BASOPHILS 1 0 - 1 %    IMMATURE GRANULOCYTES 0 0.0 - 0.5 %    ABS. NEUTROPHILS 7.8 1.8 - 8.0 K/UL    ABS. LYMPHOCYTES 1.1 0.8 - 3.5 K/UL    ABS. MONOCYTES 1.2 (H) 0.0 - 1.0 K/UL    ABS. EOSINOPHILS 0.1 0.0 - 0.4 K/UL    ABS. BASOPHILS 0.1 0.0 - 0.1 K/UL    ABS. IMM.  GRANS. 0.0 0.00 - 0.04 K/UL    DF AUTOMATED     METABOLIC PANEL, COMPREHENSIVE    Collection Time: 06/04/22  3:59 PM   Result Value Ref Range    Sodium 137 136 - 145 mmol/L    Potassium 4.0 3.5 - 5.1 mmol/L    Chloride 93 (L) 97 - 108 mmol/L    CO2 41 (HH) 21 - 32 mmol/L    Anion gap 3 (L) 5 - 15 mmol/L    Glucose 83 65 - 100 mg/dL    BUN 57 (H) 6 - 20 MG/DL    Creatinine 1.40 (H) 0.55 - 1.02 MG/DL    BUN/Creatinine ratio 41 (H) 12 - 20      GFR est AA 44 (L) >60 ml/min/1.73m2    GFR est non-AA 36 (L) >60 ml/min/1.73m2    Calcium 9.5 8.5 - 10.1 MG/DL    Bilirubin, total 0.4 0.2 - 1.0 MG/DL    ALT (SGPT) 26 12 - 78 U/L    AST (SGOT) 15 15 - 37 U/L    Alk.  phosphatase 88 45 - 117 U/L    Protein, total 6.5 6.4 - 8.2 g/dL    Albumin 2.8 (L) 3.5 - 5.0 g/dL    Globulin 3.7 2.0 - 4.0 g/dL    A-G Ratio 0.8 (L) 1.1 - 2.2     TROPONIN-HIGH SENSITIVITY    Collection Time: 06/04/22  3:59 PM   Result Value Ref Range    Troponin-High Sensitivity 19 0 - 51 ng/L   NT-PRO BNP    Collection Time: 06/04/22  3:59 PM   Result Value Ref Range    NT pro- <450 PG/ML   URINALYSIS W/ RFLX MICROSCOPIC    Collection Time: 06/04/22  6:12 PM   Result Value Ref Range    Color YELLOW/STRAW      Appearance CLOUDY (A) CLEAR      Specific gravity 1.013 1.003 - 1.030      pH (UA) 6.5 5.0 - 8.0      Protein 30 (A) NEG mg/dL    Glucose Negative NEG mg/dL    Ketone Negative NEG mg/dL    Bilirubin Negative NEG      Blood Negative NEG      Urobilinogen 0.2 0.2 - 1.0 EU/dL    Nitrites Negative NEG      Leukocyte Esterase Negative NEG      WBC 0-4 0 - 4 /hpf    RBC 0-5 0 - 5 /hpf    Epithelial cells FEW FEW /lpf    Bacteria 4+ (A) NEG /hpf    Hyaline cast 5-10 0 - 5 /lpf   POC VENOUS BLOOD GAS    Collection Time: 06/04/22  6:44 PM   Result Value Ref Range    pH, venous (POC) 7.26 (L) 7.32 - 7.42      pCO2, venous (POC) 90.9 (H) 41 - 51 MMHG    pO2, venous (POC) 43 (H) 25 - 40 mmHg    HCO3, venous (POC) 40.7 (H) 23.0 - 28.0 MMOL/L    sO2, venous (POC) 67.3 65 - 88 %    Base excess, venous (POC) 8.6 mmol/L    Specimen type (POC) VENOUS BLOOD      Performed by Shamar Hammond (Tech)    BLOOD GAS, ARTERIAL    Collection Time: 06/04/22  8:35 PM   Result Value Ref Range    pH 7.41 7.35 - 7.45      PCO2 67 (H) 35 - 45 mmHg    PO2 50 (L) 80 - 100 mmHg    O2 SAT 84 (L) 92 - 97 %    BICARBONATE 41 (H) 22 - 26 mmol/L    BASE EXCESS 12.8 mmol/L    O2 METHOD BIPAP      MODE BIPAP      Sample source ARTERIAL      SITE RIGHT RADIAL      ROBERT'S TEST NO      Critical value read back Called to [facility] MD on 06/04/2022 at 20:38

## 2022-06-05 NOTE — ED NOTES
Lab called stating PTT hemolyzed. Repeat PTT sent to lab. Patient is alert and active in bed. Noted bleeding around strong catheter site. Patient cleaned, and tolerated well. Bleeding is small in amount. Heparin is still paused until PTT result.

## 2022-06-05 NOTE — ED NOTES
Care handoff given by Galdino Reese RN. Patient alert, limited verbal responsive, confused, and unable to communicate needs. Patient resting quietly at this time with eyes closed. No signs or symptoms of pain or discomfort at this time. Oriented patient family to room and call bell system. Told patient spouse to call for help before getting out of bed. Patient spouse demonstrated proper use of call bell and verbalized when to use it. Personal items and call bell placed within reach.

## 2022-06-05 NOTE — ED NOTES
Dr. John Wheatley to notify of new PTT: 64.3. Urine output continues to be pink tinged. There was mild bleeding around strong site. Patient is alert and active. Heparin drip is still held, awaiting orders from Dr. Liza Goldman.

## 2022-06-05 NOTE — ED NOTES
12:04 AM Bedside and Verbal shift change report given to [address] Jitendra White (oncoming nurse) by Sp Fitzgerald RN (offgoing nurse). Report included the following information SBAR, Kardex, ED Summary, Intake/Output, MAR, Recent Results and Cardiac Rhythm SB.     11:30 PM Dr. Arnel Adamson at bedside order for PO ativan 0.5mg PRN, strong cath and initiate heparin gtt now prior to waiting on CTA results, obtained from provider. 11:00 PM Dr. Arnel blanchard served as well as called/ left VM for  Patient being restless, pulling bipap off. Question regarding heparin ggt as well as urine incontinence. 10:21 PM Med reconcilation done. Per pharmacy hospital does not carry [facility] 128 eye gtts, patient's daughter reports she will place eye gtts in eye's patient tonight and will leave medication at bedside. Dr. Arnel blanchard served to update on med list. Per patient's  Jacque Aguilarer) reports to call him with updates or if patient gets a room on the unit [phone].

## 2022-06-05 NOTE — ED NOTES
Pt attempting to pull at bipap mask. This RN readjusted mask and educated pt on importance of continuing to wear the bipap. MD aware.

## 2022-06-05 NOTE — PROGRESS NOTES
CARE MANAGEMENT INITIAL ASSESSEMENT      NAME:   Eugenio Mcelroy   :     1940   MRN:     [id number]       Emergency Contact:  Extended Emergency Contact Information  Primary Emergency Contact: Butch Morales   [address] Phone: [phone]  Mobile Phone: [phone]  Relation: Spouse  Secondary Emergency Contact: Delmar Silva  Mobile Phone: [phone]  Relation: Child    Advance Directive:  DNR, does not have an advance directive. Fer Squires Healthcare Decision Maker:    Primary Decision Maker: Zac Quiñones - Spouse - [phone]     Reason for Admission:  Ms. Sheba Griffith is a 79 y.o. female with history that includes dementia, AFIB, HTN, HLD, hypothyroidism and OA  who was emergently admitted for:  worsening dyspnea and AMS    Patient Active Problem List   Diagnosis Code    Psoriasis L40.9    Essential hypertension I10    Allergic rhinitis, cause unspecified J30.9    OA (osteoarthritis) M19.90    Glaucoma H40.9    Headache R51.9    Acquired hypothyroidism E03.9    Mixed hyperlipidemia E78.2    Paroxysmal atrial flutter (HCC) I48.92    Advance care planning Z71.89    Advanced directives, counseling/discussion Z71.89    Obesity, morbid ([facility] [address] E66.01    Encephalopathy acute G93.40       Assessment:  Via phone with spouse Blanche Mcneill). RUR:  12%  Risk Level:  Low  Value-based purchasing:  Yes  Bundle patient:  No    Residency:  Private residence  Exterior Steps:  4  Interior Steps:  None    Lives With:  Spouse/Significant Other and Adult children (daughter)    Prior functioning:  Partial dependence.   Patient requires assistance with:  Bathing, Dressing and Toileting    Prior DME required:  Rolling walker, Rollator, Grab bars, Shower chair, Raised toilet seat and Home O2 (2L/Provider [employer])    DME available:  Same as above    Rehab history:  None    Discharge Concerns/Barriers Identified:  ADL- decline in status and Medical conditions- significant      Insurer:  Payor: Daniel Taylor / Plan: [place] MEDICARE PART A & B / Product Type: Medicare /     PCP: Frank Ashraf MD   [facility] of [facility]   Current patient: Yes   Approximate date of last visit: 7/7/21   Access to virtual PCP visits:  Unknown    Pharmacy:  [facility]/Difficulty affording medications:   No    COVID-19 vaccination status:  Fully vaccinated. DC Transport:  TBD      Transition of care plan:  Unable to determine at this time. Awaiting clinical progress, and disposition recommendations     Comments:   Pt admitted on 6/4/22 for worsening dyspnea with AMS. Pt on bi-pap when CM attempted to complete initial assessment. Pt also no A/O. CM completed initial assessment with Pt's spouse Yessi Hart). Pt lives at home with Claudia Abreu and their daughter. Pt has hx of  after discharge from [address] [place] [address] about a year ago. Claudia Abreu can't recall agency name and confirms that Pt is no longer open to agency. Pt has home O2 at 2L through [employer]. Pt has a walker and rollator at home. Claudia Abreu states that Pt has had to start using the rollator lately due to weakness. Pt also has a shower chair, grab bars, and raised toilet seat. Pt requires assistance with bathing, dressing, meal prep, and getting to the restroom. Claudia Abreu states their daughter usually provides assistance with these ADLs. Pt is able to feed herself and take her medications with verbal cues. Claudia Abreu reports Pt has been weak but able to ambulate with rollator. Family provides standby assist when using rollator. Pt is retired. Pt is not able to drive. Claudia Abreu states that caring for Pt has become increasingly difficult due to Pt's weakness. Claudia Abreu reports having a long term care policy and that he had a meeting on Monday \"with a lady\" to see if she would be able to provide assistance with caring for Pt. Claudia Abreu states that Pt is unable to return home if \"she can't do anything for herself\".  CM discussed possible need for PT/OT eval and possible SNF placement depending on ability to participate due to confusion. Brionna Muñoz is agreeable to that suggestion. CM will continue to follow for discharge needs. Recommend PT/OT evals to assist with discharge planning.  _____________________________________  Erika Walker [address] [facility]  Available via [address] [place] [address]  6/5/2022   6:30 PM      Care Management Interventions  PCP Verified by CM: Yes Rodolfo Overton MD)  Mode of Transport at Discharge:  Other (see comment) (TBD)  Transition of Care Consult (CM Consult): Discharge Planning  MyChart Signup: No  Discharge Durable Medical Equipment: No  Physical Therapy Consult: No  Occupational Therapy Consult: No  Speech Therapy Consult: No  Support Systems: Spouse/Significant Other,Child(brooklynn)  Confirm Follow Up Transport: Family  Discharge Location  Patient Expects to be Discharged to[de-identified] Unable to determine at this time

## 2022-06-05 NOTE — ED NOTES
Patient passed swallow screen. Patient alert and able to tolerate liquids. No sign of cough or difficulty noted. Per Dr. Cb Mireles, patient able to have clear liquid diet when not on bipap.

## 2022-06-05 NOTE — PROGRESS NOTES
Hospitalist Progress Note              Jeff Castaneda MD.                                                             Cell: [phone]                               NAME:  Jose Rowe  :  1940  MRN:  [id number]  Date of Service:  2022    Summary: 18-year-old female with history of dementia, hypertension, hyperlipidemia, paroxysmal a flutter, psoriasis, basal cell CA, thyroid CA status post thyroidectomy was brought to the ED by EMS from home for AMS. Patient does have a history of dementia and recently been diagnosed with CHF and started on diuretic and home oxygen after being noted to be hypoxic by the cardiologist.  Since starting the supplemental oxygen, according to the patient and family patient has been more withdrawn and less responsive       Assessment/Plan:  Acute on chronic hypoxic/acute hypercarbic respiratory failure; likely multifactorial with hypoventilation and undiagnosed TYRONE  CO2 narcosis  - Continue BIPAP intermittently and at night  . Acute CHF; systolic versus diastolic. Sinus bradycardia with history of paroxysmal atrial fibrillation. Concern for PE  We will obtain CTA of the chest tomorrow  Can hold heparin gtt due to concern for hematuria  Obtain bilateral lower extremity doppler    Metabolic alkalosis: Suspect due to chronic respiratory acidosis  Give acetazolamide    Acute kidney injury. History of advanced dementia. History of hypertension. Possible paroxysmal atrial fibrillation being on sotalol; family unaware. History of thyroid cancer status post thyroidectomy; with subsequent hypothyroidism. History of psoriasis and basal cell carcinoma. Code status: DNR/DNI  DVT prophylaxsis: heparin  Dispo:pending         Interval History/Subjective: On BIPAP  She has advanced dementia    Review of Systems:  Review of systems not obtained due to patient factors.       Objective:     VITALS:   Last 24hrs VS reviewed since prior progress note. Most recent are:  Visit Vitals  BP (!) 149/69   Pulse (!) 58   Temp 98 °F (36.7 °C)   Resp 13   Ht 5' 7\" (1.702 m)   Wt 95.7 kg (211 lb)   SpO2 94%   BMI 33.05 kg/m²       Intake/Output Summary (Last 24 hours) at 6/5/2022 1404  Last data filed at 6/5/2022 1300  Gross per 24 hour   Intake --   Output 1925 ml   Net -1925 ml        PHYSICAL EXAM:  General: Ill looking on BIPAP  EENT: EOMI. Anicteric sclerae. Oral mucous moist, oropharynx benign  Resp: CTA bilaterally. No wheezing/rhonchi/rales. No accessory muscle use  CV: Regular rhythm, normal rate, no murmurs, gallops, rubs  GI: Soft, non distended, non tender. normoactive bowel sounds, no hepatosplenomegaly Extremities: No edema, warm, 2+ pulses throughout  Neurologic: Moves all extremities. Responsive to name  Psych: Good insight. Not anxious nor agitated. Skin: Good Turgor, no rashes or ulcers    Lab Data Personally Reviewed: (see below)     Medications list Personally Reviewed:  x YES  NO     _______________________________________________________________________  Care Plan discussed with:  Patient/Family and Nurse  daughter and     Total NON critical care TIME:  27 minutes    Yovany Maharaj MD     Procedures: see electronic medical records for all procedures/Xrays and details which were not copied into this note but were reviewed prior to creation of Plan.       LABS:  Recent Labs     06/05/22  0835 06/05/22  0500   WBC 9.8 9.6   HGB 11.7 11.9   HCT 37.2 38.6    237     Recent Labs     06/05/22  0500 06/04/22  2314 06/04/22  1559    139 137   K 3.9 4.0 4.0   CL 93* 94* 93*   CO2 42* 43* 41*   BUN 55* 52* 57*   CREA 1.31* 1.16* 1.40*   GLU 98 104* 83   CA 9.5 9.5 9.5   MG 2.1 2.1  --      Recent Labs     06/04/22  1559   ALT 26   AP 88   TBILI 0.4   TP 6.5   ALB 2.8*   GLOB 3.7     Recent Labs     06/05/22  0925 06/04/22  2307   INR  --  1.1   PTP  --  11.0   APTT 64.3* 24.5      No results for input(s): FE, TIBC, PSAT, FERR in the last 72 hours. Lab Results   Component Value Date/Time    Folate >20.0 11/28/2017 12:26 PM      Recent Labs     06/04/22  2336 06/04/22 2035   PH 7.37 7.41   PCO2 77* 67*   PO2 82 50*     No results for input(s): CPK, CKNDX, TROIQ in the last 72 hours.     No lab exists for component: CPKMB  Lab Results   Component Value Date/Time    Cholesterol, total 209 (H) 07/21/2021 10:32 AM    HDL Cholesterol 51 07/21/2021 10:32 AM    LDL, calculated 134 (H) 07/21/2021 10:32 AM    LDL, calculated 121 (H) 11/19/2018 12:09 PM    Triglyceride 135 07/21/2021 10:32 AM    CHOL/HDL Ratio 3.8 01/08/2010 10:35 AM     Lab Results   Component Value Date/Time    Glucose (POC) 85 06/05/2022 09:03 AM     Lab Results   Component Value Date/Time    Color YELLOW/STRAW 06/04/2022 06:12 PM    Appearance CLOUDY (A) 06/04/2022 06:12 PM    Specific gravity 1.013 06/04/2022 06:12 PM    pH (UA) 6.5 06/04/2022 06:12 PM    Protein 30 (A) 06/04/2022 06:12 PM    Glucose Negative 06/04/2022 06:12 PM    Ketone Negative 06/04/2022 06:12 PM    Bilirubin Negative 06/04/2022 06:12 PM    Urobilinogen 0.2 06/04/2022 06:12 PM    Nitrites Negative 06/04/2022 06:12 PM    Leukocyte Esterase Negative 06/04/2022 06:12 PM    Epithelial cells FEW 06/04/2022 06:12 PM    Bacteria 4+ (A) 06/04/2022 06:12 PM    WBC 0-4 06/04/2022 06:12 PM    RBC 0-5 06/04/2022 06:12 PM

## 2022-06-05 NOTE — ED NOTES
Heparin drip paused due to noted blood in urine via strong. Attempting to draw repeat PTT, due to lab concern of possible contamination due to high result of PTT.

## 2022-06-06 ENCOUNTER — APPOINTMENT (OUTPATIENT)
Dept: VASCULAR SURGERY | Age: 82
DRG: 189 | End: 2022-06-06
Attending: HOSPITALIST
Payer: MEDICARE

## 2022-06-06 ENCOUNTER — APPOINTMENT (OUTPATIENT)
Dept: GENERAL RADIOLOGY | Age: 82
DRG: 189 | End: 2022-06-06
Attending: STUDENT IN AN ORGANIZED HEALTH CARE EDUCATION/TRAINING PROGRAM
Payer: MEDICARE

## 2022-06-06 ENCOUNTER — APPOINTMENT (OUTPATIENT)
Dept: CT IMAGING | Age: 82
DRG: 189 | End: 2022-06-06
Attending: INTERNAL MEDICINE
Payer: MEDICARE

## 2022-06-06 LAB
ALBUMIN SERPL-MCNC: 2.8 G/DL (ref 3.5–5)
ALBUMIN/GLOB SERPL: 0.7 {RATIO} (ref 1.1–2.2)
ALP SERPL-CCNC: 88 U/L (ref 45–117)
ALT SERPL-CCNC: 22 U/L (ref 12–78)
ANION GAP SERPL CALC-SCNC: 3 MMOL/L (ref 5–15)
ANION GAP SERPL CALC-SCNC: 8 MMOL/L (ref 5–15)
ARTERIAL PATENCY WRIST A: POSITIVE
AST SERPL-CCNC: 14 U/L (ref 15–37)
BASE EXCESS BLD CALC-SCNC: 10.9 MMOL/L
BASOPHILS # BLD: 0.1 K/UL (ref 0–0.1)
BASOPHILS # BLD: 0.1 K/UL (ref 0–0.1)
BASOPHILS NFR BLD: 1 % (ref 0–1)
BASOPHILS NFR BLD: 1 % (ref 0–1)
BDY SITE: ABNORMAL
BILIRUB SERPL-MCNC: 0.7 MG/DL (ref 0.2–1)
BUN SERPL-MCNC: 46 MG/DL (ref 6–20)
BUN SERPL-MCNC: 47 MG/DL (ref 6–20)
BUN/CREAT SERPL: 44 (ref 12–20)
BUN/CREAT SERPL: 45 (ref 12–20)
CALCIUM SERPL-MCNC: 9.4 MG/DL (ref 8.5–10.1)
CALCIUM SERPL-MCNC: 9.5 MG/DL (ref 8.5–10.1)
CHLORIDE SERPL-SCNC: 101 MMOL/L (ref 97–108)
CHLORIDE SERPL-SCNC: 98 MMOL/L (ref 97–108)
CO2 SERPL-SCNC: 34 MMOL/L (ref 21–32)
CO2 SERPL-SCNC: 42 MMOL/L (ref 21–32)
CREAT SERPL-MCNC: 1.03 MG/DL (ref 0.55–1.02)
CREAT SERPL-MCNC: 1.07 MG/DL (ref 0.55–1.02)
DIFFERENTIAL METHOD BLD: ABNORMAL
DIFFERENTIAL METHOD BLD: ABNORMAL
EOSINOPHIL # BLD: 0.1 K/UL (ref 0–0.4)
EOSINOPHIL # BLD: 0.1 K/UL (ref 0–0.4)
EOSINOPHIL NFR BLD: 1 % (ref 0–7)
EOSINOPHIL NFR BLD: 1 % (ref 0–7)
ERYTHROCYTE [DISTWIDTH] IN BLOOD BY AUTOMATED COUNT: 14.3 % (ref 11.5–14.5)
ERYTHROCYTE [DISTWIDTH] IN BLOOD BY AUTOMATED COUNT: 14.3 % (ref 11.5–14.5)
GAS FLOW.O2 O2 DELIVERY SYS: ABNORMAL L/MIN
GLOBULIN SER CALC-MCNC: 4 G/DL (ref 2–4)
GLUCOSE SERPL-MCNC: 86 MG/DL (ref 65–100)
GLUCOSE SERPL-MCNC: 89 MG/DL (ref 65–100)
HCO3 BLD-SCNC: 39 MMOL/L (ref 22–26)
HCT VFR BLD AUTO: 38.5 % (ref 35–47)
HCT VFR BLD AUTO: 40.9 % (ref 35–47)
HGB BLD-MCNC: 12 G/DL (ref 11.5–16)
HGB BLD-MCNC: 12.8 G/DL (ref 11.5–16)
IMM GRANULOCYTES # BLD AUTO: 0 K/UL (ref 0–0.04)
IMM GRANULOCYTES # BLD AUTO: 0 K/UL (ref 0–0.04)
IMM GRANULOCYTES NFR BLD AUTO: 0 % (ref 0–0.5)
IMM GRANULOCYTES NFR BLD AUTO: 0 % (ref 0–0.5)
LYMPHOCYTES # BLD: 1 K/UL (ref 0.8–3.5)
LYMPHOCYTES # BLD: 1.1 K/UL (ref 0.8–3.5)
LYMPHOCYTES NFR BLD: 11 % (ref 12–49)
LYMPHOCYTES NFR BLD: 11 % (ref 12–49)
MAGNESIUM SERPL-MCNC: 2.4 MG/DL (ref 1.6–2.4)
MCH RBC QN AUTO: 32 PG (ref 26–34)
MCH RBC QN AUTO: 32.3 PG (ref 26–34)
MCHC RBC AUTO-ENTMCNC: 31.2 G/DL (ref 30–36.5)
MCHC RBC AUTO-ENTMCNC: 31.3 G/DL (ref 30–36.5)
MCV RBC AUTO: 102.3 FL (ref 80–99)
MCV RBC AUTO: 103.8 FL (ref 80–99)
MONOCYTES # BLD: 0.7 K/UL (ref 0–1)
MONOCYTES # BLD: 0.9 K/UL (ref 0–1)
MONOCYTES NFR BLD: 8 % (ref 5–13)
MONOCYTES NFR BLD: 8 % (ref 5–13)
NEUTS SEG # BLD: 7.5 K/UL (ref 1.8–8)
NEUTS SEG # BLD: 8.6 K/UL (ref 1.8–8)
NEUTS SEG NFR BLD: 79 % (ref 32–75)
NEUTS SEG NFR BLD: 79 % (ref 32–75)
NRBC # BLD: 0 K/UL (ref 0–0.01)
NRBC # BLD: 0 K/UL (ref 0–0.01)
NRBC BLD-RTO: 0 PER 100 WBC
NRBC BLD-RTO: 0 PER 100 WBC
O2/TOTAL GAS SETTING VFR VENT: 28 %
PCO2 BLD: 67.9 MMHG (ref 35–45)
PEEP RESPIRATORY: 5 CMH2O
PH BLD: 7.37 [PH] (ref 7.35–7.45)
PHOSPHATE SERPL-MCNC: 3.3 MG/DL (ref 2.6–4.7)
PLATELET # BLD AUTO: 233 K/UL (ref 150–400)
PLATELET # BLD AUTO: 252 K/UL (ref 150–400)
PMV BLD AUTO: 11 FL (ref 8.9–12.9)
PMV BLD AUTO: 11.2 FL (ref 8.9–12.9)
PO2 BLD: 74 MMHG (ref 80–100)
POTASSIUM SERPL-SCNC: 3.9 MMOL/L (ref 3.5–5.1)
POTASSIUM SERPL-SCNC: 4 MMOL/L (ref 3.5–5.1)
PRESSURE SUPPORT SETTING VENT: 13 CMH2O
PROT SERPL-MCNC: 6.8 G/DL (ref 6.4–8.2)
RBC # BLD AUTO: 3.71 M/UL (ref 3.8–5.2)
RBC # BLD AUTO: 4 M/UL (ref 3.8–5.2)
SAO2 % BLD: 93.3 % (ref 92–97)
SODIUM SERPL-SCNC: 143 MMOL/L (ref 136–145)
SODIUM SERPL-SCNC: 143 MMOL/L (ref 136–145)
SPECIMEN TYPE: ABNORMAL
TROPONIN-HIGH SENSITIVITY: 32 NG/L (ref 0–51)
TSH SERPL DL<=0.05 MIU/L-ACNC: 3.79 UIU/ML (ref 0.36–3.74)
WBC # BLD AUTO: 10.7 K/UL (ref 3.6–11)
WBC # BLD AUTO: 9.4 K/UL (ref 3.6–11)

## 2022-06-06 PROCEDURE — 82803 BLOOD GASES ANY COMBINATION: CPT

## 2022-06-06 PROCEDURE — 74011250636 HC RX REV CODE- 250/636: Performed by: HOSPITALIST

## 2022-06-06 PROCEDURE — 94761 N-INVAS EAR/PLS OXIMETRY MLT: CPT

## 2022-06-06 PROCEDURE — 93005 ELECTROCARDIOGRAM TRACING: CPT

## 2022-06-06 PROCEDURE — 36415 COLL VENOUS BLD VENIPUNCTURE: CPT

## 2022-06-06 PROCEDURE — APPSS30 APP SPLIT SHARED TIME 16-30 MINUTES: Performed by: NURSE PRACTITIONER

## 2022-06-06 PROCEDURE — 77010033678 HC OXYGEN DAILY

## 2022-06-06 PROCEDURE — 93970 EXTREMITY STUDY: CPT

## 2022-06-06 PROCEDURE — 80053 COMPREHEN METABOLIC PANEL: CPT

## 2022-06-06 PROCEDURE — 74011250636 HC RX REV CODE- 250/636: Performed by: STUDENT IN AN ORGANIZED HEALTH CARE EDUCATION/TRAINING PROGRAM

## 2022-06-06 PROCEDURE — 74011000250 HC RX REV CODE- 250: Performed by: HOSPITALIST

## 2022-06-06 PROCEDURE — 84443 ASSAY THYROID STIM HORMONE: CPT

## 2022-06-06 PROCEDURE — 74011000250 HC RX REV CODE- 250: Performed by: INTERNAL MEDICINE

## 2022-06-06 PROCEDURE — 84100 ASSAY OF PHOSPHORUS: CPT

## 2022-06-06 PROCEDURE — 74011000250 HC RX REV CODE- 250: Performed by: STUDENT IN AN ORGANIZED HEALTH CARE EDUCATION/TRAINING PROGRAM

## 2022-06-06 PROCEDURE — 74011250637 HC RX REV CODE- 250/637: Performed by: FAMILY MEDICINE

## 2022-06-06 PROCEDURE — 74011000636 HC RX REV CODE- 636: Performed by: FAMILY MEDICINE

## 2022-06-06 PROCEDURE — 94003 VENT MGMT INPAT SUBQ DAY: CPT

## 2022-06-06 PROCEDURE — 99223 1ST HOSP IP/OBS HIGH 75: CPT | Performed by: SPECIALIST

## 2022-06-06 PROCEDURE — 83735 ASSAY OF MAGNESIUM: CPT

## 2022-06-06 PROCEDURE — 65610000006 HC RM INTENSIVE CARE

## 2022-06-06 PROCEDURE — 71045 X-RAY EXAM CHEST 1 VIEW: CPT

## 2022-06-06 PROCEDURE — 71275 CT ANGIOGRAPHY CHEST: CPT

## 2022-06-06 PROCEDURE — 84484 ASSAY OF TROPONIN QUANT: CPT

## 2022-06-06 PROCEDURE — 36600 WITHDRAWAL OF ARTERIAL BLOOD: CPT

## 2022-06-06 PROCEDURE — 85025 COMPLETE CBC W/AUTO DIFF WBC: CPT

## 2022-06-06 RX ORDER — VALSARTAN 80 MG/1
320 TABLET ORAL DAILY
Status: DISCONTINUED | OUTPATIENT
Start: 2022-06-07 | End: 2022-06-09

## 2022-06-06 RX ORDER — POTASSIUM CHLORIDE 7.45 MG/ML
10 INJECTION INTRAVENOUS ONCE
Status: COMPLETED | OUTPATIENT
Start: 2022-06-06 | End: 2022-06-07

## 2022-06-06 RX ORDER — DONEPEZIL HYDROCHLORIDE 5 MG/1
10 TABLET, FILM COATED ORAL
Status: DISCONTINUED | OUTPATIENT
Start: 2022-06-06 | End: 2022-06-14 | Stop reason: HOSPADM

## 2022-06-06 RX ORDER — DILTIAZEM HYDROCHLORIDE 5 MG/ML
5 INJECTION INTRAVENOUS ONCE
Status: COMPLETED | OUTPATIENT
Start: 2022-06-06 | End: 2022-06-06

## 2022-06-06 RX ORDER — TRIAMCINOLONE ACETONIDE 1 MG/G
CREAM TOPICAL 2 TIMES DAILY
Status: DISCONTINUED | OUTPATIENT
Start: 2022-06-06 | End: 2022-06-14 | Stop reason: HOSPADM

## 2022-06-06 RX ORDER — SOTALOL HYDROCHLORIDE 80 MG/1
80 TABLET ORAL 2 TIMES DAILY
Status: CANCELLED | OUTPATIENT
Start: 2022-06-06

## 2022-06-06 RX ORDER — DULOXETIN HYDROCHLORIDE 30 MG/1
60 CAPSULE, DELAYED RELEASE ORAL DAILY
Status: DISCONTINUED | OUTPATIENT
Start: 2022-06-07 | End: 2022-06-14 | Stop reason: HOSPADM

## 2022-06-06 RX ORDER — LEVOTHYROXINE SODIUM 75 UG/1
150 TABLET ORAL
Status: DISCONTINUED | OUTPATIENT
Start: 2022-06-07 | End: 2022-06-14 | Stop reason: HOSPADM

## 2022-06-06 RX ORDER — MELATONIN
2000 DAILY
Status: DISCONTINUED | OUTPATIENT
Start: 2022-06-07 | End: 2022-06-14 | Stop reason: HOSPADM

## 2022-06-06 RX ADMIN — TRIAMCINOLONE ACETONIDE: 1 CREAM TOPICAL at 18:33

## 2022-06-06 RX ADMIN — POTASSIUM CHLORIDE 10 MEQ: 7.46 INJECTION, SOLUTION INTRAVENOUS at 23:50

## 2022-06-06 RX ADMIN — TIMOLOL MALEATE 1 DROP: 5 SOLUTION/ DROPS OPHTHALMIC at 09:32

## 2022-06-06 RX ADMIN — DILTIAZEM HYDROCHLORIDE 5 MG: 5 INJECTION INTRAVENOUS at 22:49

## 2022-06-06 RX ADMIN — Medication 10 ML: at 22:28

## 2022-06-06 RX ADMIN — SODIUM CHLORIDE 250 ML: 9 INJECTION, SOLUTION INTRAVENOUS at 22:27

## 2022-06-06 RX ADMIN — Medication 10 ML: at 16:34

## 2022-06-06 RX ADMIN — DONEPEZIL HYDROCHLORIDE 10 MG: 5 TABLET, FILM COATED ORAL at 22:27

## 2022-06-06 RX ADMIN — IOPAMIDOL 70 ML: 755 INJECTION, SOLUTION INTRAVENOUS at 11:57

## 2022-06-06 RX ADMIN — ACETAZOLAMIDE 500 MG: 500 INJECTION, POWDER, LYOPHILIZED, FOR SOLUTION INTRAVENOUS at 13:28

## 2022-06-06 NOTE — PROGRESS NOTES
Tele notified nurse of patient says in the 66's. Respiratory notified. Oxygen sensor repositioned.  Will continue to monitor closely

## 2022-06-06 NOTE — PROGRESS NOTES
CARE MANAGEMENT NOTE      TRANSFER - OUT REPORT:    Verbal report given to Rosio SHANNON (name) on Lanie Brooks (patient name)  being transferred to ICU/IVCU (unit) for routine progression of care. Report consisted of patients Situation, Background, Assessment and   Recommendations(SBAR). RUR:  13%  Risk Level:  Low  Value-based purchasing:  Yes   Bundle patient:  No     Transition of care plan:  1. Pt admitted on 6/4/22 for worsening dyspnea and AMS. Pt is followed by Pulmonary and Hospitalist.    2. Pt is receiving nebs and IV Diamox. Pt is also on bi-pap at this time. 3. PTA, Pt lived at home with spouse and adult daughter. Pt required assistance with some ADLs. Pt has home O2, walker, rollator, shower chair, grab bars, and raised toilet seat. 4. Discharge plan unclear at this time. CM recommends PT/OT evals when Pt is medically stable. CM will continue to follow. 5. Outpatient follow up.   6. Transportation: TBD     _____________________________________  Jillian Martino Management  Available via [address] [place] [address]  6/6/2022   3:35 PM

## 2022-06-06 NOTE — PROGRESS NOTES
Bedside and Verbal shift change report given to 61Aditi Rachel López (oncoming nurse) by Rodrigo Beach (offgoing nurse). Report included the following information SBAR, ED Summary, Procedure Summary, Intake/Output, Recent Results and Cardiac Rhythm Sinus Mac Post.

## 2022-06-06 NOTE — PROGRESS NOTES
ED BiPAP patient transported from ED to CT and back to ED. Patient transported on nasal cannula @ 1l/m, with no adverse effects. Patient talking, answering questions appropriately and requesting not to return back to BiPAP. Patient placed back on BiPAP when returned to ED. RN aware.

## 2022-06-06 NOTE — PROGRESS NOTES
CARE MANAGEMENT   DAILY PROGRESS NOTE        NAME:   Yanni Yang   :     1940   MRN:     [id number]     RUR:  13%  Risk Level:  Low  Value-based purchasing:  Yes   Bundle patient:  No    Transition of care plan:  1. Pt admitted on 22 for worsening dyspnea and AMS. Pt is followed by Pulmonary and Hospitalist.    2. Pt is receiving nebs and IV Diamox. Pt is also on bi-pap at this time. 3. PTA, Pt lived at home with spouse and adult daughter. Pt required assistance with some ADLs. Pt has home O2, walker, rollator, shower chair, grab bars, and raised toilet seat. 4. Discharge plan unclear at this time. CM recommends PT/OT evals when Pt is medically stable. CM will continue to follow. 5. Outpatient follow up.   6. Transportation: TBD      _____________________________________  SARAH Bocanegra - Care Management  2022   10:38 AM

## 2022-06-06 NOTE — PROGRESS NOTES
Cardiology Initial Care Encounter    Patient: Judah Fisher MRN: [id number]     YOB: 1940  Age: 79 y.o. Sex: female      Admit Date: 6/4/2022       Assessment/Plan     1 acute hypoxic resp failure: multifactorial: ? chf, hypoventilation syndrome, likely has TYRONE. pBNP 345 . ECHO shows EF of 55 - 60%. Left ventricle size is normal. Mildly increased wall thickness. Findings consistent with mild concentric hypertrophy. Normal wall motion. Grade I diastolic dysfunction with normal LAP. Pulmonology following. may consider Trelegy if amenable to outpatient therapy. I placed call to spouse to review cardiac hx, no answer, no voice mail. Start low dose diuretics tomorrow if renal function stable. .  2. Hx PAF: holding home sotalol 2/2 bradycardia. On heparin, paused due to high PTT.      3. Metabolic alkalosis: Suspect due to chronic respiratory acidosis  -on  acetazolamide     4. RAN:   -improving.      5. Dementia:   On donepezil and Cymbalta     6. HTN   - diovan resumed     7 hx psoirasis    CARDIOLOGY ATTENDING  Patient personally seen and examined. All the elements of history and examination were personally performed. Assessment and plan was discussed and agree. Admitted with AMS and acute resp failure. Patient unable to provide any history. On Bipap. Hrt RRR, + BS ant, no sig edema       ECHO ADULT COMPLETE 6/05/2022    Left Ventricle: Normal left ventricular systolic function with a visually estimated EF of 55 - 60%. Left ventricle size is normal. Mildly increased wall thickness. Findings consistent with mild concentric hypertrophy. Normal wall motion. Grade I diastolic dysfunction with normal LAP.   Right Ventricle: Right ventricle is mildly dilated.   Aortic Valve: Mild regurgitation with a centrally directed jet.   Left Atrium: Left atrium is mildly dilated.   Right Atrium: Right atrium is mildly dilated.     I suspect Acute resp failure likely not due to CHF (proBNP normal, normal filling pressure on echo, no sig edema on CT scan). Sotalol stopped due to bradycardia. Will need to get records from prior cardiologist.            Lit Morales MD, [facility]              RAY Macias is a 79 y.o.   female  with PMH of dementia, hypertension, hyperlipidemia, paroxysmal a flutter, psoriasis, basal cell CA, thyroid CA status post thyroidectomy was brought to the ED by EMS from home for AMS. Per chart review: Patient does have a history of dementia and recently been diagnosed with CHF and started on diuretic and home oxygen after being noted to be hypoxic by the cardiologist.      Since starting the supplemental oxygen, according to the patient and family patient has been more withdrawn and less responsive. PPt is on cont Bi Pap in ED and does not answer questions. The patient has been referred to cardiology for on going management of CHF      Review of Symptoms: unable due to lethargy         Previous cardiac hx  22    ECHO ADULT COMPLETE 2022    Interpretation Summary    Left Ventricle: Normal left ventricular systolic function with a visually estimated EF of 55 - 60%. Left ventricle size is normal. Mildly increased wall thickness. Findings consistent with mild concentric hypertrophy. Normal wall motion. Grade I diastolic dysfunction with normal LAP.   Right Ventricle: Right ventricle is mildly dilated.   Aortic Valve: Mild regurgitation with a centrally directed jet.   Left Atrium: Left atrium is mildly dilated.   Right Atrium: Right atrium is mildly dilated. Signed by: Ashley Mendiola MD on 2022  3:21 PM      Risk factors:     Social History     Tobacco Use    Smoking status: Former Smoker     Packs/day: 0.50     Years: 4.00     Pack years: 2.00     Types: Cigarettes     Quit date: 1961     Years since quittin.4    Smokeless tobacco: Never Used   Substance Use Topics    Alcohol use:  No Alcohol/week: 0.0 standard drinks     Family History   Problem Relation Age of Onset    Hypertension Mother     Heart Disease Mother     Heart Disease Father     Hypertension Father    Gallito Lees Elevated Lipids Father     Heart Disease Brother     Psychiatric Disorder Brother     Diabetes Brother     Breast Cancer Maternal Aunt     Breast Cancer Paternal Aunt        Current Facility-Administered Medications   Medication Dose Route Frequency    [START ON 6/7/2022] cholecalciferol (VITAMIN D3) (1000 Units /25 mcg) tablet 2,000 Units  2,000 Units Oral DAILY    donepeziL (ARICEPT) tablet 10 mg  10 mg Oral QHS    [START ON 6/7/2022] DULoxetine (CYMBALTA) capsule 60 mg  60 mg Oral DAILY    [START ON 6/7/2022] levothyroxine (SYNTHROID) tablet 150 mcg  150 mcg Oral ACB    [START ON 6/7/2022] valsartan (DIOVAN) tablet 320 mg  320 mg Oral DAILY    triamcinolone acetonide (KENALOG) 0.1 % cream   Topical BID    timolol (TIMOPTIC) 0.5 % ophthalmic solution 1 Drop  1 Drop Both Eyes DAILY    sodium chloride (KEMAL 128) 2 % ophthalmic drops 1 Drop (Patient Supplied)  1 Drop Left Eye BID    acetaZOLAMIDE (DIAMOX) 500 mg in sterile water (preservative free) 5 mL injection  500 mg IntraVENous Q24H    sodium chloride (NS) flush 5-40 mL  5-40 mL IntraVENous Q8H    sodium chloride (NS) flush 5-40 mL  5-40 mL IntraVENous PRN    acetaminophen (TYLENOL) suppository 650 mg  650 mg Rectal Q6H PRN    polyethylene glycol (MIRALAX) packet 17 g  17 g Oral DAILY PRN    ondansetron (ZOFRAN) injection 4 mg  4 mg IntraVENous Q6H PRN    [Held by provider] heparin 25,000 units in D5W 250 ml infusion  18-36 Units/kg/hr IntraVENous TITRATE    albuterol (ACCUNEB) nebulizer solution 1.25 mg  1.25 mg Nebulization Q6H PRN     Current Outpatient Medications   Medication Sig    sodium chloride (Kemal 128) 2 % ophthalmic solution Administer 1 Drop to left eye two (2) times a day. Administer 1 Drop to left eye two (2) times a day.  Morning and at night 15 minutes after timolol eye drops    sotaloL (BETAPACE) 120 mg tablet Take 120 mg by mouth two (2) times a day.  Gemtesa 75 mg tab Take 75 mg by mouth every evening.  triamcinolone acetonide (KENALOG) 0.1 % topical cream Apply 0.1 Packages to affected area two (2) times a day. Apply to rash under pannus    levothyroxine (Synthroid) 150 mcg tablet Take 1 Tablet by mouth Daily (before breakfast).  valsartan (DIOVAN) 320 mg tablet TAKE 1 TABLET EVERY DAY    DULoxetine (CYMBALTA) 60 mg capsule TAKE 1 CAPSULE EVERY DAY    donepeziL (ARICEPT) 10 mg tablet TAKE ONE TABLET BY MOUTH ONCE NIGHTLY    vit A/vit C/vit E/zinc/copper (PRESERVISION AREDS PO) Take 2 Tablets by mouth two (2) times a day.  glucosam/chond/hyalu/CF borate (MOVE FREE JOINT HEALTH PO) Take 1 Tablet by mouth daily (with breakfast).  Cholecalciferol, Vitamin D3, (VITAMIN D3) 2,000 unit cap capsule Take 2,000 Units by mouth two (2) times a day.  timolol (TIMOPTIC) 0.5 % ophthalmic solution Administer 1 Drop to both eyes daily. In the morning    aspirin 81 mg Tab Take  by mouth.  MULTIVITAMINS (MULTIVITAMIN PO) Take  by mouth. Objective:     Vitals:    06/06/22 0901 06/06/22 1000 06/06/22 1108 06/06/22 1245   BP: 137/61 132/63  (!) 143/47   Pulse: 64 69 65 (!) 58   Resp: 16 20 21 17   Temp:       SpO2: 100% 100% 100% 93%   Weight:       Height:            Intake and Output:  Current Shift: 06/06 0701 - 06/06 1900  In: -   Out: 300 [Urine:300]  Last three shifts: 06/04 1901 - 06/06 0700  In: 511.9 [P.O.:370; I.V.:141.9]  Out: 7481 [Urine:2375]          Gen: Somnolent does not follow commands   Neck: Supple,No JVD, No Carotid Bruit,   Resp: No accessory muscle use, Clear breath sounds, No rales or rhonchi  Card: Regular Rate,  Rhythm,Normal S1, S2, No murmurs, rubs or gallop.    Abd:  Soft,  non-distended,BS+,   MSK: No cyanosis  Skin: No rashes    Neuro: moving all four extremities , follows commands appropriately  Psych: ? Orientation   LE: No edema    EK22    ECHO ADULT COMPLETE 2022    Interpretation Summary    Left Ventricle: Normal left ventricular systolic function with a visually estimated EF of 55 - 60%. Left ventricle size is normal. Mildly increased wall thickness. Findings consistent with mild concentric hypertrophy. Normal wall motion. Grade I diastolic dysfunction with normal LAP.   Right Ventricle: Right ventricle is mildly dilated.   Aortic Valve: Mild regurgitation with a centrally directed jet.   Left Atrium: Left atrium is mildly dilated.   Right Atrium: Right atrium is mildly dilated. Signed by: Alireza Macario MD on 2022  3:21 PM        TELEMETRY: SB     Lab/Data Review:  BMP:   Lab Results   Component Value Date/Time     2022 10:06 AM    K 4.0 2022 10:06 AM    CL 98 2022 10:06 AM    CO2 42 (HH) 2022 10:06 AM    AGAP 3 (L) 2022 10:06 AM    GLU 86 2022 10:06 AM    BUN 47 (H) 2022 10:06 AM    CREA 1.07 (H) 2022 10:06 AM    GFRAA 60 (L) 2022 10:06 AM    GFRNA 49 (L) 2022 10:06 AM     CBC:   Lab Results   Component Value Date/Time    WBC 9.4 2022 10:06 AM    HGB 12.0 2022 10:06 AM    HCT 38.5 2022 10:06 AM     2022 10:06 AM      Results for Venus Rivera (MRN [id number]) as of 2022 14:15   Ref.  Range 2022 15:59 2022 23:14   NT pro-BNP Latest Ref Range: <450 PG/      Signed By: Arthur Andrade NP     2022

## 2022-06-06 NOTE — PROGRESS NOTES
TRANSFER - IN REPORT:    Verbal report received from Loretta Mcfarlane (name) on Daris December  being received from ED (unit) for routine progression of care      Report consisted of patients Situation, Background, Assessment and   Recommendations(SBAR). Information from the following report(s) SBAR, Kardex, Intake/Output, MAR, Recent Results and Cardiac Rhythm SR was reviewed with the receiving nurse. Opportunity for questions and clarification was provided. Assessment completed upon patients arrival to unit and care assumed.

## 2022-06-06 NOTE — PROGRESS NOTES
Bedside shift change report given to Sakshi Alvarez RN (oncoming nurse) by Zuly Du RN (offgoing nurse). Report included the following information SBAR, Kardex, Intake/Output, MAR, Recent Results, Cardiac Rhythm Sinus Rhythm and Alarm Parameters . 1715 Pt awake in bed and on BIPAP. 2100 After completion of assessment, noticed patient had converted to A-fib RVR at 2007 during the assessment. Performed ECG and contacted Dr. Jesica Lott. Awaiting orders. Patient is responsive and conversing pleasantly at her baseline with no pain. .    2125 Received phone orders from Dr. Jesica Lott for labs and CXR. Orders placed by this writer. Provider will review chart. 2300 Pt still A-fib RVR after 250 bolus. Administered 5 mg diltiazem and pt still A-fib RVR, but dipping as low as the 110s and 120s. Will continue to monitor for one hour and speak with Dr. Jesica Lott at that time. 0020 Pt still A-fib RVR after 5 mg diltiazem. Sustaining in 130s-140s, sporadically dipping to 120s or jumping to 150s. Received order for additional 5 mg dose of diltiazem. Will continue to monitor. 0140 Pt still A-fib RVR after 2nd 5 mg diltiazem. Sustaining in 130s-140s, frequently climbing to 150s. Received order for cardizem gtt. 3294 Notified Dr. Jesica Lott about pt BP 91/43, sustained map below 60. Pt still Afib RVR. Received order for amio bolus and will titrate cardizem down to manage hypotension. 6546 after brief drop in HR during and after amio bolus, HR continued to sustain in 120s-130s with peaks in 140s, BP still soft. Received order to stop cardizem gtt. This writer was concerned about continued A-fib RVR and requested alternative intervention. Received order for amio gtt.    4352 Pt still A-fib RVR but range is mostly high 110s to low 130s, occasionally peaking to 140s. /49, MAP 64    Bedside shift change report given to Brittany Carpio RN (oncoming nurse) by Sakshi Alvarez RN (offgoing nurse).  Report included the following information SBAR, Kardex, Intake/Output, MAR, Recent Results, Cardiac Rhythm A-fib RVR and Alarm Parameters .

## 2022-06-06 NOTE — CONSULTS
Name: [id number]: [address] [place] [address] Dr TAVARESB: 1940 Admit Date: 2022   Phone: [phone]  Room: [place] [address]   PCP: Rick Laguerre MD  MRN: [id number]   Date: 2022  Code: DNR          Chart and notes reviewed. Data reviewed. I review the patient's current medications in the medical record at each encounter. I have evaluated and examined the patient. HPI:    7:52 AM       History was obtained from patient. I was asked by Zackary Vicente MD to see Ingrid Pollard in consultation for a chief complaint of acute on chronic respiratory failure with hypoxia and hypercapnia. History of Present Illness:  Ms. Rowan Fajardo is an 80 yo woman with a history of HTN, afib, CKD prior thyroid cancer s/p thyroidectom, prior subdural hematoma, dementia and obesity who is admitted with AMS and acute on chronic respiratory failure with hypoxia and hypercapnia. She comes in with altered mental status and found to be hypoxic and hypercapnic requiring BiPAP. Per report she was recently started on a diuretic and supplemental O2 after being found to be in heart failure and hypoxic. She is able to indicated to me that she is not short of breath on BiPAP currently, but still lethargic.     Labs: WBC 9.8, Hgb 11.7, , HCO3 >40, cr 1.31, proBNP 345, ABG 7.37/77/82 on BiPAP    Images reviewed:  CXR 2022: increased interstitial markings concerning for pulmonary edema    TTE 2022: EF 74-87%, grade 1 diastolic dysfunction      Past Medical History:   Diagnosis Date    Arthritis     Basal cell carcinoma 2012    Calculus of kidney     Cancer ([facility] [address]     skin    Cancer ([facility] [address]     thyroid    Glaucoma 2010    Hypertension     OA (osteoarthritis) 2010    Psoriasis 2010       Past Surgical History:   Procedure Laterality Date    HX CATARACT REMOVAL      bilat    HX CHOLECYSTECTOMY      HX CRANIOTOMY      HX DILATION AND CURETTAGE      HX HYSTERECTOMY      HX KNEE ARTHROSCOPY      HX KNEE REPLACEMENT      HX TONSILLECTOMY      HX WRIST FRACTURE TX Left 8/21/15    HI THYROIDECTOMY         Family History   Problem Relation Age of Onset    Hypertension Mother     Heart Disease Mother     Heart Disease Father     Hypertension Father    Wilson Elevated Lipids Father     Heart Disease Brother     Psychiatric Disorder Brother     Diabetes Brother     Breast Cancer Maternal Aunt     Breast Cancer Paternal Aunt        Social History     Tobacco Use    Smoking status: Former Smoker     Packs/day: 0.50     Years: 4.00     Pack years: 2.00     Types: Cigarettes     Quit date: 1961     Years since quittin.4    Smokeless tobacco: Never Used   Substance Use Topics    Alcohol use: No     Alcohol/week: 0.0 standard drinks       Allergies   Allergen Reactions    Cephalexin Itching    Codeine Rash    Gabapentin Other (comments)    Neomycin Rash    Polysporin [Bacitracin-Polymyxin B] Rash    Protonix [Pantoprazole] Other (comments)     Gi upset       Current Facility-Administered Medications   Medication Dose Route Frequency    timolol (TIMOPTIC) 0.5 % ophthalmic solution 1 Drop  1 Drop Both Eyes DAILY    sodium chloride (BRANDON 128) 2 % ophthalmic drops 1 Drop (Patient Supplied)  1 Drop Left Eye BID    acetaZOLAMIDE (DIAMOX) 500 mg in sterile water (preservative free) 5 mL injection  500 mg IntraVENous Q24H    sodium chloride (NS) flush 5-40 mL  5-40 mL IntraVENous Q8H    sodium chloride (NS) flush 5-40 mL  5-40 mL IntraVENous PRN    acetaminophen (TYLENOL) suppository 650 mg  650 mg Rectal Q6H PRN    polyethylene glycol (MIRALAX) packet 17 g  17 g Oral DAILY PRN    ondansetron (ZOFRAN) injection 4 mg  4 mg IntraVENous Q6H PRN    [Held by provider] heparin 25,000 units in D5W 250 ml infusion  18-36 Units/kg/hr IntraVENous TITRATE    albuterol (ACCUNEB) nebulizer solution 1.25 mg  1.25 mg Nebulization Q6H PRN     Current Outpatient Medications   Medication Sig  sodium chloride (Kemal 128) 2 % ophthalmic solution Administer 1 Drop to left eye two (2) times a day. Administer 1 Drop to left eye two (2) times a day. Morning and at night 15 minutes after timolol eye drops    sotaloL (BETAPACE) 120 mg tablet Take 120 mg by mouth two (2) times a day.  Gemtesa 75 mg tab Take 75 mg by mouth every evening.  triamcinolone acetonide (KENALOG) 0.1 % topical cream Apply 0.1 Packages to affected area two (2) times a day. Apply to rash under pannus    levothyroxine (Synthroid) 150 mcg tablet Take 1 Tablet by mouth Daily (before breakfast).  valsartan (DIOVAN) 320 mg tablet TAKE 1 TABLET EVERY DAY    DULoxetine (CYMBALTA) 60 mg capsule TAKE 1 CAPSULE EVERY DAY    donepeziL (ARICEPT) 10 mg tablet TAKE ONE TABLET BY MOUTH ONCE NIGHTLY    vit A/vit C/vit E/zinc/copper (PRESERVISION AREDS PO) Take 2 Tablets by mouth two (2) times a day.  glucosam/chond/hyalu/CF borate (MOVE FREE JOINT HEALTH PO) Take 1 Tablet by mouth daily (with breakfast).  Cholecalciferol, Vitamin D3, (VITAMIN D3) 2,000 unit cap capsule Take 2,000 Units by mouth two (2) times a day.  timolol (TIMOPTIC) 0.5 % ophthalmic solution Administer 1 Drop to both eyes daily. In the morning    aspirin 81 mg Tab Take  by mouth.  MULTIVITAMINS (MULTIVITAMIN PO) Take  by mouth. REVIEW OF SYSTEMS   12 point ROS negative except as stated in the HPI. Physical Exam:   Visit Vitals  BP (!) 139/52   Pulse 65   Temp 98.3 °F (36.8 °C)   Resp 17   Ht 5' 7\" (1.702 m)   Wt 95.7 kg (211 lb)   SpO2 97%   BMI 33.05 kg/m²       General:  Alert, cooperative, no distress, appears stated age. Head:  Normocephalic, without obvious abnormality, atraumatic. Eyes:  Conjunctivae/corneas clear. Nose: Nares normal. Septum midline.     Throat: Lips, mucosa, and tongue normal.    Neck: Supple, symmetrical, trachea midline   Lungs:   Clear to auscultation bilaterally, respirations non-labored on BiPAP   Chest wall: No tenderness or deformity. Heart:  Regular rate and rhythm, S1, S2 normal, no murmur, click, rub or gallop. Abdomen:   Soft, non-tender. Bowel sounds normal. Obese. Extremities: Extremities normal, atraumatic, no cyanosis or edema. Pulses: 2+ and symmetric all extremities. Skin: Skin color, texture, turgor normal. No rashes or lesions       Neurologic: Grossly nonfocal. Responds to yes/no questions, does not open her eyes for yvonne. Lab Results   Component Value Date/Time    Sodium 139 06/05/2022 05:00 AM    Potassium 3.9 06/05/2022 05:00 AM    Chloride 93 (L) 06/05/2022 05:00 AM    CO2 42 (HH) 06/05/2022 05:00 AM    BUN 55 (H) 06/05/2022 05:00 AM    Creatinine 1.31 (H) 06/05/2022 05:00 AM    Glucose 98 06/05/2022 05:00 AM    Calcium 9.5 06/05/2022 05:00 AM    Magnesium 2.1 06/05/2022 05:00 AM    Lactic acid 0.8 06/04/2022 03:59 PM       Lab Results   Component Value Date/Time    WBC 9.8 06/05/2022 08:35 AM    HGB 11.7 06/05/2022 08:35 AM    PLATELET 217 76/51/4963 08:35 AM    .5 (H) 06/05/2022 08:35 AM       Lab Results   Component Value Date/Time    INR 1.1 06/04/2022 11:07 PM    aPTT 64.3 (H) 06/05/2022 09:25 AM    Alk.  phosphatase 88 06/04/2022 03:59 PM    Protein, total 6.5 06/04/2022 03:59 PM    Albumin 2.8 (L) 06/04/2022 03:59 PM    Globulin 3.7 06/04/2022 03:59 PM       No results found for: IRON, FE, TIBC, IBCT, PSAT, FERR    Lab Results   Component Value Date/Time    Sed rate (ESR) 4 08/10/2017 12:00 AM    TSH 7.92 (H) 06/04/2022 03:59 PM        No results found for: PH, PHI, PCO2, PCO2I, PO2, PO2I, HCO3, HCO3I, FIO2, FIO2I    No results found for: CPK, RCK1, RCK2, RCK3, RCK4, CKNDX, CKND1, TROPT, TROIQ, BNPP, BNP     Lab Results   Component Value Date/Time    Culture result: NO GROWTH 2 DAYS 06/04/2022 03:59 PM    Culture result: MIXED UROGENITAL NEVILLE ISOLATED 07/08/2021 10:36 AM    Culture result: STREPTOCOCCI, BETA HEMOLYTIC GROUP B 04/14/2009 02:06 PM       No results found for: TOXA1, RPR, HBCM, HBSAG, HAAB, HCAB1, HAAT, G6PD, CRYAC, HIVGT, HIVR, HIV1, HIV12, HIVPC, HIVRPI    No results found for: VANCT, CPK    Lab Results   Component Value Date/Time    Color YELLOW/STRAW 06/04/2022 06:12 PM    Appearance CLOUDY (A) 06/04/2022 06:12 PM    pH (UA) 6.5 06/04/2022 06:12 PM    Protein 30 (A) 06/04/2022 06:12 PM    Glucose Negative 06/04/2022 06:12 PM    Ketone Negative 06/04/2022 06:12 PM    Bilirubin Negative 06/04/2022 06:12 PM    Blood Negative 06/04/2022 06:12 PM    Urobilinogen 0.2 06/04/2022 06:12 PM    Nitrites Negative 06/04/2022 06:12 PM    Leukocyte Esterase Negative 06/04/2022 06:12 PM    WBC 0-4 06/04/2022 06:12 PM    RBC 0-5 06/04/2022 06:12 PM    Epithelial cells 0-10 08/18/2015 02:41 PM    Bacteria 4+ (A) 06/04/2022 06:12 PM       IMPRESSION  · Acute on chronic respiratory failure with hypoxia and hypercarbia  · Metabolic acidosis  · Suspected OHS/TYRONE  · Altered mental status  · Diastolic heart failure  · Obesity  · Dementia      PLAN  · Goal sats 88% or higher, wean supplemental O2 as tolerated  · BiPAP while sleeping, currently lethargic and needs to remain on until she is more awake/interactive  · If she tolerates BiPAP and amenable to outpatient therapy, could consider Trelegy  · Agree with diamox given metabolic acidosis that is likely both compensatory and related to recent diuresis  · PRN nebs  · CTA, LE doopplers pending  · NPO until more alert  · She is DNR    Thank you for allowing us to participate in the care of this patient. We will be happy to follow along in his/her progress with you.     Silvina Velasquez MD

## 2022-06-06 NOTE — ED NOTES
Care handoff given to KS, RN hold nurse. Report included the following information SBAR, Kardex, ED Summary, MAR and recent results.

## 2022-06-06 NOTE — PROGRESS NOTES
[address] Adult  Hospitalist Group                                                                                          Hospitalist Progress Note  Jai Macias MD        Date of Service:  2022  NAME:  Eugenio Mcelroy  :  1940  MRN:  [id number]      Admission Summary:   58-year-old female with history of dementia, hypertension, hyperlipidemia, paroxysmal a flutter, psoriasis, basal cell CA, thyroid CA status post thyroidectomy was brought to the ED by EMS from home for AMS.   Patient does have a history of dementia and recently been diagnosed with CHF and started on diuretic and home oxygen after being noted to be hypoxic by the cardiologist. Jack Hernandez starting the supplemental oxygen, according to the patient and family patient has been more withdrawn and less responsive    Interval history / Subjective:   Currently on BiPAP, somnolent. Arouses to voice and touch but unable to answer questions     Assessment & Plan:     Acute on chronic hypoxic/acute hypercarbic respiratory failure; likely multifactorial with hypoventilation and undiagnosed TYRONE  CO2 narcosis  - Continue BIPAP intermittently and at night  -Pulmonology evaluated, may consider Trelegy if amenable to outpatient therapy  .   Acute on chronic diastolic CHF  -Echo shows normal EF with grade 1 diastolic dysfunction   -Resume low dose diuresis once stable  -Consult cardiology    sinus bradycardia with history of paroxysmal atrial fibrillation.   -Hold home sotalol for now due to bradycardia     Metabolic alkalosis: Suspect due to chronic respiratory acidosis  -Continue acetazolamide     Acute kidney injury.  -Likely due to CHF  -Improving    History of advanced dementia.  -Continue donepezil and Cymbalta    History of hypertension.  -Continue Diovan now that renal function is improving    Possible paroxysmal atrial fibrillation being on sotalol; family unaware.  -Consult cardiology    History of thyroid cancer status post thyroidectomy; with subsequent hypothyroidism.  -Continue levothyroxine  -Check TSH    History of psoriasis and basal cell carcinoma.  -Continue home steroid cream    Code status: DNR  DVT prophylaxis: SCDs       Hospital Problems  Date Reviewed: 11/2/2018          Codes Class Noted POA    Encephalopathy acute ICD-10-CM: G93.40  ICD-9-CM: 348.30  6/4/2022 Unknown                Review of Systems:   Review of systems not obtained due to patient factors. Vital Signs:    Last 24hrs VS reviewed since prior progress note. Most recent are:  Visit Vitals  BP (!) 143/47   Pulse (!) 58   Temp 98.3 °F (36.8 °C)   Resp 17   Ht 5' 7\" (1.702 m)   Wt 95.7 kg (211 lb)   SpO2 93%   BMI 33.05 kg/m²         Intake/Output Summary (Last 24 hours) at 6/6/2022 1306  Last data filed at 6/6/2022 1046  Gross per 24 hour   Intake 511.9 ml   Output 750 ml   Net -238.1 ml        Physical Examination:             Constitutional:  No acute distress, currently on BiPAP, somnolent   ENT:  Oral mucosa moist, oropharynx benign. Resp:  CTA bilaterally. No wheezing/rhonchi/rales. No accessory muscle use   CV:  Regular rhythm, normal rate, no murmurs, gallops, rubs    GI:  Soft, non distended, non tender. normoactive bowel sounds, no hepatosplenomegaly     Musculoskeletal:  No edema, warm, 2+ pulses throughout    Neurologic:  Moves all extremities     Psych: No insight.   Not agitated or anxious       Data Review:    Review and/or order of clinical lab test      Labs:     Recent Labs     06/06/22  1006 06/05/22  0835   WBC 9.4 9.8   HGB 12.0 11.7   HCT 38.5 37.2    231     Recent Labs     06/06/22  1006 06/05/22  0500 06/04/22  2314    139 139   K 4.0 3.9 4.0   CL 98 93* 94*   CO2 42* 42* 43*   BUN 47* 55* 52*   CREA 1.07* 1.31* 1.16*   GLU 86 98 104*   CA 9.4 9.5 9.5   MG  --  2.1 2.1     Recent Labs     06/04/22  1559   ALT 26   AP 88   TBILI 0.4   TP 6.5   ALB 2.8*   GLOB 3.7     Recent Labs     06/05/22  0925 06/04/22  3559 INR  --  1.1   PTP  --  11.0   APTT 64.3* 24.5      No results for input(s): FE, TIBC, PSAT, FERR in the last 72 hours. Lab Results   Component Value Date/Time    Folate >20.0 11/28/2017 12:26 PM      Recent Labs     06/04/22  2336 06/04/22 2035   PH 7.37 7.41   PCO2 77* 67*   PO2 82 50*     No results for input(s): CPK, CKNDX, TROIQ in the last 72 hours.     No lab exists for component: CPKMB  Lab Results   Component Value Date/Time    Cholesterol, total 209 (H) 07/21/2021 10:32 AM    HDL Cholesterol 51 07/21/2021 10:32 AM    LDL, calculated 134 (H) 07/21/2021 10:32 AM    LDL, calculated 121 (H) 11/19/2018 12:09 PM    Triglyceride 135 07/21/2021 10:32 AM    CHOL/HDL Ratio 3.8 01/08/2010 10:35 AM     Lab Results   Component Value Date/Time    Glucose (POC) 85 06/05/2022 09:03 AM     Lab Results   Component Value Date/Time    Color YELLOW/STRAW 06/04/2022 06:12 PM    Appearance CLOUDY (A) 06/04/2022 06:12 PM    Specific gravity 1.013 06/04/2022 06:12 PM    pH (UA) 6.5 06/04/2022 06:12 PM    Protein 30 (A) 06/04/2022 06:12 PM    Glucose Negative 06/04/2022 06:12 PM    Ketone Negative 06/04/2022 06:12 PM    Bilirubin Negative 06/04/2022 06:12 PM    Urobilinogen 0.2 06/04/2022 06:12 PM    Nitrites Negative 06/04/2022 06:12 PM    Leukocyte Esterase Negative 06/04/2022 06:12 PM    Epithelial cells FEW 06/04/2022 06:12 PM    Bacteria 4+ (A) 06/04/2022 06:12 PM    WBC 0-4 06/04/2022 06:12 PM    RBC 0-5 06/04/2022 06:12 PM         Medications Reviewed:     Current Facility-Administered Medications   Medication Dose Route Frequency    timolol (TIMOPTIC) 0.5 % ophthalmic solution 1 Drop  1 Drop Both Eyes DAILY    sodium chloride (BRANDON 128) 2 % ophthalmic drops 1 Drop (Patient Supplied)  1 Drop Left Eye BID    acetaZOLAMIDE (DIAMOX) 500 mg in sterile water (preservative free) 5 mL injection  500 mg IntraVENous Q24H    sodium chloride (NS) flush 5-40 mL  5-40 mL IntraVENous Q8H    sodium chloride (NS) flush 5-40 mL 5-40 mL IntraVENous PRN    acetaminophen (TYLENOL) suppository 650 mg  650 mg Rectal Q6H PRN    polyethylene glycol (MIRALAX) packet 17 g  17 g Oral DAILY PRN    ondansetron (ZOFRAN) injection 4 mg  4 mg IntraVENous Q6H PRN    [Held by provider] heparin 25,000 units in D5W 250 ml infusion  18-36 Units/kg/hr IntraVENous TITRATE    albuterol (ACCUNEB) nebulizer solution 1.25 mg  1.25 mg Nebulization Q6H PRN     Current Outpatient Medications   Medication Sig    sodium chloride (Kemal 128) 2 % ophthalmic solution Administer 1 Drop to left eye two (2) times a day. Administer 1 Drop to left eye two (2) times a day. Morning and at night 15 minutes after timolol eye drops    sotaloL (BETAPACE) 120 mg tablet Take 120 mg by mouth two (2) times a day.  Gemtesa 75 mg tab Take 75 mg by mouth every evening.  triamcinolone acetonide (KENALOG) 0.1 % topical cream Apply 0.1 Packages to affected area two (2) times a day. Apply to rash under pannus    levothyroxine (Synthroid) 150 mcg tablet Take 1 Tablet by mouth Daily (before breakfast).  valsartan (DIOVAN) 320 mg tablet TAKE 1 TABLET EVERY DAY    DULoxetine (CYMBALTA) 60 mg capsule TAKE 1 CAPSULE EVERY DAY    donepeziL (ARICEPT) 10 mg tablet TAKE ONE TABLET BY MOUTH ONCE NIGHTLY    vit A/vit C/vit E/zinc/copper (PRESERVISION AREDS PO) Take 2 Tablets by mouth two (2) times a day.  glucosam/chond/hyalu/CF borate (MOVE FREE JOINT HEALTH PO) Take 1 Tablet by mouth daily (with breakfast).  Cholecalciferol, Vitamin D3, (VITAMIN D3) 2,000 unit cap capsule Take 2,000 Units by mouth two (2) times a day.  timolol (TIMOPTIC) 0.5 % ophthalmic solution Administer 1 Drop to both eyes daily. In the morning    aspirin 81 mg Tab Take  by mouth.     MULTIVITAMINS (MULTIVITAMIN PO) Take  by mouth.     ______________________________________________________________________  EXPECTED LENGTH OF STAY: - - -  ACTUAL LENGTH OF STAY:          2                 Savage Vidales MD

## 2022-06-06 NOTE — PROGRESS NOTES
Problem: Falls - Risk of  Goal: *Absence of Falls  Description: Document Dominique Smith Fall Risk and appropriate interventions in the flowsheet. Outcome: Progressing Towards Goal  Note: Fall Risk Interventions:  Mobility Interventions: Bed/chair exit alarm,OT consult for ADLs,PT Consult for mobility concerns,PT Consult for assist device competence,Patient to call before getting OOB    Mentation Interventions: Adequate sleep, hydration, pain control,Bed/chair exit alarm,Door open when patient unattended,Reorient patient,More frequent rounding,Increase mobility    Medication Interventions: Assess postural VS orthostatic hypotension,Teach patient to arise slowly,Patient to call before getting OOB,Bed/chair exit alarm    Elimination Interventions: Bed/chair exit alarm,Call light in reach,Patient to call for help with toileting needs    History of Falls Interventions: Bed/chair exit alarm,Door open when patient unattended,Room close to nurse's station         Problem: Patient Education: Go to Patient Education Activity  Goal: Patient/Family Education  Outcome: Progressing Towards Goal     Problem: Pressure Injury - Risk of  Goal: *Prevention of pressure injury  Description: Document Ivan Scale and appropriate interventions in the flowsheet. Outcome: Progressing Towards Goal  Note: Pressure Injury Interventions:  Sensory Interventions: Discuss PT/OT consult with provider,Turn and reposition approx. every two hours (pillows and wedges if needed),Pressure redistribution bed/mattress (bed type)         Activity Interventions: Pressure redistribution bed/mattress(bed type),Increase time out of bed,PT/OT evaluation    Mobility Interventions: PT/OT evaluation,Turn and reposition approx.  every two hours(pillow and wedges),Pressure redistribution bed/mattress (bed type)    Nutrition Interventions: Document food/fluid/supplement intake,Discuss nutritional consult with provider,Offer support with meals,snacks and hydration    Friction and Shear Interventions: Foam dressings/transparent film/skin sealants,Transferring/repositioning devices,Minimize layers

## 2022-06-06 NOTE — CONSULTS
Consult Date: 2022    Consults hypercapnic RF    Subjective    81F with h/o htn, pAfib, CHF, and dementia who was brought to ED with somnolence and FTT. She was found to have hypercapnic RF with initial hypoxia and RAN. She was placed on BiPAP in the ED and admitted to the ICU. She is responsive but not consistently communicative. No fever or cough reported. Initial bradycardia in ED has improved.   CTA without evidence of PE but showed bibasilar atelectasis    Past Medical History:   Diagnosis Date    Arthritis     Basal cell carcinoma 2012    Calculus of kidney     Cancer ([facility] [address]     skin    Cancer ([facility] [address]     thyroid    Glaucoma 2010    Hypertension     OA (osteoarthritis) 2010    Psoriasis 2010      Past Surgical History:   Procedure Laterality Date    HX CATARACT REMOVAL      bilat    HX CHOLECYSTECTOMY      HX CRANIOTOMY      HX DILATION AND CURETTAGE      HX HYSTERECTOMY      HX KNEE ARTHROSCOPY      HX KNEE REPLACEMENT      HX TONSILLECTOMY      HX WRIST FRACTURE TX Left 8/21/15    VT THYROIDECTOMY       Family History   Problem Relation Age of Onset    Hypertension Mother     Heart Disease Mother     Heart Disease Father     Hypertension Father    [facility] Elevated Lipids Father     Heart Disease Brother     Psychiatric Disorder Brother     Diabetes Brother     Breast Cancer Maternal Aunt     Breast Cancer Paternal Aunt       Social History     Tobacco Use    Smoking status: Former Smoker     Packs/day: 0.50     Years: 4.00     Pack years: 2.00     Types: Cigarettes     Quit date: 1961     Years since quittin.4    Smokeless tobacco: Never Used   Substance Use Topics    Alcohol use: No     Alcohol/week: 0.0 standard drinks       Current Facility-Administered Medications   Medication Dose Route Frequency Provider Last Rate Last Admin    [START ON 2022] cholecalciferol (VITAMIN D3) (1000 Units /25 mcg) tablet 2,000 Units  2,000 Units Oral DAILY Taiwo Alex MD        donepeziL (ARICEPT) tablet 10 mg  10 mg Oral QHS Taiwo Alex MD        [Held by provider] DULoxetine (CYMBALTA) capsule 60 mg  60 mg Oral DAILY Taiwo Alex MD        [START ON 6/7/2022] levothyroxine (SYNTHROID) tablet 150 mcg  150 mcg Oral ACB Taiwo Alex MD        [START ON 6/7/2022] valsartan (DIOVAN) tablet 320 mg  320 mg Oral DAILY Taiwo Alex MD        triamcinolone acetonide (KENALOG) 0.1 % cream   Topical BID Taiwo Alex MD   Given at 06/06/22 1833    timolol (TIMOPTIC) 0.5 % ophthalmic solution 1 Drop  1 Drop Both Eyes DAILY Cecy Taylor MD   1 Drop at 06/06/22 0932    sodium chloride (BRANDON 128) 2 % ophthalmic drops 1 Drop (Patient Supplied)  1 Drop Left Eye BID Cecy Taylor MD   1 Drop at 06/06/22 1711    sodium chloride (NS) flush 5-40 mL  5-40 mL IntraVENous Q8H Cecy Taylor MD   10 mL at 06/06/22 1634    sodium chloride (NS) flush 5-40 mL  5-40 mL IntraVENous PRN Cecy Taylor MD        acetaminophen (TYLENOL) suppository 650 mg  650 mg Rectal Q6H PRN Cecy Taylor MD        polyethylene glycol (MIRALAX) packet 17 g  17 g Oral DAILY PRN Cecy Taylor MD        ondansetron [facility]) injection 4 mg  4 mg IntraVENous Q6H PRN Cecy Taylor MD       [facility] by provider] heparin 25,000 units in D5W 250 ml infusion  18-36 Units/kg/hr IntraVENous TITRATE Cecy Taylor MD   Stopped at 06/05/22 0800    albuterol (ACCUNEB) nebulizer solution 1.25 mg  1.25 mg Nebulization Q6H PRN Cecy Taylor MD            Review of Systems  GUSTAVO page [facility]  Objective     Vital signs for last 24 hours:  Visit Vitals  BP (!) 148/39 (BP 1 Location: Left upper arm, BP Patient Position: At rest)   Pulse 66   Temp 97.8 °F (36.6 °C)   Resp 18   Ht 5' 7\" (1.702 m)   Wt 95.7 kg (211 lb)   SpO2 91%   BMI 33.05 kg/m²       Intake/Output this shift:  Current Shift: No intake/output data recorded. Last 3 Shifts: 06/05 0701 - 06/06 1900  In: 511.9 [P.O.:370;  I.V.:141.9]  Out: 4567 [Urine:2325]    Data Review:   Recent Results (from the past 24 hour(s))   CBC WITH AUTOMATED DIFF    Collection Time: 06/06/22 10:06 AM   Result Value Ref Range    WBC 9.4 3.6 - 11.0 K/uL    RBC 3.71 (L) 3.80 - 5.20 M/uL    HGB 12.0 11.5 - 16.0 g/dL    HCT 38.5 35.0 - 47.0 %    .8 (H) 80.0 - 99.0 FL    MCH 32.3 26.0 - 34.0 PG    MCHC 31.2 30.0 - 36.5 g/dL    RDW 14.3 11.5 - 14.5 %    PLATELET 441 519 - 389 K/uL    MPV 11.0 8.9 - 12.9 FL    NRBC 0.0 0  WBC    ABSOLUTE NRBC 0.00 0.00 - 0.01 K/uL    NEUTROPHILS 79 (H) 32 - 75 %    LYMPHOCYTES 11 (L) 12 - 49 %    MONOCYTES 8 5 - 13 %    EOSINOPHILS 1 0 - 7 %    BASOPHILS 1 0 - 1 %    IMMATURE GRANULOCYTES 0 0.0 - 0.5 %    ABS. NEUTROPHILS 7.5 1.8 - 8.0 K/UL    ABS. LYMPHOCYTES 1.0 0.8 - 3.5 K/UL    ABS. MONOCYTES 0.7 0.0 - 1.0 K/UL    ABS. EOSINOPHILS 0.1 0.0 - 0.4 K/UL    ABS. BASOPHILS 0.1 0.0 - 0.1 K/UL    ABS. IMM.  GRANS. 0.0 0.00 - 0.04 K/UL    DF AUTOMATED     METABOLIC PANEL, BASIC    Collection Time: 06/06/22 10:06 AM   Result Value Ref Range    Sodium 143 136 - 145 mmol/L    Potassium 4.0 3.5 - 5.1 mmol/L    Chloride 98 97 - 108 mmol/L    CO2 42 (HH) 21 - 32 mmol/L    Anion gap 3 (L) 5 - 15 mmol/L    Glucose 86 65 - 100 mg/dL    BUN 47 (H) 6 - 20 MG/DL    Creatinine 1.07 (H) 0.55 - 1.02 MG/DL    BUN/Creatinine ratio 44 (H) 12 - 20      GFR est AA 60 (L) >60 ml/min/1.73m2    GFR est non-AA 49 (L) >60 ml/min/1.73m2    Calcium 9.4 8.5 - 10.1 MG/DL   POC G3 - PUL    Collection Time: 06/06/22 10:49 AM   Result Value Ref Range    FIO2 (POC) 28 %    pH (POC) 7.37 7.35 - 7.45      pCO2 (POC) 67.9 (H) 35.0 - 45.0 MMHG    pO2 (POC) 74 (L) 80 - 100 MMHG    HCO3 (POC) 39.0 (H) 22 - 26 MMOL/L    sO2 (POC) 93.3 92 - 97 %    Base excess (POC) 10.9 mmol/L    Site RIGHT RADIAL      Device: BIPAP MASK      PEEP/CPAP (POC) 5 cmH2O    Pressure support 13 cmH2O    Allens test (POC) Positive      Specimen type (POC) ARTERIAL         Physical Exam          Neuro - encephalopathy likely multifactorial with hypercapnia, ran, and underlying dementia; would hold CNS acting medications and reassess; can continue her dementia regimen for now   No evidence of pain  Cvs - HFpEF but no obvious decompensation at this time but would reassess daily for potential diuresis   -sotalol on hold and would monitor for rebound tachycardia  -would tolerate mild hypertension and avoid hypotension with her poor intake and FTT  Pulm - acute resp failure with hypercapnia in setting of chronic hypoxic and hypercapnic respiratory failure; no PE on CTA; the atelectasis and hypercapnia may improve with NIPPV (she is DNI)  May require outpatient follow up for the nodules but unlikely to warrant intervention in this setting    Gi - npo until mental status improves  -no immediate need for LANEY proph     - RAN of unclear etiology but has several potential etiology with resp failure, relative bradycardia,poor intake    Heme - no evidence of hemorrhage and can receive dvt proph    ID - no obvious source of infection    Endo - history of thyroid cancer status post thyroidectomy; with subsequent hypothyroidism and on Synthroid at baseline    Exam facilitated by use of telemedicine platform and with assistance from in house team  Gen -somnolent and responds to stimuli but did not follow commands or speak  Head - nc/at  Eyes - anicteric sclera  Ent - normal external anatomy  Cvs - irreg; low DBP but no evidence of hypoperfusion  Pulm - normal WOB and adequate SaO2 on NC  GI-no evidence of tenderness with passive movement; no bruising or ecchymosis  Ext - no c/c/e  Msk - normal bulk  Neuro - no fasic or tremor  CCT 40minutes excluding teaching and procedures  I performed all aspects of the physical examination via Telemedicine associated with two way audio and video communication and with the on-site assistance of the bedside nurse.   I am located in [place] and the patient is located in [place] at [facility] [address]   The patient is critically ill in the ICU. I  personally  reviewed the pertinent medical records, laboratory/ pathology data and radiographic images. The decision making regarding this patient is as documented above, which was generated  following  discussion  with the multidisciplinary ICU team and creation of a treatment plan for  the patient. We discussed the patient's interval history and future coordination of care and  plans. The patient's medications  were reviewed and changes made as stipulated above. Due to  critical illness impairing one or more vital organs of this patient resulting in life threatening clinical situation  I have provided direct, frequent personal  assessment and manipulation in management plan.

## 2022-06-06 NOTE — PROGRESS NOTES
Bedside and Verbal shift change report given to [address] [place] [address] (oncoming nurse) by Josy Karimi RN (offgoing nurse). Report included the following information SBAR, ED Summary, MAR and Recent Results.

## 2022-06-06 NOTE — PROGRESS NOTES
1650:P/t arrived on unit    Primary Nurse Suyapa Squires and Jasson Beltran, RN performed a dual skin assessment on this patient Impairment noted- see wound doc flow sheet  Ivan score is see flow sheet. 1700 Assessment, VS being monitored, intensvist notified      Neuro: Ox1  Cardiac: SB  Respiratory: Bipap / Room Air  GI: NPO  : Waller  IV: 20 G R FA/AC; 24 G L Hand    1800: Patent turned, resting quietly in bed on Bipap, pharmacy called about kenalog- med is in ED per pharmacy, ED call and is waiting to be tubed up to ICU    Bedside and Verbal shift change report given to [employer] (oncoming nurse) by Yusra Angel (offgoing nurse).  Report included the following information SBAR, Kardex, Intake/Output, MAR, Recent Results and Cardiac Rhythm SR.

## 2022-06-06 NOTE — PROGRESS NOTES
Problem: Falls - Risk of  Goal: *Absence of Falls  Description: Document Shannon Mcburney Fall Risk and appropriate interventions in the flowsheet. Outcome: Progressing Towards Goal  Note: Fall Risk Interventions:  Mobility Interventions: Bed/chair exit alarm,Communicate number of staff needed for ambulation/transfer,PT Consult for mobility concerns    Mentation Interventions: Bed/chair exit alarm,Door open when patient unattended,Gait belt with transfers/ambulation,More frequent rounding,Reorient patient,Room close to nurse's station,Toileting rounds,Update white board    Medication Interventions: Evaluate medications/consider consulting pharmacy,Patient to call before getting OOB,Teach patient to arise slowly    Elimination Interventions: Bed/chair exit alarm,Call light in reach,Patient to call for help with toileting needs,Stay With Me (per policy),Toilet paper/wipes in reach,Toileting schedule/hourly rounds    History of Falls Interventions: Bed/chair exit alarm,Consult care management for discharge planning,Door open when patient unattended,Investigate reason for fall,Room close to nurse's station,Utilize gait belt for transfer/ambulation         Problem: Patient Education: Go to Patient Education Activity  Goal: Patient/Family Education  Outcome: Progressing Towards Goal     Problem: Pressure Injury - Risk of  Goal: *Prevention of pressure injury  Description: Document Ivan Scale and appropriate interventions in the flowsheet. Outcome: Progressing Towards Goal  Note: Pressure Injury Interventions:  Sensory Interventions: Assess changes in LOC,Avoid rigorous massage over bony prominences,Check visual cues for pain,Discuss PT/OT consult with provider,Keep linens dry and wrinkle-free,Float heels,Minimize linen layers,Monitor skin under medical devices,Pad between skin to skin,Pressure redistribution bed/mattress (bed type),Turn and reposition approx.  every two hours (pillows and wedges if needed)         Activity Interventions: Pressure redistribution bed/mattress(bed type),PT/OT evaluation    Mobility Interventions: HOB 30 degrees or less,Pressure redistribution bed/mattress (bed type),PT/OT evaluation,Turn and reposition approx.  every two hours(pillow and wedges)    Nutrition Interventions: Document food/fluid/supplement intake,Offer support with meals,snacks and hydration,Discuss nutritional consult with provider    Friction and Shear Interventions: Apply protective barrier, creams and emollients,HOB 30 degrees or less,Minimize layers                Problem: Patient Education: Go to Patient Education Activity  Goal: Patient/Family Education  Outcome: Progressing Towards Goal

## 2022-06-07 LAB
ANION GAP SERPL CALC-SCNC: 8 MMOL/L (ref 5–15)
ARTERIAL PATENCY WRIST A: ABNORMAL
BASE EXCESS BLDA CALC-SCNC: 5.3 MMOL/L
BASOPHILS # BLD: 0.1 K/UL (ref 0–0.1)
BASOPHILS NFR BLD: 1 % (ref 0–1)
BDY SITE: ABNORMAL
BUN SERPL-MCNC: 51 MG/DL (ref 6–20)
BUN/CREAT SERPL: 44 (ref 12–20)
CALCIUM SERPL-MCNC: 9 MG/DL (ref 8.5–10.1)
CHLORIDE SERPL-SCNC: 102 MMOL/L (ref 97–108)
CO2 SERPL-SCNC: 32 MMOL/L (ref 21–32)
CREAT SERPL-MCNC: 1.16 MG/DL (ref 0.55–1.02)
DIFFERENTIAL METHOD BLD: ABNORMAL
EOSINOPHIL # BLD: 0.1 K/UL (ref 0–0.4)
EOSINOPHIL NFR BLD: 1 % (ref 0–7)
ERYTHROCYTE [DISTWIDTH] IN BLOOD BY AUTOMATED COUNT: 14.3 % (ref 11.5–14.5)
GAS FLOW.O2 O2 DELIVERY SYS: 1 L/MIN
GLUCOSE SERPL-MCNC: 95 MG/DL (ref 65–100)
HCO3 BLDA-SCNC: 31 MMOL/L (ref 22–26)
HCT VFR BLD AUTO: 41.1 % (ref 35–47)
HGB BLD-MCNC: 12.8 G/DL (ref 11.5–16)
IMM GRANULOCYTES # BLD AUTO: 0.1 K/UL (ref 0–0.04)
IMM GRANULOCYTES NFR BLD AUTO: 1 % (ref 0–0.5)
LYMPHOCYTES # BLD: 1.2 K/UL (ref 0.8–3.5)
LYMPHOCYTES NFR BLD: 13 % (ref 12–49)
MCH RBC QN AUTO: 31.9 PG (ref 26–34)
MCHC RBC AUTO-ENTMCNC: 31.1 G/DL (ref 30–36.5)
MCV RBC AUTO: 102.5 FL (ref 80–99)
MONOCYTES # BLD: 0.9 K/UL (ref 0–1)
MONOCYTES NFR BLD: 9 % (ref 5–13)
NEUTS SEG # BLD: 7.4 K/UL (ref 1.8–8)
NEUTS SEG NFR BLD: 75 % (ref 32–75)
NRBC # BLD: 0 K/UL (ref 0–0.01)
NRBC BLD-RTO: 0 PER 100 WBC
PCO2 BLDA: 50 MMHG (ref 35–45)
PH BLDA: 7.41 [PH] (ref 7.35–7.45)
PLATELET # BLD AUTO: 257 K/UL (ref 150–400)
PMV BLD AUTO: 11.4 FL (ref 8.9–12.9)
PO2 BLDA: 57 MMHG (ref 80–100)
POTASSIUM SERPL-SCNC: 4.1 MMOL/L (ref 3.5–5.1)
RBC # BLD AUTO: 4.01 M/UL (ref 3.8–5.2)
SAO2 % BLD: 90 % (ref 92–97)
SAO2% DEVICE SAO2% SENSOR NAME: ABNORMAL
SODIUM SERPL-SCNC: 142 MMOL/L (ref 136–145)
SPECIMEN SITE: ABNORMAL
WBC # BLD AUTO: 9.7 K/UL (ref 3.6–11)

## 2022-06-07 PROCEDURE — 82803 BLOOD GASES ANY COMBINATION: CPT

## 2022-06-07 PROCEDURE — 94660 CPAP INITIATION&MGMT: CPT

## 2022-06-07 PROCEDURE — 97165 OT EVAL LOW COMPLEX 30 MIN: CPT

## 2022-06-07 PROCEDURE — 97530 THERAPEUTIC ACTIVITIES: CPT

## 2022-06-07 PROCEDURE — 74011250636 HC RX REV CODE- 250/636: Performed by: STUDENT IN AN ORGANIZED HEALTH CARE EDUCATION/TRAINING PROGRAM

## 2022-06-07 PROCEDURE — 74011250637 HC RX REV CODE- 250/637: Performed by: SPECIALIST

## 2022-06-07 PROCEDURE — 97161 PT EVAL LOW COMPLEX 20 MIN: CPT

## 2022-06-07 PROCEDURE — 80048 BASIC METABOLIC PNL TOTAL CA: CPT

## 2022-06-07 PROCEDURE — 36415 COLL VENOUS BLD VENIPUNCTURE: CPT

## 2022-06-07 PROCEDURE — APPSS30 APP SPLIT SHARED TIME 16-30 MINUTES: Performed by: NURSE PRACTITIONER

## 2022-06-07 PROCEDURE — 74011000250 HC RX REV CODE- 250: Performed by: STUDENT IN AN ORGANIZED HEALTH CARE EDUCATION/TRAINING PROGRAM

## 2022-06-07 PROCEDURE — 65270000046 HC RM TELEMETRY

## 2022-06-07 PROCEDURE — 74011250637 HC RX REV CODE- 250/637: Performed by: FAMILY MEDICINE

## 2022-06-07 PROCEDURE — 36600 WITHDRAWAL OF ARTERIAL BLOOD: CPT

## 2022-06-07 PROCEDURE — 77010033678 HC OXYGEN DAILY

## 2022-06-07 PROCEDURE — 85025 COMPLETE CBC W/AUTO DIFF WBC: CPT

## 2022-06-07 PROCEDURE — 74011000258 HC RX REV CODE- 258: Performed by: STUDENT IN AN ORGANIZED HEALTH CARE EDUCATION/TRAINING PROGRAM

## 2022-06-07 PROCEDURE — 74011000250 HC RX REV CODE- 250: Performed by: INTERNAL MEDICINE

## 2022-06-07 PROCEDURE — 99233 SBSQ HOSP IP/OBS HIGH 50: CPT | Performed by: SPECIALIST

## 2022-06-07 RX ORDER — METOPROLOL TARTRATE 25 MG/1
25 TABLET, FILM COATED ORAL EVERY 6 HOURS
Status: DISCONTINUED | OUTPATIENT
Start: 2022-06-07 | End: 2022-06-08

## 2022-06-07 RX ORDER — DILTIAZEM HYDROCHLORIDE 5 MG/ML
5 INJECTION INTRAVENOUS ONCE
Status: COMPLETED | OUTPATIENT
Start: 2022-06-07 | End: 2022-06-07

## 2022-06-07 RX ORDER — ENOXAPARIN SODIUM 100 MG/ML
1 INJECTION SUBCUTANEOUS EVERY 24 HOURS
Status: DISCONTINUED | OUTPATIENT
Start: 2022-06-07 | End: 2022-06-07

## 2022-06-07 RX ORDER — ENOXAPARIN SODIUM 100 MG/ML
1 INJECTION SUBCUTANEOUS EVERY 12 HOURS
Status: DISCONTINUED | OUTPATIENT
Start: 2022-06-07 | End: 2022-06-10

## 2022-06-07 RX ADMIN — SODIUM CHLORIDE 2.5 MG/HR: 900 INJECTION, SOLUTION INTRAVENOUS at 02:03

## 2022-06-07 RX ADMIN — LEVOTHYROXINE SODIUM 150 MCG: 0.07 TABLET ORAL at 06:57

## 2022-06-07 RX ADMIN — Medication 10 ML: at 13:57

## 2022-06-07 RX ADMIN — TRIAMCINOLONE ACETONIDE: 1 CREAM TOPICAL at 09:11

## 2022-06-07 RX ADMIN — DILTIAZEM HYDROCHLORIDE 5 MG: 5 INJECTION INTRAVENOUS at 00:18

## 2022-06-07 RX ADMIN — ENOXAPARIN SODIUM 100 MG: 100 INJECTION SUBCUTANEOUS at 17:17

## 2022-06-07 RX ADMIN — AMIODARONE HYDROCHLORIDE 0.5 MG/MIN: 50 INJECTION, SOLUTION INTRAVENOUS at 23:58

## 2022-06-07 RX ADMIN — METOPROLOL TARTRATE 25 MG: 25 TABLET, FILM COATED ORAL at 22:15

## 2022-06-07 RX ADMIN — DEXTROSE MONOHYDRATE 150 MG: 50 INJECTION, SOLUTION INTRAVENOUS at 04:13

## 2022-06-07 RX ADMIN — Medication 10 ML: at 22:14

## 2022-06-07 RX ADMIN — AMIODARONE HYDROCHLORIDE 0.5 MG/MIN: 50 INJECTION, SOLUTION INTRAVENOUS at 11:38

## 2022-06-07 RX ADMIN — ENOXAPARIN SODIUM 100 MG: 100 INJECTION SUBCUTANEOUS at 06:09

## 2022-06-07 RX ADMIN — AMIODARONE HYDROCHLORIDE 1 MG/MIN: 50 INJECTION, SOLUTION INTRAVENOUS at 05:35

## 2022-06-07 RX ADMIN — TRIAMCINOLONE ACETONIDE: 1 CREAM TOPICAL at 17:21

## 2022-06-07 RX ADMIN — Medication 10 ML: at 06:08

## 2022-06-07 RX ADMIN — DONEPEZIL HYDROCHLORIDE 10 MG: 5 TABLET, FILM COATED ORAL at 22:14

## 2022-06-07 RX ADMIN — TIMOLOL MALEATE 1 DROP: 5 SOLUTION/ DROPS OPHTHALMIC at 09:11

## 2022-06-07 NOTE — PROGRESS NOTES
Problem: Mobility Impaired (Adult and Pediatric)  Goal: *Acute Goals and Plan of Care (Insert Text)  Description: FUNCTIONAL STATUS PRIOR TO ADMISSION: Unable to ascertain due to patient's AMD, dementia. HOME SUPPORT PRIOR TO ADMISSION: The patient lived with her  and daughter but unsure what assistance she may have required. Physical Therapy Goals  Initiated 6/7/2022  1. Patient will move from supine to sit and sit to supine , scoot up and down, and roll side to side in bed with minimal assistance/contact guard assist within 7 day(s). 2.  Patient will transfer from bed to chair and chair to bed with minimal assistance/contact guard assist using the least restrictive device within 7 day(s). 3.  Patient will perform sit to stand with minimal assistance/contact guard assist within 7 day(s). 4.  Patient will ambulate with minimal assistance/contact guard assist for 75 feet with the least restrictive device within 7 day(s). Outcome: Not Met   PHYSICAL THERAPY EVALUATION  Patient: Cheng Mccray (17 y.o. female)  Date: 6/7/2022  Primary Diagnosis: Encephalopathy acute [G93.40]        Precautions:          ASSESSMENT  Based on the objective data described below, the patient presents with dementia/confusion, generalized weakness, cardiovascular dysfunction, inferred impairments with standing balance and gait with decreased activity tolerance not consistent with baseline functional mobility level s/p admission for acute encephalopathy. Patient cleared to be seen by RN, only oriented x 2, a poor historian (thinks she lives alone but lives with  and daughter) and slow to respond to questions. With attempt to roll and move in bed to prepare to sit, patient's HR increased to the 150s and continued to vary from 110s to 150s with minimal movements in bed. Sats remained in 90s on 2L O2. Further mobility assessment deferred 2/2 HR but lunch arrived and assisted patient with set up.   Anticipate patient may need rehab prior to d/c home but will need to determine prior level of functional mobility independence. Current Level of Function Impacting Discharge (mobility/balance): Only bed mob at this time    Functional Outcome Measure: The patient scored Total: 5/100 on the Barthel Index outcome measure which is indicative of being totally dependent in basic self-care. Other factors to consider for discharge: family's ability to assist, PLOF     Patient will benefit from skilled therapy intervention to address the above noted impairments. PLAN :  Recommendations and Planned Interventions: bed mobility training, transfer training, gait training, therapeutic exercises, neuromuscular re-education, patient and family training/education, and therapeutic activities      Frequency/Duration: Patient will be followed by physical therapy:  5 times a week to address goals. Recommendation for discharge: (in order for the patient to meet his/her long term goals)  Therapy up to 5 days/week in SNF setting    This discharge recommendation:  Has been made in collaboration with the attending provider and/or case management    IF patient discharges home will need the following DME: to be determined (TBD)         SUBJECTIVE:   Patient stated Yes no answers primarily.     OBJECTIVE DATA SUMMARY:   HISTORY:    Past Medical History:   Diagnosis Date    Arthritis     Basal cell carcinoma 05/20/2012    Calculus of kidney     Cancer ([facility] [address]     skin    Cancer ([facility] [address]     thyroid    Glaucoma 4/30/2010    Hypertension     OA (osteoarthritis) 4/30/2010    Psoriasis 4/30/2010     Past Surgical History:   Procedure Laterality Date    HX CATARACT REMOVAL      bilat    HX CHOLECYSTECTOMY      HX CRANIOTOMY      HX DILATION AND CURETTAGE      HX HYSTERECTOMY      HX KNEE ARTHROSCOPY      HX KNEE REPLACEMENT      HX TONSILLECTOMY      HX WRIST FRACTURE TX Left 8/21/15    WI THYROIDECTOMY         Personal factors and/or comorbidities impacting plan of care:     Home Situation  Home Environment: Private residence  Living Alone: No  Support Systems: Spouse/Significant Other,Child(brooklynn)  Patient Expects to be Discharged to[de-identified] Unable to determine at this time  Current DME Used/Available at Home:  (unable to determine from patient)    EXAMINATION/PRESENTATION/DECISION MAKING:   Critical Behavior:  Neurologic State: Alert  Orientation Level: Oriented to person,Oriented to place,Disoriented to situation,Disoriented to time  Cognition: Follows commands     Hearing: decreased    Range Of Motion:  AROM: Generally decreased, functional                       Strength:    Strength: Generally decreased, functional                    Tone & Sensation:   Tone: Normal                              Coordination:  Coordination: Generally decreased, functional  Vision:      Functional Mobility:  Bed Mobility:  Rolling: Moderate assistance           Transfers:                             Balance:      Ambulation/Gait Training:                                                           Functional Measure:  Barthel Index:    Bathin  Bladder: 0  Bowels: 0  Groomin  Dressin  Feedin  Mobility: 0  Stairs: 0  Toilet Use: 0  Transfer (Bed to Chair and Back): 0  Total: 5/100       The Barthel ADL Index: Guidelines  1. The index should be used as a record of what a patient does, not as a record of what a patient could do. 2. The main aim is to establish degree of independence from any help, physical or verbal, however minor and for whatever reason. 3. The need for supervision renders the patient not independent. 4. A patient's performance should be established using the best available evidence. Asking the patient, friends/relatives and nurses are the usual sources, but direct observation and common sense are also important. However direct testing is not needed.   5. Usually the patient's performance over the preceding 24-48 hours is important, but occasionally longer periods will be relevant. 6. Middle categories imply that the patient supplies over 50 per cent of the effort. 7. Use of aids to be independent is allowed. Score Interpretation (from [address])    Independent   60-79 Minimally independent   40-59 Partially dependent   20-39 Very dependent   <20 Totally dependent     -Yassine Kincaid, Barthel, D.W. (1965). Functional evaluation: the Barthel Index. [address] ([address] (1997). The Barthel activities of daily living index: self-reporting versus actual performance in the old (> or = 75 years). Journal of [address] [place] [address] 45(7), [address] [facility], [place], Gilson Shankarr., Adrian Carey. (1999). Measuring the change in disability after inpatient rehabilitation; comparison of the responsiveness of the Barthel Index and Functional [place] Measure. Journal of Neurology, Neurosurgery, and Psychiatry, 66(4), 421-677. Robert Chopra, N.J.A, MAURO Tarango, & Radha Heller M.A. (2004) Assessment of post-stroke quality of life in cost-effectiveness studies: The usefulness of the Barthel Index and the EuroQoL-5D.  Quality of Life Research, 15, 112-08        Physical Therapy Evaluation Charge Determination   History Examination Presentation Decision-Making   HIGH Complexity :3+ comorbidities / personal factors will impact the outcome/ POC  LOW Complexity : 1-2 Standardized tests and measures addressing body structure, function, activity limitation and / or participation in recreation  MEDIUM Complexity : Evolving with changing characteristics  Other outcome measures Barthel 5  HIGH       Based on the above components, the patient evaluation is determined to be of the following complexity level: LOW     Pain Rating:  None observed or reported    Activity Tolerance:   Poor and very tachycardic    After treatment patient left in no apparent distress:   Call bell within reach, Bed / chair alarm activated, and bed in modified chair position    COMMUNICATION/EDUCATION:   The patients plan of care was discussed with: Occupational therapist and Registered nurse. Fall prevention education was provided and the patient/caregiver indicated understanding. and Patient/family agree to work toward stated goals and plan of care.     Thank you for this referral.  Antwan Santamaria, PT   Time Calculation: 24 mins

## 2022-06-07 NOTE — PROGRESS NOTES
CARDIOLOGY PROGRESS NOTE        [address], [place], [address] [facility]  Phone [phone]; Fax [phone]          2022 7:40 AM       Admit Date:           2022  Admit Diagnosis:  Encephalopathy acute [G93.40]  :          1940   MRN:          [id number]        Assessment/Plan  1.  acute hypoxic resp failure: multifactorial: ? chf, hypoventilation syndrome, likely has TYRONE. pBNP 345 . ECHO shows EF of 55 - 60%. Left ventricle size is normal. Mildly increased wall thickness. Findings consistent with mild concentric hypertrophy. Normal wall motion. Grade I diastolic dysfunction with normal LAP. Off Bi Pap this am. Hold diuresis . Was on demendex 20 mg as OP, last seen in [facility] office . Dr Christine Forte    2. Hx PAFlutter : now with RVR overnight. IV dilt stopped due to hypotension. Cont IV amio. On full dose lovenox , no previous anticoagulation on [facility] cardiology records      3. Metabolic alkalosis: Suspect due to chronic respiratory acidosis       4. RAN:   -Cr 1.16       5. Dementia:   On donepezil and Cymbalta     6. HTN   - hold diovan given hypotension      7 hx psoriasis    CARDIOLOGY ATTENDING  Patient personally seen and examined. All the elements of history and examination were personally performed. Assessment and plan was discussed and agree. AMS although more awake. NAD. hrt irreg irreg, no murmur, lungs clear ant, abd soft, no edema     Afib with RVR  - Hx of Afib - was on sotalol on admission - held due to AMS  - now RVR --> On Cardizem gtt and due to low BP, also on IV Amio ---> try adding BB as BP allows   - Not on [address] on admission --> as long as no contraindication to [address], will consider it on DC (discussed with family)     Acute resp failure   - Suspect volume status compensated - hold off of diuretics     Vivienne Serrano MD, [facility]                 We discussed the expected course, resolution and complications of the diagnosis(es) in detail. No intake/output data recorded.     Last 3 Recorded Weights in this Encounter    06/04/22 1544 06/05/22 1420   Weight: 95.7 kg (211 lb) 95.7 kg (211 lb)         06/05 1901 - 06/07 0700  In: 381.6 [I.V.:381.6]  Out: 1975 [[facility]:1078]    SUBJECTIVE      Admitted for hypoxia, FTT          Dwyane Divine Contner reports none due to AMS, lethargy       Current Facility-Administered Medications   Medication Dose Route Frequency    dilTIAZem (CARDIZEM) 100 mg in 0.9% sodium chloride (MBP/ADV) 100 mL infusion  0-15 mg/hr IntraVENous TITRATE    enoxaparin (LOVENOX) injection 100 mg  1 mg/kg SubCUTAneous Q12H    amiodarone (CORDARONE) 375 mg in dextrose 5% 250 mL infusion  0.5-1 mg/min IntraVENous TITRATE    cholecalciferol (VITAMIN D3) (1000 Units /25 mcg) tablet 2,000 Units  2,000 Units Oral DAILY    donepeziL (ARICEPT) tablet 10 mg  10 mg Oral QHS    [Held by provider] DULoxetine (CYMBALTA) capsule 60 mg  60 mg Oral DAILY    levothyroxine (SYNTHROID) tablet 150 mcg  150 mcg Oral ACB    valsartan (DIOVAN) tablet 320 mg  320 mg Oral DAILY    triamcinolone acetonide (KENALOG) 0.1 % cream   Topical BID    timolol (TIMOPTIC) 0.5 % ophthalmic solution 1 Drop  1 Drop Both Eyes DAILY    sodium chloride (BRANDON 128) 2 % ophthalmic drops 1 Drop (Patient Supplied)  1 Drop Left Eye BID    sodium chloride (NS) flush 5-40 mL  5-40 mL IntraVENous Q8H    sodium chloride (NS) flush 5-40 mL  5-40 mL IntraVENous PRN    acetaminophen (TYLENOL) suppository 650 mg  650 mg Rectal Q6H PRN    polyethylene glycol (MIRALAX) packet 17 g  17 g Oral DAILY PRN    ondansetron (ZOFRAN) injection 4 mg  4 mg IntraVENous Q6H PRN    albuterol (ACCUNEB) nebulizer solution 1.25 mg  1.25 mg Nebulization Q6H PRN      OBJECTIVE               Intake/Output Summary (Last 24 hours) at 6/7/2022 0740  Last data filed at 6/7/2022 0600  Gross per 24 hour   Intake 239.66 ml   Output 1425 ml   Net -1185.34 ml       Review of Systems - History obtained from the patient AS PER  [facility]        PHYSICAL EXAM        Visit Vitals  /79   Pulse (!) 138   Temp 98 °F (36.7 °C)   Resp 20   Ht 5' 7\" (1.702 m)   Wt 95.7 kg (211 lb)   SpO2 94%   BMI 33.05 kg/m²       Gen: arousable to voice, no verbalization   HEENT:  Pink conjunctivae,  Oral mucosa dry   Neck: Supple,No JVD, No Carotid Bruit, Thyroid- non tender No cervical lymphadenopathy  Resp: No accessory muscle use, Clear breath sounds, No rales or rhonchi  Card: Tachycardic, irregular Rate,Rythm,   Normal S1, S2, No murmurs, rubs or gallop. MSK: No cyanosis or clubbing, good capillary refill  Skin: psoriasis lesions  Neuro:  Moving all four extremities, no focal deficit, follows commands appropriately  Psych:  ? Insight   LE: No edema       DATA REVIEW      06/04/22    ECHO ADULT COMPLETE 06/05/2022 6/5/2022    Interpretation Summary    Left Ventricle: Normal left ventricular systolic function with a visually estimated EF of 55 - 60%. Left ventricle size is normal. Mildly increased wall thickness. Findings consistent with mild concentric hypertrophy. Normal wall motion. Grade I diastolic dysfunction with normal LAP.   Right Ventricle: Right ventricle is mildly dilated.   Aortic Valve: Mild regurgitation with a centrally directed jet.   Left Atrium: Left atrium is mildly dilated.   Right Atrium: Right atrium is mildly dilated. Signed by: Gosia Goldstein MD on 6/5/2022  3:21 PM    [facility] cardiology records  CHF  CAD  PA flutter 2015 converting to SR ECHO (2015) EF 50%-55% , betapace 120 mg BID        Cardiac monitor: a fib RVR         Laboratory and Imaging have been reviewed by me and are notable for  No results for input(s): CPK, CKMB, TROIQ in the last 72 hours.   Recent Labs     06/07/22  0420 06/06/22  2146 06/06/22  1006 06/05/22  0835 06/05/22  0500 06/04/22  2314 06/04/22  2314    143 143   < > 139   < > 139   K 4.1 3.9 4.0   < > 3.9   < > 4.0   CO2 32 34* 42*   < > 42*   < > 43*   BUN 51* 46* 47*   < > 55*   < > 52*   CREA 1.16* 1.03* 1.07*   < > 1.31*   < > 1.16*   GLU 95 89 86   < > 98   < > 104*   PHOS  --  3.3  --   --   --   --   --    MG  --  2.4  --   --  2.1  --  2.1   WBC 9.7 10.7 9.4   < > 9.6   < > 9.8   HGB 12.8 12.8 12.0   < > 11.9   < > 11.9   HCT 41.1 40.9 38.5   < > 38.6   < > 38.2    252 233   < > 237   < > 242    < > = values in this interval not displayed.

## 2022-06-07 NOTE — PROGRESS NOTES
Problem: Falls - Risk of  Goal: *Absence of Falls  Description: Document Brandie Diane Fall Risk and appropriate interventions in the flowsheet. Outcome: Progressing Towards Goal  Note: Fall Risk Interventions:  Mobility Interventions: Assess mobility with egress test,Bed/chair exit alarm,Communicate number of staff needed for ambulation/transfer,OT consult for ADLs,Patient to call before getting OOB,PT Consult for mobility concerns,PT Consult for assist device competence,Strengthening exercises (ROM-active/passive)    Mentation Interventions: Adequate sleep, hydration, pain control,Bed/chair exit alarm,Door open when patient unattended,Evaluate medications/consider consulting pharmacy,Increase mobility,More frequent rounding,Reorient patient,Room close to nurse's station,Toileting rounds,Update white board    Medication Interventions: Assess postural VS orthostatic hypotension,Bed/chair exit alarm,Evaluate medications/consider consulting pharmacy    Elimination Interventions: Bed/chair exit alarm,Call light in reach,Patient to call for help with toileting needs,Stay With Me (per policy),Toileting schedule/hourly rounds    History of Falls Interventions: Bed/chair exit alarm,Consult care management for discharge planning,Door open when patient unattended,Evaluate medications/consider consulting pharmacy,Room close to nurse's station,Assess for delayed presentation/identification of injury for 48 hrs (comment for end date),Vital signs minimum Q4HRs X 24 hrs (comment for end date)         Problem: Pressure Injury - Risk of  Goal: *Prevention of pressure injury  Description: Document Ivan Scale and appropriate interventions in the flowsheet. Outcome: Progressing Towards Goal  Note: Pressure Injury Interventions:  Sensory Interventions: Discuss PT/OT consult with provider,Turn and reposition approx.  every two hours (pillows and wedges if needed),Pressure redistribution bed/mattress (bed type)         Activity Interventions: Pressure redistribution bed/mattress(bed type),Increase time out of bed,PT/OT evaluation    Mobility Interventions: PT/OT evaluation,Turn and reposition approx.  every two hours(pillow and wedges),Pressure redistribution bed/mattress (bed type)    Nutrition Interventions: Document food/fluid/supplement intake,Discuss nutritional consult with provider,Offer support with meals,snacks and hydration    Friction and Shear Interventions: Foam dressings/transparent film/skin sealants,Transferring/repositioning devices,Minimize layers                Problem: Patient Education: Go to Patient Education Activity  Goal: Patient/Family Education  Outcome: Progressing Towards Goal

## 2022-06-07 NOTE — PROGRESS NOTES
Problem: Self Care Deficits Care Plan (Adult)  Goal: *Acute Goals and Plan of Care (Insert Text)  Description: FUNCTIONAL STATUS PRIOR TO ADMISSION: Unable to ascertain due to patient's cognitive status/dementia. HOME SUPPORT PRIOR TO ADMISSION: The patient lived with her  and daughter but unsure what assistance she may have required. Occupational Therapy Goals  Initiated 6/7/2022  1. Patient will perform self feeding with minimal assistance within 7 day(s). 2.  Patient will perform grooming with moderate assistance  within 7 day(s). 3.  Patient will perform upper body dressing and bathing with moderate assistance  within 7 day(s). 4.  Patient will tolerate sitting EOB while completing functional tasks with moderate assistance within 7 days. 5.  Patient will participate in upper extremity therapeutic exercise/activities with minimal assistance for 10 minutes within 7 day(s). Outcome: Progressing Towards Goal   OCCUPATIONAL THERAPY EVALUATION  Patient: Andie Chavez (48 y.o. female)  Date: 6/7/2022  Primary Diagnosis: Encephalopathy acute [G93.40]        Precautions: fall       ASSESSMENT  Based on the objective data described below, the patient presents with decreased activity tolerance, generalized weakness, elevated HR/Afib, and poor cognition following admission for acute encephalopathy. Patient is a poor historian unable to provide consistent accurate background information. She was alert and oriented x2 at best (knows her name and aware she is at the hospital but unable to provide name of hospital). She is delayed both in verbal and motor response and unsure if this is d/t cognition or true motor impairment. Patient required max A to reposition in the bed and placed in chair position to encourage upright posture. Patient setup for lunch and required up to mod A for feeding otherwise total A required for ADLs. Activity limited d/t elevated HR ranging from 110s to 150s.   Patient would benefit from continued skilled OT to progress towards goals and improve overall independence. Current Level of Function Impacting Discharge (ADLs/self-care): Patient required max A for functional mobility. Patient required mod A for feeding and total A for all other ADLs. Functional Outcome Measure: The patient scored Total: 5/100 on the Barthel Index outcome measure. Patient will benefit from skilled therapy intervention to address the above noted impairments. PLAN :  Recommendations and Planned Interventions: self care training, functional mobility training, therapeutic exercise, balance training, therapeutic activities, endurance activities, patient education, home safety training, and family training/education    Frequency/Duration: Patient will be followed by occupational therapy 5 times a week to address goals. Recommendation for discharge: (in order for the patient to meet his/her long term goals)  Therapy up to 5 days/week in SNF setting    This discharge recommendation:  Has been made in collaboration with the attending provider and/or case management    IF patient discharges home will need the following DME: none       SUBJECTIVE:   Patient minimally verbal.  Answers yes/no questions and nods primarily for communication.       OBJECTIVE DATA SUMMARY:   HISTORY:   Past Medical History:   Diagnosis Date    Arthritis     Basal cell carcinoma 05/20/2012    Calculus of kidney     Cancer ([facility] [address]     skin    Cancer ([facility] [address]     thyroid    Glaucoma 4/30/2010    Hypertension     OA (osteoarthritis) 4/30/2010    Psoriasis 4/30/2010     Past Surgical History:   Procedure Laterality Date    HX CATARACT REMOVAL      bilat    HX CHOLECYSTECTOMY      HX CRANIOTOMY      HX DILATION AND CURETTAGE      HX HYSTERECTOMY      HX KNEE ARTHROSCOPY      HX KNEE REPLACEMENT      HX TONSILLECTOMY      HX WRIST FRACTURE TX Left 8/21/15    [place] THYROIDECTOMY         Expanded or extensive additional review of patient history:     Home Situation  Home Environment: Private residence  Living Alone: No  Support Systems: Spouse/Significant Other,Child([email or web address])  Patient Expects to be Discharged to[de-identified] Unable to determine at this time  Current DME Used/Available at Home:  (unable to determine from patient)    Hand dominance: Right    EXAMINATION OF PERFORMANCE DEFICITS:  Cognitive/Behavioral Status:  Neurologic State: Alert  Orientation Level: Oriented to person;Disoriented to time;Disoriented to situation;Disoriented to place  Cognition: Follows commands  Perception: Appears intact  Perseveration: No perseveration noted  Safety/Judgement: Awareness of environment    Skin: Intact in the uppers    Edema: None noted in the uppers    Vision/Perceptual:    Tracking: Able to track stimulus in all quadrants w/o difficulty    Diplopia: No    Acuity: Within Defined Limits       Range of Motion:  AROM: Generally decreased, functional in the uppers    Strength:  Strength: Generally decreased, functional in the uppers    Coordination:  Fine Motor Skills-Upper: Left Intact; Right Intact    Gross Motor Skills-Upper: Left Intact; Right Intact    Tone & Sensation:  Tone: Normal  Sensation: intact     Balance:  Sitting: Intact  Standing: Intact    Functional Mobility and Transfers for ADLs:  Bed Mobility:  Rolling: Maximum assistance    Transfers:       ADL Assessment:  Feeding: Moderate assistance    Oral Facial Hygiene/Grooming: Total assistance    Bathing: Total assistance    Upper Body Dressing: Total assistance    Lower Body Dressing: Total assistance    Toileting: Total assistance    Cognitive Retraining  Safety/Judgement: Awareness of environment    Functional Measure:    Barthel Index:  Bathin  Bladder: 0  Bowels: 0  Groomin  Dressin  Feedin  Mobility: 0  Stairs: 0  Toilet Use: 0  Transfer (Bed to Chair and Back): 0  Total: 5/100      The Barthel ADL Index: Guidelines  1.  The index should be used as a record of what a patient does, not as a record of what a patient could do. 2. The main aim is to establish degree of independence from any help, physical or verbal, however minor and for whatever reason. 3. The need for supervision renders the patient not independent. 4. A patient's performance should be established using the best available evidence. Asking the patient, friends/relatives and nurses are the usual sources, but direct observation and common sense are also important. However direct testing is not needed. 5. Usually the patient's performance over the preceding 24-48 hours is important, but occasionally longer periods will be relevant. 6. Middle categories imply that the patient supplies over 50 per cent of the effort. 7. Use of aids to be independent is allowed. Score Interpretation (from [address] [facility])    Independent   60-79 Minimally independent   40-59 Partially dependent   20-39 Very dependent   <20 Totally dependent     -Roseann Kincaid., Barthel, D.W. (1965). Functional evaluation: the Barthel Index. [address] [facility] ([address] (1997). The Barthel activities of daily living index: self-reporting versus actual performance in the old (> or = 75 years). Journal of [address] [place] [address] 45(7), [facility], JVAMSIF, Maggie Fry, Malachi Rios (1999). Measuring the change in disability after inpatient rehabilitation; comparison of the responsiveness of the Barthel Index and Functional Cottle Measure. Journal of Neurology, Neurosurgery, and Psychiatry, 66(4), 527-221. Marybeth Gilford, N.J.A, MAURO George, & Aaliyah Anderson M.A. (2004) Assessment of post-stroke quality of life in cost-effectiveness studies: The usefulness of the Barthel Index and the EuroQoL-5D.  Quality of Life Research, 15, 327-39     Occupational Therapy Evaluation Charge Determination   History Examination Decision-Making   LOW Complexity : Brief history review  LOW Complexity : 1-3 performance deficits relating to physical, cognitive , or psychosocial skils that result in activity limitations and / or participation restrictions  LOW Complexity : No comorbidities that affect functional and no verbal or physical assistance needed to complete eval tasks       Based on the above components, the patient evaluation is determined to be of the following complexity level: LOW     Activity Tolerance:   Poor    After treatment patient left in no apparent distress:    Supine in bed, Call bell within reach, and Bed / chair alarm activated    COMMUNICATION/EDUCATION:   The patients plan of care was discussed with: Physical therapist, Registered nurse, and patient . Home safety education was provided and the patient/caregiver indicated understanding., Patient/family have participated as able in goal setting and plan of care. , and Patient/family agree to work toward stated goals and plan of care. This patients plan of care is appropriate for delegation to \[facility]\"".     Thank you for this referral.  Armando Degroot OTR/L  Time Calculation: 24 mins

## 2022-06-07 NOTE — PROGRESS NOTES
[facility]  [address] [facility], [place], [address] [place] [address]  [phone] [address] Adult  Hospitalist Group                                                                                          Hospitalist Progress Note  Cain Parker MD        Date of Service:  2022  NAME:  Mercedez Albrecht  :  1940  MRN:  [id number]      Admission Summary:   79-year-old female with history of dementia, hypertension, hyperlipidemia, paroxysmal a flutter, psoriasis, basal cell CA, thyroid CA status post thyroidectomy was brought to the ED by EMS from home for AMS.   Patient does have a history of dementia and recently been diagnosed with CHF and started on diuretic and home oxygen after being noted to be hypoxic by the cardiologist. Fidencio Charles starting the supplemental oxygen, according to the patient and family patient has been more withdrawn and less responsive    Interval history / Subjective:   Off bipap, follows some commands and answers questions     Assessment & Plan:     Acute on chronic hypoxic/acute hypercarbic respiratory failure; likely multifactorial with hypoventilation and undiagnosed TYRONE  CO2 narcosis  - off bipap  -Pulmonology evaluated, may consider Trelegy if amenable to outpatient therapy. Would benefit from outpatient sleep study  .   Acute on chronic diastolic CHF  -Echo shows normal EF with grade 1 diastolic dysfunction   -cardiology following  -hold diuresis for now    Atrial fibrillation with RVR  -currently on amiodarone gtt  -cardiology following    Metabolic alkalosis:   -Suspect due to chronic respiratory acidosis  -Continue acetazolamide     Acute kidney injury.  -Likely due to CHF  -Improving    History of advanced dementia.  -Continue donepezil and Cymbalta    History of hypertension.  -hold diovan due to hypotension    History of thyroid cancer status post thyroidectomy; with subsequent hypothyroidism.  -Continue levothyroxine    History of psoriasis and basal cell carcinoma.  -Continue home steroid cream    Code status: DNR  DVT prophylaxis: SCDs       Hospital Problems  Date Reviewed: 11/2/2018          Codes Class Noted POA    Encephalopathy acute ICD-10-CM: G93.40  ICD-9-CM: 348.30  6/4/2022 Unknown                Review of Systems:   Review of systems not obtained due to patient factors. Vital Signs:    Last 24hrs VS reviewed since prior progress note. Most recent are:  Visit Vitals  BP 96/68   Pulse (!) 117   Temp 98.5 °F (36.9 °C)   Resp 17   Ht 5' 7\" (1.702 m)   Wt 95.7 kg (211 lb)   SpO2 100%   BMI 33.05 kg/m²         Intake/Output Summary (Last 24 hours) at 6/7/2022 1123  Last data filed at 6/7/2022 0600  Gross per 24 hour   Intake 239.66 ml   Output 1225 ml   Net -985.34 ml        Physical Examination:             Constitutional:  No acute distress, currently on BiPAP, somnolent   ENT:  Oral mucosa moist, oropharynx benign. Resp:  CTA bilaterally. No wheezing/rhonchi/rales. No accessory muscle use   CV:  Regular rhythm, normal rate, no murmurs, gallops, rubs    GI:  Soft, non distended, non tender. normoactive bowel sounds, no hepatosplenomegaly     Musculoskeletal:  No edema, warm, 2+ pulses throughout    Neurologic:  Moves all extremities     Psych: No insight.   Not agitated or anxious       Data Review:    Review and/or order of clinical lab test      Labs:     Recent Labs     06/07/22 0420 06/06/22 2146   WBC 9.7 10.7   HGB 12.8 12.8   HCT 41.1 40.9    252     Recent Labs     06/07/22  0420 06/06/22  2146 06/06/22  1006 06/05/22  0500 06/05/22  0500 06/04/22  2314 06/04/22  2314    143 143   < > 139   < > 139   K 4.1 3.9 4.0   < > 3.9   < > 4.0    101 98   < > 93*   < > 94*   CO2 32 34* 42*   < > 42*   < > 43*   BUN 51* 46* 47*   < > 55*   < > 52*   CREA 1.16* 1.03* 1.07*   < > 1.31*   < > 1.16*   GLU 95 89 86   < > 98   < > 104*   CA 9.0 9.5 9.4   < > 9.5   < > 9.5   MG  --  2.4  --   --  2.1  --  2.1 PHOS  --  3.3  --   --   --   --   --     < > = values in this interval not displayed. Recent Labs     06/06/22  2146 06/04/22  1559   ALT 22 26   AP 88 88   TBILI 0.7 0.4   TP 6.8 6.5   ALB 2.8* 2.8*   GLOB 4.0 3.7     Recent Labs     06/05/22  0925 06/04/22  2307   INR  --  1.1   PTP  --  11.0   APTT 64.3* 24.5      No results for input(s): FE, TIBC, PSAT, FERR in the last 72 hours. Lab Results   Component Value Date/Time    Folate >20.0 11/28/2017 12:26 PM      Recent Labs     06/07/22  0631 06/04/22  2336   PH 7.41 7.37   PCO2 50* 77*   PO2 57* 82     No results for input(s): CPK, CKNDX, TROIQ in the last 72 hours.     No lab exists for component: CPKMB  Lab Results   Component Value Date/Time    Cholesterol, total 209 (H) 07/21/2021 10:32 AM    HDL Cholesterol 51 07/21/2021 10:32 AM    LDL, calculated 134 (H) 07/21/2021 10:32 AM    LDL, calculated 121 (H) 11/19/2018 12:09 PM    Triglyceride 135 07/21/2021 10:32 AM    CHOL/HDL Ratio 3.8 01/08/2010 10:35 AM     Lab Results   Component Value Date/Time    Glucose (POC) 85 06/05/2022 09:03 AM     Lab Results   Component Value Date/Time    Color YELLOW/STRAW 06/04/2022 06:12 PM    Appearance CLOUDY (A) 06/04/2022 06:12 PM    Specific gravity 1.013 06/04/2022 06:12 PM    pH (UA) 6.5 06/04/2022 06:12 PM    Protein 30 (A) 06/04/2022 06:12 PM    Glucose Negative 06/04/2022 06:12 PM    Ketone Negative 06/04/2022 06:12 PM    Bilirubin Negative 06/04/2022 06:12 PM    Urobilinogen 0.2 06/04/2022 06:12 PM    Nitrites Negative 06/04/2022 06:12 PM    Leukocyte Esterase Negative 06/04/2022 06:12 PM    Epithelial cells FEW 06/04/2022 06:12 PM    Bacteria 4+ (A) 06/04/2022 06:12 PM    WBC 0-4 06/04/2022 06:12 PM    RBC 0-5 06/04/2022 06:12 PM         Medications Reviewed:     Current Facility-Administered Medications   Medication Dose Route Frequency    dilTIAZem (CARDIZEM) 100 mg in 0.9% sodium chloride (MBP/ADV) 100 mL infusion  0-15 mg/hr IntraVENous TITRATE    enoxaparin (LOVENOX) injection 100 mg  1 mg/kg SubCUTAneous Q12H    amiodarone (CORDARONE) 375 mg in dextrose 5% 250 mL infusion  0.5-1 mg/min IntraVENous TITRATE    cholecalciferol (VITAMIN D3) (1000 Units /25 mcg) tablet 2,000 Units  2,000 Units Oral DAILY    donepeziL (ARICEPT) tablet 10 mg  10 mg Oral QHS    [Held by provider] DULoxetine (CYMBALTA) capsule 60 mg  60 mg Oral DAILY    levothyroxine (SYNTHROID) tablet 150 mcg  150 mcg Oral ACB    [Held by provider] valsartan (DIOVAN) tablet 320 mg  320 mg Oral DAILY    triamcinolone acetonide (KENALOG) 0.1 % cream   Topical BID    timolol (TIMOPTIC) 0.5 % ophthalmic solution 1 Drop  1 Drop Both Eyes DAILY    sodium chloride (BRANDON 128) 2 % ophthalmic drops 1 Drop (Patient Supplied)  1 Drop Left Eye BID    sodium chloride (NS) flush 5-40 mL  5-40 mL IntraVENous Q8H    sodium chloride (NS) flush 5-40 mL  5-40 mL IntraVENous PRN    acetaminophen (TYLENOL) suppository 650 mg  650 mg Rectal Q6H PRN    polyethylene glycol (MIRALAX) packet 17 g  17 g Oral DAILY PRN    ondansetron (ZOFRAN) injection 4 mg  4 mg IntraVENous Q6H PRN    albuterol (ACCUNEB) nebulizer solution 1.25 mg  1.25 mg Nebulization Q6H PRN     ______________________________________________________________________  EXPECTED LENGTH OF STAY: - - -  ACTUAL LENGTH OF STAY:          3                 Devin Valero MD

## 2022-06-07 NOTE — CONSULTS
Name: [address]: [facility] Janes Garcia   : 1940 Admit Date: 2022   Phone: [phone]  Room: [address] [place] [address]   PCP: Sam Lowery MD  MRN: [id number]   Date: 2022  Code: DNR          Chart and notes reviewed. Data reviewed. I review the patient's current medications in the medical record at each encounter. I have evaluated and examined the patient. HPI:    7:52 AM       History was obtained from patient. I was asked by Kellee Vasquez MD to see Veniec El in consultation for a chief complaint of acute on chronic respiratory failure with hypoxia and hypercapnia. History of Present Illness:  Ms. Portia Grigsby is an 80 yo woman with a history of HTN, afib, CKD prior thyroid cancer s/p thyroidectom, prior subdural hematoma, dementia and obesity who is admitted with AMS and acute on chronic respiratory failure with hypoxia and hypercapnia. She comes in with altered mental status and found to be hypoxic and hypercapnic requiring BiPAP. Per report she was recently started on a diuretic and supplemental O2 after being found to be in heart failure and hypoxic. She is able to indicated to me that she is not short of breath on BiPAP currently, but still lethargic. Labs: WBC 9.8, Hgb 11.7, , HCO3 >40, cr 1.31, proBNP 345, ABG 7.37/77/82 on BiPAP    Images reviewed:  CXR 2022: increased interstitial markings concerning for pulmonary edema    TTE 2022: EF 75-49%, grade 1 diastolic dysfunction    Interval History:  Wore NC most of the night. Satting well on 1L. She has no complaints today. ABG 7.41/50/57 on 1L this morning after wearing BiPAP yesterday.       Past Medical History:   Diagnosis Date    Arthritis     Basal cell carcinoma 2012    Calculus of kidney     Cancer ([facility]     skin    Cancer ([facility]     thyroid    Glaucoma 2010    Hypertension     OA (osteoarthritis) 2010    Psoriasis 2010       Past Surgical History:   Procedure Laterality Date    HX CATARACT REMOVAL      bilat    HX CHOLECYSTECTOMY      HX CRANIOTOMY      HX DILATION AND CURETTAGE      HX HYSTERECTOMY      HX KNEE ARTHROSCOPY      HX KNEE REPLACEMENT      HX TONSILLECTOMY      HX WRIST FRACTURE TX Left 8/21/15    MT THYROIDECTOMY         Family History   Problem Relation Age of Onset    Hypertension Mother     Heart Disease Mother     Heart Disease Father     Hypertension Father    Wilson Elevated Lipids Father     Heart Disease Brother     Psychiatric Disorder Brother     Diabetes Brother     Breast Cancer Maternal Aunt     Breast Cancer Paternal Aunt        Social History     Tobacco Use    Smoking status: Former Smoker     Packs/day: 0.50     Years: 4.00     Pack years: 2.00     Types: Cigarettes     Quit date: 1961     Years since quittin.4    Smokeless tobacco: Never Used   Substance Use Topics    Alcohol use: No     Alcohol/week: 0.0 standard drinks       Allergies   Allergen Reactions    Cephalexin Itching    Codeine Rash    Gabapentin Other (comments)    Neomycin Rash    Polysporin [Bacitracin-Polymyxin B] Rash    Protonix [Pantoprazole] Other (comments)     Gi upset       Current Facility-Administered Medications   Medication Dose Route Frequency    dilTIAZem (CARDIZEM) 100 mg in 0.9% sodium chloride (MBP/ADV) 100 mL infusion  0-15 mg/hr IntraVENous TITRATE    enoxaparin (LOVENOX) injection 100 mg  1 mg/kg SubCUTAneous Q12H    amiodarone (CORDARONE) 375 mg in dextrose 5% 250 mL infusion  0.5-1 mg/min IntraVENous TITRATE    cholecalciferol (VITAMIN D3) (1000 Units /25 mcg) tablet 2,000 Units  2,000 Units Oral DAILY    donepeziL (ARICEPT) tablet 10 mg  10 mg Oral QHS    [Held by provider] DULoxetine (CYMBALTA) capsule 60 mg  60 mg Oral DAILY    levothyroxine (SYNTHROID) tablet 150 mcg  150 mcg Oral ACB    [Held by provider] valsartan (DIOVAN) tablet 320 mg  320 mg Oral DAILY    triamcinolone acetonide (KENALOG) 0.1 % cream Topical BID    timolol (TIMOPTIC) 0.5 % ophthalmic solution 1 Drop  1 Drop Both Eyes DAILY    sodium chloride (BRANDON 128) 2 % ophthalmic drops 1 Drop (Patient Supplied)  1 Drop Left Eye BID    sodium chloride (NS) flush 5-40 mL  5-40 mL IntraVENous Q8H    sodium chloride (NS) flush 5-40 mL  5-40 mL IntraVENous PRN    acetaminophen (TYLENOL) suppository 650 mg  650 mg Rectal Q6H PRN    polyethylene glycol (MIRALAX) packet 17 g  17 g Oral DAILY PRN    ondansetron (ZOFRAN) injection 4 mg  4 mg IntraVENous Q6H PRN    albuterol (ACCUNEB) nebulizer solution 1.25 mg  1.25 mg Nebulization Q6H PRN         REVIEW OF SYSTEMS   12 point ROS negative except as stated in the HPI. Physical Exam:   Visit Vitals  BP (!) 102/49   Pulse (!) 117   Temp 99.2 °F (37.3 °C)   Resp 23   Ht 5' 7\" (1.702 m)   Wt 95.7 kg (211 lb)   SpO2 95%   BMI 33.05 kg/m²       General:  Alert, cooperative, no distress, appears stated age. Head:  Normocephalic, without obvious abnormality, atraumatic. Eyes:  Conjunctivae/corneas clear. Nose: Nares normal. Septum midline. Throat: Lips, mucosa, and tongue normal.    Neck: Supple, symmetrical, trachea midline   Lungs:   Clear to auscultation bilaterally, respirations non-labored    Chest wall:  No tenderness or deformity. Heart:  Regular rate and rhythm, S1, S2 normal, no murmur, click, rub or gallop. Abdomen:   Soft, non-tender. Bowel sounds normal. Obese. Extremities: Extremities normal, atraumatic, no cyanosis or edema. Pulses: 2+ and symmetric all extremities. Skin: Skin color, texture, turgor normal. No rashes or lesions       Neurologic: Grossly nonfocal. Responds to yes/no questions, does not open her eyes for yvonne.        Lab Results   Component Value Date/Time    Sodium 142 06/07/2022 04:20 AM    Potassium 4.1 06/07/2022 04:20 AM    Chloride 102 06/07/2022 04:20 AM    CO2 32 06/07/2022 04:20 AM    BUN 51 (H) 06/07/2022 04:20 AM    Creatinine 1.16 (H) 06/07/2022 04:20 AM Glucose 95 06/07/2022 04:20 AM    Calcium 9.0 06/07/2022 04:20 AM    Magnesium 2.4 06/06/2022 09:46 PM    Phosphorus 3.3 06/06/2022 09:46 PM    Lactic acid 0.8 06/04/2022 03:59 PM       Lab Results   Component Value Date/Time    WBC 9.7 06/07/2022 04:20 AM    HGB 12.8 06/07/2022 04:20 AM    PLATELET 028 30/66/9970 04:20 AM    .5 (H) 06/07/2022 04:20 AM       Lab Results   Component Value Date/Time    INR 1.1 06/04/2022 11:07 PM    aPTT 64.3 (H) 06/05/2022 09:25 AM    Alk.  phosphatase 88 06/06/2022 09:46 PM    Protein, total 6.8 06/06/2022 09:46 PM    Albumin 2.8 (L) 06/06/2022 09:46 PM    Globulin 4.0 06/06/2022 09:46 PM       No results found for: IRON, FE, TIBC, IBCT, PSAT, FERR    Lab Results   Component Value Date/Time    Sed rate (ESR) 4 08/10/2017 12:00 AM    TSH 3.79 (H) 06/06/2022 09:46 PM        Lab Results   Component Value Date/Time    PH 7.41 06/07/2022 06:31 AM    PCO2 50 (H) 06/07/2022 06:31 AM    PO2 57 (L) 06/07/2022 06:31 AM    HCO3 31 (H) 06/07/2022 06:31 AM       No results found for: CPK, RCK1, RCK2, RCK3, RCK4, CKNDX, CKND1, TROPT, TROIQ, BNPP, BNP     Lab Results   Component Value Date/Time    Culture result: CULTURE IN PROGRESS,FURTHER UPDATES TO FOLLOW 06/04/2022 06:12 PM    Culture result: NO GROWTH 3 DAYS 06/04/2022 03:59 PM    Culture result: MIXED UROGENITAL NEVILLE ISOLATED 07/08/2021 10:36 AM       No results found for: TOXA1, RPR, HBCM, HBSAG, HAAB, HCAB1, HAAT, G6PD, CRYAC, HIVGT, HIVR, HIV1, HIV12, HIVPC, HIVRPI    No results found for: VANCT, CPK    Lab Results   Component Value Date/Time    Color YELLOW/STRAW 06/04/2022 06:12 PM    Appearance CLOUDY (A) 06/04/2022 06:12 PM    pH (UA) 6.5 06/04/2022 06:12 PM    Protein 30 (A) 06/04/2022 06:12 PM    Glucose Negative 06/04/2022 06:12 PM    Ketone Negative 06/04/2022 06:12 PM    Bilirubin Negative 06/04/2022 06:12 PM    Blood Negative 06/04/2022 06:12 PM    Urobilinogen 0.2 06/04/2022 06:12 PM    Nitrites Negative 06/04/2022 06:12 PM    Leukocyte Esterase Negative 06/04/2022 06:12 PM    WBC 0-4 06/04/2022 06:12 PM    RBC 0-5 06/04/2022 06:12 PM    Epithelial cells 0-10 08/18/2015 02:41 PM    Bacteria 4+ (A) 06/04/2022 06:12 PM       IMPRESSION  · Acute on chronic respiratory failure with hypoxia and hypercarbia  · Metabolic acidosis  · Suspected OHS/TYRONE  · Altered mental status  · Diastolic heart failure  · Obesity  · Dementia  · Pulmonary nodules      PLAN  · Goal sats 88% or higher, wean supplemental O2 as tolerated  · BiPAP while sleeping, currently lethargic and needs to remain on until she is more awake/interactive  · Will place case management for tomorrow for Trilogy. A non-invasive ventilator is being ordered for the patient because CPAP and BiPAP will not provide the necessary ventilatory support or settings needed to treat this patient with respiratory failure caused by OHS.   · PRN nebs  · CTA with bilateral pulmonary nodules up to 9mm in size; consider outpatient surveillance in 6 months  · Ok to eat as long as she is not requiring continuous BiPAP  · She is DNR    Jorge Luu MD

## 2022-06-07 NOTE — PROGRESS NOTES
Progress Note  Date:2022       Room:[address] [place] [address]  Patient Name:Liseth Morales     Date of Birth:[birth year]     Age:81 y.o. Subjective    Subjective on/off bipap; more awake this am; afib with rvr overnight and now on amio  Review of Systems susana given dementia/delirium  Objective         Vitals Last 24 Hours:  TEMPERATURE:  Temp  Av.9 °F (36.6 °C)  Min: 97.1 °F (36.2 °C)  Max: 98.5 °F (36.9 °C)  RESPIRATIONS RANGE: Resp  Av.9  Min: 15  Max: 29  PULSE OXIMETRY RANGE: SpO2  Av.2 %  Min: 87 %  Max: 100 %  PULSE RANGE: Pulse  Av.4  Min: 55  Max: 152  BLOOD PRESSURE RANGE: Systolic (71YYG), KX , Min:76 , BKX:640   ; Diastolic (75WZS), GNK:85, Min:32, Max:145    I/O (24Hr):     Intake/Output Summary (Last 24 hours) at 2022 1031  Last data filed at 2022 0600  Gross per 24 hour   Intake 239.66 ml   Output 1425 ml   Net -1185.34 ml     Objective   Exam facilitated by use of telemedicine platform and with assistance from in house team  Gen -eyes open and responds to [address]; follows some commands; wasn't speaking  Head - nc/at  Eyes - anicteric sclera  Ent - normal external anatomy  Cvs - irreg; low BP but no evidence of hypoperfusion  Pulm - normal WOB and adequate SaO2 on NC  GI-no evidence of tenderness with passive movement  Ext - no c/c/e  Msk - normal bulk  Neuro - no fasic or tremor  Labs/Imaging/Diagnostics    Labs:  CBC:  Recent Labs     22  0420 22  2146 22  1006   WBC 9.7 10.7 9.4   RBC 4.01 4.00 3.71*   HGB 12.8 12.8 12.0   HCT 41.1 40.9 38.5   .5* 102.3* 103.8*   RDW 14.3 14.3 14.3    252 233     CHEMISTRIES:  Recent Labs     22  0420 22  2146 22  1006 22  0500 22  0500 22  2314 22  2314    143 143   < > 139   < > 139   K 4.1 3.9 4.0   < > 3.9   < > 4.0    101 98   < > 93*   < > 94*   CO2 32 34* 42*   < > 42*   < > 43*   BUN 51* 46* 47*   < > 55*   < > 52*   CA 9.0 9.5 9.4   < > 9.5   < > 9.5   PHOS  --  3.3  --   --   --   --   --    MG  --  2.4  --   --  2.1  --  2.1    < > = values in this interval not displayed. PT/INR:  Recent Labs     06/04/22  2307   INR 1.1     APTT:  Recent Labs     06/05/22  0925 06/04/22  2307   APTT 64.3* 24.5     LIVER PROFILE:  Recent Labs     06/06/22  2146 06/04/22  1559   AST 14* 15   ALT 22 26     Lab Results   Component Value Date/Time    ALT (SGPT) 22 06/06/2022 09:46 PM    AST (SGOT) 14 (L) 06/06/2022 09:46 PM    Alk. phosphatase 88 06/06/2022 09:46 PM    Bilirubin, total 0.7 06/06/2022 09:46 PM       Imaging Last 24 Hours:  CTA CHEST W OR W WO CONT    Result Date: 6/6/2022  INDICATION: Hypoxia COMPARISON:None TECHNIQUE:  Routine noncontrast imaging the chest was performed for localization purposes. Then, following the uneventful intravenous administration of 70 cc [id number], thin helical axial images were obtained through the chest. 3D image postprocessing was performed. CT dose reduction was achieved through use of a standardized protocol tailored for this examination and automatic exposure control for dose modulation. FINDINGS: CHEST WALL: No chest wall mass or axillary lymphadenopathy. THYROID: Surgically absent. MEDIASTINUM: No mass or lymphadenopathy. SILVER: No mass or lymphadenopathy. THORACIC AORTA: No dissection or aneurysm. PULMONARY ARTERIES: Main pulmonary artery is normal in caliber. No evidence of acute pulmonary emboli through the segmental pulmonary arterial level; some of the subsegmental pulmonary arteries are suboptimally opacified and cannot be adequately evaluated. TRACHEA/BRONCHI: Patent. ESOPHAGUS: No wall thickening or dilatation. HEART: Mild cardiomegaly. PLEURA: No effusion or pneumothorax. LUNGS: Bilateral dependent atelectasis. 9 mm pleural-based nodule in the right middle lobe (series 2, image 48). 8 mm pleural-based nodule in the left upper lobe (2/73).  INCIDENTALLY IMAGED UPPER ABDOMEN: Calcified granulomata in the spleen. BONES: No destructive bone lesion. Degenerative changes in the thoracic spine. ADDITIONAL COMMENTS: N/A     No evidence of acute pulmonary embolus. Bilateral pleural-based pulmonary nodules measure up to 9 mm; consider follow-up CT in 6 months to assure stability or resolution. Bilateral dependent atelectasis. Mild cardiomegaly. XR CHEST PORT    Result Date: 6/6/2022  Indication: Atrial fibrillation Comparison: 6/5/2022 Portable exam of the chest obtained at 2146 demonstrates stable cardiomegaly. There is no acute process in the lung fields. The osseous structures are unremarkable. No acute process.      Assessment//Plan   Active Problems:    Encephalopathy acute (6/4/2022)      Assessment & Plan   Acute on chronic resp failure with hypoxia and hypercapnia  Encephalopathy  afib with rvr  hfpef  dementia    Neuro - encephalopathy likely multifactorial with hypercapnia, mild ran, and underlying dementia; would hold CNS acting medications and reassess; NIPPV qhs and whilst sleeping;can continue her dementia regimen for now   No evidence of pain    Cvs - HFpEF but no obvious decompensation at this time but would reassess daily for potential diuresis (will hold today)   -sotalol on hold with intiial bradycardia; afib rvr recurred overnight and now on amio (diltiazem reportedly dropped her MAPs)  -would tolerate mild hypertension and avoid hypotension with her poor intake and FTT  -with her trajectory and weakness, would retain low threshold to stop AC    Pulm - acute resp failure with hypercapnia in setting of chronic hypoxic and hypercapnic respiratory failure; no PE on CTA; the atelectasis and hypercapnia may improve with NIPPV  -May require outpatient follow up for the nodules but unlikely to warrant intervention in this setting     Gi - npo until mental status improves  -no immediate need for LANEY proph      - RAN mild and of unclear etiology but has several potential etiology with resp failure, relative bradycardia,poor intake     Heme - no evidence of hemorrhage and can receive dvt proph - may need to switch if gfr declines     ID - no obvious source of infection and procal negative     Endo - history of thyroid cancer status post thyroidectomy; with subsequent hypothyroidism and on Synthroid at baseline       CCT 40minutes excluding teaching and procedures  I performed all aspects of the physical examination via Telemedicine associated with two way audio and video communication and with the on-site assistance of the bedside nurse. Sharren Canavan am located in [place] and the patient is located in [place] at [address] [place] [address]   The patient is critically ill in the ICU. Jody Clay  reviewed the pertinent medical records, laboratory/ pathology data and radiographic images.  The decision making regarding this patient is as documented above, which was generated  following  discussion  with the multidisciplinary ICU team and creation of a treatment plan for  the patient. We discussed the patient's interval history and future coordination of care and Rudolph Allen patient's medications  were reviewed and changes made as stipulated above.  Due to  critical illness impairing one or more vital organs of this patient resulting in life threatening clinical situation  I have provided direct, frequent personal  assessment and manipulation in management plan.   Electronically signed by Asya Mehta MD on 6/7/2022 at 10:31 AM

## 2022-06-07 NOTE — PROGRESS NOTES
Paged by bedside RN for Afib w/ RVR with HR to 140s. Chart reviewed. Recently on Sotalol which was held due to la nena. Euvolemic-Hypovolemic on exam per bedside RN. Plan for 250cc NS bolus, CXR and metabolic w/u. Low dose dilt 5mg if RVR persistent after volume resuscitation. Monitor and replete lytes.

## 2022-06-07 NOTE — PROGRESS NOTES
Transition of Care Plan - RUR 13%:  1. Medication management continues  2. Patient lives with spouse and daughter; she requires some assistance with ADLs, home O2 from [place], uses a rollator when ambulating  3. Cardiology following  4. Await further evaluation - PT/OT consulted  5.  CM will monitor patient response to treatment and recommendations    Magdaleno Alejandra LCSW

## 2022-06-07 NOTE — PROGRESS NOTES
Patient is currently ICU status. Will sign off at this time. Please reconsult when she is appropriate for transfer out of the ICU.

## 2022-06-07 NOTE — PROGRESS NOTES
0700- Bedside shift change report given to Shantal Ruiz RN (oncoming nurse) by Jeb Lomax RN (offgoing nurse). Report included the following information SBAR, ED Summary, Recent Results and Cardiac Rhythm afib. Primary Nurse Shantal Ruiz RN and ROLAND Chaney performed a dual skin assessment on this patient. [place] screening was done - pt passed. Dr. Deandre Gr initiated orders for a reg adult diet with no added salt. Pt ate 75% of meal - eats very slowly. 1130 - Titrated the Amiodarone down to 0.5 and hung a new bag.    1200 - OT saw patient. 1600 - Pulmonologist saw pt and spoke with the family. Will try to get Trilogy approved for her at home to sleep with at night. Pt has not put out much urine today most likely due to RAN. 1900- Bedside shift change report given to Jeb Lomax RN (oncoming nurse) by Shantal Ruiz RN (offgoing nurse). Report included the following information SBAR, Recent Results and Cardiac Rhythm afib.

## 2022-06-07 NOTE — PROGRESS NOTES
Spiritual Care Assessment/Progress Note  Mackenzie Al      NAME: Travon Calderón      MRN: [id number]  AGE: 79 y.o.  SEX: female  Adventism Affiliation: [place]   Language: English     6/7/2022     Total Time (in minutes): 17     Spiritual Assessment begun in [facility] through conversation with:         [x]Patient        [] Family    [] Friend(s)        Reason for Consult: Initial/Spiritual assessment, critical care     Spiritual beliefs: (Please include comment if needed)     [x] Identifies with a rito tradition:   Faith       [x] Supported by a rito community:            [] Claims no spiritual orientation:           [] Seeking spiritual identity:                [] Adheres to an individual form of spirituality:           [] Not able to assess:                           Identified resources for coping:      [x] Prayer                               [] Music                  [] Guided Imagery     [] Family/friends                 [] Pet visits     [] Devotional reading                         [] Unknown     [] Other:                                             Interventions offered during this visit: (See comments for more details)    Patient Interventions: Affirmation of emotions/emotional suffering,Affirmation of rito,Jewish/blessing,Catharsis/review of pertinent events in supportive environment,Initial/Spiritual assessment, Critical care,Normalization of emotional/spiritual concerns,Prayer (assurance of),Prayer (actual),Adventism beliefs/image of God discussed           Plan of Care:     [] Support spiritual and/or cultural needs    [] Support AMD and/or advance care planning process      [] Support grieving process   [] Coordinate Rites and/or Rituals    [] Coordination with community clergy   [] No spiritual needs identified at this time   [] Detailed Plan of Care below (See Comments)  [] Make referral to Music Therapy  [] Make referral to Pet Therapy     [] Make referral to Addiction services  [] Make referral to [facility]  [] Make referral to Spiritual Care Partner  [] No future visits requested        [x] Contact Spiritual Care for further referrals     Comments:  Spiritual care assessment with Mrs. Strong Cea in ICU. Pt is lying in bed, connected to IV's, resting and welcomes . She looks confused but said she is feeling good and doing okay. She did not speak much but answered questions. She is  with 3 adult children, and grandchildren. She expressed having a home [place] and thinks her  knows she is here. She feels well supported by family, friends and [place] family. Asked how she is feeling (emotionally) now and she responded, \"happy. \" Her only request is for healing.  held prayer for her and she expressed much gratitude.      Chaplain Carissa Lozada M.Div.  Jane Daley ([id number])

## 2022-06-07 NOTE — PROGRESS NOTES
Pt downgraded to stepdown so can be seen by Pulmonology. Dr. Otto Bowen paged. Dr. Tania Monson following up with family to answer questions. Lacie glez still on for Afib RVR.

## 2022-06-08 PROBLEM — F03.90 DEMENTIA (HCC): Status: ACTIVE | Noted: 2022-06-08

## 2022-06-08 PROBLEM — R53.81 PHYSICAL DEBILITY: Status: ACTIVE | Noted: 2022-06-08

## 2022-06-08 PROBLEM — J96.22 ACUTE ON CHRONIC RESPIRATORY FAILURE WITH HYPOXIA AND HYPERCAPNIA (HCC): Status: ACTIVE | Noted: 2022-06-08

## 2022-06-08 PROBLEM — J96.21 ACUTE ON CHRONIC RESPIRATORY FAILURE WITH HYPOXIA AND HYPERCAPNIA (HCC): Status: ACTIVE | Noted: 2022-06-08

## 2022-06-08 LAB
ANION GAP SERPL CALC-SCNC: 7 MMOL/L (ref 5–15)
ATRIAL RATE: 131 BPM
ATRIAL RATE: 141 BPM
BACTERIA SPEC CULT: NORMAL
BASOPHILS # BLD: 0.1 K/UL (ref 0–0.1)
BASOPHILS NFR BLD: 1 % (ref 0–1)
BUN SERPL-MCNC: 57 MG/DL (ref 6–20)
BUN/CREAT SERPL: 43 (ref 12–20)
CALCIUM SERPL-MCNC: 8.9 MG/DL (ref 8.5–10.1)
CALCULATED R AXIS, ECG10: -26 DEGREES
CALCULATED R AXIS, ECG10: -35 DEGREES
CALCULATED T AXIS, ECG11: 116 DEGREES
CALCULATED T AXIS, ECG11: 69 DEGREES
CC UR VC: NORMAL
CHLORIDE SERPL-SCNC: 95 MMOL/L (ref 97–108)
CO2 SERPL-SCNC: 32 MMOL/L (ref 21–32)
CREAT SERPL-MCNC: 1.33 MG/DL (ref 0.55–1.02)
DIAGNOSIS, 93000: NORMAL
DIAGNOSIS, 93000: NORMAL
DIFFERENTIAL METHOD BLD: ABNORMAL
EOSINOPHIL # BLD: 0.1 K/UL (ref 0–0.4)
EOSINOPHIL NFR BLD: 1 % (ref 0–7)
ERYTHROCYTE [DISTWIDTH] IN BLOOD BY AUTOMATED COUNT: 14.3 % (ref 11.5–14.5)
GLUCOSE SERPL-MCNC: 177 MG/DL (ref 65–100)
HCT VFR BLD AUTO: 37.1 % (ref 35–47)
HGB BLD-MCNC: 11.9 G/DL (ref 11.5–16)
IMM GRANULOCYTES # BLD AUTO: 0 K/UL (ref 0–0.04)
IMM GRANULOCYTES NFR BLD AUTO: 0 % (ref 0–0.5)
LYMPHOCYTES # BLD: 1.7 K/UL (ref 0.8–3.5)
LYMPHOCYTES NFR BLD: 17 % (ref 12–49)
MCH RBC QN AUTO: 31.9 PG (ref 26–34)
MCHC RBC AUTO-ENTMCNC: 32.1 G/DL (ref 30–36.5)
MCV RBC AUTO: 99.5 FL (ref 80–99)
MONOCYTES # BLD: 0.9 K/UL (ref 0–1)
MONOCYTES NFR BLD: 9 % (ref 5–13)
NEUTS SEG # BLD: 7 K/UL (ref 1.8–8)
NEUTS SEG NFR BLD: 72 % (ref 32–75)
NRBC # BLD: 0 K/UL (ref 0–0.01)
NRBC BLD-RTO: 0 PER 100 WBC
PLATELET # BLD AUTO: 238 K/UL (ref 150–400)
PMV BLD AUTO: 11.7 FL (ref 8.9–12.9)
POTASSIUM SERPL-SCNC: 3.8 MMOL/L (ref 3.5–5.1)
Q-T INTERVAL, ECG07: 334 MS
Q-T INTERVAL, ECG07: 372 MS
QRS DURATION, ECG06: 114 MS
QRS DURATION, ECG06: 118 MS
QTC CALCULATION (BEZET), ECG08: 506 MS
QTC CALCULATION (BEZET), ECG08: 512 MS
RBC # BLD AUTO: 3.73 M/UL (ref 3.8–5.2)
SERVICE CMNT-IMP: NORMAL
SODIUM SERPL-SCNC: 134 MMOL/L (ref 136–145)
VENTRICULAR RATE, ECG03: 114 BPM
VENTRICULAR RATE, ECG03: 138 BPM
WBC # BLD AUTO: 9.8 K/UL (ref 3.6–11)

## 2022-06-08 PROCEDURE — 74011250637 HC RX REV CODE- 250/637: Performed by: NURSE PRACTITIONER

## 2022-06-08 PROCEDURE — 80048 BASIC METABOLIC PNL TOTAL CA: CPT

## 2022-06-08 PROCEDURE — 85025 COMPLETE CBC W/AUTO DIFF WBC: CPT

## 2022-06-08 PROCEDURE — 77010033678 HC OXYGEN DAILY

## 2022-06-08 PROCEDURE — 94761 N-INVAS EAR/PLS OXIMETRY MLT: CPT

## 2022-06-08 PROCEDURE — 99232 SBSQ HOSP IP/OBS MODERATE 35: CPT | Performed by: SPECIALIST

## 2022-06-08 PROCEDURE — APPSS30 APP SPLIT SHARED TIME 16-30 MINUTES: Performed by: NURSE PRACTITIONER

## 2022-06-08 PROCEDURE — 93005 ELECTROCARDIOGRAM TRACING: CPT

## 2022-06-08 PROCEDURE — 36415 COLL VENOUS BLD VENIPUNCTURE: CPT

## 2022-06-08 PROCEDURE — 74011250636 HC RX REV CODE- 250/636: Performed by: STUDENT IN AN ORGANIZED HEALTH CARE EDUCATION/TRAINING PROGRAM

## 2022-06-08 PROCEDURE — 65270000046 HC RM TELEMETRY

## 2022-06-08 PROCEDURE — 74011000250 HC RX REV CODE- 250: Performed by: INTERNAL MEDICINE

## 2022-06-08 PROCEDURE — 74011250637 HC RX REV CODE- 250/637: Performed by: FAMILY MEDICINE

## 2022-06-08 PROCEDURE — 94660 CPAP INITIATION&MGMT: CPT

## 2022-06-08 PROCEDURE — 97530 THERAPEUTIC ACTIVITIES: CPT

## 2022-06-08 PROCEDURE — 94003 VENT MGMT INPAT SUBQ DAY: CPT

## 2022-06-08 RX ORDER — METOPROLOL TARTRATE 25 MG/1
25 TABLET, FILM COATED ORAL EVERY 6 HOURS
Status: DISCONTINUED | OUTPATIENT
Start: 2022-06-08 | End: 2022-06-09

## 2022-06-08 RX ORDER — SOTALOL HYDROCHLORIDE 80 MG/1
120 TABLET ORAL EVERY 12 HOURS
Status: DISCONTINUED | OUTPATIENT
Start: 2022-06-08 | End: 2022-06-08

## 2022-06-08 RX ORDER — SOTALOL HYDROCHLORIDE 80 MG/1
80 TABLET ORAL EVERY 24 HOURS
Status: DISCONTINUED | OUTPATIENT
Start: 2022-06-08 | End: 2022-06-10

## 2022-06-08 RX ADMIN — SOTALOL HYDROCHLORIDE 80 MG: 80 TABLET ORAL at 14:18

## 2022-06-08 RX ADMIN — TRIAMCINOLONE ACETONIDE: 1 CREAM TOPICAL at 08:23

## 2022-06-08 RX ADMIN — ENOXAPARIN SODIUM 100 MG: 100 INJECTION SUBCUTANEOUS at 06:06

## 2022-06-08 RX ADMIN — TIMOLOL MALEATE 1 DROP: 5 SOLUTION/ DROPS OPHTHALMIC at 08:23

## 2022-06-08 RX ADMIN — ENOXAPARIN SODIUM 100 MG: 100 INJECTION SUBCUTANEOUS at 17:23

## 2022-06-08 RX ADMIN — Medication 10 ML: at 06:06

## 2022-06-08 RX ADMIN — LEVOTHYROXINE SODIUM 150 MCG: 0.07 TABLET ORAL at 06:06

## 2022-06-08 RX ADMIN — DONEPEZIL HYDROCHLORIDE 10 MG: 5 TABLET, FILM COATED ORAL at 20:36

## 2022-06-08 RX ADMIN — Medication 10 ML: at 14:19

## 2022-06-08 RX ADMIN — TRIAMCINOLONE ACETONIDE: 1 CREAM TOPICAL at 17:25

## 2022-06-08 RX ADMIN — Medication 10 ML: at 22:00

## 2022-06-08 NOTE — PROGRESS NOTES
Owen Luna [facility] 79  [address] [facility], [address] [place] [address]  [phone] [address] Adult  Hospitalist Group                                                                                          Hospitalist Progress Note  Juan Hanna MD        Date of Service:  2022  NAME:  Sandro Maciel  :  1940  MRN:  [id number]      Admission Summary:   50-year-old female with history of dementia, hypertension, hyperlipidemia, paroxysmal a flutter, psoriasis, basal cell CA, thyroid CA status post thyroidectomy was brought to the ED by EMS from home for AMS.   Patient does have a history of dementia and recently been diagnosed with CHF and started on diuretic and home oxygen after being noted to be hypoxic by the cardiologist. Leone Habermann starting the supplemental oxygen, according to the patient and family patient has been more withdrawn and less responsive    Interval history / Subjective:   Off bipap, follows some commands and answers questions     Assessment & Plan:     Acute on chronic hypoxic/acute hypercarbic respiratory failure; likely multifactorial with hypoventilation and undiagnosed TYRONE/OHS  CO2 narcosis  - intermittent bipap when sleeping  -Pulmonology following, consult placed to  for set up of Trelegy  . Acute on chronic diastolic CHF  -Echo shows normal EF with grade 1 diastolic dysfunction   -cardiology following  -hold diuresis for now    Atrial fibrillation with RVR  -currently on amiodarone gtt, plan to wean today  -cardiology following    Metabolic alkalosis:   -Suspect due to chronic respiratory acidosis  -was given acetazolamide     Acute kidney injury.   -stable    History of advanced dementia.  -Continue donepezil and Cymbalta    History of hypertension.  -hold diovan due to hypotension    History of thyroid cancer status post thyroidectomy; with subsequent hypothyroidism.  -Continue levothyroxine    History of psoriasis and basal cell carcinoma.  -Continue home steroid cream    Debility  -PT/OT  -needs SNF on discharge    Code status: DNR  DVT prophylaxis: SCDs       Hospital Problems  Date Reviewed: 11/2/2018          Codes Class Noted POA    Encephalopathy acute ICD-10-CM: G93.40  ICD-9-CM: 348.30  6/4/2022 Unknown                Review of Systems:   Review of systems not obtained due to patient factors. Vital Signs:    Last 24hrs VS reviewed since prior progress note. Most recent are:  Visit Vitals  /71 (BP 1 Location: Left upper arm, BP Patient Position: At rest)   Pulse 88   Temp 98.6 °F (37 °C)   Resp 20   Ht 5' 7\" (1.702 m)   Wt 95.7 kg (211 lb)   SpO2 98%   BMI 33.05 kg/m²         Intake/Output Summary (Last 24 hours) at 6/8/2022 0827  Last data filed at 6/8/2022 0600  Gross per 24 hour   Intake 552.67 ml   Output 800 ml   Net -247.33 ml        Physical Examination:             Constitutional:  No acute distress, currently on BiPAP, somnolent   ENT:  Oral mucosa moist, oropharynx benign. Resp:  CTA bilaterally. No wheezing/rhonchi/rales. No accessory muscle use   CV:  Regular rhythm, normal rate, no murmurs, gallops, rubs    GI:  Soft, non distended, non tender. normoactive bowel sounds, no hepatosplenomegaly     Musculoskeletal:  No edema, warm, 2+ pulses throughout    Neurologic:  Moves all extremities     Psych: No insight.   Not agitated or anxious       Data Review:    Review and/or order of clinical lab test      Labs:     Recent Labs     06/08/22 0239 06/07/22 0420   WBC 9.8 9.7   HGB 11.9 12.8   HCT 37.1 41.1    257     Recent Labs     06/08/22  0239 06/07/22  0420 06/06/22 2146   * 142 143   K 3.8 4.1 3.9   CL 95* 102 101   CO2 32 32 34*   BUN 57* 51* 46*   CREA 1.33* 1.16* 1.03*   * 95 89   CA 8.9 9.0 9.5   MG  --   --  2.4   PHOS  --   --  3.3     Recent Labs     06/06/22 2146   ALT 22   AP 88   TBILI 0.7   TP 6.8   ALB 2.8*   GLOB 4.0     Recent Labs     06/05/22  0925   APTT 64.3*      No results for input(s): FE, TIBC, PSAT, FERR in the last 72 hours. Lab Results   Component Value Date/Time    Folate >20.0 11/28/2017 12:26 PM      Recent Labs     06/07/22  0631   PH 7.41   PCO2 50*   PO2 57*     No results for input(s): CPK, CKNDX, TROIQ in the last 72 hours.     No lab exists for component: CPKMB  Lab Results   Component Value Date/Time    Cholesterol, total 209 (H) 07/21/2021 10:32 AM    HDL Cholesterol 51 07/21/2021 10:32 AM    LDL, calculated 134 (H) 07/21/2021 10:32 AM    LDL, calculated 121 (H) 11/19/2018 12:09 PM    Triglyceride 135 07/21/2021 10:32 AM    CHOL/HDL Ratio 3.8 01/08/2010 10:35 AM     Lab Results   Component Value Date/Time    Glucose (POC) 85 06/05/2022 09:03 AM     Lab Results   Component Value Date/Time    Color YELLOW/STRAW 06/04/2022 06:12 PM    Appearance CLOUDY (A) 06/04/2022 06:12 PM    Specific gravity 1.013 06/04/2022 06:12 PM    pH (UA) 6.5 06/04/2022 06:12 PM    Protein 30 (A) 06/04/2022 06:12 PM    Glucose Negative 06/04/2022 06:12 PM    Ketone Negative 06/04/2022 06:12 PM    Bilirubin Negative 06/04/2022 06:12 PM    Urobilinogen 0.2 06/04/2022 06:12 PM    Nitrites Negative 06/04/2022 06:12 PM    Leukocyte Esterase Negative 06/04/2022 06:12 PM    Epithelial cells FEW 06/04/2022 06:12 PM    Bacteria 4+ (A) 06/04/2022 06:12 PM    WBC 0-4 06/04/2022 06:12 PM    RBC 0-5 06/04/2022 06:12 PM         Medications Reviewed:     Current Facility-Administered Medications   Medication Dose Route Frequency    dilTIAZem (CARDIZEM) 100 mg in 0.9% sodium chloride (MBP/ADV) 100 mL infusion  0-15 mg/hr IntraVENous TITRATE    enoxaparin (LOVENOX) injection 100 mg  1 mg/kg SubCUTAneous Q12H    amiodarone (CORDARONE) 375 mg in dextrose 5% 250 mL infusion  0.5-1 mg/min IntraVENous TITRATE    metoprolol tartrate (LOPRESSOR) tablet 25 mg  25 mg Oral Q6H    cholecalciferol (VITAMIN D3) (1000 Units /25 mcg) tablet 2,000 Units  2,000 Units Oral DAILY    donepeziL (ARICEPT) tablet 10 mg 10 mg Oral QHS    [Held by provider] DULoxetine (CYMBALTA) capsule 60 mg  60 mg Oral DAILY    levothyroxine (SYNTHROID) tablet 150 mcg  150 mcg Oral ACB    [Held by provider] valsartan (DIOVAN) tablet 320 mg  320 mg Oral DAILY    triamcinolone acetonide (KENALOG) 0.1 % cream   Topical BID    timolol (TIMOPTIC) 0.5 % ophthalmic solution 1 Drop  1 Drop Both Eyes DAILY    sodium chloride (BRANDON 128) 2 % ophthalmic drops 1 Drop (Patient Supplied)  1 Drop Left Eye BID    sodium chloride (NS) flush 5-40 mL  5-40 mL IntraVENous Q8H    sodium chloride (NS) flush 5-40 mL  5-40 mL IntraVENous PRN    acetaminophen (TYLENOL) suppository 650 mg  650 mg Rectal Q6H PRN    polyethylene glycol (MIRALAX) packet 17 g  17 g Oral DAILY PRN    ondansetron (ZOFRAN) injection 4 mg  4 mg IntraVENous Q6H PRN    albuterol (ACCUNEB) nebulizer solution 1.25 mg  1.25 mg Nebulization Q6H PRN     ______________________________________________________________________  EXPECTED LENGTH OF STAY: 3d 14h  ACTUAL LENGTH OF STAY:          4                 Lizeth Masters MD

## 2022-06-08 NOTE — PROGRESS NOTES
I discussed pt with pulmonology PA who will provide an order with BiPaP settings for SNF. Once I receive the order,I will send to the SNFs pt's  chose. Also,once the SNF has been determined,will notify [employer] so they can follow-up with SNF regarding pt.'s eventual discharge from SNF.     Krystal Chappell

## 2022-06-08 NOTE — PROGRESS NOTES
Bedside shift change report given to Maricarmen Santillan RN (oncoming nurse) by Cecilio Khanna RN (offgoing nurse). Report included the following information SBAR, Kardex, Intake/Output, MAR, Recent Results, Cardiac Rhythm A-fib RVR and Alarm Parameters . 2000 assessment complete. Pt sitting up in bed eating snack. A/O 2    0000 reassessment complete. Pt resting quietly in bed with BIPAP on    0400 reassessment complete. Pt sleeping with BIPAP on    0630 Pt on BIPAP overnight. Attempted to place on NC this am at [phone], but she still wants to sleep and desaturated to low 80s. Currently sleeping with BIPAP on.      Bedside shift change report given to Daniel Huerta RN (oncoming nurse) by Maricarmen Santillan RN (offgoing nurse). Report included the following information SBAR, Kardex, Intake/Output, MAR, Recent Results, Cardiac Rhythm A-fib and Alarm Parameters .

## 2022-06-08 NOTE — PROGRESS NOTES
Problem: Falls - Risk of  Goal: *Absence of Falls  Description: Document Sumeet Lion Fall Risk and appropriate interventions in the flowsheet. Outcome: Progressing Towards Goal  Note: Fall Risk Interventions:  Mobility Interventions: Bed/chair exit alarm,Communicate number of staff needed for ambulation/transfer,OT consult for ADLs,Patient to call before getting OOB,PT Consult for mobility concerns,Strengthening exercises (ROM-active/passive),Utilize gait belt for transfers/ambulation    Mentation Interventions: Adequate sleep, hydration, pain control,Bed/chair exit alarm,Door open when patient unattended,Gait belt with transfers/ambulation,Increase mobility,More frequent rounding,Reorient patient,Toileting rounds,Update white board    Medication Interventions: Assess postural VS orthostatic hypotension,Bed/chair exit alarm,Evaluate medications/consider consulting pharmacy,Patient to call before getting OOB,Utilize gait belt for transfers/ambulation    Elimination Interventions: Bed/chair exit alarm,Call light in reach,Patient to call for help with toileting needs,Toileting schedule/hourly rounds    History of Falls Interventions: Bed/chair exit alarm,Door open when patient unattended,Room close to nurse's station,Utilize gait belt for transfer/ambulation         Problem: Patient Education: Go to Patient Education Activity  Goal: Patient/Family Education  Outcome: Progressing Towards Goal     Problem: Pressure Injury - Risk of  Goal: *Prevention of pressure injury  Description: Document Ivan Scale and appropriate interventions in the flowsheet.   Outcome: Progressing Towards Goal  Note: Pressure Injury Interventions:  Sensory Interventions: Assess changes in LOC,Assess need for specialty bed,Avoid rigorous massage over bony prominences,Discuss PT/OT consult with provider,Float heels,Keep linens dry and wrinkle-free,Maintain/enhance activity level,Minimize linen layers,Pad between skin to skin,Pressure redistribution bed/mattress (bed type),Turn and reposition approx. every two hours (pillows and wedges if needed)         Activity Interventions: Assess need for specialty bed,Increase time out of bed,Pressure redistribution bed/mattress(bed type),PT/OT evaluation    Mobility Interventions: Assess need for specialty bed,Float heels,HOB 30 degrees or less,Pressure redistribution bed/mattress (bed type),PT/OT evaluation,Turn and reposition approx.  every two hours(pillow and wedges)    Nutrition Interventions: Document food/fluid/supplement intake,Offer support with meals,snacks and hydration    Friction and Shear Interventions: HOB 30 degrees or less,Minimize layers,Lift sheet                Problem: Patient Education: Go to Patient Education Activity  Goal: Patient/Family Education  Outcome: Progressing Towards Goal     Problem: Patient Education: Go to Patient Education Activity  Goal: Patient/Family Education  Outcome: Progressing Towards Goal     Problem: Afib Pathway: Day 1  Goal: Off Pathway (Use only if patient is Off Pathway)  Outcome: Progressing Towards Goal  Goal: Activity/Safety  Outcome: Progressing Towards Goal  Goal: Consults, if ordered  Outcome: Progressing Towards Goal  Goal: Diagnostic Test/Procedures  Outcome: Progressing Towards Goal  Goal: Nutrition/Diet  Outcome: Progressing Towards Goal  Goal: Discharge Planning  Outcome: Progressing Towards Goal  Goal: Medications  Outcome: Progressing Towards Goal  Goal: Respiratory  Outcome: Progressing Towards Goal  Goal: Treatments/Interventions/Procedures  Outcome: Progressing Towards Goal  Goal: Psychosocial  Outcome: Progressing Towards Goal  Goal: *Optimal pain control at patient's stated goal  Outcome: Progressing Towards Goal  Goal: *Hemodynamically stable  Outcome: Progressing Towards Goal  Goal: *Stable cardiac rhythm  Outcome: Progressing Towards Goal  Goal: *Lungs clear or at baseline  Outcome: Progressing Towards Goal  Goal: *Labs within defined limits  Outcome: Progressing Towards Goal  Goal: *Describes available resources and support systems  Outcome: Progressing Towards Goal     Problem: Afib Pathway: Day 2  Goal: Off Pathway (Use only if patient is Off Pathway)  Outcome: Progressing Towards Goal  Goal: Activity/Safety  Outcome: Progressing Towards Goal  Goal: Consults, if ordered  Outcome: Progressing Towards Goal  Goal: Diagnostic Test/Procedures  Outcome: Progressing Towards Goal  Goal: Nutrition/Diet  Outcome: Progressing Towards Goal  Goal: Discharge Planning  Outcome: Progressing Towards Goal  Goal: Medications  Outcome: Progressing Towards Goal  Goal: Respiratory  Outcome: Progressing Towards Goal  Goal: Treatments/Interventions/Procedures  Outcome: Progressing Towards Goal  Goal: Psychosocial  Outcome: Progressing Towards Goal  Goal: *Hemodynamically stable  Outcome: Progressing Towards Goal  Goal: *Optimal pain control at patient's stated goal  Outcome: Progressing Towards Goal  Goal: *Stable cardiac rhythm  Outcome: Progressing Towards Goal  Goal: *Lungs clear or at baseline  Outcome: Progressing Towards Goal  Goal: *Describes available resources and support systems  Outcome: Progressing Towards Goal     Problem: Afib Pathway: Day 3  Goal: Off Pathway (Use only if patient is Off Pathway)  Outcome: Progressing Towards Goal  Goal: Activity/Safety  Outcome: Progressing Towards Goal  Goal: Diagnostic Test/Procedures  Outcome: Progressing Towards Goal  Goal: Nutrition/Diet  Outcome: Progressing Towards Goal  Goal: Discharge Planning  Outcome: Progressing Towards Goal  Goal: Medications  Outcome: Progressing Towards Goal  Goal: Respiratory  Outcome: Progressing Towards Goal  Goal: Treatments/Interventions/Procedures  Outcome: Progressing Towards Goal  Goal: Psychosocial  Outcome: Progressing Towards Goal     Problem: Afib: Discharge Outcomes (not in CAT)  Goal: *Hemodynamically stable  Outcome: Progressing Towards Goal  Goal: *Stable cardiac rhythm  Outcome: Progressing Towards Goal  Goal: *Lungs clear or at baseline  Outcome: Progressing Towards Goal  Goal: *Optimal pain control at patient's stated goal  Outcome: Progressing Towards Goal  Goal: *Identifies cardiac risk factors  Outcome: Progressing Towards Goal  Goal: *Verbalizes home exercise program, activity guidelines, cardiac precautions  Outcome: Progressing Towards Goal  Goal: *Verbalizes understanding and describes prescribed diet  Outcome: Progressing Towards Goal  Goal: *Verbalizes understanding and describes medication purposes and frequencies  Outcome: Progressing Towards Goal  Goal: *Anxiety reduced or absent  Outcome: Progressing Towards Goal  Goal: *Understands and describes signs and symptoms to report to providers(Stroke Metric)  Outcome: Progressing Towards Goal  Goal: *Describes follow-up/return visits to physicians  Outcome: Progressing Towards Goal  Goal: *Describes available resources and support systems  Outcome: Progressing Towards Goal  Goal: *Influenza immunization  Outcome: Progressing Towards Goal  Goal: *Pneumococcal immunization  Outcome: Progressing Towards Goal  Goal: *Describes smoking cessation resources  Outcome: Progressing Towards Goal     Problem: Patient Education: Go to Patient Education Activity  Goal: Patient/Family Education  Outcome: Progressing Towards Goal     Problem: Patient Education: Go to Patient Education Activity  Goal: Patient/Family Education  Outcome: Progressing Towards Goal

## 2022-06-08 NOTE — PROGRESS NOTES
Name: [id number]: [facility] [address] Janes Garcia   : 1940 Admit Date: 2022   Phone: [phone]  Room: [address] [place] [address]   PCP: Cecilio Mkcee MD  MRN: [id number]   Date: 2022  Code: DNR          Chart and notes reviewed. Data reviewed. I review the patient's current medications in the medical record at each encounter. I have evaluated and examined the patient. History of Present Illness:  Ms. Raoul Robles is an 80 yo woman with a history of HTN, afib, CKD prior thyroid cancer s/p thyroidectom, prior subdural hematoma, dementia and obesity who is admitted with AMS and acute on chronic respiratory failure with hypoxia and hypercapnia. She comes in with altered mental status and found to be hypoxic and hypercapnic requiring BiPAP. Per report she was recently started on a diuretic and supplemental O2 after being found to be in heart failure and hypoxic. She is able to indicated to me that she is not short of breath on BiPAP currently, but still lethargic. Labs: WBC 9.8, Hgb 11.7, , HCO3 >40, cr 1.31, proBNP 345, ABG 7.37/77/82 on BiPAP    Images reviewed:  CXR 2022: increased interstitial markings concerning for pulmonary edema    TTE 2022: EF 13-02%, grade 1 diastolic dysfunction    Interval History:  Afebrile  Tachycardic overnight and remains on amiodarone drip  BP soft but stable  Sats 95% on NIV FiO2 27%  Bicarb 32 - better  Creat 1.33 - worse    ROS: Unable to obtain. Patient sleeping and arouses briefly to name, but quickly falls back to sleep.       Past Medical History:   Diagnosis Date    Arthritis     Basal cell carcinoma 2012    Calculus of kidney     Cancer ([facility] [address]     skin    Cancer ([facility] [address]     thyroid    Glaucoma 2010    Hypertension     OA (osteoarthritis) 2010    Psoriasis 2010       Past Surgical History:   Procedure Laterality Date    HX CATARACT REMOVAL      bilat    HX CHOLECYSTECTOMY      HX CRANIOTOMY      HX DILATION AND CURETTAGE      HX HYSTERECTOMY      HX KNEE ARTHROSCOPY      HX KNEE REPLACEMENT      HX TONSILLECTOMY      HX WRIST FRACTURE TX Left 8/21/15    [place] THYROIDECTOMY         Family History   Problem Relation Age of Onset    Hypertension Mother     Heart Disease Mother     Heart Disease Father     Hypertension Father    Waldron Flakito Elevated Lipids Father     Heart Disease Brother     Psychiatric Disorder Brother     Diabetes Brother     Breast Cancer Maternal Aunt     Breast Cancer Paternal Aunt        Social History     Tobacco Use    Smoking status: Former Smoker     Packs/day: 0.50     Years: 4.00     Pack years: 2.00     Types: Cigarettes     Quit date: 1961     Years since quittin.4    Smokeless tobacco: Never Used   Substance Use Topics    Alcohol use: No     Alcohol/week: 0.0 standard drinks       Allergies   Allergen Reactions    Cephalexin Itching    Codeine Rash    Gabapentin Other (comments)    Neomycin Rash    Polysporin [Bacitracin-Polymyxin B] Rash    Protonix [Pantoprazole] Other (comments)     Gi upset       Current Facility-Administered Medications   Medication Dose Route Frequency    dilTIAZem (CARDIZEM) 100 mg in 0.9% sodium chloride (MBP/ADV) 100 mL infusion  0-15 mg/hr IntraVENous TITRATE    enoxaparin (LOVENOX) injection 100 mg  1 mg/kg SubCUTAneous Q12H    amiodarone (CORDARONE) 375 mg in dextrose 5% 250 mL infusion  0.5-1 mg/min IntraVENous TITRATE    metoprolol tartrate (LOPRESSOR) tablet 25 mg  25 mg Oral Q6H    cholecalciferol (VITAMIN D3) (1000 Units /25 mcg) tablet 2,000 Units  2,000 Units Oral DAILY    donepeziL (ARICEPT) tablet 10 mg  10 mg Oral QHS    [Held by provider] DULoxetine (CYMBALTA) capsule 60 mg  60 mg Oral DAILY    levothyroxine (SYNTHROID) tablet 150 mcg  150 mcg Oral ACB    [Held by provider] valsartan (DIOVAN) tablet 320 mg  320 mg Oral DAILY    triamcinolone acetonide (KENALOG) 0.1 % cream   Topical BID    timolol (TIMOPTIC) 0.5 % ophthalmic solution 1 Drop  1 Drop Both Eyes DAILY    sodium chloride (BRANDON 128) 2 % ophthalmic drops 1 Drop (Patient Supplied)  1 Drop Left Eye BID    sodium chloride (NS) flush 5-40 mL  5-40 mL IntraVENous Q8H    sodium chloride (NS) flush 5-40 mL  5-40 mL IntraVENous PRN    acetaminophen (TYLENOL) suppository 650 mg  650 mg Rectal Q6H PRN    polyethylene glycol (MIRALAX) packet 17 g  17 g Oral DAILY PRN    ondansetron (ZOFRAN) injection 4 mg  4 mg IntraVENous Q6H PRN    albuterol (ACCUNEB) nebulizer solution 1.25 mg  1.25 mg Nebulization Q6H PRN         REVIEW OF SYSTEMS   12 point ROS negative except as stated in the HPI. Physical Exam:   Visit Vitals  /71 (BP 1 Location: Left upper arm, BP Patient Position: At rest)   Pulse 88   Temp 98.6 °F (37 °C)   Resp 20   Ht 5' 7\" (1.702 m)   Wt 95.7 kg (211 lb)   SpO2 98%   BMI 33.05 kg/m²       General:  Skeeping, no acute distress, appears stated age. Head:  Normocephalic, without obvious abnormality, atraumatic. Eyes:  Conjunctivae/corneas clear. Nose: Nares normal. Septum midline. Throat: Lips, mucosa, and tongue normal.    Neck: Supple, symmetrical, trachea midline   Lungs:   Clear to auscultation bilaterally, respirations non-labored    Chest wall:  No tenderness or deformity. Heart:  Regular rate and rhythm, S1, S2 normal, no murmur, click, rub or gallop. Abdomen:   Soft, non-tender. Bowel sounds normal. Obese. Extremities: Extremities normal, atraumatic, no cyanosis or edema. Pulses: 2+ and symmetric all extremities. Skin: Skin color, texture, turgor normal. No rashes or lesions   Neurologic: Opens eyes in response to name. Quickly falls back to sleep.        Lab Results   Component Value Date/Time    Sodium 134 (L) 06/08/2022 02:39 AM    Potassium 3.8 06/08/2022 02:39 AM    Chloride 95 (L) 06/08/2022 02:39 AM    CO2 32 06/08/2022 02:39 AM    BUN 57 (H) 06/08/2022 02:39 AM    Creatinine 1.33 (H) 06/08/2022 02:39 AM    Glucose 177 (H) 06/08/2022 02:39 AM    Calcium 8.9 06/08/2022 02:39 AM    Magnesium 2.4 06/06/2022 09:46 PM    Phosphorus 3.3 06/06/2022 09:46 PM    Lactic acid 0.8 06/04/2022 03:59 PM       Lab Results   Component Value Date/Time    WBC 9.8 06/08/2022 02:39 AM    HGB 11.9 06/08/2022 02:39 AM    PLATELET 724 00/50/1020 02:39 AM    MCV 99.5 (H) 06/08/2022 02:39 AM       Lab Results   Component Value Date/Time    INR 1.1 06/04/2022 11:07 PM    aPTT 64.3 (H) 06/05/2022 09:25 AM    Alk.  phosphatase 88 06/06/2022 09:46 PM    Protein, total 6.8 06/06/2022 09:46 PM    Albumin 2.8 (L) 06/06/2022 09:46 PM    Globulin 4.0 06/06/2022 09:46 PM       No results found for: IRON, FE, TIBC, IBCT, PSAT, FERR    Lab Results   Component Value Date/Time    Sed rate (ESR) 4 08/10/2017 12:00 AM    TSH 3.79 (H) 06/06/2022 09:46 PM        No results found for: PH, PHI, PCO2, PCO2I, PO2, PO2I, HCO3, HCO3I, FIO2, FIO2I    No results found for: CPK, RCK1, RCK2, RCK3, RCK4, CKNDX, CKND1, TROPT, TROIQ, BNPP, BNP     Lab Results   Component Value Date/Time    Culture result: CULTURE IN PROGRESS,FURTHER UPDATES TO FOLLOW 06/04/2022 06:12 PM    Culture result: NO GROWTH 4 DAYS 06/04/2022 03:59 PM    Culture result: MIXED UROGENITAL NEVILLE ISOLATED 07/08/2021 10:36 AM       No results found for: TOXA1, RPR, HBCM, HBSAG, HAAB, HCAB1, HAAT, G6PD, CRYAC, HIVGT, HIVR, HIV1, HIV12, HIVPC, HIVRPI    No results found for: VANCT, CPK    Lab Results   Component Value Date/Time    Color YELLOW/STRAW 06/04/2022 06:12 PM    Appearance CLOUDY (A) 06/04/2022 06:12 PM    pH (UA) 6.5 06/04/2022 06:12 PM    Protein 30 (A) 06/04/2022 06:12 PM    Glucose Negative 06/04/2022 06:12 PM    Ketone Negative 06/04/2022 06:12 PM    Bilirubin Negative 06/04/2022 06:12 PM    Blood Negative 06/04/2022 06:12 PM    Urobilinogen 0.2 06/04/2022 06:12 PM    Nitrites Negative 06/04/2022 06:12 PM    Leukocyte Esterase Negative 06/04/2022 06:12 PM    WBC 0-4 06/04/2022 06:12 PM    RBC 0-5 06/04/2022 06:12 PM    Epithelial cells 0-10 08/18/2015 02:41 PM    Bacteria 4+ (A) 06/04/2022 06:12 PM       IMPRESSION  · Acute on chronic respiratory failure with hypoxia and hypercarbia  · Metabolic acidosis  · Suspected OHS/TYRONE  · Altered mental status  · Diastolic heart failure  · Obesity  · Dementia  · Pulmonary nodules      PLAN  · Goal sats 88% or higher, wean supplemental O2 as tolerated  · BiPAP/NIV while sleeping, currently lethargic and needs to remain on until she is more awake/interactive  · Case management consult placed Trilogy. A non-invasive ventilator is being ordered for the patient because CPAP and BiPAP will not provide the necessary ventilatory support or settings needed to treat this patient with respiratory failure caused by OHS.   · PRN nebs  · CTA with bilateral pulmonary nodules up to 9mm in size; consider outpatient surveillance in 6 months  · Ok to eat as long as she is not requiring continuous BiPAP  · She is DNR    TOLU Pillai

## 2022-06-08 NOTE — PROGRESS NOTES
CARDIOLOGY PROGRESS NOTE        [address], [place], [address] [facility]  Phone [phone]; Fax [phone]          2022 7:40 AM       Admit Date:           2022  Admit Diagnosis:  Encephalopathy acute [G93.40]  :          1940   MRN:          [id number]        Assessment/Plan  1.  acute hypoxic resp failure: multifactorial: ? chf, hypoventilation syndrome, likely has TYRONE. pBNP 345 . ECHO shows EF of 55 - 60%. Left ventricle size is normal. Mildly increased wall thickness. Findings consistent with mild concentric hypertrophy. Normal wall motion. Grade I diastolic dysfunction with normal LAP. 2. Hx PAFlutter : Rate 80's D/W EP will resume home sotalol when Qtc < 450. Cont BB Q 6 hours for now. No previous anticoagulation on [facility] cardiology records. On full dose lovenox. Add eliquis 5 mg BID at discharge, d/w family 6.7     3. Metabolic alkalosis: Suspect due to chronic respiratory acidosis       4. RAN:   -Cr 1.33       5. Dementia:   On donepezil and Cymbalta     6. HTN   - hold diovan given initiation of BB      CARDIOLOGY ATTENDING  Patient personally seen and examined. All the elements of history and examination were personally performed. Assessment and plan was discussed and agree. Still requiring bipap. On Bipap.  hrt irreg irreg, no murmur, good BS ant, no edema     Afib with RVR --> long history of PAF. Will try and get her back on Sotalol as long as she can take PO meds. Acute resp failure - per pulmonary - doubt problems related to CHF      Eliud Walters MD, [facility] [place], [place]                     We discussed the expected course, resolution and complications of the diagnosis(es) in detail. No intake/output data recorded.     Last 3 Recorded Weights in this Encounter    22 1544 22 1420   Weight: 95.7 kg (211 lb) 95.7 kg (211 lb)          1901 -  0700  In: 792.3 [I.V.:792.3]  Out: 7262 [ONL:4092]    SUBJECTIVE Admitted for hypoxia, FTT          Tello Morales reports none due to on Bi Pap.        Current Facility-Administered Medications   Medication Dose Route Frequency    dilTIAZem (CARDIZEM) 100 mg in 0.9% sodium chloride (MBP/ADV) 100 mL infusion  0-15 mg/hr IntraVENous TITRATE    enoxaparin (LOVENOX) injection 100 mg  1 mg/kg SubCUTAneous Q12H    amiodarone (CORDARONE) 375 mg in dextrose 5% 250 mL infusion  0.5-1 mg/min IntraVENous TITRATE    metoprolol tartrate (LOPRESSOR) tablet 25 mg  25 mg Oral Q6H    cholecalciferol (VITAMIN D3) (1000 Units /25 mcg) tablet 2,000 Units  2,000 Units Oral DAILY    donepeziL (ARICEPT) tablet 10 mg  10 mg Oral QHS    [Held by provider] DULoxetine (CYMBALTA) capsule 60 mg  60 mg Oral DAILY    levothyroxine (SYNTHROID) tablet 150 mcg  150 mcg Oral ACB    [Held by provider] valsartan (DIOVAN) tablet 320 mg  320 mg Oral DAILY    triamcinolone acetonide (KENALOG) 0.1 % cream   Topical BID    timolol (TIMOPTIC) 0.5 % ophthalmic solution 1 Drop  1 Drop Both Eyes DAILY    sodium chloride (BRANDON 128) 2 % ophthalmic drops 1 Drop (Patient Supplied)  1 Drop Left Eye BID    sodium chloride (NS) flush 5-40 mL  5-40 mL IntraVENous Q8H    sodium chloride (NS) flush 5-40 mL  5-40 mL IntraVENous PRN    acetaminophen (TYLENOL) suppository 650 mg  650 mg Rectal Q6H PRN    polyethylene glycol (MIRALAX) packet 17 g  17 g Oral DAILY PRN    ondansetron (ZOFRAN) injection 4 mg  4 mg IntraVENous Q6H PRN    albuterol (ACCUNEB) nebulizer solution 1.25 mg  1.25 mg Nebulization Q6H PRN      OBJECTIVE               Intake/Output Summary (Last 24 hours) at 6/8/2022 0801  Last data filed at 6/8/2022 0600  Gross per 24 hour   Intake 552.67 ml   Output 800 ml   Net -247.33 ml       Review of Systems - History obtained from the patient AS PER  HPI        PHYSICAL EXAM        Visit Vitals  /71 (BP 1 Location: Left upper arm, BP Patient Position: At rest)   Pulse 88   Temp 98.6 °F (37 °C)   Resp 20   Ht 5' 7\" (1.702 m)   Wt 95.7 kg (211 lb)   SpO2 98%   BMI 33.05 kg/m²       Gen: arousable to voice, no verbalization   HEENT:  Pink conjunctivae,  Oral mucosa dry   Neck: Supple,No JVD, No Carotid Bruit, Thyroid- non tender No cervical lymphadenopathy  Resp: No accessory muscle use, Clear breath sounds, No rales or rhonchi  Card:  irregular Rythm,   Normal S1, S2, No murmurs, rubs or gallop. MSK: No cyanosis or clubbing, good capillary refill  Skin: psoriasis lesions  Neuro:  Moving all four extremities, no focal deficit, follows commands appropriately  Psych:  ? Insight   LE: No edema       DATA REVIEW      06/04/22    ECHO ADULT COMPLETE 06/05/2022 6/5/2022    Interpretation Summary    Left Ventricle: Normal left ventricular systolic function with a visually estimated EF of 55 - 60%. Left ventricle size is normal. Mildly increased wall thickness. Findings consistent with mild concentric hypertrophy. Normal wall motion. Grade I diastolic dysfunction with normal LAP.   Right Ventricle: Right ventricle is mildly dilated.   Aortic Valve: Mild regurgitation with a centrally directed jet.   Left Atrium: Left atrium is mildly dilated.   Right Atrium: Right atrium is mildly dilated. Signed by: Tin Grijalva MD on 6/5/2022  3:21 PM    [facility] cardiology records  CHF  CAD  PA flutter 2015 converting to SR ECHO (2015) EF 50%-55% , betapace 120 mg BID        Cardiac monitor: a fib         Laboratory and Imaging have been reviewed by me and are notable for  No results for input(s): CPK, CKMB, TROIQ in the last 72 hours.   Recent Labs     06/08/22  0239 06/07/22  0420 06/06/22  2146   * 142 143   K 3.8 4.1 3.9   CO2 32 32 34*   BUN 57* 51* 46*   CREA 1.33* 1.16* 1.03*   * 95 89   PHOS  --   --  3.3   MG  --   --  2.4   WBC 9.8 9.7 10.7   HGB 11.9 12.8 12.8   HCT 37.1 41.1 40.9    257 252

## 2022-06-08 NOTE — PROGRESS NOTES
0700- Bedside shift change report given to Nimisha Quiroz (oncoming nurse) by Marie Spears (offgoing nurse). Report included the following information SBAR, Kardex, ED Summary, Intake/Output, MAR, Recent Results, Cardiac Rhythm afib and Alarm Parameters . Primary Nurse Barbi Clements RN and Marie Spears RN performed a dual skin assessment on this patient No impairment noted  Ivan score is 14.    0800- Shift assessment complete, see doc flowsheets. 56- Pts  at bedside. Writer explained why patient was still on BiPAP and answered other questions asked by . Barbi Clements RN. 1200- Reassessment complete, see doc flowsheets. 1400- PT working with pt at this time. Barbi Clements RN.     1947- Pt stated she wanted to go back to sleep. BiPAP placed back on patient, call bell in place, bed locked and in lowest position. Barbi Clements RN. 1600- Reassessment complete, see doc flowsheets. 1900- Bedside shift change report given to Josiah Morley (oncoming nurse) by Nimisha Quiroz (offgoing nurse). Report included the following information SBAR, Kardex, ED Summary, Intake/Output, MAR, Recent Results, Cardiac Rhythm afib and Alarm Parameters .

## 2022-06-08 NOTE — PROGRESS NOTES
Transition of care note:    RUR 13%(low readmission risk score)    I discussed pt with  attending ,pulmonologist and pulmonology PA. Trilogy ordered and sent through [employer] to [facility]. I met with pt and pt.'s .  state that typically he and his daughter care for pt @ home but agree that pt will need SNF for rehab for building up her strength and endurance. First choices for SNF placement is  [facility]. Second choice is  the [facility]. Referral to [facility] including chest xray sent through [employer]. Referral to [facility] sent through cc link. For SNF,pt will need BiPaP orders for the SNF to obtain a BiPaP ahead of time for pt. Pulmonology has agreed to write orders.     Rufus Munguia Serve

## 2022-06-08 NOTE — PROGRESS NOTES
I appreciate pulmonology providing me with BiPaP orders for SNF. Orders faxed through [employer] to [facility]ylie Ang. I also notified [facility] and [facility] regarding BiPaP orders through the cc link .     Argentina Bran

## 2022-06-08 NOTE — PROGRESS NOTES
Problem: Mobility Impaired (Adult and Pediatric)  Goal: *Acute Goals and Plan of Care (Insert Text)  Description: FUNCTIONAL STATUS PRIOR TO ADMISSION: Unable to ascertain due to patient's AMD, dementia. HOME SUPPORT PRIOR TO ADMISSION: The patient lived with her  and daughter but unsure what assistance she may have required. Physical Therapy Goals  Initiated 6/7/2022  1. Patient will move from supine to sit and sit to supine , scoot up and down, and roll side to side in bed with minimal assistance/contact guard assist within 7 day(s). 2.  Patient will transfer from bed to chair and chair to bed with minimal assistance/contact guard assist using the least restrictive device within 7 day(s). 3.  Patient will perform sit to stand with minimal assistance/contact guard assist within 7 day(s). 4.  Patient will ambulate with minimal assistance/contact guard assist for 75 feet with the least restrictive device within 7 day(s). Outcome: Progressing Towards Goal   PHYSICAL THERAPY TREATMENT  Patient: Darren Clark (40 y.o. female)  Date: 6/8/2022  Diagnosis: Encephalopathy acute [G93.40] <principal problem not specified>       Precautions:    Chart, physical therapy assessment, plan of care and goals were reviewed. ASSESSMENT  Patient continues with skilled PT services and is not progressing towards goals. Pt received supine in bed having slid down to foot of the bed. Pt with poor tolerance to any activity and requires near constant stimuli to maintain alertness. Max A for rolling. Mod A x 2 to pull into long sitting for ~10 seconds prior to pt lying back against the mattress. Pt resistant to encouragement to transition to sitting EOB. Poor engagement with supine bed level exercises. Assisted in lunch tray set up. Pt ate two bites of dessert after being handed the preloaded spoon but requires significant time to initiate movement to her mouth. HR ranging from 102-133BPM with bed level activity. If pt's volition and and mental state is not improved by tomorrow recommend decrease in frequency to 3x/week. Current Level of Function Impacting Discharge (mobility/balance): Max A bed mobility    Other factors to consider for discharge: dementia         PLAN :  Patient continues to benefit from skilled intervention to address the above impairments. Continue treatment per established plan of care. to address goals. Recommendation for discharge: (in order for the patient to meet his/her long term goals)  SNF    This discharge recommendation:  Has been made in collaboration with the attending provider and/or case management    IF patient discharges home will need the following DME: to be determined (TBD)       SUBJECTIVE:   Patient stated no.     OBJECTIVE DATA SUMMARY:   Critical Behavior:  Neurologic State: Tamica Trevor open to voice  Orientation Level: Oriented to person,Disoriented to time,Disoriented to situation,Disoriented to place  Cognition: Follows commands  Safety/Judgement: Awareness of environment  Functional Mobility Training:  Bed Mobility:  Rolling: Maximum assistance                 Transfers:                                   Balance:  Sitting: Impaired (could not maintain long sitting without Mod A)  Ambulation/Gait Training:                                                Activity Tolerance:   Poor    After treatment patient left in no apparent distress:   Supine in bed, Heels elevated for pressure relief, Call bell within reach, and Side rails x 3    COMMUNICATION/COLLABORATION:   The patients plan of care was discussed with: Registered nurse.      Pb Osborne, PT   Time Calculation: 11 mins

## 2022-06-09 LAB
ANION GAP SERPL CALC-SCNC: 6 MMOL/L (ref 5–15)
BASOPHILS # BLD: 0.1 K/UL (ref 0–0.1)
BASOPHILS NFR BLD: 1 % (ref 0–1)
BUN SERPL-MCNC: 50 MG/DL (ref 6–20)
BUN/CREAT SERPL: 43 (ref 12–20)
CALCIUM SERPL-MCNC: 9 MG/DL (ref 8.5–10.1)
CHLORIDE SERPL-SCNC: 99 MMOL/L (ref 97–108)
CO2 SERPL-SCNC: 32 MMOL/L (ref 21–32)
CREAT SERPL-MCNC: 1.17 MG/DL (ref 0.55–1.02)
DIFFERENTIAL METHOD BLD: NORMAL
EOSINOPHIL # BLD: 0.1 K/UL (ref 0–0.4)
EOSINOPHIL NFR BLD: 1 % (ref 0–7)
ERYTHROCYTE [DISTWIDTH] IN BLOOD BY AUTOMATED COUNT: 14.3 % (ref 11.5–14.5)
GLUCOSE SERPL-MCNC: 99 MG/DL (ref 65–100)
HCT VFR BLD AUTO: 38.4 % (ref 35–47)
HGB BLD-MCNC: 12.5 G/DL (ref 11.5–16)
IMM GRANULOCYTES # BLD AUTO: 0 K/UL (ref 0–0.04)
IMM GRANULOCYTES NFR BLD AUTO: 0 % (ref 0–0.5)
LYMPHOCYTES # BLD: 1.5 K/UL (ref 0.8–3.5)
LYMPHOCYTES NFR BLD: 16 % (ref 12–49)
MCH RBC QN AUTO: 32.1 PG (ref 26–34)
MCHC RBC AUTO-ENTMCNC: 32.6 G/DL (ref 30–36.5)
MCV RBC AUTO: 98.5 FL (ref 80–99)
MONOCYTES # BLD: 0.9 K/UL (ref 0–1)
MONOCYTES NFR BLD: 9 % (ref 5–13)
NEUTS SEG # BLD: 7.3 K/UL (ref 1.8–8)
NEUTS SEG NFR BLD: 73 % (ref 32–75)
NRBC # BLD: 0 K/UL (ref 0–0.01)
NRBC BLD-RTO: 0 PER 100 WBC
PLATELET # BLD AUTO: 220 K/UL (ref 150–400)
PMV BLD AUTO: 11.6 FL (ref 8.9–12.9)
POTASSIUM SERPL-SCNC: 3.8 MMOL/L (ref 3.5–5.1)
RBC # BLD AUTO: 3.9 M/UL (ref 3.8–5.2)
SODIUM SERPL-SCNC: 137 MMOL/L (ref 136–145)
WBC # BLD AUTO: 9.9 K/UL (ref 3.6–11)

## 2022-06-09 PROCEDURE — 74011250636 HC RX REV CODE- 250/636: Performed by: STUDENT IN AN ORGANIZED HEALTH CARE EDUCATION/TRAINING PROGRAM

## 2022-06-09 PROCEDURE — 74011000250 HC RX REV CODE- 250: Performed by: INTERNAL MEDICINE

## 2022-06-09 PROCEDURE — 74011250637 HC RX REV CODE- 250/637: Performed by: SPECIALIST

## 2022-06-09 PROCEDURE — 74011250637 HC RX REV CODE- 250/637: Performed by: NURSE PRACTITIONER

## 2022-06-09 PROCEDURE — 77010033678 HC OXYGEN DAILY

## 2022-06-09 PROCEDURE — 80048 BASIC METABOLIC PNL TOTAL CA: CPT

## 2022-06-09 PROCEDURE — 97530 THERAPEUTIC ACTIVITIES: CPT

## 2022-06-09 PROCEDURE — 97535 SELF CARE MNGMENT TRAINING: CPT

## 2022-06-09 PROCEDURE — 94761 N-INVAS EAR/PLS OXIMETRY MLT: CPT

## 2022-06-09 PROCEDURE — 65270000046 HC RM TELEMETRY

## 2022-06-09 PROCEDURE — 94660 CPAP INITIATION&MGMT: CPT

## 2022-06-09 PROCEDURE — APPSS30 APP SPLIT SHARED TIME 16-30 MINUTES: Performed by: NURSE PRACTITIONER

## 2022-06-09 PROCEDURE — 74011250637 HC RX REV CODE- 250/637: Performed by: FAMILY MEDICINE

## 2022-06-09 PROCEDURE — 99232 SBSQ HOSP IP/OBS MODERATE 35: CPT | Performed by: SPECIALIST

## 2022-06-09 PROCEDURE — 85025 COMPLETE CBC W/AUTO DIFF WBC: CPT

## 2022-06-09 PROCEDURE — 36415 COLL VENOUS BLD VENIPUNCTURE: CPT

## 2022-06-09 PROCEDURE — 94003 VENT MGMT INPAT SUBQ DAY: CPT

## 2022-06-09 RX ORDER — METOPROLOL TARTRATE 25 MG/1
25 TABLET, FILM COATED ORAL EVERY 8 HOURS
Status: DISCONTINUED | OUTPATIENT
Start: 2022-06-09 | End: 2022-06-11

## 2022-06-09 RX ADMIN — ENOXAPARIN SODIUM 100 MG: 100 INJECTION SUBCUTANEOUS at 17:38

## 2022-06-09 RX ADMIN — DONEPEZIL HYDROCHLORIDE 10 MG: 5 TABLET, FILM COATED ORAL at 22:15

## 2022-06-09 RX ADMIN — Medication 2000 UNITS: at 08:19

## 2022-06-09 RX ADMIN — SOTALOL HYDROCHLORIDE 80 MG: 80 TABLET ORAL at 14:35

## 2022-06-09 RX ADMIN — TIMOLOL MALEATE 1 DROP: 5 SOLUTION/ DROPS OPHTHALMIC at 08:20

## 2022-06-09 RX ADMIN — METOPROLOL TARTRATE 25 MG: 25 TABLET, FILM COATED ORAL at 23:30

## 2022-06-09 RX ADMIN — TRIAMCINOLONE ACETONIDE: 1 CREAM TOPICAL at 08:19

## 2022-06-09 RX ADMIN — ENOXAPARIN SODIUM 100 MG: 100 INJECTION SUBCUTANEOUS at 05:35

## 2022-06-09 RX ADMIN — Medication 10 ML: at 14:35

## 2022-06-09 RX ADMIN — LEVOTHYROXINE SODIUM 150 MCG: 0.07 TABLET ORAL at 07:05

## 2022-06-09 RX ADMIN — Medication 10 ML: at 22:15

## 2022-06-09 RX ADMIN — Medication 10 ML: at 05:36

## 2022-06-09 RX ADMIN — TRIAMCINOLONE ACETONIDE: 1 CREAM TOPICAL at 18:23

## 2022-06-09 NOTE — PROGRESS NOTES
Name: [address]: [address]   : 1940 Admit Date: 2022   Phone: [phone]  Room: [facility]/   PCP: Ana Ramirez MD  MRN: [id number]   Date: 2022  Code: DNR          Chart and notes reviewed. Data reviewed. I review the patient's current medications in the medical record at each encounter. I have evaluated and examined the patient. History of Present Illness:  Ms. Earnest Albrecht is an 78 yo woman with a history of HTN, afib, CKD prior thyroid cancer s/p thyroidectom, prior subdural hematoma, dementia and obesity who is admitted with AMS and acute on chronic respiratory failure with hypoxia and hypercapnia. She comes in with altered mental status and found to be hypoxic and hypercapnic requiring BiPAP. Per report she was recently started on a diuretic and supplemental O2 after being found to be in heart failure and hypoxic. She is able to indicated to me that she is not short of breath on BiPAP currently, but still lethargic. Labs: WBC 9.8, Hgb 11.7, , HCO3 >40, cr 1.31, proBNP 345, ABG 7.37/77/82 on BiPAP    Images reviewed:  CXR 2022: increased interstitial markings concerning for pulmonary edema    TTE 2022: EF 85-88%, grade 1 diastolic dysfunction    Interval History:  Afebrile  Tachycardic overnight and remains on amiodarone drip  BP soft but stable  Sats 92-98% on 1L  Bicarb 32 -  stable  Creat 1.17 - better    ROS: Awake and answers yes/no questions, but responses do not all seem appropriate. Oriented to self only. Wore BiPAP overnight.       Past Medical History:   Diagnosis Date    Arthritis     Basal cell carcinoma 2012    Calculus of kidney     Cancer ([address]     skin    Cancer ([address]     thyroid    Glaucoma 2010    Hypertension     OA (osteoarthritis) 2010    Psoriasis 2010       Past Surgical History:   Procedure Laterality Date    HX CATARACT REMOVAL      bilat    HX CHOLECYSTECTOMY      HX CRANIOTOMY  HX DILATION AND CURETTAGE      HX HYSTERECTOMY      HX KNEE ARTHROSCOPY      HX KNEE REPLACEMENT      HX TONSILLECTOMY      HX WRIST FRACTURE TX Left 8/21/15    NC THYROIDECTOMY         Family History   Problem Relation Age of Onset    Hypertension Mother     Heart Disease Mother     Heart Disease Father     Hypertension Father    [facility] Elevated Lipids Father     Heart Disease Brother     Psychiatric Disorder Brother     Diabetes Brother     Breast Cancer Maternal Aunt     Breast Cancer Paternal Aunt        Social History     Tobacco Use    Smoking status: Former Smoker     Packs/day: 0.50     Years: 4.00     Pack years: 2.00     Types: Cigarettes     Quit date: 1961     Years since quittin.4    Smokeless tobacco: Never Used   Substance Use Topics    Alcohol use: No     Alcohol/week: 0.0 standard drinks       Allergies   Allergen Reactions    Cephalexin Itching    Codeine Rash    Gabapentin Other (comments)    Neomycin Rash    Polysporin [Bacitracin-Polymyxin B] Rash    Protonix [Pantoprazole] Other (comments)     Gi upset       Current Facility-Administered Medications   Medication Dose Route Frequency    [Held by provider] sotaloL (BETAPACE) tablet 80 mg  80 mg Oral Q24H    [Held by provider] metoprolol tartrate (LOPRESSOR) tablet 25 mg  25 mg Oral Q6H    dilTIAZem (CARDIZEM) 100 mg in 0.9% sodium chloride (MBP/ADV) 100 mL infusion  0-15 mg/hr IntraVENous TITRATE    enoxaparin (LOVENOX) injection 100 mg  1 mg/kg SubCUTAneous Q12H    cholecalciferol (VITAMIN D3) (1000 Units /25 mcg) tablet 2,000 Units  2,000 Units Oral DAILY    donepeziL (ARICEPT) tablet 10 mg  10 mg Oral QHS    [Held by provider] DULoxetine (CYMBALTA) capsule 60 mg  60 mg Oral DAILY    levothyroxine (SYNTHROID) tablet 150 mcg  150 mcg Oral ACB    [Held by provider] valsartan (DIOVAN) tablet 320 mg  320 mg Oral DAILY    triamcinolone acetonide (KENALOG) 0.1 % cream   Topical BID    timolol (TIMOPTIC) 0.5 % ophthalmic solution 1 Drop  1 Drop Both Eyes DAILY    sodium chloride (BRANDON 128) 2 % ophthalmic drops 1 Drop (Patient Supplied)  1 Drop Left Eye BID    sodium chloride (NS) flush 5-40 mL  5-40 mL IntraVENous Q8H    sodium chloride (NS) flush 5-40 mL  5-40 mL IntraVENous PRN    acetaminophen (TYLENOL) suppository 650 mg  650 mg Rectal Q6H PRN    polyethylene glycol (MIRALAX) packet 17 g  17 g Oral DAILY PRN    ondansetron (ZOFRAN) injection 4 mg  4 mg IntraVENous Q6H PRN    albuterol (ACCUNEB) nebulizer solution 1.25 mg  1.25 mg Nebulization Q6H PRN         REVIEW OF SYSTEMS   12 point ROS negative except as stated in the HPI. Physical Exam:   Visit Vitals  BP (!) 101/56   Pulse 75   Temp 98.6 °F (37 °C)   Resp 20   Ht 5' 7\" (1.702 m)   Wt 95.7 kg (211 lb)   SpO2 95%   BMI 33.05 kg/m²       General:  Skeeping, no acute distress, appears stated age. Head:  Normocephalic, without obvious abnormality, atraumatic. Eyes:  Conjunctivae/corneas clear. Nose: Nares normal. Septum midline. Throat: Lips, mucosa, and tongue normal.    Neck: Supple, symmetrical, trachea midline   Lungs:   Clear to auscultation bilaterally, respirations non-labored    Chest wall:  No tenderness or deformity. Heart:  Regular rate and rhythm, S1, S2 normal, no murmur, click, rub or gallop. Abdomen:   Soft, non-tender. Bowel sounds normal. Obese. Extremities: Extremities normal, atraumatic, no cyanosis or edema. Pulses: 2+ and symmetric all extremities. Skin: Skin color, texture, turgor normal. No rashes or lesions   Neurologic: Opens eyes in response to name. Quickly falls back to sleep.        Lab Results   Component Value Date/Time    Sodium 137 06/09/2022 02:23 AM    Potassium 3.8 06/09/2022 02:23 AM    Chloride 99 06/09/2022 02:23 AM    CO2 32 06/09/2022 02:23 AM    BUN 50 (H) 06/09/2022 02:23 AM    Creatinine 1.17 (H) 06/09/2022 02:23 AM    Glucose 99 06/09/2022 02:23 AM    Calcium 9.0 06/09/2022 02:23 AM Magnesium 2.4 06/06/2022 09:46 PM    Phosphorus 3.3 06/06/2022 09:46 PM    Lactic acid 0.8 06/04/2022 03:59 PM       Lab Results   Component Value Date/Time    WBC 9.9 06/09/2022 02:23 AM    HGB 12.5 06/09/2022 02:23 AM    PLATELET 788 09/29/7801 02:23 AM    MCV 98.5 06/09/2022 02:23 AM       Lab Results   Component Value Date/Time    INR 1.1 06/04/2022 11:07 PM    aPTT 64.3 (H) 06/05/2022 09:25 AM    Alk.  phosphatase 88 06/06/2022 09:46 PM    Protein, total 6.8 06/06/2022 09:46 PM    Albumin 2.8 (L) 06/06/2022 09:46 PM    Globulin 4.0 06/06/2022 09:46 PM       No results found for: IRON, FE, TIBC, IBCT, PSAT, FERR    Lab Results   Component Value Date/Time    Sed rate (ESR) 4 08/10/2017 12:00 AM    TSH 3.79 (H) 06/06/2022 09:46 PM        No results found for: PH, PHI, PCO2, PCO2I, PO2, PO2I, HCO3, HCO3I, FIO2, FIO2I    No results found for: CPK, RCK1, RCK2, RCK3, RCK4, CKNDX, CKND1, TROPT, TROIQ, BNPP, BNP     Lab Results   Component Value Date/Time    Culture result: MIXED UROGENITAL NEVILLE ISOLATED 06/04/2022 06:12 PM    Culture result: NO GROWTH 5 DAYS 06/04/2022 03:59 PM    Culture result: MIXED UROGENITAL NEVILLE ISOLATED 07/08/2021 10:36 AM       No results found for: TOXA1, RPR, HBCM, HBSAG, HAAB, HCAB1, HAAT, G6PD, CRYAC, HIVGT, HIVR, HIV1, HIV12, HIVPC, HIVRPI    No results found for: VANCT, CPK    Lab Results   Component Value Date/Time    Color YELLOW/STRAW 06/04/2022 06:12 PM    Appearance CLOUDY (A) 06/04/2022 06:12 PM    pH (UA) 6.5 06/04/2022 06:12 PM    Protein 30 (A) 06/04/2022 06:12 PM    Glucose Negative 06/04/2022 06:12 PM    Ketone Negative 06/04/2022 06:12 PM    Bilirubin Negative 06/04/2022 06:12 PM    Blood Negative 06/04/2022 06:12 PM    Urobilinogen 0.2 06/04/2022 06:12 PM    Nitrites Negative 06/04/2022 06:12 PM    Leukocyte Esterase Negative 06/04/2022 06:12 PM    WBC 0-4 06/04/2022 06:12 PM    RBC 0-5 06/04/2022 06:12 PM    Epithelial cells 0-10 08/18/2015 02:41 PM    Bacteria 4+ (A) 06/04/2022 06:12 PM       IMPRESSION  · Acute on chronic respiratory failure with hypoxia and hypercarbia  · Metabolic acidosis  · Suspected OHS/TYRONE  · Altered mental status  · Diastolic heart failure  · Obesity  · Dementia  · Pulmonary nodules      PLAN  · Goal sats 88% or higher, wean supplemental O2 as tolerated  · BiPAP/NIV while sleeping, currently lethargic and needs to remain on until she is more awake/interactive  · BiPAP ordered in place of planned Trilogy as she is likely going to SNF for rehab at discharge  · PRN nebs  · CTA with bilateral pulmonary nodules up to 9mm in size; consider outpatient surveillance in 6 months  · Ok to eat as long as she is not requiring continuous BiPAP  · She is DNR    Randy Cross MD

## 2022-06-09 NOTE — PROGRESS NOTES
I contacted pt.'s  to review wit  the plans for discharge tomorrow.  will arrive @ the hospital tomorrow around 930 am so treatment team can answer any questions relating to pt treatment and discharge. I updated  that pt has been accepted @ the [address] of [facility] and the plan is for case management to arrange transportation for pt to go to SNF tomorrow. I am double checking with the [facility] about the bipap to insure they have ordered the bipap for pt as per our conversation this morning. The  tried multiple times to connect me with admissions but could not get through. The  emailed Brit Alcantar in admissions as I need to confirm actual bed availability for tomorrow and to confirm they obtained the BiPaP.     Alisia Lucia

## 2022-06-09 NOTE — PROGRESS NOTES
1900 Bedside shift change report given to Isadora Conn RN (oncoming nurse) by Chrystal Helms RN (offgoing nurse). Report included the following information SBAR, Kardex, ED Summary, Procedure Summary, Intake/Output, MAR, Recent Results, Med Rec Status, Cardiac Rhythm Afib, Alarm Parameters  and Quality Measures. Patient resting quietly in bed. Will assess. Primary Nurse Colonel Kiran RN and Chrystal Helms RN performed a dual skin assessment on this patient Impairment noted- psoriasis and bruising. Ivan score is; see flow sheet. 0700 Bedside shift change report given to Natasha Da Silva RN (oncoming nurse) by Isadora Conn RN (offgoing nurse). Report included the following information SBAR, Kardex, ED Summary, Procedure Summary, Intake/Output, MAR, Recent Results, Med Rec Status, Cardiac Rhythm Afib, Alarm Parameters  and Quality Measures.

## 2022-06-09 NOTE — PROGRESS NOTES
Problem: Self Care Deficits Care Plan (Adult)  Goal: *Acute Goals and Plan of Care (Insert Text)  Description: FUNCTIONAL STATUS PRIOR TO ADMISSION: Unable to ascertain due to patient's cognitive status/dementia. HOME SUPPORT PRIOR TO ADMISSION: The patient lived with her  and daughter but unsure what assistance she may have required. Occupational Therapy Goals  Initiated 6/7/2022  1. Patient will perform self feeding with minimal assistance within 7 day(s). 2.  Patient will perform grooming with moderate assistance  within 7 day(s). 3.  Patient will perform upper body dressing and bathing with moderate assistance  within 7 day(s). 4.  Patient will tolerate sitting EOB while completing functional tasks with moderate assistance within 7 days. 5.  Patient will participate in upper extremity therapeutic exercise/activities with minimal assistance for 10 minutes within 7 day(s). Outcome: Progressing Towards Goal     OCCUPATIONAL THERAPY TREATMENT  Patient: Darren Clark (19 y.o. female)  Date: 6/9/2022  Diagnosis: Encephalopathy acute [G93.40] Acute on chronic respiratory failure with hypoxia and hypercapnia (HCC)       Precautions:    Chart, occupational therapy assessment, plan of care, and goals were reviewed. ASSESSMENT  Patient continues with skilled OT services and is progressing towards goals. Pt today with improved alertness and command following, is received with family at bedside and is oriented x 3. She follows simple commands with increased time and cues. Pt attempted to cross legs in semi-fowlers to participate in LB dressing, but continues to require total A for task. She tolerates increased time at EOB this session to perform seated ADLs, including self feeding, mostly using R UE to complete, with use of utensils and requiring CGA to occasional min A (PT present to facilitate and address balance).  Pt today participates in standing attempts at EOB, but unable to achieve full upright standing with max A x 2. She requires return to supine due to incontinence of bowels and she tolerates rolling to R/L for dependent hygiene. Continue to recommend rehab at discharge. Vitals during therapy session:                       Blood pressure             Heart rate(bpm)          Oxygen saturation  Pre-activity             104/52                         104                              89%  During activity         98/69                           106                              88%                               119/54                         112                              90%                                98/66                          111                              97%  Post activity            121/52                         118                              97%    Current Level of Function Impacting Discharge (ADLs): A x 2 for standing trials at EOB; up to max/total A for ADLs     Other factors to consider for discharge: supportive family         PLAN :  Patient continues to benefit from skilled intervention to address the above impairments. Continue treatment per established plan of care to address goals. Recommend with staff: bed level toileting; ADLs with assistance; encourage participation in ADLs (set up for self feeding)    Recommend next OT session: continue with EOB ADLs; standing trials; UE HEP    Recommendation for discharge: (in order for the patient to meet his/her long term goals)  Therapy up to 5 days/week in SNF setting    This discharge recommendation:  Has been made in collaboration with the attending provider and/or case management    IF patient discharges home will need the following DME: TBD       SUBJECTIVE:   Patient stated okay.     OBJECTIVE DATA SUMMARY:   Cognitive/Behavioral Status:  Neurologic State: Alert  Orientation Level: Oriented to person;Oriented to place;Oriented to time  Cognition: Decreased attention/concentration;Poor safety awareness;Recognition of people/places  Perception: Cues to maintain midline in sitting; Tactile;Verbal;Visual  Perseveration: No perseveration noted  Safety/Judgement: Awareness of environment    Functional Mobility and Transfers for ADLs:  Bed Mobility:  Rolling: Moderate assistance;Assist x1;Additional time  Supine to Sit: Moderate assistance;Assist x1;Additional time  Sit to Supine: Maximum assistance;Assist x2; Additional time    Transfers:  Sit to Stand: Total assistance;Assist x2    Balance:  Sitting: Impaired  Sitting - Static: Fair (occasional)  Sitting - Dynamic: Fair (occasional)  Standing: Impaired  Standing - Static: Poor    ADL Intervention:  Feeding  Feeding Assistance: Stand-by assistance;Minimum assistance (seated EOB: A for sitting balance and to hold plate)  Utensil Management: Set-up; Stand-by assistance  Food to Mouth: Contact guard assistance  Drink to Mouth: Contact guard assistance  Cues: Tactile cues provided;Verbal cues provided;Visual cues provided;Physical assistance    Lower Body Dressing Assistance  Socks: Total assistance (dependent)  Leg Crossed Method Used:  (attempted to cross legs in bed (semi-victoria's))  Position Performed: Other (comment) (semi-victoria's position)  Cues: Don;Physical assistance; Tactile cues provided;Verbal cues provided;Visual cues provided    Toileting  Toileting Assistance: Total assistance(dependent) (bed level due to incontinence)  Bladder Hygiene: Total assistance (dependent)  Bowel Hygiene:  Total assistance (dependent)  Clothing Management: Total assistance (dependent)  Cues: Tactile cues provided;Verbal cues provided;Visual cues provided    Cognitive Retraining  Safety/Judgement: Awareness of environment    Pain:  Pt reporting minimal pain    Activity Tolerance:   Fair    After treatment patient left in no apparent distress:   Supine in bed, Call bell within reach, Caregiver / family present and Side rails x 3    COMMUNICATION/COLLABORATION:   The patients plan of care was discussed with: Physical therapist, Registered nurse and Case management.      Jojo Teague, OT  Time Calculation: 45 mins

## 2022-06-09 NOTE — PROGRESS NOTES
Update-At 's request,I emailed  a list of SNFs. I updated  that [facility] has [facility] pt.and are working on obtaining a BiPaP. [facility] declined accepting pt.     Myron Bermudez

## 2022-06-09 NOTE — PROGRESS NOTES
Problem: Mobility Impaired (Adult and Pediatric)  Goal: *Acute Goals and Plan of Care (Insert Text)  Description: FUNCTIONAL STATUS PRIOR TO ADMISSION: Unable to ascertain due to patient's AMD, dementia. HOME SUPPORT PRIOR TO ADMISSION: The patient lived with her  and daughter but unsure what assistance she may have required. Physical Therapy Goals  Initiated 6/7/2022  1. Patient will move from supine to sit and sit to supine , scoot up and down, and roll side to side in bed with minimal assistance/contact guard assist within 7 day(s). 2.  Patient will transfer from bed to chair and chair to bed with minimal assistance/contact guard assist using the least restrictive device within 7 day(s). 3.  Patient will perform sit to stand with minimal assistance/contact guard assist within 7 day(s). 4.  Patient will ambulate with minimal assistance/contact guard assist for 75 feet with the least restrictive device within 7 day(s). Outcome: Progressing Towards Goal  Note:   PHYSICAL THERAPY TREATMENT  Patient: Duane Forward (17 y.o. female)  Date: 6/9/2022  Diagnosis: Encephalopathy acute [G93.40] Acute on chronic respiratory failure with hypoxia and hypercapnia (HCC)       Precautions:    Chart, physical therapy assessment, plan of care and goals were reviewed. ASSESSMENT  Patient continues with skilled PT services and is progressing towards goals. Patient today with improved alertness and ability to follow commands, was able to sit at edge of bed today x10 min for attempts at standing and for ADL's with OT present during session. Patient demonstrates improved initiation of movement and able to come to sitting at edge of bed with 1 person assist, overall static sitting balance with CGA, dynamic sitting at MIN A level. Patient initial with slight drop in blood pressure but able to stabilize throughout session- see below.   Patient required 2 person assist for sit-supine and attempts at standing, unable to achieve full stand with 3x attempts. Still recommend rehab at discharge, due to patient being at an ambulatory level prior to admission. .     Current Level of Function Impacting Discharge (mobility/balance): MOD A x1 supine-sit, MAX A x2 sit-supine, MOD A x1 rolling right/left, MAX A x2 sit-stand (unable to achieve full erect standing)    Other factors to consider for discharge:          PLAN :  Patient continues to benefit from skilled intervention to address the above impairments. Continue treatment per established plan of care. to address goals. Recommendation for discharge: (in order for the patient to meet his/her long term goals)  Therapy up to 5 days/week in SNF setting    This discharge recommendation:  Has been made in collaboration with the attending provider and/or case management    IF patient discharges home will need the following DME: to be determined (TBD)       SUBJECTIVE:   Patient stated:    OBJECTIVE DATA SUMMARY:   Critical Behavior:  Neurologic State: Noris Spittle open spontaneously  Orientation Level: Oriented to person,Oriented to place,Disoriented to time,Disoriented to situation  Cognition: Decreased attention/concentration,Follows commands,Memory loss,Recognition of people/places  Safety/Judgement: Awareness of environment  Vitals    Blood pressure Heart rate(bpm) Oxygen saturation  Pre-activity 104/52   104   89%  During activity 98/69   106   88%    119/54   112   90%    98/66   111   97%  Post activity 121/52   118   97%    Functional Mobility Training:  Bed Mobility:  Rolling: Moderate assistance;Assist x1;Additional time  Supine to Sit: Moderate assistance;Assist x1;Additional time  Sit to Supine: Maximum assistance;Assist x2; Additional time           Transfers:  Sit to Stand:  Total assistance;Assist x2         Unable to achieve full standing, attempt x3 and only in partial stand position with hand held assist                              Pain Rating:  None reported    Activity Tolerance:   Fair    After treatment patient left in no apparent distress:   Sitting in chair, Call bell within reach, and Bed / chair alarm activated    COMMUNICATION/COLLABORATION:   The patients plan of care was discussed with: Occupational therapist and Registered nurse.      Stephanie Morrison PT, DPT   Time Calculation: 39 mins

## 2022-06-09 NOTE — PROGRESS NOTES
CARDIOLOGY PROGRESS NOTE        [address], [place], [address] [facility]  Phone [phone]; Fax [phone]          2022 7:40 AM       Admit Date:           2022  Admit Diagnosis:  Encephalopathy acute [G93.40]  :          1940   MRN:          [id number]        Assessment/Plan  1.  acute hypoxic resp failure: multifactorial: hypoventilation syndrome, likely has TYRONE. pBNP 345 . ECHO shows EF of 55 - 60%. No signs of volume overload. 2. Hx PAFlutter : will resume home sotalol today Qtc 447. [address] at discharge     3. Metabolic alkalosis: Suspect due to chronic respiratory acidosis       4. RAN:   -Cr 1.17       5. Dementia:   On donepezil and Cymbalta     6. HTN   - [facility]     CARDIOLOGY ATTENDING  Patient personally seen and examined. All the elements of history and examination were personally performed. Assessment and plan was discussed and agree. Events reviewed - patient unable to provide any history   Still requiring bipap. On Bipap.  hrt irreg irreg, no murmur, good BS ant, no edema      1) Afib with RVR   - long history of PAF  - have resumed her outpatient Sotalol however have reduced the dosage / frequency due to renal dysfunction   - Will add some metoprolol for further HR control   - follow QTc while we resume sotalol   - can discharge on eliquis 5 mg BID if no contraindication      2) Acute resp failure - per pulmonary - doubt problems related to CHF - thought to be from obesity hypoventilation / Cecilio Marquez MD, [facility]                     We discussed the expected course, resolution and complications of the diagnosis(es) in detail. No intake/output data recorded. Last 3 Recorded Weights in this Encounter    22 1544 22 1420   Weight: 95.7 kg (211 lb) 95.7 kg (211 lb)          1901 -  0700  In: 786.3 [P.O.:450;  I.V.:336.3]  Out: 1625 [Urine:1625]    SUBJECTIVE      Admitted for hypoxia, FTT Elvera December reports her L leg and foot hurt.        Current Facility-Administered Medications   Medication Dose Route Frequency    [Held by provider] sotaloL (BETAPACE) tablet 80 mg  80 mg Oral Q24H    [Held by provider] metoprolol tartrate (LOPRESSOR) tablet 25 mg  25 mg Oral Q6H    dilTIAZem (CARDIZEM) 100 mg in 0.9% sodium chloride (MBP/ADV) 100 mL infusion  0-15 mg/hr IntraVENous TITRATE    enoxaparin (LOVENOX) injection 100 mg  1 mg/kg SubCUTAneous Q12H    cholecalciferol (VITAMIN D3) (1000 Units /25 mcg) tablet 2,000 Units  2,000 Units Oral DAILY    donepeziL (ARICEPT) tablet 10 mg  10 mg Oral QHS    [Held by provider] DULoxetine (CYMBALTA) capsule 60 mg  60 mg Oral DAILY    levothyroxine (SYNTHROID) tablet 150 mcg  150 mcg Oral ACB    [Held by provider] valsartan (DIOVAN) tablet 320 mg  320 mg Oral DAILY    triamcinolone acetonide (KENALOG) 0.1 % cream   Topical BID    timolol (TIMOPTIC) 0.5 % ophthalmic solution 1 Drop  1 Drop Both Eyes DAILY    sodium chloride (BRANDON 128) 2 % ophthalmic drops 1 Drop (Patient Supplied)  1 Drop Left Eye BID    sodium chloride (NS) flush 5-40 mL  5-40 mL IntraVENous Q8H    sodium chloride (NS) flush 5-40 mL  5-40 mL IntraVENous PRN    acetaminophen (TYLENOL) suppository 650 mg  650 mg Rectal Q6H PRN    polyethylene glycol (MIRALAX) packet 17 g  17 g Oral DAILY PRN    ondansetron (ZOFRAN) injection 4 mg  4 mg IntraVENous Q6H PRN    albuterol (ACCUNEB) nebulizer solution 1.25 mg  1.25 mg Nebulization Q6H PRN      OBJECTIVE               Intake/Output Summary (Last 24 hours) at 6/9/2022 0722  Last data filed at 6/9/2022 0600  Gross per 24 hour   Intake 606.33 ml   Output 1125 ml   Net -518.67 ml       Review of Systems - History obtained from the patient AS PER  HPI        PHYSICAL EXAM        Visit Vitals  BP (!) 101/56   Pulse 75   Temp 98.6 °F (37 °C)   Resp 20   Ht 5' 7\" (1.702 m)   Wt 95.7 kg (211 lb)   SpO2 95%   BMI 33.05 kg/m² Gen: Alert, pleasant, oriented times 1. HEENT:  Pink conjunctivae,  Oral mucosa dry   Neck: Supple,  No JVD, No Carotid Bruit, Thyroid- non tender No cervical lymphadenopathy  Resp: No accessory muscle use, Clear breath sounds, No rales or rhonchi  Card:  irregular Rhythm,   Normal S1, S2, No murmurs, rubs or gallop. MSK: No cyanosis or clubbing, good capillary refill  Skin: psoriasis lesions  Neuro:  Moving all four extremities, no focal deficit, follows commands appropriately  Psych:  ? Insight   LE: No edema       DATA REVIEW      06/04/22    ECHO ADULT COMPLETE 06/05/2022 6/5/2022    Interpretation Summary    Left Ventricle: Normal left ventricular systolic function with a visually estimated EF of 55 - 60%. Left ventricle size is normal. Mildly increased wall thickness. Findings consistent with mild concentric hypertrophy. Normal wall motion. Grade I diastolic dysfunction with normal LAP.   Right Ventricle: Right ventricle is mildly dilated.   Aortic Valve: Mild regurgitation with a centrally directed jet.   Left Atrium: Left atrium is mildly dilated.   Right Atrium: Right atrium is mildly dilated. Signed by: Padmini Khan MD on 6/5/2022  3:21 PM    [facility] cardiology records  CHF  CAD  PA flutter 2015 converting to SR ECHO (2015) EF 50%-55% , betapace 120 mg BID        Cardiac monitor: a fib         Laboratory and Imaging have been reviewed by me and are notable for  No results for input(s): CPK, CKMB, TROIQ in the last 72 hours.   Recent Labs     06/09/22  0223 06/08/22  0239 06/07/22  0420 06/06/22  2146 06/06/22  2146    134* 142   < > 143   K 3.8 3.8 4.1   < > 3.9   CO2 32 32 32   < > 34*   BUN 50* 57* 51*   < > 46*   CREA 1.17* 1.33* 1.16*   < > 1.03*   GLU 99 177* 95   < > 89   PHOS  --   --   --   --  3.3   MG  --   --   --   --  2.4   WBC 9.9 9.8 9.7   < > 10.7   HGB 12.5 11.9 12.8   < > 12.8   HCT 38.4 37.1 41.1   < > 40.9    238 257   < > 252    < > = values in this interval not displayed.

## 2022-06-09 NOTE — PROGRESS NOTES
Owen Luna [facility] [place] [facility] [address]  [address] [facility], [place], [address] [place] [address]  [phone]      Medical Progress Note      NAME:         Em Meza   :        1940  MRM:        [id number]    Date of service: 2022      Chief complaint: Hypoxia on BiPAP    Interval HPI: Patient admitted with acute on chronic respiratory failure with hypoxia, now requiring intermittent BiPAP. She is not able to give much hx. I have discussed with the ICU staff for collaborative hx    Objective:    Vital Signs:    Visit Vitals  /67   Pulse (!) 101   Temp 98 °F (36.7 °C)   Resp 18   Ht 5' 7\" (1.702 m)   Wt 95.7 kg (211 lb)   SpO2 97%   BMI 33.05 kg/m²          Intake/Output Summary (Last 24 hours) at 2022 1512  Last data filed at 2022 1200  Gross per 24 hour   Intake 510 ml   Output 1125 ml   Net -615 ml        Physical Examination:    General:   Weak and frail looking, appears uncomfortable, not I distress   Eyes:   pink conjunctivae, PERRLA with no discharge. ENT:   no ottorrhea or rhinorrhea with dry mucous membranes  Neck: no masses, thyroid non-tender and trachea central.  Pulm:  no accessory muscle use, decreased breath sounds with scattered crackles. + wheezes  Card:  no JVD or murmurs, has regular and normal S1, S2 without thrills, bruits. + peripheral edema  Abd:  Soft, non-tender, non-distended, normoactive bowel sounds   Musc:  No cyanosis, clubbing, atrophy or deformities. Skin:  No rashes, bruising or ulcers. Neuro: Awake and alert but lapses to sleep.  Limited exam.   Psych:  Has little insight to her illness     Current Facility-Administered Medications   Medication Dose Route Frequency    sotaloL (BETAPACE) tablet 80 mg  80 mg Oral Q24H    enoxaparin (LOVENOX) injection 100 mg  1 mg/kg SubCUTAneous Q12H    cholecalciferol (VITAMIN D3) (1000 Units /25 mcg) tablet 2,000 Units  2,000 Units Oral DAILY    donepeziL (ARICEPT) tablet 10 mg  10 mg Oral QHS    [Held by provider] DULoxetine (CYMBALTA) capsule 60 mg  60 mg Oral DAILY    levothyroxine (SYNTHROID) tablet 150 mcg  150 mcg Oral ACB    triamcinolone acetonide (KENALOG) 0.1 % cream   Topical BID    timolol (TIMOPTIC) 0.5 % ophthalmic solution 1 Drop  1 Drop Both Eyes DAILY    sodium chloride (BRANDON 128) 2 % ophthalmic drops 1 Drop (Patient Supplied)  1 Drop Left Eye BID    sodium chloride (NS) flush 5-40 mL  5-40 mL IntraVENous Q8H    sodium chloride (NS) flush 5-40 mL  5-40 mL IntraVENous PRN    acetaminophen (TYLENOL) suppository 650 mg  650 mg Rectal Q6H PRN    polyethylene glycol (MIRALAX) packet 17 g  17 g Oral DAILY PRN    ondansetron (ZOFRAN) injection 4 mg  4 mg IntraVENous Q6H PRN    albuterol (ACCUNEB) nebulizer solution 1.25 mg  1.25 mg Nebulization Q6H PRN        Laboratory data and review:    Recent Labs     06/09/22 0223 06/08/22  0239 06/07/22  0420   WBC 9.9 9.8 9.7   HGB 12.5 11.9 12.8   HCT 38.4 37.1 41.1    238 257     Recent Labs     06/09/22 0223 06/08/22  0239 06/07/22  0420 06/06/22  2146 06/06/22  2146    134* 142   < > 143   K 3.8 3.8 4.1   < > 3.9   CL 99 95* 102   < > 101   CO2 32 32 32   < > 34*   GLU 99 177* 95   < > 89   BUN 50* 57* 51*   < > 46*   CREA 1.17* 1.33* 1.16*   < > 1.03*   CA 9.0 8.9 9.0   < > 9.5   MG  --   --   --   --  2.4   PHOS  --   --   --   --  3.3   ALB  --   --   --   --  2.8*   ALT  --   --   --   --  22    < > = values in this interval not displayed. No components found for: [address]: Imaging studies have been reviewed    Telemetry reviewed by me: barb tachycardia      Assessment and Plan:    Acute on chronic respiratory failure with hypoxia and hypercapnia ([address] (6/8/2022) POA: given her encephalopathy, will continue BiPAP and wean to nasal canula oxygen as tolerated. Trigger suspected to be TYRONE. Plan is to have a Trilogy at SNF when she is ready for discharge.  Hold sedating medications. Continue to monitor. She is a DNR    Acquired hypothyroidism (7/7/2010) POA: she has a hx of thyroid cancer and is sp thyroidectomy. Continue Levothyroxine    Paroxysmal atrial flutter ([address] (11/23/2015) POA: rate controlled. Continue Sotalol, Enoxaparin (while on BiPAP    Encephalopathy acute (6/4/2022) POA: likely from the CO2 retention with her underlying dementia. Continue supportive care    Physical debility (6/8/2022) POA: she is too frail and weak. May not tolerate therapy given she is intermittently on BiPAP. Monitor    Dementia ([address] (6/8/2022) POA: continue Aricept. Monitor clinical progress. HTN (hypertension), benign (4/30/2010) POA: BP stable.  Monitor     Glaucoma (4/30/2010) POA: continue eye gtts    Total time spent for the patient's care: [address] discussed with: Care Manager and Nursing Staff    Discussed:  Care Plan and D/C Planning    Prophylaxis:  Lovenox    Anticipated Disposition:  SNF/LTC vs TBD           ___________________________________________________    Attending Physician:   Roxann Polanco MD

## 2022-06-09 NOTE — PROGRESS NOTES
Transition of care note    RUR 13%  LOS 4 days,GLOS 3.6      Sotalol started-was on @home  Tachycardic overnight  Bipap at night and with naps    Orders for trilogy sent to [facility] who has accepted pt for trilogy after pt completes rehab @ SNF. Maxi Camacho with [facility] will follow pt when she is in SNF to insure pt is set up with her home trilogy when discharged from SNF. Pt has been accepted @ the [address] [facility]. I sent a message to the [address] [place], [place] [address] [facility] [address] [facility] talked with Brit Alcantar in admissions this am to insure they saw the BiPaP orders sent to them yesterday    We discussed how important it is for pt to have the BiPaP ordered before pt comes to the [facility] to insure a safe discharge. I also left a message with Mónica Goldsmith in admissions with [facility] regarding the BiPaP order . When  arrives,I will review the current plans for discharge with .     Alisia Munguia Serve

## 2022-06-09 NOTE — WOUND CARE
Wound Consult:  New Patient Visit. Chart reviewed. Consulted for skin care support/evaluation. Spoke with patients nurse,  Maria M Mcneal. Patient is resting on a Progressa bed with hercules mattress. Heels off loaded with pillows, on BiPap. Patient is easily aroused, follows commands and assists with turning; requires at least assist of one to move side to side in bed. Ivan score 12; strong in place. Assessment/Treatment:  Buttocks/perianal area - pink, irritated skin from incontinence; zinc ointment reapplied after cleansing with foam cleanser/wipes. Sacrum without redness; gluteal cleft with linear pink dry skin. Heels with blanching redness. Pannus without rash or redness. Perineal area pink, intact - [facility] hands off that daughter washed area due to powder being in use and has been a bit pink since. Applied no sting barrier to skin (clear swab barrier) to soothe and protect. Skin Care / PI Prevention Recommendations:  Consulted with nursing concern over patient's daughter refusing to allow for hospital incontinence products to be used as appropriate; however, [facility] educated on need for barrier ointment (z guard) and daughter has allowed use. She has Cera V ointment for her mom and Triamcinolone and has indicated areas she would like this used which is her baseline; would advise as Maria M Mcneal did to use the barrier ointment for incontinence protection. 1. Minimize friction/shear: minimize layers of linen/pads under patient. Aidhenscorner system. 2. Off load pressure/reposition:  turn and reposition approximately every 2 hours; float heels with pillows or use off loading heel boots; mobility team.  3. Manage Moisture - keep skin folds dry; incontinence skin care with incontinence wipes; zinc barrier ointment; currently with strong; having moderate amount of fecal incontinence. 4. Continue to monitor nutrition, pain, and skin risk scale, and skin assessment. Plan:   We will continue to reassess routinely and as needed. Please re-consult should concerns arise despite continued skin/PI prevention measures.     Amy Sweeney, MSN, RN, [facility], [facility] / [facility] Office [phone]

## 2022-06-09 NOTE — PROGRESS NOTES
2000 Bedside and Verbal shift change report given to Tara (oncoming nurse) by HAMILTON (offgoing nurse).  Report included the following information Procedure Summary, Intake/Output and Pre Procedure Checklist.  Pt sat up  Primary Nurse Arnaldo Salmeron RN and ROLAND Bruno performed a dual skin assessment on this patient Impairment noted- see wound doc flow sheet

## 2022-06-09 NOTE — PROGRESS NOTES
0700- Bedside and Verbal shift change report given to Nimisha Quiroz (oncoming nurse) by Lauren Webb (offgoing nurse). Report included the following information SBAR, Kardex, ED Summary, Intake/Output, MAR, Recent Results, Cardiac Rhythm afib and Alarm Parameters . Primary Nurse Frank Malhotra RN and Lauren Webb, RN performed a dual skin assessment on this patient No impairment noted  Ivan score is 13.    0705- Pt transferred from BiPAP mask to 1L NC. Pt given morning medication and passed stand. Pt alert and oriented x2. [facility]ion Encarnacion RN.     0800- Assessment complete, see doc flowsheets. 1200- Reassessment complete, see doc flowsheets. Pt working with physical therapy at this time. Pt alert and on 1L NC. Frank Malhotra RN. 1600- Reassessment complete, see doc flowsheets. 1900- Bedside shift change report given to Isadora Conn (oncoming nurse) by Nimisha Quiroz (offgoing nurse). Report included the following information SBAR, Kardex, ED Summary, Intake/Output, MAR, Recent Results, Cardiac Rhythm afib and Alarm Parameters .

## 2022-06-10 LAB
BACTERIA SPEC CULT: NORMAL
COVID-19 RAPID TEST, COVR: NOT DETECTED
SERVICE CMNT-IMP: NORMAL
SOURCE, COVRS: NORMAL

## 2022-06-10 PROCEDURE — 77030040361 HC SLV COMPR DVT MDII -B

## 2022-06-10 PROCEDURE — 74011250636 HC RX REV CODE- 250/636: Performed by: STUDENT IN AN ORGANIZED HEALTH CARE EDUCATION/TRAINING PROGRAM

## 2022-06-10 PROCEDURE — 74011000250 HC RX REV CODE- 250: Performed by: HOSPITALIST

## 2022-06-10 PROCEDURE — 87635 SARS-COV-2 COVID-19 AMP PRB: CPT

## 2022-06-10 PROCEDURE — 74011250637 HC RX REV CODE- 250/637: Performed by: NURSE PRACTITIONER

## 2022-06-10 PROCEDURE — 74011000258 HC RX REV CODE- 258: Performed by: NURSE PRACTITIONER

## 2022-06-10 PROCEDURE — 74011250637 HC RX REV CODE- 250/637: Performed by: FAMILY MEDICINE

## 2022-06-10 PROCEDURE — 74011000250 HC RX REV CODE- 250: Performed by: INTERNAL MEDICINE

## 2022-06-10 PROCEDURE — 99232 SBSQ HOSP IP/OBS MODERATE 35: CPT | Performed by: SPECIALIST

## 2022-06-10 PROCEDURE — 65270000046 HC RM TELEMETRY

## 2022-06-10 PROCEDURE — 94003 VENT MGMT INPAT SUBQ DAY: CPT

## 2022-06-10 PROCEDURE — 99222 1ST HOSP IP/OBS MODERATE 55: CPT | Performed by: FAMILY MEDICINE

## 2022-06-10 PROCEDURE — 74011250636 HC RX REV CODE- 250/636: Performed by: NURSE PRACTITIONER

## 2022-06-10 PROCEDURE — 74011250637 HC RX REV CODE- 250/637: Performed by: SPECIALIST

## 2022-06-10 RX ORDER — DIGOXIN 125 MCG
0.12 TABLET ORAL DAILY
Status: DISCONTINUED | OUTPATIENT
Start: 2022-06-10 | End: 2022-06-14 | Stop reason: HOSPADM

## 2022-06-10 RX ORDER — ENOXAPARIN SODIUM 100 MG/ML
1 INJECTION SUBCUTANEOUS EVERY 12 HOURS
Status: DISCONTINUED | OUTPATIENT
Start: 2022-06-10 | End: 2022-06-13

## 2022-06-10 RX ORDER — METOPROLOL TARTRATE 5 MG/5ML
5 INJECTION INTRAVENOUS ONCE
Status: COMPLETED | OUTPATIENT
Start: 2022-06-10 | End: 2022-06-10

## 2022-06-10 RX ORDER — DIGOXIN 0.25 MG/ML
250 INJECTION INTRAMUSCULAR; INTRAVENOUS ONCE
Status: COMPLETED | OUTPATIENT
Start: 2022-06-10 | End: 2022-06-10

## 2022-06-10 RX ORDER — SODIUM CHLORIDE 9 MG/ML
250 INJECTION, SOLUTION INTRAVENOUS CONTINUOUS
Status: DISPENSED | OUTPATIENT
Start: 2022-06-10 | End: 2022-06-12

## 2022-06-10 RX ADMIN — TRIAMCINOLONE ACETONIDE: 1 CREAM TOPICAL at 08:23

## 2022-06-10 RX ADMIN — DONEPEZIL HYDROCHLORIDE 10 MG: 5 TABLET, FILM COATED ORAL at 21:37

## 2022-06-10 RX ADMIN — DIGOXIN 0.12 MG: 125 TABLET ORAL at 09:17

## 2022-06-10 RX ADMIN — DIGOXIN 250 MCG: 0.25 INJECTION INTRAMUSCULAR; INTRAVENOUS at 11:17

## 2022-06-10 RX ADMIN — METOPROLOL TARTRATE 25 MG: 25 TABLET, FILM COATED ORAL at 22:58

## 2022-06-10 RX ADMIN — Medication 2000 UNITS: at 08:21

## 2022-06-10 RX ADMIN — METOPROLOL TARTRATE 25 MG: 25 TABLET, FILM COATED ORAL at 06:25

## 2022-06-10 RX ADMIN — Medication 10 ML: at 21:37

## 2022-06-10 RX ADMIN — ENOXAPARIN SODIUM 100 MG: 100 INJECTION SUBCUTANEOUS at 05:50

## 2022-06-10 RX ADMIN — DEXTROSE MONOHYDRATE 150 MG: 50 INJECTION, SOLUTION INTRAVENOUS at 09:17

## 2022-06-10 RX ADMIN — TIMOLOL MALEATE 1 DROP: 5 SOLUTION/ DROPS OPHTHALMIC at 08:22

## 2022-06-10 RX ADMIN — METOPROLOL TARTRATE 25 MG: 25 TABLET, FILM COATED ORAL at 16:52

## 2022-06-10 RX ADMIN — METOPROLOL TARTRATE 5 MG: 5 INJECTION INTRAVENOUS at 06:50

## 2022-06-10 RX ADMIN — Medication 10 ML: at 05:50

## 2022-06-10 RX ADMIN — ENOXAPARIN SODIUM 90 MG: 100 INJECTION SUBCUTANEOUS at 17:52

## 2022-06-10 RX ADMIN — LEVOTHYROXINE SODIUM 150 MCG: 0.07 TABLET ORAL at 08:21

## 2022-06-10 RX ADMIN — SODIUM CHLORIDE 250 ML: 9 INJECTION, SOLUTION INTRAVENOUS at 11:46

## 2022-06-10 RX ADMIN — TRIAMCINOLONE ACETONIDE: 1 CREAM TOPICAL at 17:53

## 2022-06-10 RX ADMIN — Medication 10 ML: at 13:13

## 2022-06-10 RX ADMIN — DEXTROSE MONOHYDRATE 1 MG/MIN: 50 INJECTION, SOLUTION INTRAVENOUS at 18:34

## 2022-06-10 NOTE — PROGRESS NOTES
Palliative Medicine      Code Status: DNR    Advance Care Planning:  Advance Care Planning 6/10/2022   Patient's Healthcare Decision Maker is: Legal Next of Kin   Confirm Advance Directive None   Patient Would Like to Complete Advance Directive Unable       Patient / Family Encounter Documentation    Participants (names): Pt,  Chantel Martínez by phone, Palliative Medicine (Dr. Hernesto Villagran, [address])    Narrative: Visited with pt before speaking with  by phone. Pt was awake in bed, laying on side, had O2 in place via NC. Pt agreed that her  had just been present but did not recall dtmyrna Avelar also having been in to see her this morning. Pt was able to state that she has three children (2 dtrs, 1 son), identified reason for hospitalization as \"I think I had a fall. \"  Pt is very pleased with the care she is receiving and expressed desire to remain in the hospital.      was receptive to call, shared that he and pt met in high school and have been  59 yrs. Pt and  built a house together with dtr Rosio in the recent past; other dtr resides in [place], son resides in [place].  shared that the family moved quite a bit over the years across the [address] due to his job, is glad to have seen much of the country but expressed some regret that the family is so spread out, does not like being so far away from the grandchildren.  shared that pt was doing well at home until recent cardiology appt, had not been aware that she had fluid around her heart, was sent home from appt with O2 and, after 2 falls in the home, was sent to [facility].  reports pt's cognitive impairment primarily affects short term memory, reports pt's long term memory is intact.  is hopeful that pt will regain her strength at SNF and be able to walk with a walker again (pt's baseline), shared that if pt is unable to ambulate, family will likely need to pursue long term care in a facility.        could not recall whether pt ever completed an AMD in the past.  In absence of verified Medical POA,  Owen Eller is legal NOK and surrogate decision maker.  confirmed DNR status, was receptive to completing a DDNR but does not plan to return to the hospital today. Psychosocial Issues Identified/ Resilience Factors:  Pt and  were both very gracious, welcoming, and appreciative of the care pt is receiving. Pt has long term care insurance to help alleviate the cost of caregivers in the home or the cost of a facility. No spiritual concerns identified at this time. Caregiver [place]: Low to moderate burden prior to hospitalization  Does the caregiver feel confident administering medication? No concerns expressed  Does the caregiver need any help connecting with community resources?  will be gathering more info re: pt's long term care insurance coverage. Does the caregiver feel confident assisting with activities of daily living? Needs assistance     Goals of Care / Plan:  would like to proceed with plan for SNF when stable for discharge, expressed that alternate living arrangements will be needed if pt does not recover to her baseline (ambulating with walker).  was clear re: DNR code status; DDNR was left in room with instructions for completion,  to sign when he visits tomorrow. Discussed with Dr. Elva Daniels and with Care Manager. Will follow for support. Thank you for including Palliative Medicine in the care of Ms. Morales.      Tai Luna LCSW, [facility]-  [phone])

## 2022-06-10 NOTE — PROGRESS NOTES
[facility] [address]  [address], [place], [address] [facility]  [phone]      Medical Progress Note      NAME:         Ginny December   :        1940  MRM:        [id number]    Date of service: 6/10/2022      Chief complaint: Hypoxia on BiPAP, rapid heart rate    Interval HPI: Patient admitted with acute on chronic respiratory failure with hypoxia, on intermittent BiPAP developed RVR today. Mild SOB. She is a poor historian. Discussed with her nurse for collaboarative Hx. Objective:    Vital Signs:    Visit Vitals  /66   Pulse (!) 145   Temp 98.3 °F (36.8 °C)   Resp 21   Ht 5' 7\" (1.702 m)   Wt 90.5 kg (199 lb 8.3 oz)   SpO2 93%   BMI 31.25 kg/m²          Intake/Output Summary (Last 24 hours) at 6/10/2022 1308  Last data filed at 6/10/2022 0600  Gross per 24 hour   Intake 480 ml   Output 425 ml   Net 55 ml      Physical Examination:    General:   Weak and frail looking, not in any acute distress, dyspneic  Eyes:   pink conjunctivae, PERRLA with no discharge. ENT:   no ottorrhea or rhinorrhea with dry mucous membranes  Neck: no masses, thyroid non-tender and trachea central.  Pulm: decreased breath sounds with scattered crackles. + wheezes  Card:  no JVD or murmurs, has tachycardia, without thrills, bruits. + peripheral edema  Abd:  Soft, non-tender, non-distended, normoactive bowel sounds   Musc:  No cyanosis, clubbing, atrophy or deformities.   Skin: bruising over extremities   Neuro: Awake and alert, answers some questions but limited exam.   Psych:  Has little insight to her illness     Current Facility-Administered Medications   Medication Dose Route Frequency    digoxin (LANOXIN) tablet 0.125 mg  0.125 mg Oral DAILY    amiodarone (CORDARONE) 375 mg in dextrose 5% 250 mL infusion  0.5-1 mg/min IntraVENous TITRATE    0.9% sodium chloride infusion 250 mL  250 mL IntraVENous CONTINUOUS    metoprolol tartrate (LOPRESSOR) tablet 25 mg  25 mg Oral Q8H    enoxaparin (LOVENOX) injection 100 mg  1 mg/kg SubCUTAneous Q12H    cholecalciferol (VITAMIN D3) (1000 Units /25 mcg) tablet 2,000 Units  2,000 Units Oral DAILY    donepeziL (ARICEPT) tablet 10 mg  10 mg Oral QHS    [Held by provider] DULoxetine (CYMBALTA) capsule 60 mg  60 mg Oral DAILY    levothyroxine (SYNTHROID) tablet 150 mcg  150 mcg Oral ACB    triamcinolone acetonide (KENALOG) 0.1 % cream   Topical BID    timolol (TIMOPTIC) 0.5 % ophthalmic solution 1 Drop  1 Drop Both Eyes DAILY    sodium chloride (BRANDON 128) 2 % ophthalmic drops 1 Drop (Patient Supplied)  1 Drop Left Eye BID    sodium chloride (NS) flush 5-40 mL  5-40 mL IntraVENous Q8H    sodium chloride (NS) flush 5-40 mL  5-40 mL IntraVENous PRN    acetaminophen (TYLENOL) suppository 650 mg  650 mg Rectal Q6H PRN    polyethylene glycol (MIRALAX) packet 17 g  17 g Oral DAILY PRN    ondansetron (ZOFRAN) injection 4 mg  4 mg IntraVENous Q6H PRN    albuterol (ACCUNEB) nebulizer solution 1.25 mg  1.25 mg Nebulization Q6H PRN        Laboratory data and review:    Recent Labs     06/09/22  0223 06/08/22  0239   WBC 9.9 9.8   HGB 12.5 11.9   HCT 38.4 37.1    238     Recent Labs     06/09/22  0223 06/08/22  0239    134*   K 3.8 3.8   CL 99 95*   CO2 32 32   GLU 99 177*   BUN 50* 57*   CREA 1.17* 1.33*   CA 9.0 8.9     No components found for: [place]    Diagnostics: Imaging studies have been reviewed    Telemetry reviewed by me: barb tachycardia      Assessment and Plan:    Acute on chronic respiratory failure with hypoxia and hypercapnia (HCC) (6/8/2022) POA: given her intermittent encephalopathy, will continue oxygen via nasal cannula as tolerated to keep sats > 90%. BiPAP as needed. Trigger suspected to be TYRONE. Plan is to have a Trilogy at [facility] when she is ready for discharge. CM working on this. Avoid sedating medications. Continue to monitor. She is a DNR.  Consult palliative care to address goals of care    Paroxysmal atrial flutter ([facility] [address] (11/23/2015) POA: went into RVR today. Received IV Digoxin. Started on Amiodarone gtt. Sotalol discontinued. On Enoxaparin    Acquired hypothyroidism (7/7/2010) POA: she has a hx of thyroid cancer and is sp thyroidectomy. Continue Levothyroxine    Encephalopathy acute (6/4/2022) POA: likely from the CO2 retention with her underlying dementia. Continue supportive care    Physical debility (6/8/2022) POA: she is too frail and weak. PT, OT as tolerated    Dementia ([facility] [address] (6/8/2022) POA: continue Aricept. Monitor clinical progress. HTN (hypertension), benign (4/30/2010) POA: BP stable.  Monitor     Glaucoma (4/30/2010) POA: continue eye gtts    Total time spent for the patient's care: [address] discussed with: Care Manager and Nursing Staff    Discussed:  Care Plan and D/C Planning    Prophylaxis:  Lovenox    Anticipated Disposition:  SNF/LTC vs TBD           ___________________________________________________    Attending Physician:   Deandre Felipe MD

## 2022-06-10 NOTE — PROGRESS NOTES
Update:    Palliative Medicine met with pt. DDNR left in room for  to sign. [email or web address] is working out a contract with the [facility] [address] and pt will be set up with trilogy once pt is discharged to [facility]. Per palliative medicine,cardiologist had informed  pt was not being discharged today. Attending confirmed that pt is not being discharged. Per attending,if cardiologist clears pt over the weekend,then pt will discharge to the [address] [facility]. I am on this weekend so if cardiology clears pt Saturday or Sunday as I will need to make special arrangements with the trilogy delivery. ,I can be reached on [employer] (Diann Brambila my number [phone]         Myron Bermudez

## 2022-06-10 NOTE — PROGRESS NOTES
Problem: Falls - Risk of  Goal: *Absence of Falls  Description: Document Forrest Mcmullen Fall Risk and appropriate interventions in the flowsheet. Outcome: Progressing Towards Goal  Note: Fall Risk Interventions:  Mobility Interventions: Bed/chair exit alarm,Communicate number of staff needed for ambulation/transfer,OT consult for ADLs,PT Consult for mobility concerns,PT Consult for assist device competence    Mentation Interventions: Adequate sleep, hydration, pain control,Bed/chair exit alarm,Evaluate medications/consider consulting pharmacy,More frequent rounding,Reorient patient,Room close to nurse's station,Update white board    Medication Interventions: Bed/chair exit alarm,Evaluate medications/consider consulting pharmacy    Elimination Interventions: Bed/chair exit alarm,Call light in reach,Patient to call for help with toileting needs,Toileting schedule/hourly rounds    History of Falls Interventions: Bed/chair exit alarm,Evaluate medications/consider consulting pharmacy,Room close to nurse's station         Problem: Pressure Injury - Risk of  Goal: *Prevention of pressure injury  Description: Document Ivan Scale and appropriate interventions in the flowsheet. Outcome: Progressing Towards Goal  Note: Pressure Injury Interventions:  Sensory Interventions: Assess changes in LOC,Assess need for specialty bed,Check visual cues for pain,Keep linens dry and wrinkle-free,Minimize linen layers,Monitor skin under medical devices,Pressure redistribution bed/mattress (bed type),Turn and reposition approx.  every two hours (pillows and wedges if needed)    Moisture Interventions: Absorbent underpads,Assess need for specialty bed,Check for incontinence Q2 hours and as needed,Internal/External urinary devices,Minimize layers    Activity Interventions: Assess need for specialty bed,Pressure redistribution bed/mattress(bed type)    Mobility Interventions: Assess need for specialty bed,HOB 30 degrees or less,Pressure redistribution bed/mattress (bed type),Turn and reposition approx.  every two hours(pillow and wedges)    Nutrition Interventions: Document food/fluid/supplement intake,Discuss nutritional consult with provider    Friction and Shear Interventions: HOB 30 degrees or less,Lift sheet,Lift team/patient mobility team,Minimize layers,Transferring/repositioning devices

## 2022-06-10 NOTE — PROGRESS NOTES
At 8 am,when admissions @ the [address] [place], [place] [address] arrive @ their facility,I will call admissions to insure they obtained the BiPaP for pt and have a bed for pt. I was unable to reach admissions yesterday pm after multiple attempts by their .    Jameson Palumbo

## 2022-06-10 NOTE — PROGRESS NOTES
Physical Therapy  Patient back in a-fib/flutter with restart amnio drip. Heart rate at rest in the 140's. We will defer due to medical complexity.   Kathryn Alvares PT,DPT,NCS

## 2022-06-10 NOTE — PROGRESS NOTES
Name: [address] [facility] [address]: [facility]   : 1940 Admit Date: 2022   Phone: [phone]  Room: [facility]/   PCP: Cesar Osborne MD  MRN: [id number]   Date: 6/10/2022  Code: DNR          Chart and notes reviewed. Data reviewed. I review the patient's current medications in the medical record at each encounter. I have evaluated and examined the patient. History of Present Illness:  Ms. Audrey Gibson is an 80 yo woman with a history of HTN, afib, CKD prior thyroid cancer s/p thyroidectom, prior subdural hematoma, dementia and obesity who is admitted with AMS and acute on chronic respiratory failure with hypoxia and hypercapnia. She comes in with altered mental status and found to be hypoxic and hypercapnic requiring BiPAP. Per report she was recently started on a diuretic and supplemental O2 after being found to be in heart failure and hypoxic. She is able to indicated to me that she is not short of breath on BiPAP currently, but still lethargic. Labs: WBC 9.8, Hgb 11.7, , HCO3 >40, cr 1.31, proBNP 345, ABG 7.37/77/82 on BiPAP    Images reviewed:  CXR 2022: increased interstitial markings concerning for pulmonary edema    TTE 2022: EF 56-34%, grade 1 diastolic dysfunction    Interval History:  Afebrile  Tachycardic improving  BP soft but stable  Sats 92% on 1L, wore BiPAP overnight  Bicarb 32  Creat 1.17    ROS: Awake and answers yes/no questions, but responses do not all seem appropriate. Oriented to self only. Wore BiPAP overnight.       Past Medical History:   Diagnosis Date    Arthritis     Basal cell carcinoma 2012    Calculus of kidney     Cancer ([facility] [address]     skin    Cancer ([facility] [address]     thyroid    Glaucoma 2010    Hypertension     OA (osteoarthritis) 2010    Psoriasis 2010       Past Surgical History:   Procedure Laterality Date    HX CATARACT REMOVAL      bilat    HX CHOLECYSTECTOMY      HX CRANIOTOMY      HX DILATION AND CURETTAGE      HX HYSTERECTOMY      HX KNEE ARTHROSCOPY      HX KNEE REPLACEMENT      HX TONSILLECTOMY      HX WRIST FRACTURE TX Left 8/21/15    IN THYROIDECTOMY         Family History   Problem Relation Age of Onset    Hypertension Mother     Heart Disease Mother     Heart Disease Father     Hypertension Father    [facility] Elevated Lipids Father     Heart Disease Brother     Psychiatric Disorder Brother     Diabetes Brother     Breast Cancer Maternal Aunt     Breast Cancer Paternal Aunt        Social History     Tobacco Use    Smoking status: Former Smoker     Packs/day: 0.50     Years: 4.00     Pack years: 2.00     Types: Cigarettes     Quit date: 1961     Years since quittin.4    Smokeless tobacco: Never Used   Substance Use Topics    Alcohol use: No     Alcohol/week: 0.0 standard drinks       Allergies   Allergen Reactions    Cephalexin Itching    Codeine Rash    Gabapentin Other (comments)    Neomycin Rash    Polysporin [Bacitracin-Polymyxin B] Rash    Protonix [Pantoprazole] Other (comments)     Gi upset       Current Facility-Administered Medications   Medication Dose Route Frequency    metoprolol tartrate (LOPRESSOR) tablet 25 mg  25 mg Oral Q8H    sotaloL (BETAPACE) tablet 80 mg  80 mg Oral Q24H    enoxaparin (LOVENOX) injection 100 mg  1 mg/kg SubCUTAneous Q12H    cholecalciferol (VITAMIN D3) (1000 Units /25 mcg) tablet 2,000 Units  2,000 Units Oral DAILY    donepeziL (ARICEPT) tablet 10 mg  10 mg Oral QHS    [Held by provider] DULoxetine (CYMBALTA) capsule 60 mg  60 mg Oral DAILY    levothyroxine (SYNTHROID) tablet 150 mcg  150 mcg Oral ACB    triamcinolone acetonide (KENALOG) 0.1 % cream   Topical BID    timolol (TIMOPTIC) 0.5 % ophthalmic solution 1 Drop  1 Drop Both Eyes DAILY    sodium chloride (BRANDON 128) 2 % ophthalmic drops 1 Drop (Patient Supplied)  1 Drop Left Eye BID    sodium chloride (NS) flush 5-40 mL  5-40 mL IntraVENous Q8H    sodium chloride (NS) flush 5-40 mL  5-40 mL IntraVENous PRN    acetaminophen (TYLENOL) suppository 650 mg  650 mg Rectal Q6H PRN    polyethylene glycol (MIRALAX) packet 17 g  17 g Oral DAILY PRN    ondansetron (ZOFRAN) injection 4 mg  4 mg IntraVENous Q6H PRN    albuterol (ACCUNEB) nebulizer solution 1.25 mg  1.25 mg Nebulization Q6H PRN         REVIEW OF SYSTEMS   12 point ROS negative except as stated in the HPI. Physical Exam:   Visit Vitals  BP (!) 120/51   Pulse (!) 104   Temp 98.8 °F (37.1 °C)   Resp 23   Ht 5' 7\" (1.702 m)   Wt 90.5 kg (199 lb 8.3 oz)   SpO2 97%   BMI 31.25 kg/m²       General:  Skeeping, no acute distress, appears stated age. Head:  Normocephalic, without obvious abnormality, atraumatic. Eyes:  Conjunctivae/corneas clear. Nose: Nares normal. Septum midline. Throat: Lips, mucosa, and tongue normal.    Neck: Supple, symmetrical, trachea midline   Lungs:   Clear to auscultation bilaterally, respirations non-labored    Chest wall:  No tenderness or deformity. Heart:  Regular rate and rhythm, S1, S2 normal, no murmur, click, rub or gallop. Abdomen:   Soft, non-tender. Bowel sounds normal. Obese. Extremities: Extremities normal, atraumatic, no cyanosis or edema. Pulses: 2+ and symmetric all extremities. Skin: Skin color, texture, turgor normal. No rashes or lesions   Neurologic: Opens eyes in response to name. Quickly falls back to sleep.        Lab Results   Component Value Date/Time    Sodium 137 06/09/2022 02:23 AM    Potassium 3.8 06/09/2022 02:23 AM    Chloride 99 06/09/2022 02:23 AM    CO2 32 06/09/2022 02:23 AM    BUN 50 (H) 06/09/2022 02:23 AM    Creatinine 1.17 (H) 06/09/2022 02:23 AM    Glucose 99 06/09/2022 02:23 AM    Calcium 9.0 06/09/2022 02:23 AM    Magnesium 2.4 06/06/2022 09:46 PM    Phosphorus 3.3 06/06/2022 09:46 PM    Lactic acid 0.8 06/04/2022 03:59 PM       Lab Results   Component Value Date/Time    WBC 9.9 06/09/2022 02:23 AM    HGB 12.5 06/09/2022 02:23 AM    PLATELET 695 06/09/2022 02:23 AM    MCV 98.5 06/09/2022 02:23 AM       Lab Results   Component Value Date/Time    INR 1.1 06/04/2022 11:07 PM    aPTT 64.3 (H) 06/05/2022 09:25 AM    Alk.  phosphatase 88 06/06/2022 09:46 PM    Protein, total 6.8 06/06/2022 09:46 PM    Albumin 2.8 (L) 06/06/2022 09:46 PM    Globulin 4.0 06/06/2022 09:46 PM       No results found for: IRON, FE, TIBC, IBCT, PSAT, FERR    Lab Results   Component Value Date/Time    Sed rate (ESR) 4 08/10/2017 12:00 AM    TSH 3.79 (H) 06/06/2022 09:46 PM        No results found for: PH, PHI, PCO2, PCO2I, PO2, PO2I, HCO3, HCO3I, FIO2, FIO2I    No results found for: CPK, RCK1, RCK2, RCK3, RCK4, CKNDX, CKND1, TROPT, TROIQ, BNPP, BNP     Lab Results   Component Value Date/Time    Culture result: MIXED UROGENITAL NEVILLE ISOLATED 06/04/2022 06:12 PM    Culture result: NO GROWTH 6 DAYS 06/04/2022 03:59 PM    Culture result: MIXED UROGENITAL NEVILLE ISOLATED 07/08/2021 10:36 AM       No results found for: TOXA1, RPR, HBCM, HBSAG, HAAB, HCAB1, HAAT, G6PD, CRYAC, HIVGT, HIVR, HIV1, HIV12, HIVPC, HIVRPI    No results found for: VANCT, CPK    Lab Results   Component Value Date/Time    Color YELLOW/STRAW 06/04/2022 06:12 PM    Appearance CLOUDY (A) 06/04/2022 06:12 PM    pH (UA) 6.5 06/04/2022 06:12 PM    Protein 30 (A) 06/04/2022 06:12 PM    Glucose Negative 06/04/2022 06:12 PM    Ketone Negative 06/04/2022 06:12 PM    Bilirubin Negative 06/04/2022 06:12 PM    Blood Negative 06/04/2022 06:12 PM    Urobilinogen 0.2 06/04/2022 06:12 PM    Nitrites Negative 06/04/2022 06:12 PM    Leukocyte Esterase Negative 06/04/2022 06:12 PM    WBC 0-4 06/04/2022 06:12 PM    RBC 0-5 06/04/2022 06:12 PM    Epithelial cells 0-10 08/18/2015 02:41 PM    Bacteria 4+ (A) 06/04/2022 06:12 PM       IMPRESSION  · Acute on chronic respiratory failure with hypoxia and hypercarbia  · Metabolic acidosis  · Suspected OHS/TYRONE  · Altered mental status  · Diastolic heart failure  · Obesity  · Dementia  · Pulmonary nodules      PLAN  · Goal sats 88% or higher, wean supplemental O2 as tolerated  · BiPAP/NIV while sleeping, currently lethargic and needs to remain on until she is more awake/interactive  · BiPAP ordered in place of planned Trilogy as she is going to SNF for rehab at discharge  · PRN nebs  · CTA with bilateral pulmonary nodules up to 9mm in size; consider outpatient surveillance in 6 months  · Ok to eat as long as she is not requiring continuous BiPAP  · She is DNR    She is stable for discharge from a pulmonary standpoint. Will arrange follow-up in our NIV clinic.     Arianne Colon MD

## 2022-06-10 NOTE — PROGRESS NOTES
CARDIOLOGY PROGRESS NOTE        [address], [place], [address] [facility]  Phone [phone]; Fax [phone]          6/10/2022 7:40 AM       Admit Date:           2022  Admit Diagnosis:  Encephalopathy acute [G93.40]  :          1940   MRN:          [id number]        Assessment/Plan  1.  acute hypoxic resp failure: multifactorial: hypoventilation syndrome. Not volume overloaded. 2. Hx PAFlutter : 's this am. Cont metoprolol, stop sotalol. Bolus amio this am , digoxin times 1 dose. On full dose lovenox. Plan to change to eliquis 5 mg BID at discharge.      3. Metabolic alkalosis: Suspect due to chronic respiratory acidosis    4. RAN:   -Cr 1.17       5. Dementia:   On donepezil and Cymbalta     6. HTN   - stop diovan given low BP     CARDIOLOGY ATTENDING  Patient personally seen and examined. All the elements of history and examination were personally performed. Assessment and plan was discussed and agree. Events reviewed - patient unable to provide any history   She is more alert today and more interactive  On , NAD, hrt irreg irreg, no murmur, good BS ant,  Obese, no edema      1) Afib with RVR   - long history of PAF  - given low BP and continue rapid Afib on Sotalol, have switched to amiodarone plus metoprolol for rate control. Also gave some digoxin to help control HR in setting of low BP   - can discharge on eliquis 5 mg BID if no contraindication      2) Acute resp failure - per pulmonary - doubt problems related to CHF - thought to be from obesity hypoventilation / Helena Breaux MD, [facility]                    We discussed the expected course, resolution and complications of the diagnosis(es) in detail. No intake/output data recorded.     Last 3 Recorded Weights in this Encounter    22 1544 22 1420 06/10/22 0400   Weight: 95.7 kg (211 lb) 95.7 kg (211 lb) 90.5 kg (199 lb 8.3 oz)         1901 - 06/10 0700  In: 990 [P.O.:930; I.V.:60]  Out: 1250 [Urine:1250]    SUBJECTIVE      Admitted for hypoxia, FTT          Denisse Morales reports no SOB or chest pains. Current Facility-Administered Medications   Medication Dose Route Frequency    digoxin (LANOXIN) tablet 0.125 mg  0.125 mg Oral DAILY    metoprolol tartrate (LOPRESSOR) tablet 25 mg  25 mg Oral Q8H    sotaloL (BETAPACE) tablet 80 mg  80 mg Oral Q24H    enoxaparin (LOVENOX) injection 100 mg  1 mg/kg SubCUTAneous Q12H    cholecalciferol (VITAMIN D3) (1000 Units /25 mcg) tablet 2,000 Units  2,000 Units Oral DAILY    donepeziL (ARICEPT) tablet 10 mg  10 mg Oral QHS    [Held by provider] DULoxetine (CYMBALTA) capsule 60 mg  60 mg Oral DAILY    levothyroxine (SYNTHROID) tablet 150 mcg  150 mcg Oral ACB    triamcinolone acetonide (KENALOG) 0.1 % cream   Topical BID    timolol (TIMOPTIC) 0.5 % ophthalmic solution 1 Drop  1 Drop Both Eyes DAILY    sodium chloride (BRANDON 128) 2 % ophthalmic drops 1 Drop (Patient Supplied)  1 Drop Left Eye BID    sodium chloride (NS) flush 5-40 mL  5-40 mL IntraVENous Q8H    sodium chloride (NS) flush 5-40 mL  5-40 mL IntraVENous PRN    acetaminophen (TYLENOL) suppository 650 mg  650 mg Rectal Q6H PRN    polyethylene glycol (MIRALAX) packet 17 g  17 g Oral DAILY PRN    ondansetron (ZOFRAN) injection 4 mg  4 mg IntraVENous Q6H PRN    albuterol (ACCUNEB) nebulizer solution 1.25 mg  1.25 mg Nebulization Q6H PRN      OBJECTIVE               Intake/Output Summary (Last 24 hours) at 6/10/2022 0809  Last data filed at 6/10/2022 0600  Gross per 24 hour   Intake 480 ml   Output 650 ml   Net -170 ml       Review of Systems - History obtained from the patient AS PER  HPI        PHYSICAL EXAM        Visit Vitals  BP (!) 120/51   Pulse (!) 104   Temp 98.8 °F (37.1 °C)   Resp 23   Ht 5' 7\" (1.702 m)   Wt 90.5 kg (199 lb 8.3 oz)   SpO2 97%   BMI 31.25 kg/m²       Gen: Alert, pleasant, oriented times 1.     HEENT:  Pink conjunctivae,  Oral mucosa dry   Neck: Supple,  No JVD   Resp: No accessory muscle use, Clear breath sounds, No rales or rhonchi  Card:  irregular , tachycardic, Rhythm,   Normal S1, S2, No murmurs, rubs or gallop. MSK: No cyanosis or clubbing, good capillary refill  Skin: psoriasis lesions  Neuro:  Moving all four extremities, no focal deficit, follows commands appropriately  Psych:  ? Insight   LE: No edema       DATA REVIEW      06/04/22    ECHO ADULT COMPLETE 06/05/2022 6/5/2022    Interpretation Summary    Left Ventricle: Normal left ventricular systolic function with a visually estimated EF of 55 - 60%. Left ventricle size is normal. Mildly increased wall thickness. Findings consistent with mild concentric hypertrophy. Normal wall motion. Grade I diastolic dysfunction with normal LAP.   Right Ventricle: Right ventricle is mildly dilated.   Aortic Valve: Mild regurgitation with a centrally directed jet.   Left Atrium: Left atrium is mildly dilated.   Right Atrium: Right atrium is mildly dilated. Signed by: Aakash Schulz MD on 6/5/2022  3:21 PM    [facility] cardiology records  CHF  CAD  PA flutter 2015 converting to SR ECHO (2015) EF 50%-55% , betapace 120 mg BID        Cardiac monitor: a flutter         Laboratory and Imaging have been reviewed by me and are notable for  No results for input(s): CPK, CKMB, TROIQ in the last 72 hours.   Recent Labs     06/09/22  0223 06/08/22  0239    134*   K 3.8 3.8   CO2 32 32   BUN 50* 57*   CREA 1.17* 1.33*   GLU 99 177*   WBC 9.9 9.8   HGB 12.5 11.9   HCT 38.4 37.1    238

## 2022-06-10 NOTE — ACP (ADVANCE CARE PLANNING)
Primary Decision MakerEly Harada [phone]  Advance Care Planning 6/10/2022   Patient's Healthcare Decision Maker is: Legal Next of Kin   Confirm Advance Directive None   Patient Would Like to Complete Advance Directive Unable      could not recall whether pt ever completed an AMD in the past.  In absence of verified Medical POA,  Jean Paul Juarez is legal NOK and surrogate decision maker.  confirmed DNR status, was receptive to completing a DDNR but does not plan to return to the hospital today. DDNR was left in room with instructions for completion,  to sign when he visits tomorrow.

## 2022-06-10 NOTE — PROGRESS NOTES
Transition of care note:    RUR 13%(low readmission risk score)    LOS 5 days,GLOS 3.6    Plan: Canda Crigler met @ length with pt,her  and daughter,Rosio. 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 I reviewed the current plan of treatment:  SNF when stable for discharge  BiPaP while in SNF  Trilogy when discharged from SNF with [facility]. Fartun Dye with[facility] will follow pt while in SNF to insure trilogy is set up when pt is discharged.  has a long-term policy plan which may be beneficial when arranging out-of-pocket caretakers once pt is discharged home from SNF. Pt will likely need home health once dischraged from SNF also. I updated pt/family that Palliative Medicine team will be meeting with them this am.    Pt has been tachycardic and actually restarted on iv amiodarone(per nurse) Nurse informed me pt.'s discharge has been cancelled. I have faxed the BiPaP to [facility] in admissions [place] [address] [facility] [phone])P am also trying to contact [facility] by phone -will try again in 10 minutes.     Artis Munguia Serve

## 2022-06-10 NOTE — CONSULTS
Palliative Medicine Consult  [employer]: [phone] ([id number])    Patient Name: Em Meza  YOB: 1940    Date of Initial Consult: 6/10/22  Reason for Consult: Care Decisions  Requesting Provider: Dr. Dayna Hester   Primary Care Physician: Ana Ramirez MD     SUMMARY:   Em Meza is a 79 y.o. with CHF, dementia, HTN, PAF, h/o thyroid cancer with thyroidectomy and subsequent hypothyroidism who was admitted on 6/4/2022 from home with a diagnosis of AHRF. She is suspected to have OHS/TYRONE in addition there her chronic CHF and has received treatment for this. At this time she is requiring either BiPAP or Trilogy for sleeping and naps. She has also received medication adjustments for PAF with periods of rapid heart rate. Current medical issues leading to Palliative Medicine involvement include: Care Decisions. Social:  Patient has been  to her  Litzy Chapin for almost 59 years. They have three children. Their son Liban Murillo is currently living with them. At baseline patient is able to walk with a walker though she has had a couple falls recently. She has bad short-term memory according to her , but her long-term memory is intact. PALLIATIVE DIAGNOSES:   1. Encounter for Palliative Care  2. Goals of care  3. Code status discussion  4. Debility  5. Impaired cognition/Dementia  6. New oxygen requirement  7. History of falls     PLAN:   1. We spoke with Dr. Dayna Hester and GELY Avelar prior to meeting with patient for coordination. 2. We met with patient at bedside. Patient is resting but wakes to name call. She is able to answer basic yes/no questions and asks for water. She is not able to participate in more complex discussion. She does not appear to have medical decision making capacity at this time. 3. We spoke with  this afternoon. He provides the social history above. We discussed his wife's current clinical status.  He reports that his wife has declined over the last two weeks with new oxygen requirement after her recent Cardiology appointment, two falls, increased fluid. 4. We discussed goals.  is hopeful that while at rehab she can increase her strength to be able to walk again with a walker. He hopes she can return home after that. If her strength does not increase significantly, then he is considering hiring in-home caregivers starting for several hours in the morning and increasing as needed versus looking into long-term care for his wife. They do have long-term care insurance, and he is going to start looking into what options they have for caregiving support. 5. We discussed patient's high risk for further decline and respiratory complications that may lead to future hospitalization. We discussed that her recovery of physical function may be prolonged and that she may not be able to recover to her previous level of function. We encourage  to continue efforts to consider and to begin planning for her future needs for increased caregiving support. 6. We discussed code status. He believes his wife would want to be DNR. We counseled regarding the importance of having a DDNR form and will complete this for him to sign. 7. Thank you for asking our team to participate in the care of Griselda Dye. Our team will return on Monday.      GOALS OF CARE / TREATMENT PREFERENCES:     GOALS OF CARE:       TREATMENT PREFERENCES:   Code Status: DNR    Patient and family's personal goals include: to increase strength to be able to walk again    Important upcoming milestones or family events: n/a    The patient identifies the following as important for living well: patient cannot answer d/t medical condition      Advance Care Planning:  [] The [facility] Interdisciplinary Team has updated the ACP Navigator with [address] and Patient Capacity      Primary Decision MakerMarylene Guile  [phone]  Advance Care Planning 6/10/2022   Patient's Healthcare Decision Maker is: Legal Next of Kin   Confirm Advance Directive None   Patient Would Like to Complete Advance Directive Unable               Other:    As far as possible, the palliative care team has discussed with patient / health care proxy about goals of care / treatment preferences for patient. HISTORY:     History obtained from: chart, patient,     CHIEF COMPLAINT: I'm fine    HPI/SUBJECTIVE:    The patient is:   [x] Verbal and participatory  [] Non-participatory due to:     6/10: patient is resting but wakes to name call. She is able to answer basic yes/no questions and asks for water. She is not able to participate in more complex discussion. Clinical Pain Assessment (nonverbal scale for severity on nonverbal patients):   Clinical Pain Assessment  Severity: 0     Activity (Movement): Lying quietly, normal position    Duration: for how long has pt been experiencing pain (e.g., 2 days, 1 month, years)  Frequency: how often pain is an issue (e.g., several times per day, once every few days, constant)     FUNCTIONAL ASSESSMENT:     Palliative Performance Scale (PPS):  PPS: 30       PSYCHOSOCIAL/SPIRITUAL SCREENING:     Palliative IDT has assessed this patient for cultural preferences / practices and a referral made as appropriate to needs (Cultural Services, Patient Advocacy, Ethics, etc.)    Any spiritual / Bahai concerns:  [] Yes /  [x] No   If \"Yes\" to discuss with pastoral care during IDT     Does caregiver feel burdened by caring for their loved one:   [] Yes /  [x] No /  [] No Caregiver Present    If \"Yes\" to discuss with social work during IDT    Anticipatory grief assessment:   [x] Normal  / [] Maladaptive     If \"Maladaptive\" to discuss with social work during IDT    ESAS Anxiety:      ESAS Depression:          REVIEW OF SYSTEMS:     Positive and pertinent negative findings in ROS are noted above in HPI.   The following systems were [x] reviewed / [] unable to be reviewed as noted in HPI  Other findings are noted below. Systems: constitutional, ears/nose/mouth/throat, respiratory, gastrointestinal, genitourinary, musculoskeletal, integumentary, neurologic, psychiatric, endocrine. Positive findings noted below. Modified ESAS Completed by: provider           Pain: 0     Nausea: 0     Dyspnea: 0                    PHYSICAL EXAM:     From RN flowsheet:  Wt Readings from Last 3 Encounters:   06/10/22 199 lb 8.3 oz (90.5 kg)   07/07/21 214 lb (97.1 kg)   09/04/20 221 lb (100.2 kg)     Blood pressure 105/66, pulse (!) 145, temperature 98.3 °F (36.8 °C), resp. rate 21, height 5' 7\" (1.702 m), weight 199 lb 8.3 oz (90.5 kg), SpO2 93 %.     Pain Scale 1: Numeric (0 - 10)  Pain Intensity 1: 0                 Last bowel movement, if known:     General: resting but wakes to name call, appears frail but in NAD  Eyes: lids normal, no drainage  Nose: nares normal, no drainage  Mouth: mmm, no drainage  Pulm: rate is normal, respirations are unlabored  Neuro: alert, able to answer yes/no questions, moves extremities  Psych: calm without restlessness or agitation       HISTORY:     Principal Problem:    Acute on chronic respiratory failure with hypoxia and hypercapnia ([address] (6/8/2022)    Active Problems:    HTN (hypertension), benign (4/30/2010)      Glaucoma (4/30/2010)      Acquired hypothyroidism (7/7/2010)      Paroxysmal atrial flutter ([address] (11/23/2015)      Encephalopathy acute (6/4/2022)      Physical debility (6/8/2022)      Dementia ([address] (6/8/2022)      Past Medical History:   Diagnosis Date    Arthritis     Basal cell carcinoma 05/20/2012    Calculus of kidney     Cancer ([address]     skin    Cancer ([address]     thyroid    Glaucoma 4/30/2010    Hypertension     OA (osteoarthritis) 4/30/2010    Psoriasis 4/30/2010      Past Surgical History:   Procedure Laterality Date    HX CATARACT REMOVAL      bilat    HX CHOLECYSTECTOMY      HX CRANIOTOMY      HX DILATION AND CURETTAGE      HX HYSTERECTOMY      HX KNEE ARTHROSCOPY      HX KNEE REPLACEMENT      HX TONSILLECTOMY      HX WRIST FRACTURE TX Left 8/21/15    MA THYROIDECTOMY        Family History   Problem Relation Age of Onset    Hypertension Mother     Heart Disease Mother     Heart Disease Father     Hypertension Father     Elevated Lipids Father     Heart Disease Brother     Psychiatric Disorder Brother     Diabetes Brother     Breast Cancer Maternal Aunt     Breast Cancer Paternal Aunt       History reviewed, no pertinent family history.   Social History     Tobacco Use    Smoking status: Former Smoker     Packs/day: 0.50     Years: 4.00     Pack years: 2.00     Types: Cigarettes     Quit date: 1961     Years since quittin.4    Smokeless tobacco: Never Used   Substance Use Topics    Alcohol use: No     Alcohol/week: 0.0 standard drinks     Allergies   Allergen Reactions    Cephalexin Itching    Codeine Rash    Gabapentin Other (comments)    Neomycin Rash    Polysporin [Bacitracin-Polymyxin B] Rash    Protonix [Pantoprazole] Other (comments)     Gi upset      Current Facility-Administered Medications   Medication Dose Route Frequency    digoxin (LANOXIN) tablet 0.125 mg  0.125 mg Oral DAILY    amiodarone (CORDARONE) 375 mg in dextrose 5% 250 mL infusion  0.5-1 mg/min IntraVENous TITRATE    0.9% sodium chloride infusion 250 mL  250 mL IntraVENous CONTINUOUS    metoprolol tartrate (LOPRESSOR) tablet 25 mg  25 mg Oral Q8H    enoxaparin (LOVENOX) injection 100 mg  1 mg/kg SubCUTAneous Q12H    cholecalciferol (VITAMIN D3) (1000 Units /25 mcg) tablet 2,000 Units  2,000 Units Oral DAILY    donepeziL (ARICEPT) tablet 10 mg  10 mg Oral QHS    [Held by provider] DULoxetine (CYMBALTA) capsule 60 mg  60 mg Oral DAILY    levothyroxine (SYNTHROID) tablet 150 mcg  150 mcg Oral ACB    triamcinolone acetonide (KENALOG) 0.1 % cream   Topical BID    timolol (TIMOPTIC) 0.5 % ophthalmic solution 1 Drop  1 Drop Both Eyes DAILY    sodium chloride (BRANDON 128) 2 % ophthalmic drops 1 Drop (Patient Supplied)  1 Drop Left Eye BID    sodium chloride (NS) flush 5-40 mL  5-40 mL IntraVENous Q8H    sodium chloride (NS) flush 5-40 mL  5-40 mL IntraVENous PRN    acetaminophen (TYLENOL) suppository 650 mg  650 mg Rectal Q6H PRN    polyethylene glycol (MIRALAX) packet 17 g  17 g Oral DAILY PRN    ondansetron (ZOFRAN) injection 4 mg  4 mg IntraVENous Q6H PRN    albuterol (ACCUNEB) nebulizer solution 1.25 mg  1.25 mg Nebulization Q6H PRN          LAB AND IMAGING FINDINGS:     Lab Results   Component Value Date/Time    WBC 9.9 06/09/2022 02:23 AM    HGB 12.5 06/09/2022 02:23 AM    PLATELET 213 94/58/0061 02:23 AM     Lab Results   Component Value Date/Time    Sodium 137 06/09/2022 02:23 AM    Potassium 3.8 06/09/2022 02:23 AM    Chloride 99 06/09/2022 02:23 AM    CO2 32 06/09/2022 02:23 AM    BUN 50 (H) 06/09/2022 02:23 AM    Creatinine 1.17 (H) 06/09/2022 02:23 AM    Calcium 9.0 06/09/2022 02:23 AM    Magnesium 2.4 06/06/2022 09:46 PM    Phosphorus 3.3 06/06/2022 09:46 PM      Lab Results   Component Value Date/Time    Alk.  phosphatase 88 06/06/2022 09:46 PM    Protein, total 6.8 06/06/2022 09:46 PM    Albumin 2.8 (L) 06/06/2022 09:46 PM    Globulin 4.0 06/06/2022 09:46 PM     Lab Results   Component Value Date/Time    INR 1.1 06/04/2022 11:07 PM    Prothrombin time 11.0 06/04/2022 11:07 PM    aPTT 64.3 (H) 06/05/2022 09:25 AM      No results found for: IRON, FE, TIBC, IBCT, PSAT, FERR   Lab Results   Component Value Date/Time    pH 7.41 06/07/2022 06:31 AM    PCO2 50 (H) 06/07/2022 06:31 AM    PO2 57 (L) 06/07/2022 06:31 AM     No components found for: GLPOC   No results found for: CPK, CKMB             Total time: 50 mins  Counseling / coordination time, spent as noted above: 35 mins  > 50% counseling / coordination?: yes    Prolonged service was provided for  []30 min   []75 min in face to face time in the presence of the patient, spent as noted above.  Time Start:   Time End:   Note: this can only be billed with 56603 (initial) or 87556 (follow up). If multiple start / stop times, list each separately.

## 2022-06-10 NOTE — PROGRESS NOTES
Transition of care note:    RUR 13%  LOS 5 days,GLOS 3.6    I contacted the [facility] @ [phone] following up on bed availability and BiPaP. Pulmonologist informed me she has medically cleared pt for discharge today.  plans to arrive today @ 9:30 am to visit pt. Admissions @ [facility] asked me about pt.'s covid immunization status. Their new requirement is for pt to either have both vaccines and two booster shots or pt will need a private room. I discussed  With admissions again that  pt will need to have the BiPaP for night and naps . Admissions informed me that they will have the BiPaP for pt. I updated [employer] that the SNF pt will be going to is the [facility] (hopefully today )    [facility] will update me after their morning meeting about bed availability .     Luis Garcia

## 2022-06-10 NOTE — PROGRESS NOTES
Attempted a follow up visit for palliative spiritual assessment. Unable to visit patient at this time she was sleeping and on BiPAP. No family present. Pt's chart was consulted.   Chaplain Murphy MDiv, MS, [facility]

## 2022-06-11 PROCEDURE — 74011250637 HC RX REV CODE- 250/637: Performed by: SPECIALIST

## 2022-06-11 PROCEDURE — 94003 VENT MGMT INPAT SUBQ DAY: CPT

## 2022-06-11 PROCEDURE — 2709999900 HC NON-CHARGEABLE SUPPLY

## 2022-06-11 PROCEDURE — 74011250637 HC RX REV CODE- 250/637: Performed by: FAMILY MEDICINE

## 2022-06-11 PROCEDURE — 99232 SBSQ HOSP IP/OBS MODERATE 35: CPT | Performed by: SPECIALIST

## 2022-06-11 PROCEDURE — 74011000250 HC RX REV CODE- 250: Performed by: INTERNAL MEDICINE

## 2022-06-11 PROCEDURE — 74011250636 HC RX REV CODE- 250/636: Performed by: STUDENT IN AN ORGANIZED HEALTH CARE EDUCATION/TRAINING PROGRAM

## 2022-06-11 PROCEDURE — 74011000258 HC RX REV CODE- 258: Performed by: NURSE PRACTITIONER

## 2022-06-11 PROCEDURE — 74011250636 HC RX REV CODE- 250/636: Performed by: NURSE PRACTITIONER

## 2022-06-11 PROCEDURE — 65270000046 HC RM TELEMETRY

## 2022-06-11 PROCEDURE — 77010033678 HC OXYGEN DAILY

## 2022-06-11 PROCEDURE — 94761 N-INVAS EAR/PLS OXIMETRY MLT: CPT

## 2022-06-11 PROCEDURE — 74011250637 HC RX REV CODE- 250/637: Performed by: NURSE PRACTITIONER

## 2022-06-11 PROCEDURE — 77030038269 HC DRN EXT URIN PURWCK BARD -A

## 2022-06-11 RX ORDER — METOPROLOL TARTRATE 25 MG/1
25 TABLET, FILM COATED ORAL EVERY 6 HOURS
Status: DISCONTINUED | OUTPATIENT
Start: 2022-06-12 | End: 2022-06-12

## 2022-06-11 RX ADMIN — Medication 2000 UNITS: at 10:37

## 2022-06-11 RX ADMIN — METOPROLOL TARTRATE 25 MG: 25 TABLET, FILM COATED ORAL at 15:58

## 2022-06-11 RX ADMIN — TIMOLOL MALEATE 1 DROP: 5 SOLUTION/ DROPS OPHTHALMIC at 10:37

## 2022-06-11 RX ADMIN — LEVOTHYROXINE SODIUM 150 MCG: 0.07 TABLET ORAL at 08:26

## 2022-06-11 RX ADMIN — TRIAMCINOLONE ACETONIDE: 1 CREAM TOPICAL at 17:44

## 2022-06-11 RX ADMIN — Medication 10 ML: at 14:32

## 2022-06-11 RX ADMIN — DONEPEZIL HYDROCHLORIDE 10 MG: 5 TABLET, FILM COATED ORAL at 21:12

## 2022-06-11 RX ADMIN — DEXTROSE MONOHYDRATE 0.5 MG/MIN: 50 INJECTION, SOLUTION INTRAVENOUS at 01:04

## 2022-06-11 RX ADMIN — METOPROLOL TARTRATE 25 MG: 25 TABLET, FILM COATED ORAL at 23:35

## 2022-06-11 RX ADMIN — TRIAMCINOLONE ACETONIDE: 1 CREAM TOPICAL at 10:41

## 2022-06-11 RX ADMIN — Medication 10 ML: at 05:05

## 2022-06-11 RX ADMIN — METOPROLOL TARTRATE 25 MG: 25 TABLET, FILM COATED ORAL at 06:33

## 2022-06-11 RX ADMIN — ENOXAPARIN SODIUM 90 MG: 100 INJECTION SUBCUTANEOUS at 17:44

## 2022-06-11 RX ADMIN — DIGOXIN 0.12 MG: 125 TABLET ORAL at 10:38

## 2022-06-11 RX ADMIN — Medication 10 ML: at 21:12

## 2022-06-11 RX ADMIN — ENOXAPARIN SODIUM 90 MG: 100 INJECTION SUBCUTANEOUS at 05:05

## 2022-06-11 RX ADMIN — DEXTROSE MONOHYDRATE 0.5 MG/MIN: 50 INJECTION, SOLUTION INTRAVENOUS at 14:33

## 2022-06-11 NOTE — PROGRESS NOTES
Bedside and Verbal shift change report given to Oziel Barba RN (oncoming nurse) by Annelise Pagan (offgoing nurse). Report included the following information SBAR, Kardex, Intake/Output, MAR, Accordion, Recent Results, Med Rec Status and Cardiac Rhythm a-fib / sinus rhythm. Bedside and Verbal shift change report given to Annelise Pagan (oncoming nurse) by Oziel Barba RN (offgoing nurse). Report included the following information SBAR, Kardex, Intake/Output, MAR, Accordion, Recent Results, Med Rec Status and Cardiac Rhythm A-fib / sinus rhythm.

## 2022-06-11 NOTE — PROGRESS NOTES
1900: Bedside and Verbal shift change report given to Talia Kessler RN (oncoming nurse) by Julia Gunn RN (offgoing nurse). Report included the following information SBAR, Kardex, Intake/Output, MAR, Recent Results, Cardiac Rhythm A.Fib and Quality Measures. 2000: Shift assessment completed. See flowsheet. 0000: Reassessment completed. See flowsheet for changes. 0400: Reassessment completed. See flowsheet for changes. 0700: Bedside and Verbal shift change report given to Walter Rios (oncoming nurse) by Talia Kessler RN (offgoing nurse). Report included the following information SBAR, Kardex, Intake/Output, MAR, Recent Results, Cardiac Rhythm A.Fib and Quality Measures.

## 2022-06-11 NOTE — PROGRESS NOTES
Update:    I received notification from the [address] [facility] and [employer] that the contract regarding the trilogy @ SNF will be resolved on Monday. I updated attending. There is a DDNR in pt.'s room for  to sign today when he visits. This has been reviewed with  with the Palliative Medicine team .    Pt is still on the amiodarone gtt so is not medically stable for discharge. Willa Remy  Perfect Serve    I updated pt.'s .     Willa Remy

## 2022-06-11 NOTE — PROGRESS NOTES
1900: Bedside and Verbal shift change report given to Yo Hernandez RN (oncoming nurse) by Ric Larios RN (offgoing nurse). Report included the following information SBAR, Kardex, Intake/Output, MAR, Recent Results, Cardiac Rhythm A. FIb and Quality Measures. 2000: Shift assessment completed. See flowsheet. Amio @ [facility] [place]. 2144: Covid rapid test collected and sent. 0000: Reassessment completed. See flowsheet. 0400: Reassessment completed. See flowsheet. 0700: Bedside and Verbal shift change report given to Radu Lucia RN (oncoming nurse) by Yo Hernandez RN (offgoing nurse). Report included the following information SBAR, Kardex, Intake/Output, MAR, Recent Results, Cardiac Rhythm A. Fib and Quality Measures.

## 2022-06-11 NOTE — PROGRESS NOTES
[facility] [place] [facility]  [facility] [place] [address] [place] [address]  [phone]      Medical Progress Note      NAME:         Savage Moreno   :        1940  MRM:        [id number]    Date of service: 2022      Chief complaint: Hypoxia on BiPAP, RVR     Interval HPI: Patient admitted with acute on chronic respiratory failure with hypoxia, on intermittent BiPAP developed RVR 6/10 and now rate better controlled. She is not able to give much hx. Discussed with her nurse and       Objective:    Vital Signs:    Visit Vitals  BP (!) 132/52   Pulse 74   Temp 98 °F (36.7 °C)   Resp 20   Ht 5' 7\" (1.702 m)   Wt 90.5 kg (199 lb 8.3 oz)   SpO2 97%   BMI 31.25 kg/m²          Intake/Output Summary (Last 24 hours) at 2022 0737  Last data filed at 2022 0400  Gross per 24 hour   Intake 541.67 ml   Output 1 ml   Net 540.67 ml      Physical Examination:    General:   Weak and frail looking, on nasal canula oxygen   Eyes:   pink conjunctivae, PERRLA with no discharge. ENT:   no ottorrhea or rhinorrhea with dry mucous membranes  Neck: no masses, thyroid non-tender and trachea central.  Pulm: decreased breath sounds with scattered crackles. + wheezes  Card:  no JVD or murmurs, normal rhythm, without thrills, bruits. + peripheral edema  Abd:  Soft, non-tender, non-distended, normoactive bowel sounds   Musc:  No cyanosis, clubbing, atrophy or deformities. Skin: bruising over extremities   Neuro: lethargic and lapses to sleep.  Limited exam.    Psych:  Has no insight to her illness      Current Facility-Administered Medications   Medication Dose Route Frequency    digoxin (LANOXIN) tablet 0.125 mg  0.125 mg Oral DAILY    amiodarone (CORDARONE) 375 mg in dextrose 5% 250 mL infusion  0.5-1 mg/min IntraVENous TITRATE    0.9% sodium chloride infusion 250 mL  250 mL IntraVENous CONTINUOUS    enoxaparin (LOVENOX) injection 90 mg  1 mg/kg SubCUTAneous Q12H    metoprolol tartrate (LOPRESSOR) tablet 25 mg  25 mg Oral Q8H    cholecalciferol (VITAMIN D3) (1000 Units /25 mcg) tablet 2,000 Units  2,000 Units Oral DAILY    donepeziL (ARICEPT) tablet 10 mg  10 mg Oral QHS    [Held by provider] DULoxetine (CYMBALTA) capsule 60 mg  60 mg Oral DAILY    levothyroxine (SYNTHROID) tablet 150 mcg  150 mcg Oral ACB    triamcinolone acetonide (KENALOG) 0.1 % cream   Topical BID    timolol (TIMOPTIC) 0.5 % ophthalmic solution 1 Drop  1 Drop Both Eyes DAILY    sodium chloride (BRANDON 128) 2 % ophthalmic drops 1 Drop (Patient Supplied)  1 Drop Left Eye BID    sodium chloride (NS) flush 5-40 mL  5-40 mL IntraVENous Q8H    sodium chloride (NS) flush 5-40 mL  5-40 mL IntraVENous PRN    acetaminophen (TYLENOL) suppository 650 mg  650 mg Rectal Q6H PRN    polyethylene glycol (MIRALAX) packet 17 g  17 g Oral DAILY PRN    ondansetron (ZOFRAN) injection 4 mg  4 mg IntraVENous Q6H PRN    albuterol (ACCUNEB) nebulizer solution 1.25 mg  1.25 mg Nebulization Q6H PRN        Laboratory data and review:    Recent Labs     06/09/22 0223   WBC 9.9   HGB 12.5   HCT 38.4        Recent Labs     06/09/22 0223      K 3.8   CL 99   CO2 32   GLU 99   BUN 50*   CREA 1.17*   CA 9.0     No components found for: [place]    Diagnostics: Imaging studies have been reviewed    Telemetry reviewed by me: [facility] tachycardia      Assessment and Plan:    Acute on chronic respiratory failure with hypoxia and hypercapnia (HCC) (6/8/2022) POA: requires BiPAP when asleep ad as needed. Continue oxygen via nasal cannula as tolerated to keep sats > 90%. Trigger of respiratory failure seems to be suspected to be TYRONE, deconditioning. CM working with SNF to get a Trilogy machine prior to discharge. Avoid sedating medications. Continue to monitor. She is a DNR. Appreciate palliative care consult.      Paroxysmal atrial flutter ([address] (11/23/2015) POA: went into RVR 6/10, received a dose of IV Digoxin and has been on IV Amiodarone gtt sine 6/10. Sotalol discontinued. Continue to monitor on amiodarone gtt and enoxaparin. Cardiology following    Acquired hypothyroidism (7/7/2010) POA: she has a hx of thyroid cancer and is sp thyroidectomy. Recent TSH was 3.7. Continue Levothyroxine    Encephalopathy acute (6/4/2022) POA: likely from the CO2 retention with her underlying dementia. Continue supportive care    Physical debility (6/8/2022) POA: Continue OT, OT as tolerated. Plan is for SNF once stable with rate control    Dementia ([facility] (6/8/2022) POA: continue Aricept. Monitor clinical progress. HTN (hypertension), benign (4/30/2010) POA: BP stable.  Monitor     Glaucoma (4/30/2010) POA: continue eye gtts    Total time spent for the patient's care: [address] discussed with: Care Manager and Nursing Staff    Discussed:  Care Plan and D/C Planning    Prophylaxis:  Lovenox    Anticipated Disposition:  SNF/LTC            ___________________________________________________    Attending Physician:   Miguel Suggs MD

## 2022-06-12 LAB
GLUCOSE BLD STRIP.AUTO-MCNC: 186 MG/DL (ref 65–117)
SERVICE CMNT-IMP: ABNORMAL

## 2022-06-12 PROCEDURE — 74011000258 HC RX REV CODE- 258: Performed by: SPECIALIST

## 2022-06-12 PROCEDURE — 74011250636 HC RX REV CODE- 250/636: Performed by: SPECIALIST

## 2022-06-12 PROCEDURE — 74011000258 HC RX REV CODE- 258: Performed by: NURSE PRACTITIONER

## 2022-06-12 PROCEDURE — 99232 SBSQ HOSP IP/OBS MODERATE 35: CPT | Performed by: SPECIALIST

## 2022-06-12 PROCEDURE — 74011250637 HC RX REV CODE- 250/637: Performed by: FAMILY MEDICINE

## 2022-06-12 PROCEDURE — 94761 N-INVAS EAR/PLS OXIMETRY MLT: CPT

## 2022-06-12 PROCEDURE — 74011250636 HC RX REV CODE- 250/636: Performed by: NURSE PRACTITIONER

## 2022-06-12 PROCEDURE — 74011250637 HC RX REV CODE- 250/637: Performed by: NURSE PRACTITIONER

## 2022-06-12 PROCEDURE — 65270000046 HC RM TELEMETRY

## 2022-06-12 PROCEDURE — 74011250637 HC RX REV CODE- 250/637: Performed by: SPECIALIST

## 2022-06-12 PROCEDURE — 94003 VENT MGMT INPAT SUBQ DAY: CPT

## 2022-06-12 PROCEDURE — 74011000250 HC RX REV CODE- 250: Performed by: INTERNAL MEDICINE

## 2022-06-12 PROCEDURE — 74011250636 HC RX REV CODE- 250/636: Performed by: STUDENT IN AN ORGANIZED HEALTH CARE EDUCATION/TRAINING PROGRAM

## 2022-06-12 PROCEDURE — 82962 GLUCOSE BLOOD TEST: CPT

## 2022-06-12 PROCEDURE — 99232 SBSQ HOSP IP/OBS MODERATE 35: CPT | Performed by: INTERNAL MEDICINE

## 2022-06-12 RX ORDER — METOPROLOL TARTRATE 50 MG/1
50 TABLET ORAL EVERY 12 HOURS
Status: DISCONTINUED | OUTPATIENT
Start: 2022-06-12 | End: 2022-06-12

## 2022-06-12 RX ADMIN — DEXTROSE MONOHYDRATE 0.5 MG/MIN: 50 INJECTION, SOLUTION INTRAVENOUS at 02:51

## 2022-06-12 RX ADMIN — TIMOLOL MALEATE 1 DROP: 5 SOLUTION/ DROPS OPHTHALMIC at 08:17

## 2022-06-12 RX ADMIN — TRIAMCINOLONE ACETONIDE: 1 CREAM TOPICAL at 17:37

## 2022-06-12 RX ADMIN — METOPROLOL TARTRATE 25 MG: 25 TABLET, FILM COATED ORAL at 05:49

## 2022-06-12 RX ADMIN — ENOXAPARIN SODIUM 90 MG: 100 INJECTION SUBCUTANEOUS at 17:42

## 2022-06-12 RX ADMIN — DIGOXIN 0.12 MG: 125 TABLET ORAL at 08:15

## 2022-06-12 RX ADMIN — METOPROLOL TARTRATE 50 MG: 50 TABLET ORAL at 14:45

## 2022-06-12 RX ADMIN — AMIODARONE HYDROCHLORIDE 1 MG/MIN: 50 INJECTION, SOLUTION INTRAVENOUS at 21:50

## 2022-06-12 RX ADMIN — TRIAMCINOLONE ACETONIDE: 1 CREAM TOPICAL at 09:38

## 2022-06-12 RX ADMIN — DEXTROSE MONOHYDRATE 150 MG: 50 INJECTION, SOLUTION INTRAVENOUS at 18:12

## 2022-06-12 RX ADMIN — METOPROLOL TARTRATE 75 MG: 25 TABLET, FILM COATED ORAL at 21:28

## 2022-06-12 RX ADMIN — Medication 10 ML: at 21:29

## 2022-06-12 RX ADMIN — Medication 10 ML: at 14:45

## 2022-06-12 RX ADMIN — DONEPEZIL HYDROCHLORIDE 10 MG: 5 TABLET, FILM COATED ORAL at 21:28

## 2022-06-12 RX ADMIN — Medication 2000 UNITS: at 08:15

## 2022-06-12 RX ADMIN — Medication 10 ML: at 05:56

## 2022-06-12 RX ADMIN — ENOXAPARIN SODIUM 90 MG: 100 INJECTION SUBCUTANEOUS at 05:49

## 2022-06-12 RX ADMIN — LEVOTHYROXINE SODIUM 150 MCG: 0.07 TABLET ORAL at 08:15

## 2022-06-12 NOTE — PROGRESS NOTES
CARDIOLOGY PROGRESS NOTE        [address], [place], [address] [facility]  Phone [phone]; Fax [phone]          2022 7:40 AM       Admit Date:           2022  Admit Diagnosis:  Encephalopathy acute [G93.40]  :          1940   MRN:          [id number]        Assessment/Plan     1) Afib with RVR   - long history of PAF  - given low BP and continue rapid Afib on Sotalol, have switched to amiodarone plus metoprolol for rate control. Also added digoxin to help control HR in setting of low BP   - can discharge on eliquis 5 mg BID if no contraindication      2) Acute resp failure - per pulmonary - doubt problems related to CHF - thought to be from obesity hypoventilation / TYRONE    3) RAN   - renal function improved     4) Dementia:   On donepezil and Cymbalta     5) HTN   - stopped diovan given low BP        Anurag Garland MD, [facility] [place]                    We discussed the expected course, resolution and complications of the diagnosis(es) in detail.         1901 - 700  In: 235 [I.V.:235]  Out: 700 [Urine:700]    Last 3 Recorded Weights in this Encounter    22 1544 22 1420 06/10/22 040   Weight: 211 lb (95.7 kg) 211 lb (95.7 kg) 199 lb 8.3 oz (90.5 kg)         06/10 0701 - 1900  In: 626.7 [I.V.:626.7]  Out:  [Urine:1550]    SUBJECTIVE      Admitted for hypoxia, FTT     Lethargic but aroudable in bed          [employer] reports no SOB or chest pains.        Current Facility-Administered Medications   Medication Dose Route Frequency    digoxin (LANOXIN) tablet 0.125 mg  0.125 mg Oral DAILY    amiodarone (CORDARONE) 375 mg in dextrose 5% 250 mL infusion  0.5-1 mg/min IntraVENous TITRATE    0.9% sodium chloride infusion 250 mL  250 mL IntraVENous CONTINUOUS    enoxaparin (LOVENOX) injection 90 mg  1 mg/kg SubCUTAneous Q12H    metoprolol tartrate (LOPRESSOR) tablet 25 mg  25 mg Oral Q8H    cholecalciferol (VITAMIN D3) (1000 Units /25 mcg) tablet 2,000 Units  2,000 Units Oral DAILY    donepeziL (ARICEPT) tablet 10 mg  10 mg Oral QHS    [Held by provider] DULoxetine (CYMBALTA) capsule 60 mg  60 mg Oral DAILY    levothyroxine (SYNTHROID) tablet 150 mcg  150 mcg Oral ACB    triamcinolone acetonide (KENALOG) 0.1 % cream   Topical BID    timolol (TIMOPTIC) 0.5 % ophthalmic solution 1 Drop  1 Drop Both Eyes DAILY    sodium chloride (BRANDON 128) 2 % ophthalmic drops 1 Drop (Patient Supplied)  1 Drop Left Eye BID    sodium chloride (NS) flush 5-40 mL  5-40 mL IntraVENous Q8H    sodium chloride (NS) flush 5-40 mL  5-40 mL IntraVENous PRN    acetaminophen (TYLENOL) suppository 650 mg  650 mg Rectal Q6H PRN    polyethylene glycol (MIRALAX) packet 17 g  17 g Oral DAILY PRN    ondansetron (ZOFRAN) injection 4 mg  4 mg IntraVENous Q6H PRN    albuterol (ACCUNEB) nebulizer solution 1.25 mg  1.25 mg Nebulization Q6H PRN      OBJECTIVE               Intake/Output Summary (Last 24 hours) at 6/11/2022 2149  Last data filed at 6/11/2022 2002  Gross per 24 hour   Intake 571.67 ml   Output 2250 ml   Net -1678.33 ml       Review of Systems - History obtained from the patient AS PER  HPI  - patient denies complaints but is poor historian       PHYSICAL EXAM        Visit Vitals  BP (!) 110/46   Pulse 80   Temp 98.3 °F (36.8 °C)   Resp 18   Ht 5' 7\" (1.702 m)   Wt 199 lb 8.3 oz (90.5 kg)   SpO2 100%   BMI 31.25 kg/m²       Gen: Alert, pleasant, oriented times 1.     HEENT:  Pink conjunctivae,  Oral mucosa dry   Neck: Supple,    Resp: No accessory muscle use, Clear breath sounds, No rales or rhonchi  Card:  irregular , tachycardic, Rhythm,     No murmurs,  MSK: No cyanosis or clubbing, good capillary refill  Skin: psoriasis lesions  Neuro:  Moving all four extremities, no focal deficit, follows commands appropriately  Psych:  ? Insight   LE: No edema       DATA REVIEW      06/04/22    ECHO ADULT COMPLETE 06/05/2022 6/5/2022    Interpretation Summary   Left Ventricle: Normal left ventricular systolic function with a visually estimated EF of 55 - 60%. Left ventricle size is normal. Mildly increased wall thickness. Findings consistent with mild concentric hypertrophy. Normal wall motion. Grade I diastolic dysfunction with normal LAP.   Right Ventricle: Right ventricle is mildly dilated.   Aortic Valve: Mild regurgitation with a centrally directed jet.   Left Atrium: Left atrium is mildly dilated.   Right Atrium: Right atrium is mildly dilated. Signed by: Kristofer Bell MD on 6/5/2022  3:21 PM    [facility] cardiology records  CHF  CAD  PA flutter 2015 converting to SR ECHO (2015) EF 50%-55% , betapace 120 mg BID        Cardiac monitor: a flutter         Laboratory and Imaging have been reviewed by me and are notable for  No results for input(s): CPK, CKMB, TROIQ in the last 72 hours.   Recent Labs     06/09/22  0223      K 3.8   CO2 32   BUN 50*   CREA 1.17*   GLU 99   WBC 9.9   HGB 12.5   HCT 38.4

## 2022-06-12 NOTE — PROGRESS NOTES
Today;    I discussed pt with attending. The current plan is for pt to discharge to the [address] [place] [address] when medically stable(hopefully tomorrow)    Tomorrow,the covering  will confirm with [facility] and with the [address] [place] [address] that the details for the contract for the trilogy has been completely worked out . Confirmation will be done with [employer] that the trilogy will be @ the [facility] when pt arrives @ their facility. Ambulance transport will need to be arranged. The DDNR needs to go with ambulance transport. Palliative had placed the DNR in pt.'s room to be signed by . Covering  will also check to see if pt is off the amiodarine gtt and to please check with attending and cardiology to insure pt.'s medical stability regarding discharge.     Herminia Espinal

## 2022-06-12 NOTE — PROGRESS NOTES
Nitesh 66 Per Dr. Nj Seats place order for amiodarone bolus and drip for rate control. Orders placed. Primary RN, Nishi aware.

## 2022-06-12 NOTE — PROGRESS NOTES
7236-Bedside report received from Yesika Lopes RN. SBAR, Kardex, Procedure Summary, Intake/Output, MAR, Recent Results and Cardiac Rhythm NSR were discussed. Pt observed resting in bed without distress. Will assess. Sherry Chávez RN  8289-Dr. Umesh Leon paged regarding patient's resumption of AFib RVR. Plan to give metoprolol 50mg PO now and watch for an hour or 2. If HR remains elevated will plan to resume amiodarone drip. Dre WATKINS  1800-Amiodarone started for persistent AFib with intermittent RVR. Dre WATKINS  1905-Bedside and Verbal shift change report given to Blaire Sanchez RN (oncoming nurse) by JENNY Garvey RN (offgoing nurse). Report included the following information SBAR, Kardex, Procedure Summary, Intake/Output, MAR, Recent Results and Cardiac Rhythm AFib.  Dre WATKINS

## 2022-06-12 NOTE — PROGRESS NOTES
[address]  [address] [facility], [address] [place] [address]  [phone]      Medical Progress Note      NAME:         Em Meza   :        1940  MRM:        [id number]    Date of service: 2022      Chief complaint: Hypoxia, tiredness      Interval HPI: Patient more awake and alert and off BiPAP. HR better controlled. Given she is a poor historian, I have discussed with her nurse for collaborative hx      Objective:    Vital Signs:    Visit Vitals  BP (!) 113/59   Pulse 80   Temp 98 °F (36.7 °C)   Resp 17   Ht 5' 7\" (1.702 m)   Wt 90.5 kg (199 lb 8.3 oz)   SpO2 96%   BMI 31.25 kg/m²          Intake/Output Summary (Last 24 hours) at 2022 1045  Last data filed at 2022 0600  Gross per 24 hour   Intake 435 ml   Output 2100 ml   Net -1665 ml      Physical Examination:    General:   Weak and frail looking, on nasal canula oxygen   Eyes:   pink conjunctivae, PERRLA with no discharge. ENT:   no ottorrhea or rhinorrhea with dry mucous membranes  Pulm: decreased breath sounds with scattered crackles. + wheezes  Card:  normal rhythm, without thrills, bruits. + peripheral edema  Abd:  Soft, non-tender, non-distended, normoactive bowel sounds   Musc:  No cyanosis, clubbing, atrophy or deformities. Skin: bruising over extremities   Neuro: lethargic awake and alert but weak.  Limited exam.    Psych:  Has no insight to her illness      Current Facility-Administered Medications   Medication Dose Route Frequency    metoprolol tartrate (LOPRESSOR) tablet 50 mg  50 mg Oral Q12H    digoxin (LANOXIN) tablet 0.125 mg  0.125 mg Oral DAILY    0.9% sodium chloride infusion 250 mL  250 mL IntraVENous CONTINUOUS    enoxaparin (LOVENOX) injection 90 mg  1 mg/kg SubCUTAneous Q12H    cholecalciferol (VITAMIN D3) (1000 Units /25 mcg) tablet 2,000 Units  2,000 Units Oral DAILY    donepeziL (ARICEPT) tablet 10 mg  10 mg Oral QHS    [Held by provider] DULoxetine (CYMBALTA) capsule 60 mg  60 mg Oral DAILY    levothyroxine (SYNTHROID) tablet 150 mcg  150 mcg Oral ACB    triamcinolone acetonide (KENALOG) 0.1 % cream   Topical BID    timolol (TIMOPTIC) 0.5 % ophthalmic solution 1 Drop  1 Drop Both Eyes DAILY    sodium chloride (BRANDON 128) 2 % ophthalmic drops 1 Drop (Patient Supplied)  1 Drop Left Eye BID    sodium chloride (NS) flush 5-40 mL  5-40 mL IntraVENous Q8H    sodium chloride (NS) flush 5-40 mL  5-40 mL IntraVENous PRN    acetaminophen (TYLENOL) suppository 650 mg  650 mg Rectal Q6H PRN    polyethylene glycol (MIRALAX) packet 17 g  17 g Oral DAILY PRN    ondansetron (ZOFRAN) injection 4 mg  4 mg IntraVENous Q6H PRN    albuterol (ACCUNEB) nebulizer solution 1.25 mg  1.25 mg Nebulization Q6H PRN        Laboratory data and review:    No results for input(s): WBC, HGB, HCT, PLT, HGBEXT, HCTEXT, PLTEXT, HGBEXT, HCTEXT, PLTEXT in the last 72 hours. No results for input(s): NA, K, CL, CO2, GLU, BUN, CREA, CA, MG, PHOS, ALB, TBIL, ALT, INR, INREXT, INREXT in the last 72 hours. No lab exists for component: SGOT  No components found for: [address]: Imaging studies have been reviewed    Telemetry reviewed by me: barb tachycardia      Assessment and Plan:    Acute on chronic respiratory failure with hypoxia and hypercapnia ([facility] [address] (6/8/2022) POA: she remains frail needing intermittent BiPAP. Continue oxygen via nasal cannula as tolerated to keep sats > 90%. She likely has sleep apnea. CM working with SNF to get a Trilogy machine. Continue to monitor. She is a DNR. Appreciate palliative care consult. Paroxysmal atrial flutter ([facility] [address] (11/23/2015) POA: went into RVR 6/10, received a dose of IV Digoxin and has been on IV Amiodarone gtt sine 6/10. Continue to monitor on amiodarone gtt and enoxaparin.  Change to oral as per cardiology who are also following     Acquired hypothyroidism (7/7/2010) POA: she has a hx of thyroid cancer and is sp thyroidectomy. Recent TSH was 3.7. Continue Levothyroxine    Encephalopathy acute (6/4/2022) POA: likely from the CO2 retention with her underlying dementia. Improving. Physical debility (6/8/2022) POA: Continue OT, OT as tolerated. Plan is for SNF once stable with rate control    Dementia ([address] (6/8/2022) POA: continue Aricept. Monitor clinical progress. HTN (hypertension), benign (4/30/2010) POA: BP stable.  Monitor     Glaucoma (4/30/2010) POA: continue eye gtts                 Care Plan discussed with: Care Manager and Nursing Staff    Discussed:  Care Plan and D/C Planning    Prophylaxis:  Lovenox    Anticipated Disposition:  SNF/LTC            ___________________________________________________    Attending Physician:   Eh Ruiz MD

## 2022-06-12 NOTE — PROGRESS NOTES
CARDIOLOGY PROGRESS NOTE        [address], [place], [address] [facility]  Phone [phone]; Fax [phone]          2022 7:40 AM       Admit Date:           2022  Admit Diagnosis:  Encephalopathy acute [G93.40]  :          1940   MRN:          [id number]        Assessment/Plan     1) Afib with RVR   - long history of PAF  - given low BP and continue rapid Afib on Sotalol, have switched to amiodarone plus metoprolol for rate control. Also added digoxin to help control HR in setting of low BP   - can discharge on eliquis 5 mg BID if no contraindication   ----> on 22, I tried stopping Amiodarone gtt, but she went back into Afib with RVR this evening - have resume Amio gtt for now ---> will increase BB further and cont digoxin (low dose)      2) Acute resp failure - per pulmonary - doubt problems related to CHF - thought to be from obesity hypoventilation / TYRONE    3) RAN   - renal function improved     4) Dementia:   On donepezil and Cymbalta     5) HTN   - stopped diovan given low BP        Ángel Rojas MD, [facility] [place]                    We discussed the expected course, resolution and complications of the diagnosis(es) in detail. No intake/output data recorded. Last 3 Recorded Weights in this Encounter    22 1544 22 1420 06/10/22 0400   Weight: 211 lb (95.7 kg) 211 lb (95.7 kg) 199 lb 8.3 oz (90.5 kg)          0701 -  1900  In: 1121.3 [P.O.:480; I.V.:641.3]  Out: 2750 [Urine:2750]    SUBJECTIVE      Admitted for hypoxia, FTT     Awake - eating dinner - comfortable - denies complaints (despite being in Afib with RVR) . Andie Chavez reports no SOB or chest pains.        Current Facility-Administered Medications   Medication Dose Route Frequency    metoprolol tartrate (LOPRESSOR) tablet 50 mg  50 mg Oral Q12H    amiodarone (CORDARONE) 375 mg in dextrose 5% 250 mL infusion  0.5-1 mg/min IntraVENous TITRATE    digoxin (LANOXIN) tablet 0.125 mg  0.125 mg Oral DAILY    enoxaparin (LOVENOX) injection 90 mg  1 mg/kg SubCUTAneous Q12H    cholecalciferol (VITAMIN D3) (1000 Units /25 mcg) tablet 2,000 Units  2,000 Units Oral DAILY    donepeziL (ARICEPT) tablet 10 mg  10 mg Oral QHS    [Held by provider] DULoxetine (CYMBALTA) capsule 60 mg  60 mg Oral DAILY    levothyroxine (SYNTHROID) tablet 150 mcg  150 mcg Oral ACB    triamcinolone acetonide (KENALOG) 0.1 % cream   Topical BID    timolol (TIMOPTIC) 0.5 % ophthalmic solution 1 Drop  1 Drop Both Eyes DAILY    sodium chloride (BRANDON 128) 2 % ophthalmic drops 1 Drop (Patient Supplied)  1 Drop Left Eye BID    sodium chloride (NS) flush 5-40 mL  5-40 mL IntraVENous Q8H    sodium chloride (NS) flush 5-40 mL  5-40 mL IntraVENous PRN    acetaminophen (TYLENOL) suppository 650 mg  650 mg Rectal Q6H PRN    polyethylene glycol (MIRALAX) packet 17 g  17 g Oral DAILY PRN    ondansetron (ZOFRAN) injection 4 mg  4 mg IntraVENous Q6H PRN    albuterol (ACCUNEB) nebulizer solution 1.25 mg  1.25 mg Nebulization Q6H PRN      OBJECTIVE               Intake/Output Summary (Last 24 hours) at 6/12/2022 1919  Last data filed at 6/12/2022 1900  Gross per 24 hour   Intake 1036.33 ml   Output 1200 ml   Net -163.67 ml       Review of Systems - History obtained from the patient AS PER  HPI  - patient denies complaints but is poor historian       PHYSICAL EXAM        Visit Vitals  BP 97/73   Pulse (!) 102   Temp 98.1 °F (36.7 °C)   Resp 19   Ht 5' 7\" (1.702 m)   Wt 199 lb 8.3 oz (90.5 kg)   SpO2 91%   BMI 31.25 kg/m²       Gen: Alert, pleasant, oriented times 1.     HEENT:  Pink conjunctivae,  Oral mucosa dry   Neck: Supple,    Resp: No accessory muscle use, Clear breath sounds, No rales or rhonchi  Card:  irregular , tachycardic, Rhythm,     No murmurs,  MSK: No cyanosis or clubbing, good capillary refill  Skin: psoriasis lesions  Neuro:  Moving all four extremities, no focal deficit, follows commands appropriately  Psych:  poor Insight   LE: No edema       DATA REVIEW      06/04/22    ECHO ADULT COMPLETE 06/05/2022 6/5/2022    Interpretation Summary    Left Ventricle: Normal left ventricular systolic function with a visually estimated EF of 55 - 60%. Left ventricle size is normal. Mildly increased wall thickness. Findings consistent with mild concentric hypertrophy. Normal wall motion. Grade I diastolic dysfunction with normal LAP.   Right Ventricle: Right ventricle is mildly dilated.   Aortic Valve: Mild regurgitation with a centrally directed jet.   Left Atrium: Left atrium is mildly dilated.   Right Atrium: Right atrium is mildly dilated. Signed by: Bryant Jay MD on 6/5/2022  3:21 PM    [facility] cardiology records  CHF  CAD  PA flutter 2015 converting to SR ECHO (2015) EF 50%-55% , betapace 120 mg BID        Cardiac monitor: a flutter         Laboratory and Imaging have been reviewed by me and are notable for  No results for input(s): CPK, CKMB, TROIQ in the last 72 hours. No results for input(s): NA, K, CO2, BUN, CREA, GLU, PHOS, MG, WBC, HGB, HCT, PLT, HGBEXT, HCTEXT, PLTEXT, HGBEXT, HCTEXT, PLTEXT in the last 72 hours.     No lab exists for component:  ALB

## 2022-06-12 NOTE — PROGRESS NOTES
Bedside and Verbal shift change report given to Fiona Ross RN (oncoming nurse) by Arya Maynard RN (offgoing nurse). Report included the following information SBAR, Kardex, MAR and Cardiac Rhythm Aflutter/sinus rhythm. Bedside and Verbal shift change report given to ROLAND Almodovar (oncoming nurse) by Fiona Ross RN (offgoing nurse). Report included the following information SBAR, Kardex, [facility] and Cardiac Rhythm Aflutter.

## 2022-06-13 ENCOUNTER — TELEPHONE (OUTPATIENT)
Dept: INTERNAL MEDICINE CLINIC | Age: 82
End: 2022-06-13

## 2022-06-13 PROCEDURE — 74011250636 HC RX REV CODE- 250/636: Performed by: SPECIALIST

## 2022-06-13 PROCEDURE — 94003 VENT MGMT INPAT SUBQ DAY: CPT

## 2022-06-13 PROCEDURE — 65270000046 HC RM TELEMETRY

## 2022-06-13 PROCEDURE — 74011250637 HC RX REV CODE- 250/637: Performed by: SPECIALIST

## 2022-06-13 PROCEDURE — 74011250637 HC RX REV CODE- 250/637: Performed by: FAMILY MEDICINE

## 2022-06-13 PROCEDURE — 77010033678 HC OXYGEN DAILY

## 2022-06-13 PROCEDURE — 74011000258 HC RX REV CODE- 258: Performed by: SPECIALIST

## 2022-06-13 PROCEDURE — 74011000250 HC RX REV CODE- 250: Performed by: INTERNAL MEDICINE

## 2022-06-13 PROCEDURE — 99231 SBSQ HOSP IP/OBS SF/LOW 25: CPT | Performed by: FAMILY MEDICINE

## 2022-06-13 PROCEDURE — 77030038269 HC DRN EXT URIN PURWCK BARD -A

## 2022-06-13 PROCEDURE — 74011250637 HC RX REV CODE- 250/637: Performed by: NURSE PRACTITIONER

## 2022-06-13 PROCEDURE — 94761 N-INVAS EAR/PLS OXIMETRY MLT: CPT

## 2022-06-13 PROCEDURE — 74011250636 HC RX REV CODE- 250/636: Performed by: STUDENT IN AN ORGANIZED HEALTH CARE EDUCATION/TRAINING PROGRAM

## 2022-06-13 PROCEDURE — 74011250637 HC RX REV CODE- 250/637: Performed by: INTERNAL MEDICINE

## 2022-06-13 RX ORDER — AMIODARONE HYDROCHLORIDE 200 MG/1
400 TABLET ORAL 2 TIMES DAILY
Status: DISCONTINUED | OUTPATIENT
Start: 2022-06-13 | End: 2022-06-14 | Stop reason: HOSPADM

## 2022-06-13 RX ADMIN — TRIAMCINOLONE ACETONIDE: 1 CREAM TOPICAL at 17:01

## 2022-06-13 RX ADMIN — Medication 10 ML: at 21:15

## 2022-06-13 RX ADMIN — LEVOTHYROXINE SODIUM 150 MCG: 0.07 TABLET ORAL at 06:08

## 2022-06-13 RX ADMIN — DIGOXIN 0.12 MG: 125 TABLET ORAL at 08:21

## 2022-06-13 RX ADMIN — DONEPEZIL HYDROCHLORIDE 10 MG: 5 TABLET, FILM COATED ORAL at 21:05

## 2022-06-13 RX ADMIN — Medication 20 ML: at 06:08

## 2022-06-13 RX ADMIN — TRIAMCINOLONE ACETONIDE: 1 CREAM TOPICAL at 08:24

## 2022-06-13 RX ADMIN — AMIODARONE HYDROCHLORIDE 400 MG: 200 TABLET ORAL at 08:21

## 2022-06-13 RX ADMIN — Medication 2000 UNITS: at 08:21

## 2022-06-13 RX ADMIN — AMIODARONE HYDROCHLORIDE 400 MG: 200 TABLET ORAL at 17:00

## 2022-06-13 RX ADMIN — AMIODARONE HYDROCHLORIDE 0.5 MG/MIN: 50 INJECTION, SOLUTION INTRAVENOUS at 04:30

## 2022-06-13 RX ADMIN — TIMOLOL MALEATE 1 DROP: 5 SOLUTION/ DROPS OPHTHALMIC at 08:24

## 2022-06-13 RX ADMIN — METOPROLOL TARTRATE 75 MG: 25 TABLET, FILM COATED ORAL at 08:21

## 2022-06-13 RX ADMIN — APIXABAN 5 MG: 5 TABLET, FILM COATED ORAL at 17:00

## 2022-06-13 RX ADMIN — METOPROLOL TARTRATE 75 MG: 25 TABLET, FILM COATED ORAL at 21:05

## 2022-06-13 RX ADMIN — ENOXAPARIN SODIUM 90 MG: 100 INJECTION SUBCUTANEOUS at 06:06

## 2022-06-13 RX ADMIN — Medication 10 ML: at 13:37

## 2022-06-13 NOTE — TELEPHONE ENCOUNTER
Verified patient identity with two identifiers. Spoke with patient by phone. Pt is currently admitted to hospital hypoventilation syndrome with lethargy, plan is to d/c to rehab. She has always been on brand synthroid not generic, if ok with [facility] they will do generic.  wants a referral to [facility] PCP as it is time for them to be closer to home. He has appt with Dr. Fito Malloy today.

## 2022-06-13 NOTE — PROGRESS NOTES
4:30 pm:  Stuart Melvin is still awaiting updates on the status of the contract. 4:05 pm:  Donaldo Oliveira still has not received contract. 2:15 pm: GELY received message from Stuart Melvin at [employer]. She will check in with office to see the status of the contract. 1:30 pm:  GELY s/w [employer]. She still has not received the contract. 12:20 pm:  GELY left message for Stuart Melvin with [employer]; re: status of contract    9 am:  Message received from Donaldo Oliveira at the [employer]; she has not received the contract yet. Transition of Care Plan - RUR 11%:  1. CM following  2. Plan to discharge to the [address] [facility] when medically stable AND after Triology has been delivered to the SNF. Contract for Triology is still pending  3.  [facility] transport at discharge    C[place]ilia [place], [place]

## 2022-06-13 NOTE — ACP (ADVANCE CARE PLANNING)
DDNR has been completed.  has original document. Copy is on chart to be scanned into medical record. Copy was printed on easy to see yellow paper to be sent out with pt at time of discharge.

## 2022-06-13 NOTE — PROGRESS NOTES
1900 Bedside shift change report given to Isadora Conn RN (oncoming nurse) by Jamil Loomis RN (offgoing nurse). Report included the following information SBAR, Kardex, ED Summary, Procedure Summary, Intake/Output, MAR, Recent Results, Med Rec Status, Cardiac Rhythm Afib, Alarm Parameters  and Quality Measures. Patient resting quietly in bed. Will assess. Primary Nurse Edie Martin RN and ROLAND Gallo performed a dual skin assessment on this patient Impairment noted- psoriasis. Ivan score is; see flow sheet. 0700 Bedside shift change report given to ROLAND Whitt (oncoming nurse) by Isadora Conn RN (offgoing nurse). Report included the following information SBAR, Kardex, ED Summary, Procedure Summary, Intake/Output, MAR, Recent Results, Med Rec Status, Cardiac Rhythm Afib, Alarm Parameters  and Quality Measures.

## 2022-06-13 NOTE — PROGRESS NOTES
Owen Luna zelda [place] [address]  [address] [facility], [place], [id number] [place] [address]  [phone]      Medical Progress Note      NAME:         Sergio Lee   :        1940  MRM:        [id number]    Date of service: 2022      Chief complaint: Hypoxia, tiredness      Interval HPI: Patient more awake and alert and off BiPAP. HR better controlled. I have discussed with , nurse for collaborative hx. Objective:    Vital Signs:    Visit Vitals  /80 (BP 1 Location: Left upper arm, BP Patient Position: At rest)   Pulse 72   Temp 97.8 °F (36.6 °C)   Resp 19   Ht 5' 7\" (1.702 m)   Wt 98.1 kg (216 lb 4.3 oz)   SpO2 93%   BMI 33.87 kg/m²          Intake/Output Summary (Last 24 hours) at 2022 0828  Last data filed at 2022 0730  Gross per 24 hour   Intake 1796.33 ml   Output 1350 ml   Net 446.33 ml      Physical Examination:    General:   Weak and frail looking not in distress    Eyes:   pink conjunctivae, PERRLA with no discharge. ENT:   no ottorrhea or rhinorrhea with dry mucous membranes  Pulm: decreased breath sounds with bilateral crackles. + wheezes  Card:  normal rhythm, without thrills, bruits. + peripheral edema  Abd:  Soft, non-tender, non-distended, normoactive bowel sounds   Musc:  No cyanosis, clubbing, atrophy or deformities. Skin: bruising over extremities   Neuro: awake and alert but weak.  Limited exam.    Psych:  Has no insight to her illness      Current Facility-Administered Medications   Medication Dose Route Frequency    amiodarone (CORDARONE) tablet 400 mg  400 mg Oral BID    amiodarone (CORDARONE) 375 mg in dextrose 5% 250 mL infusion  0.5-1 mg/min IntraVENous TITRATE    metoprolol tartrate (LOPRESSOR) tablet 75 mg  75 mg Oral Q12H    digoxin (LANOXIN) tablet 0.125 mg  0.125 mg Oral DAILY    enoxaparin (LOVENOX) injection 90 mg  1 mg/kg SubCUTAneous Q12H    cholecalciferol (VITAMIN D3) (1000 Units /25 mcg) tablet 2,000 Units  2,000 Units Oral DAILY    donepeziL (ARICEPT) tablet 10 mg  10 mg Oral QHS    [Held by provider] DULoxetine (CYMBALTA) capsule 60 mg  60 mg Oral DAILY    levothyroxine (SYNTHROID) tablet 150 mcg  150 mcg Oral ACB    triamcinolone acetonide (KENALOG) 0.1 % cream   Topical BID    timolol (TIMOPTIC) 0.5 % ophthalmic solution 1 Drop  1 Drop Both Eyes DAILY    sodium chloride (BRANDON 128) 2 % ophthalmic drops 1 Drop (Patient Supplied)  1 Drop Left Eye BID    sodium chloride (NS) flush 5-40 mL  5-40 mL IntraVENous Q8H    sodium chloride (NS) flush 5-40 mL  5-40 mL IntraVENous PRN    acetaminophen (TYLENOL) suppository 650 mg  650 mg Rectal Q6H PRN    polyethylene glycol (MIRALAX) packet 17 g  17 g Oral DAILY PRN    ondansetron (ZOFRAN) injection 4 mg  4 mg IntraVENous Q6H PRN    albuterol (ACCUNEB) nebulizer solution 1.25 mg  1.25 mg Nebulization Q6H PRN        Laboratory data and review:    No results for input(s): WBC, HGB, HCT, PLT, HGBEXT, HCTEXT, PLTEXT, HGBEXT, HCTEXT, PLTEXT in the last 72 hours. No results for input(s): NA, K, CL, CO2, GLU, BUN, CREA, CA, MG, PHOS, ALB, TBIL, ALT, INR, INREXT, INREXT in the last 72 hours. No lab exists for component: SGOT  No components found for: [place]: Imaging studies have been reviewed    Telemetry reviewed by me: barb tachycardia      Assessment and Plan:    Acute on chronic respiratory failure with hypoxia and hypercapnia ([facility] [address] (6/8/2022) POA: she remains frail needing intermittent BiPAP. Continue oxygen via nasal cannula as tolerated to keep sats > 90%. She likely has sleep apnea. CM working with SNF to get a Trilogy machine contract prior to discharge. Continue to monitor. Palliative care following. She is a DNR. Paroxysmal atrial flutter ([facility] [address] (11/23/2015) POA: went into RVR 6/10, received a dose of IV Digoxin and has been on IV Amiodarone gtt sine 6/10. Continue Amiodarone gtt.  Started on PO Amiodarone and Metoprolol. On enoxaparin with plan for Eliquis at discharge. Cardiology following      Acquired hypothyroidism (7/7/2010) POA: she has a hx of thyroid cancer and is sp thyroidectomy. Recent TSH was 3.7. Continue Levothyroxine    Encephalopathy acute (6/4/2022) POA: likely from the CO2 retention with her underlying dementia. Continues to improve. Physical debility (6/8/2022) POA: Continue OT, OT as tolerated. Plan is for SNF once arrangements are completed     Dementia ([facility] [address] (6/8/2022) POA: continue Aricept. Monitor clinical progress. HTN (hypertension), benign (4/30/2010) POA: BP stable.  Monitor     Glaucoma (4/30/2010) POA: continue eye gtts                 Care Plan discussed with: Care Manager and Nursing Staff    Discussed:  Care Plan and D/C Planning    Prophylaxis:  Lovenox    Anticipated Disposition:  SNF/LTC            ___________________________________________________    Attending Physician:   Kaley Barbosa MD

## 2022-06-13 NOTE — CONSULTS
Palliative Medicine Consult  [place]: [phone] ([phone])    Patient Name: Ava Kaplan  YOB: 1940    Date of Initial Consult: 6/10/22  Reason for Consult: Care Decisions  Requesting Provider: Dr. Jaquelin Rodríguez   Primary Care Physician: Ashwin Dowling MD     SUMMARY:   Ava Kaplan is a 79 y.o. with CHF, dementia, HTN, PAF, h/o thyroid cancer with thyroidectomy and subsequent hypothyroidism who was admitted on 6/4/2022 from home with a diagnosis of AHRF. She is suspected to have OHS/TYRONE in addition there her chronic CHF and has received treatment for this. At this time she is requiring either BiPAP or Trilogy for sleeping and naps. She has also received medication adjustments for PAF with periods of rapid heart rate. Current medical issues leading to Palliative Medicine involvement include: Care Decisions. Social:  Patient has been  to her  Taina Freeman for almost 59 years. They have three children. Their son Yady Neil is currently living with them. At baseline patient is able to walk with a walker though she has had a couple falls recently. She has bad short-term memory according to her , but her long-term memory is intact. PALLIATIVE DIAGNOSES:   1. Encounter for Palliative Care  2. Goals of care  3. Code status discussion  4. Debility  5. Impaired cognition/Dementia  6. New oxygen requirement  7. History of falls     PLAN:   1. I spoke with bedside nurse about patient's status. Patient to trial oral regimen for HR control today. 2. I met with patient, , daughter. Patient is alert and able to answer some basic questions. Family is very pleased that she seems to be so alert today. They say this is the most alert and interactive that they have seen her in the last 1.5 years. They wonder if the fact that she has not been taking duloxetine has contributed to this. They think it was started to help her mood but are not sure that she still needs it.  For now they do not wish to restart the duloxetine. I encouraged them to follow up with their PCP at discharged, and they plan to do so. They are hopeful that her oxygen status will continue to improve and understand that she may need continued nighttime oxygen support. They say she has never been officially tested for or diagnosed with sleep apnea. 3.  did sign the DDNR and has a copy for himself. 4. Thank you for asking our team to participate in the care of Yanni Yang. We will remain available to see patient as needed. GOALS OF CARE / TREATMENT PREFERENCES:     GOALS OF CARE:       TREATMENT PREFERENCES:   Code Status: DNR    Patient and family's personal goals include: to increase strength to be able to walk again    Important upcoming milestones or family events: n/a    The patient identifies the following as important for living well: patient cannot answer d/t medical condition      Advance Care Planning:  [] The [facility] Interdisciplinary Team has updated the ACP Navigator with [address] and Patient Capacity      Primary Decision MakerTony Primes - [phone]  Advance Care Planning 6/10/2022   Patient's Healthcare Decision Maker is: Legal Next of Kin   Confirm Advance Directive None   Patient Would Like to Complete Advance Directive Unable               Other:    As far as possible, the palliative care team has discussed with patient / health care proxy about goals of care / treatment preferences for patient. HISTORY:     History obtained from: chart, patient,     CHIEF COMPLAINT: I'm fine    HPI/SUBJECTIVE:    The patient is:   [x] Verbal and participatory  [] Non-participatory due to:     6/10: patient is resting but wakes to name call. She is able to answer basic yes/no questions and asks for water. She is not able to participate in more complex discussion. 6/13: patient is awake and able to answer some basic questions.  She is more alert today than usual per family. Clinical Pain Assessment (nonverbal scale for severity on nonverbal patients):   Clinical Pain Assessment  Severity: 0     Activity (Movement): Lying quietly, normal position    Duration: for how long has pt been experiencing pain (e.g., 2 days, 1 month, years)  Frequency: how often pain is an issue (e.g., several times per day, once every few days, constant)     FUNCTIONAL ASSESSMENT:     Palliative Performance Scale (PPS):  PPS: 30       PSYCHOSOCIAL/SPIRITUAL SCREENING:     Palliative IDT has assessed this patient for cultural preferences / practices and a referral made as appropriate to needs (Cultural Services, Patient Advocacy, Ethics, etc.)    Any spiritual / Amish concerns:  [] Yes /  [x] No   If \"Yes\" to discuss with pastoral care during IDT     Does caregiver feel burdened by caring for their loved one:   [] Yes /  [x] No /  [] No Caregiver Present    If \"Yes\" to discuss with social work during IDT    Anticipatory grief assessment:   [x] Normal  / [] Maladaptive     If \"Maladaptive\" to discuss with social work during IDT    ESAS Anxiety:      ESAS Depression:          REVIEW OF SYSTEMS:     Positive and pertinent negative findings in ROS are noted above in HPI. The following systems were [x] reviewed / [] unable to be reviewed as noted in HPI  Other findings are noted below. Systems: constitutional, ears/nose/mouth/throat, respiratory, gastrointestinal, genitourinary, musculoskeletal, integumentary, neurologic, psychiatric, endocrine. Positive findings noted below. Modified ESAS Completed by: provider     Drowsiness: 0     Pain: 0     Nausea: 0     Dyspnea: 0           Stool Occurrence(s): 0        PHYSICAL EXAM:     From RN flowsheet:  Wt Readings from Last 3 Encounters:   06/12/22 216 lb 4.3 oz (98.1 kg)   07/07/21 214 lb (97.1 kg)   09/04/20 221 lb (100.2 kg)     Blood pressure (!) 140/61, pulse 77, temperature 97.8 °F (36.6 °C), resp.  rate 24, height 5' 7\" (1.702 m), weight 216 lb 4.3 oz (98.1 kg), SpO2 95 %. Pain Scale 1: Numeric (0 - 10)  Pain Intensity 1: 0                 Last bowel movement, if known:     General: awake, sitting up in hospital bed, appears frail but in NAD  Eyes: lids normal, no drainage  Nose: nares normal, no drainage  Mouth: mmm, no drainage  Pulm: rate is normal, respirations are unlabored on NC  Neuro: alert, able to answer basic questions, moves extremities  Psych: calm without restlessness or agitation       HISTORY:     Principal Problem:    Acute on chronic respiratory failure with hypoxia and hypercapnia ([address] (6/8/2022)    Active Problems:    HTN (hypertension), benign (4/30/2010)      Glaucoma (4/30/2010)      Acquired hypothyroidism (7/7/2010)      Paroxysmal atrial flutter ([address] (11/23/2015)      Encephalopathy acute (6/4/2022)      Physical debility (6/8/2022)      Dementia ([address] (6/8/2022)      Past Medical History:   Diagnosis Date    Arthritis     Basal cell carcinoma 05/20/2012    Calculus of kidney     Cancer ([address]     skin    Cancer ([address]     thyroid    Glaucoma 4/30/2010    Hypertension     OA (osteoarthritis) 4/30/2010    Psoriasis 4/30/2010      Past Surgical History:   Procedure Laterality Date    HX CATARACT REMOVAL      bilat    HX CHOLECYSTECTOMY      HX CRANIOTOMY      HX DILATION AND CURETTAGE      HX HYSTERECTOMY      HX KNEE ARTHROSCOPY      HX KNEE REPLACEMENT      HX TONSILLECTOMY      HX WRIST FRACTURE TX Left 8/21/15    OH THYROIDECTOMY        Family History   Problem Relation Age of Onset    Hypertension Mother     Heart Disease Mother     Heart Disease Father     Hypertension Father     Elevated Lipids Father     Heart Disease Brother     Psychiatric Disorder Brother     Diabetes Brother     Breast Cancer Maternal Aunt     Breast Cancer Paternal Aunt       History reviewed, no pertinent family history.   Social History     Tobacco Use    Smoking status: Former Smoker     Packs/day: 0.50     Years: 4.00 Pack years: 2.00     Types: Cigarettes     Quit date: 1961     Years since quittin.4    Smokeless tobacco: Never Used   Substance Use Topics    Alcohol use: No     Alcohol/week: 0.0 standard drinks     Allergies   Allergen Reactions    Cephalexin Itching    Codeine Rash    Gabapentin Other (comments)    Neomycin Rash    Polysporin [Bacitracin-Polymyxin B] Rash    Protonix [Pantoprazole] Other (comments)     Gi upset      Current Facility-Administered Medications   Medication Dose Route Frequency    amiodarone (CORDARONE) tablet 400 mg  400 mg Oral BID    amiodarone (CORDARONE) 375 mg in dextrose 5% 250 mL infusion  0.5-1 mg/min IntraVENous TITRATE    metoprolol tartrate (LOPRESSOR) tablet 75 mg  75 mg Oral Q12H    digoxin (LANOXIN) tablet 0.125 mg  0.125 mg Oral DAILY    enoxaparin (LOVENOX) injection 90 mg  1 mg/kg SubCUTAneous Q12H    cholecalciferol (VITAMIN D3) (1000 Units /25 mcg) tablet 2,000 Units  2,000 Units Oral DAILY    donepeziL (ARICEPT) tablet 10 mg  10 mg Oral QHS    [Held by provider] DULoxetine (CYMBALTA) capsule 60 mg  60 mg Oral DAILY    levothyroxine (SYNTHROID) tablet 150 mcg  150 mcg Oral ACB    triamcinolone acetonide (KENALOG) 0.1 % cream   Topical BID    timolol (TIMOPTIC) 0.5 % ophthalmic solution 1 Drop  1 Drop Both Eyes DAILY    sodium chloride (BRANDON 128) 2 % ophthalmic drops 1 Drop (Patient Supplied)  1 Drop Left Eye BID    sodium chloride (NS) flush 5-40 mL  5-40 mL IntraVENous Q8H    sodium chloride (NS) flush 5-40 mL  5-40 mL IntraVENous PRN    acetaminophen (TYLENOL) suppository 650 mg  650 mg Rectal Q6H PRN    polyethylene glycol (MIRALAX) packet 17 g  17 g Oral DAILY PRN    ondansetron (ZOFRAN) injection 4 mg  4 mg IntraVENous Q6H PRN    albuterol (ACCUNEB) nebulizer solution 1.25 mg  1.25 mg Nebulization Q6H PRN          LAB AND IMAGING FINDINGS:     Lab Results   Component Value Date/Time    WBC 9.9 2022 02:23 AM    HGB 12.5 2022 02:23 AM    PLATELET 338 63/34/1388 02:23 AM     Lab Results   Component Value Date/Time    Sodium 137 06/09/2022 02:23 AM    Potassium 3.8 06/09/2022 02:23 AM    Chloride 99 06/09/2022 02:23 AM    CO2 32 06/09/2022 02:23 AM    BUN 50 (H) 06/09/2022 02:23 AM    Creatinine 1.17 (H) 06/09/2022 02:23 AM    Calcium 9.0 06/09/2022 02:23 AM    Magnesium 2.4 06/06/2022 09:46 PM    Phosphorus 3.3 06/06/2022 09:46 PM      Lab Results   Component Value Date/Time    Alk. phosphatase 88 06/06/2022 09:46 PM    Protein, total 6.8 06/06/2022 09:46 PM    Albumin 2.8 (L) 06/06/2022 09:46 PM    Globulin 4.0 06/06/2022 09:46 PM     Lab Results   Component Value Date/Time    INR 1.1 06/04/2022 11:07 PM    Prothrombin time 11.0 06/04/2022 11:07 PM    aPTT 64.3 (H) 06/05/2022 09:25 AM      No results found for: IRON, FE, TIBC, IBCT, PSAT, FERR   Lab Results   Component Value Date/Time    pH 7.41 06/07/2022 06:31 AM    PCO2 50 (H) 06/07/2022 06:31 AM    PO2 57 (L) 06/07/2022 06:31 AM     No components found for: GLPOC   No results found for: CPK, CKMB             Total time: 15 mins  Counseling / coordination time, spent as noted above: 10 mins  > 50% counseling / coordination?: yes    Prolonged service was provided for  []30 min   []75 min in face to face time in the presence of the patient, spent as noted above. Time Start:   Time End:   Note: this can only be billed with 94498 (initial) or 86653 (follow up). If multiple start / stop times, list each separately.

## 2022-06-13 NOTE — PROGRESS NOTES
Cardiology Daily Progress Note      Jenise Macias is a 79 y.o. female with PMH of  PAF, Dementia, hypoventilation syndrome admitted with lethargy, FTT. Cardiology following for a fib RR. EF preserved. Visit Vitals  BP (!) 134/51 (BP 1 Location: Left upper arm, BP Patient Position: Semi fowlers; Lying right side)   Pulse 92   Temp 98 °F (36.7 °C)   Resp 24   Ht 5' 7\" (1.702 m)   Wt 216 lb 4.3 oz (98.1 kg)   SpO2 91%   BMI 33.87 kg/m²       Intake/Output Summary (Last 24 hours) at 6/12/2022 2058  Last data filed at 6/12/2022 2000  Gross per 24 hour   Intake 1051.33 ml   Output 500 ml   Net 551.33 ml     General appearance - alert, well appearing, and in no distress  Mental status - affect appropriate to mood  Eyes - sclera anicteric, moist mucous membranes  Neck - supple, no significant adenopathy  Chest - clear to auscultation, no wheezes, rales or rhonchi  Heart - normal rate, irregular rhythm, normal S1, S2, no murmurs, rubs, clicks or gallops  Abdomen - soft, nontender, nondistended, no masses or organomegaly  Extremities - peripheral pulses normal, no pedal edema  Skin: psoriasis lesions.     Current Facility-Administered Medications   Medication Dose Route Frequency    amiodarone (CORDARONE) 375 mg in dextrose 5% 250 mL infusion  0.5-1 mg/min IntraVENous TITRATE    metoprolol tartrate (LOPRESSOR) tablet 75 mg  75 mg Oral Q12H    digoxin (LANOXIN) tablet 0.125 mg  0.125 mg Oral DAILY    enoxaparin (LOVENOX) injection 90 mg  1 mg/kg SubCUTAneous Q12H    cholecalciferol (VITAMIN D3) (1000 Units /25 mcg) tablet 2,000 Units  2,000 Units Oral DAILY    donepeziL (ARICEPT) tablet 10 mg  10 mg Oral QHS    [Held by provider] DULoxetine (CYMBALTA) capsule 60 mg  60 mg Oral DAILY    levothyroxine (SYNTHROID) tablet 150 mcg  150 mcg Oral ACB    triamcinolone acetonide (KENALOG) 0.1 % cream   Topical BID    timolol (TIMOPTIC) 0.5 % ophthalmic solution 1 Drop  1 Drop Both Eyes DAILY    sodium chloride (BRANDON 128) 2 % ophthalmic drops 1 Drop (Patient Supplied)  1 Drop Left Eye BID    sodium chloride (NS) flush 5-40 mL  5-40 mL IntraVENous Q8H    sodium chloride (NS) flush 5-40 mL  5-40 mL IntraVENous PRN    acetaminophen (TYLENOL) suppository 650 mg  650 mg Rectal Q6H PRN    polyethylene glycol (MIRALAX) packet 17 g  17 g Oral DAILY PRN    ondansetron (ZOFRAN) injection 4 mg  4 mg IntraVENous Q6H PRN    albuterol (ACCUNEB) nebulizer solution 1.25 mg  1.25 mg Nebulization Q6H PRN        [facility] cardiology records  CHF  CAD  PA flutter 2015 converting to SR ECHO (2015) EF 50%-55% , betapace 120 mg BID    Assessment/ Plan:       1) Afib with RVR   - long history of PAF  - given low BP and continue rapid Afib on Sotalol, have switched to amiodarone plus metoprolol for rate control. Also added digoxin to help control HR in setting of low BP   - can discharge on eliquis 5 mg BID if no contraindication   ----> on 6/12/22, we tried stopping Amiodarone gtt, but she went back into Afib with RVR yesterday. HR now 80's Add po amio 400 mg BID and wean IV amio. Cont metoprolol at 75 mg BID. Check dig level in am.      2) Acute resp failure: thought to be from obesity hypoventilation / TYRONE     3) RAN   - renal function improved      4) Dementia:   On donepezil and Cymbalta     5) HTN   - stopped diovan to allow for BB uptitration. ___________________________________________________    [place].  Valorie Rodríguez MD, [facility] [place]

## 2022-06-13 NOTE — PROGRESS NOTES
Nutrition Note      Recommendations/Plan: Continue current ROWAN regular diet     Assessment:  Pt eating well currently. PO intake improved as LOS stay progressed. Weight stable. Pt may d/c today? Chart reviewed. Reason for Visit:  LOS - ICU. ADULT DIET Regular; No Salt Added (3-4 gm)    Height: 5' 7\" (170.2 cm)    Weight: 98.1 kg (216 lb 4.3 oz)     Patient Vitals for the past 168 hrs:   % Diet Eaten   06/12/22 1304 76 - 100%   06/12/22 1000 76 - 100%   06/08/22 2000 1 - 25%       Wt Readings from Last 30 Encounters:   06/12/22 98.1 kg (216 lb 4.3 oz)   07/07/21 97.1 kg (214 lb)   09/04/20 100.2 kg (221 lb)   10/09/19 98 kg (216 lb)   11/02/18 95.7 kg (211 lb)   11/28/17 95.3 kg (210 lb)   08/10/17 94.8 kg (209 lb)   06/09/17 83.9 kg (185 lb)   03/13/17 83.9 kg (185 lb)   01/31/17 83.9 kg (185 lb)   01/24/17 88 kg (194 lb)   01/12/17 89.8 kg (198 lb)   01/06/17 89.8 kg (198 lb)   03/24/16 92.5 kg (204 lb)   12/17/15 95.1 kg (209 lb 9.6 oz)   10/07/15 95.3 kg (210 lb)   08/18/15 94.8 kg (209 lb)   05/26/15 94.8 kg (209 lb)   03/26/15 95.2 kg (209 lb 12.8 oz)   01/05/15 93.9 kg (207 lb)   07/25/14 97.1 kg (214 lb)   05/28/14 97 kg (213 lb 12.8 oz)   10/16/13 104.3 kg (230 lb)   06/17/13 95.3 kg (210 lb)   06/14/12 94.4 kg (208 lb 3.2 oz)   03/14/12 99.9 kg (220 lb 3.2 oz)   08/04/11 98.9 kg (218 lb)   07/20/11 99.6 kg (219 lb 9.6 oz)   02/16/11 99.7 kg (219 lb 12.8 oz)   01/10/11 97.5 kg (215 lb)        Diagnosis: No nutrition diagnosis at this time. Monitoring and Evaluation: Patient will be monitored per nutrition standards of care. Consult Dietitian if nutrition intervention essential to patient care is needed.      Discharge Planning: No needs    Peterson Burgos RD on 6/13/2022     Contact: [phone]

## 2022-06-13 NOTE — TELEPHONE ENCOUNTER
Reason for call:  Pt  calling on behalf of pt. He is asking why the Synthroid changed over to Levothyroxine. He said he was not notified of the change and wondering why this would be. I am thinking it has to do with the insurance and what they will cover. He is requesting a call back.     Is this a new problem: yes     Date of last appointment:  7/7/2021     Can we respond via Aerospike: no    Best call back number: [phone]

## 2022-06-13 NOTE — PROGRESS NOTES
0700: Bedside and Verbal shift change report given to ROLAND Gallo (oncoming nurse) by Cornel Roberts RN (offgoing nurse). Report included the following information SBAR, Intake/Output, MAR, Recent Results and Cardiac Rhythm A fib. Primary Nurse [facility], RN performed a dual skin assessment on this patient Impairment noted- see wound doc flow sheet  Ivan score is see flow sheet. 0800: Assessment completed. Breakfast tray provided. 1000: Dr Wille Gaucher updated on patient status, verbal orders to stop amio gtt since was given PO this morning. 1200: Reassessment completed. 1600: Reassessment completed. Pulse ox replaced. SpO2 94% on room air. 1900: Bedside and Verbal shift change report given to Isadora Conn RN (oncoming nurse) by Thiago Dickson RN (offgoing nurse). Report included the following information SBAR, Intake/Output, MAR, Recent Results and Cardiac Rhythm A fib.

## 2022-06-13 NOTE — PROGRESS NOTES
2000 Bedside and Verbal shift change report given to Tara (oncoming nurse) by Marta Simmons (offgoing nurse). Report included the following information Procedure Summary, Intake/Output and Cardiac Rhythm Afib rate controlled. Pt found sitting up in chair postion in bed. Pt reports sitting up herself. Pt on 1 L NC. Nibbling on dinner. Amio drip verified. Primary Nurse Hoa Willingham RN and ROLAND Bruno performed a dual skin assessment on this patient No impairment noted  Pt given complete bath. Linen changed. Ointments applied per carol instruction and letter coordination. Purewick changed put to suction. 2200 pt resting  0000 Pt changed to bipap. Amio drip rate changed per order. Pt HOB taken back pt more comfortable. 0200 Bipap mask changed by respiratory. Verified no labs for tonight per . Morning Doc to evaluate need. 0300 pt asleep.

## 2022-06-14 ENCOUNTER — APPOINTMENT (OUTPATIENT)
Dept: CT IMAGING | Age: 82
DRG: 189 | End: 2022-06-14
Attending: INTERNAL MEDICINE
Payer: MEDICARE

## 2022-06-14 VITALS
OXYGEN SATURATION: 100 % | RESPIRATION RATE: 18 BRPM | BODY MASS INDEX: 33.94 KG/M2 | DIASTOLIC BLOOD PRESSURE: 66 MMHG | HEIGHT: 67 IN | TEMPERATURE: 98 F | SYSTOLIC BLOOD PRESSURE: 145 MMHG | WEIGHT: 216.27 LBS | HEART RATE: 80 BPM

## 2022-06-14 LAB
ANION GAP SERPL CALC-SCNC: 3 MMOL/L (ref 5–15)
ARTERIAL PATENCY WRIST A: YES
BASE EXCESS BLDA CALC-SCNC: 1.1 MMOL/L
BDY SITE: ABNORMAL
BUN SERPL-MCNC: 26 MG/DL (ref 6–20)
BUN/CREAT SERPL: 29 (ref 12–20)
CALCIUM SERPL-MCNC: 9.3 MG/DL (ref 8.5–10.1)
CHLORIDE SERPL-SCNC: 103 MMOL/L (ref 97–108)
CO2 SERPL-SCNC: 34 MMOL/L (ref 21–32)
CREAT SERPL-MCNC: 0.9 MG/DL (ref 0.55–1.02)
DIGOXIN SERPL-MCNC: 0.9 NG/ML (ref 0.9–2)
GAS FLOW.O2 O2 DELIVERY SYS: 1 L/MIN
GLUCOSE SERPL-MCNC: 109 MG/DL (ref 65–100)
HCO3 BLDA-SCNC: 28 MMOL/L (ref 22–26)
HCT VFR BLD AUTO: 36.8 % (ref 35–47)
HGB BLD-MCNC: 11.5 G/DL (ref 11.5–16)
MAGNESIUM SERPL-MCNC: 2 MG/DL (ref 1.6–2.4)
PCO2 BLDA: 53 MMHG (ref 35–45)
PH BLDA: 7.34 [PH] (ref 7.35–7.45)
PO2 BLDA: 84 MMHG (ref 80–100)
POTASSIUM SERPL-SCNC: 4.7 MMOL/L (ref 3.5–5.1)
SAO2 % BLD: 96 % (ref 92–97)
SAO2% DEVICE SAO2% SENSOR NAME: ABNORMAL
SODIUM SERPL-SCNC: 140 MMOL/L (ref 136–145)
SPECIMEN SITE: ABNORMAL

## 2022-06-14 PROCEDURE — 36415 COLL VENOUS BLD VENIPUNCTURE: CPT

## 2022-06-14 PROCEDURE — 85018 HEMOGLOBIN: CPT

## 2022-06-14 PROCEDURE — 94003 VENT MGMT INPAT SUBQ DAY: CPT

## 2022-06-14 PROCEDURE — 74011250637 HC RX REV CODE- 250/637: Performed by: SPECIALIST

## 2022-06-14 PROCEDURE — 99232 SBSQ HOSP IP/OBS MODERATE 35: CPT | Performed by: INTERNAL MEDICINE

## 2022-06-14 PROCEDURE — 74011000250 HC RX REV CODE- 250: Performed by: INTERNAL MEDICINE

## 2022-06-14 PROCEDURE — 82803 BLOOD GASES ANY COMBINATION: CPT

## 2022-06-14 PROCEDURE — 36600 WITHDRAWAL OF ARTERIAL BLOOD: CPT

## 2022-06-14 PROCEDURE — APPSS15 APP SPLIT SHARED TIME 0-15 MINUTES: Performed by: NURSE PRACTITIONER

## 2022-06-14 PROCEDURE — 80048 BASIC METABOLIC PNL TOTAL CA: CPT

## 2022-06-14 PROCEDURE — 77010033678 HC OXYGEN DAILY

## 2022-06-14 PROCEDURE — 83735 ASSAY OF MAGNESIUM: CPT

## 2022-06-14 PROCEDURE — 74011250637 HC RX REV CODE- 250/637: Performed by: NURSE PRACTITIONER

## 2022-06-14 PROCEDURE — 74011250637 HC RX REV CODE- 250/637: Performed by: INTERNAL MEDICINE

## 2022-06-14 PROCEDURE — 94761 N-INVAS EAR/PLS OXIMETRY MLT: CPT

## 2022-06-14 PROCEDURE — 97110 THERAPEUTIC EXERCISES: CPT

## 2022-06-14 PROCEDURE — 80162 ASSAY OF DIGOXIN TOTAL: CPT

## 2022-06-14 PROCEDURE — 70450 CT HEAD/BRAIN W/O DYE: CPT

## 2022-06-14 PROCEDURE — 74011250637 HC RX REV CODE- 250/637: Performed by: FAMILY MEDICINE

## 2022-06-14 RX ORDER — POLYETHYLENE GLYCOL 3350 17 G/17G
17 POWDER, FOR SOLUTION ORAL
Qty: 30 PACKET | Refills: 0 | Status: SHIPPED
Start: 2022-06-14

## 2022-06-14 RX ORDER — METOPROLOL TARTRATE 75 MG/1
75 TABLET, FILM COATED ORAL EVERY 12 HOURS
Qty: 60 TABLET | Refills: 0 | Status: SHIPPED
Start: 2022-06-14 | End: 2022-06-29

## 2022-06-14 RX ORDER — DIGOXIN 125 MCG
0.12 TABLET ORAL DAILY
Qty: 30 TABLET | Refills: 0 | Status: SHIPPED
Start: 2022-06-15 | End: 2022-07-14

## 2022-06-14 RX ORDER — AMIODARONE HYDROCHLORIDE 200 MG/1
200 TABLET ORAL DAILY
Qty: 30 TABLET | Refills: 0 | Status: SHIPPED
Start: 2022-06-14 | End: 2022-07-14

## 2022-06-14 RX ORDER — ALBUTEROL SULFATE 1.25 MG/3ML
1.25 SOLUTION RESPIRATORY (INHALATION)
Qty: 15 NEBULE | Refills: 0 | Status: SHIPPED
Start: 2022-06-14 | End: 2022-06-29

## 2022-06-14 RX ORDER — VIT C/E/ZN/COPPR/LUTEIN/ZEAXAN 250MG-90MG
2 CAPSULE ORAL 2 TIMES DAILY WITH MEALS
Qty: 120 CAPSULE | Refills: 0 | Status: SHIPPED
Start: 2022-06-14 | End: 2022-06-14

## 2022-06-14 RX ADMIN — Medication 10 ML: at 06:50

## 2022-06-14 RX ADMIN — TRIAMCINOLONE ACETONIDE: 1 CREAM TOPICAL at 17:13

## 2022-06-14 RX ADMIN — TIMOLOL MALEATE 1 DROP: 5 SOLUTION/ DROPS OPHTHALMIC at 08:55

## 2022-06-14 RX ADMIN — Medication 2000 UNITS: at 08:54

## 2022-06-14 RX ADMIN — LEVOTHYROXINE SODIUM 150 MCG: 0.07 TABLET ORAL at 06:55

## 2022-06-14 RX ADMIN — Medication 10 ML: at 13:13

## 2022-06-14 RX ADMIN — APIXABAN 5 MG: 5 TABLET, FILM COATED ORAL at 17:12

## 2022-06-14 RX ADMIN — METOPROLOL TARTRATE 75 MG: 25 TABLET, FILM COATED ORAL at 08:54

## 2022-06-14 RX ADMIN — AMIODARONE HYDROCHLORIDE 400 MG: 200 TABLET ORAL at 17:12

## 2022-06-14 RX ADMIN — DIGOXIN 0.12 MG: 125 TABLET ORAL at 08:54

## 2022-06-14 RX ADMIN — AMIODARONE HYDROCHLORIDE 400 MG: 200 TABLET ORAL at 08:54

## 2022-06-14 RX ADMIN — APIXABAN 5 MG: 5 TABLET, FILM COATED ORAL at 08:54

## 2022-06-14 RX ADMIN — TRIAMCINOLONE ACETONIDE: 1 CREAM TOPICAL at 08:55

## 2022-06-14 NOTE — WOUND CARE
Wound Consult: Follow Up Visit. Chart reviewed, in to reassess for any skin issues. Spoke with patients nurse,  Kellee Marquez at bedside and worked with Juan Daniel Castillo to [employer] and assess. Patient is resting on a Progressa bed with hercules mattress. Patient moving feet, crossing legs. Patient is alert, she is communicative and requires assist to turn in bed due to combativeness. Ivan score 15. Assessment:  No redness to sacrum/buttocks or heels. Resurfaced skin abrasions left hand. Skin folds without rash. Skin Care / PI Prevention Recommendations:  1. Minimize friction/shear: minimize layers of linen/pads under patient. [employer] system. 2. Off load pressure/reposition:  turn and reposition approximately every 2 hours; float heels with pillows as needed, mobility team.  3. Manage Moisture - keep skin folds dry; incontinence skin care with incontinence wipes; zinc barrier ointment; appropriate sized briefs if needed. 4. Continue to monitor nutrition, pain, and skin risk scale, and skin assessment. Plan:  Please re-consult should concerns arise despite continued skin/PI prevention measures.     Anny Kunz, MSN, RN, [address] [facility], Wound / [address] [facility] Office [phone]

## 2022-06-14 NOTE — DISCHARGE SUMMARY
[place] [place] [address] [address]  [address]  Tel: [phone]    Hospital Medicine Discharge Summary    Patient ID:    Anatoly Kilgore  Age:              79 y.o.    : 1940  MRN:             [id number]     PCP: Chato Hagan MD     Date of Admission: 2022    Date of Discharge:  2022    Principal admission Diagnosis:   Encephalopathy acute [G93.40]    Discharge Diagnoses:  Principal Problem:    Acute on chronic respiratory failure with hypoxia and hypercapnia ([address] (2022)    Paroxysmal atrial flutter ([address] (2015)    HTN (hypertension), benign (2010)    Glaucoma (2010)    Acquired hypothyroidism (2010)    Paroxysmal atrial flutter ([address] (2015)    Physical debility (2022)    Dementia ([address] (2022)    Hospital Course:     Ms. Taina Abdul is a 79 y.o. admitted to [facility] and treated for the following:    Acute on chronic respiratory failure with hypoxia and hypercapnia ([address] (2022) POA: symptoms much improved. She has required intermittent BiPAP but now only needing it at night time. Needing supplemental oxygen @1L/min. She will be discharged to SNF in stable condition to continue with Trilogy Q. She is a DNR.       Paroxysmal atrial flutter ([address] (2015) POA: went into RVR 6/10, received a dose of IV Digoxin and had been on IV Amiodarone gtt sine 6/10. Seen by cardiology and she will continue Amiodarone, Digoxin, Metoprolol and Eliquis.         Acquired hypothyroidism (2010) POA: she has a hx of thyroid cancer and is sp thyroidectomy. Recent TSH was 3.7. Continue Levothyroxine     Encephalopathy acute (2022) POA: likely from the CO2 retention with her underlying dementia. CT head neg for any acute changes and her Hgb has been stable. Symptoms likely due to fatigue, overall co-morbid conditions.  I did discuss with his spouse and daughter that she remains at risk for worsening.         Physical debility (6/8/2022) POA: Continue OT, OT as tolerated. Being discharged to SNF for further care. Dementia ([address] (6/8/2022) POA: continue Aricept. HOLD Cymbalta given somnolence. She has not had it for several days       HTN (hypertension), benign (4/30/2010) POA: BP stable. Continue Metoprolol.       Glaucoma (4/30/2010) POA: continue eye gtts    Discharge Exam:    Visit Vitals  BP (!) 120/45   Pulse 91   Temp 98 °F (36.7 °C)   Resp 24   Ht 5' 7\" (1.702 m)   Wt 98.1 kg (216 lb 4.3 oz)   SpO2 98%   BMI 33.87 kg/m²        Patient has been seen and examined. General: weak looking but not in distress. Pulm: clear breath sounds without wheezes  Card: no murmurs, normal S1, S2 without thrills, bruits   Abd:    soft, non-tender, normoactive bowel sounds  Skin: no rashes and skin turgor is good  Neuro: awake, alert but lapses to sleep      Activity: Activity as tolerated    Diet: Regular Diet    Current Discharge Medication List      START taking these medications    Details   albuterol (ACCUNEB) 1.25 mg/3 mL nebu 3 mL by Nebulization route every six (6) hours as needed for Wheezing or Shortness of Breath for up to 15 days. Qty: 15 Nebule, Refills: 0      apixaban (ELIQUIS) 5 mg tablet Take 1 Tablet by mouth two (2) times a day for 30 days. Indications: treatment to prevent blood clots in chronic atrial fibrillation  Qty: 60 Tablet, Refills: 0      digoxin (LANOXIN) 0.125 mg tablet Take 1 Tablet by mouth daily for 30 days. Qty: 30 Tablet, Refills: 0      metoprolol tartrate 75 mg tab Take 75 mg by mouth every twelve (12) hours for 30 doses. Qty: 60 Tablet, Refills: 0      polyethylene glycol (MIRALAX) 17 gram packet Take 1 Packet by mouth daily as needed for Constipation for up to 30 doses. Indications: constipation  Qty: 30 Packet, Refills: 0      amiodarone (CORDARONE) 200 mg tablet Take 1 Tablet by mouth daily for 30 days.   Qty: 30 Tablet, Refills: 0         CONTINUE these medications which have NOT CHANGED    Details   sodium chloride (K[address] 128) 2 % ophthalmic solution Administer 1 Drop to left eye two (2) times a day. Administer 1 Drop to left eye two (2) times a day. Morning and at night 15 minutes after timolol eye drops      Gemtesa 75 mg tab Take 75 mg by mouth every evening. triamcinolone acetonide (KENALOG) 0.1 % topical cream Apply 0.1 Packages to affected area two (2) times a day. Apply to rash under pannus      levothyroxine (Synthroid) 150 mcg tablet Take 1 Tablet by mouth Daily (before breakfast). Qty: 90 Tablet, Refills: 0      donepeziL (ARICEPT) 10 mg tablet TAKE ONE TABLET BY MOUTH ONCE NIGHTLY  Qty: 90 Tablet, Refills: 3      vit A/vit C/vit E/zinc/copper (PRESERVISION AREDS PO) Take 2 Tablets by mouth two (2) times a day. Cholecalciferol, Vitamin D3, (VITAMIN D3) 2,000 unit cap capsule Take 2,000 Units by mouth two (2) times a day. Qty: 90 Cap, Refills: 1      timolol (TIMOPTIC) 0.5 % ophthalmic solution Administer 1 Drop to both eyes daily. In the morning      aspirin 81 mg Tab Take  by mouth. STOP taking these medications       sotaloL (BETAPACE) 120 mg tablet Comments:   Reason for Stopping:         valsartan (DIOVAN) 320 mg tablet Comments:   Reason for Stopping:         DULoxetine (CYMBALTA) 60 mg capsule Comments:   Reason for Stopping:         glucosam/chond/hyalu/CF borate (MOVE FREE JOINT HEALTH PO) Comments:   Reason for Stopping:         MULTIVITAMINS (MULTIVITAMIN PO) Comments:   Reason for Stopping:         sotaloL (BETAPACE) 120 mg tablet Comments:   Reason for Stopping: Follow-up Information     Follow up With Specialties Details Why Contact Info    return patient's own med.  Located in [facility] patient specific bin        Balbir Bright MD Cardiovascular Disease Physician On 7/14/2022 1:20 pm [id number] [facility] [address]   [phone]    Please return patient's own medication (Preservision capsules) upon discharge    located in patient-specific bin in [facility]          Follow-up tests or labs: None    Discharge Condition: Stable    Disposition: SNF    Time taken to co-ordinate and arrange discharge:  35 minutes.     Signed:  Dhruv Cameron MD       6/14/2022   2:19 PM

## 2022-06-14 NOTE — PROGRESS NOTES
Problem: Falls - Risk of  Goal: *Absence of Falls  Description: Document Trumann Fall Risk and appropriate interventions in the flowsheet. Outcome: Progressing Towards Goal  Note: Fall Risk Interventions:  Mobility Interventions: Bed/chair exit alarm,Communicate number of staff needed for ambulation/transfer,PT Consult for mobility concerns    Mentation Interventions: Adequate sleep, hydration, pain control,Bed/chair exit alarm,Door open when patient unattended,Evaluate medications/consider consulting pharmacy,More frequent rounding,Reorient patient,Room close to nurse's station,Update white board    Medication Interventions: Bed/chair exit alarm,Evaluate medications/consider consulting pharmacy    Elimination Interventions: Bed/chair exit alarm,Call light in reach,Patient to call for help with toileting needs,Toileting schedule/hourly rounds    History of Falls Interventions: Bed/chair exit alarm,Door open when patient unattended,Room close to nurse's station         Problem: Pressure Injury - Risk of  Goal: *Prevention of pressure injury  Description: Document Ivan Scale and appropriate interventions in the flowsheet. Outcome: Progressing Towards Goal  Note: Pressure Injury Interventions:  Sensory Interventions: Assess changes in LOC,Assess need for specialty bed,Check visual cues for pain,Float heels,Keep linens dry and wrinkle-free,Maintain/enhance activity level,Minimize linen layers,Monitor skin under medical devices,Pressure redistribution bed/mattress (bed type),Turn and reposition approx.  every two hours (pillows and wedges if needed)    Moisture Interventions: Absorbent underpads,Check for incontinence Q2 hours and as needed,Internal/External urinary devices,Minimize layers    Activity Interventions: Assess need for specialty bed,Increase time out of bed,Pressure redistribution bed/mattress(bed type),PT/OT evaluation    Mobility Interventions: Assess need for specialty bed,Float heels,HOB 30 degrees or less,Pressure redistribution bed/mattress (bed type),PT/OT evaluation,Turn and reposition approx.  every two hours(pillow and wedges)    Nutrition Interventions: Document food/fluid/supplement intake,Discuss nutritional consult with provider    Friction and Shear Interventions: Apply protective barrier, creams and emollients,Lift sheet,Lift team/patient mobility team,Minimize layers,Transferring/repositioning devices

## 2022-06-14 NOTE — DISCHARGE INSTRUCTIONS
TRANSFER  INSTRUCTIONS    NAME: Ciara Beverly   :  1940   MRN:  [id number]     Date:    2022     ADMIT DATE: 2022     TRANSFER DATE: 2022     DISPOSITION: SNF    DISCHARGE DIAGNOSIS:  Principal Problem:    Acute on chronic respiratory failure with hypoxia and hypercapnia ([address] (2022)    Dementia ([address] (2022)    Paroxysmal atrial flutter ([address] (2015)    HTN (hypertension), benign (2010)    Glaucoma (2010)    Acquired hypothyroidism (2010)    Physical debility (2022)    MEDICATIONS: See medication list on the AVS    Recommended diet:  Regular Diet    Recommended activity: Activity as tolerated    Follow Up: Follow-up Information     Follow up With Specialties Details Why Contact Info    return patient's own med. Located in [facility] patient specific bin        Cailin Rosales MD Cardiovascular Disease Physician On 2022 1:20 pm [id number] [phone]      [facility] [phone]          Information obtained by :  I understand that if any problems occur once I am at home I am to contact my physician. I understand and acknowledge receipt of the instructions indicated above.                                                                                                                                            Physician's or R.N.'s Signature                                                                  Date/Time                                                                                                                                              Patient or Representative Signature                                                          Date/Time

## 2022-06-14 NOTE — PROGRESS NOTES
Cardiology Daily Progress Note      Travon Calderón is a 79 y.o. female with PMH of  PAF, Dementia, hypoventilation syndrome admitted with lethargy, FTT. Cardiology following for a fib RR. EF preserved. Visit Vitals  BP (!) 148/52   Pulse 78   Temp 97.9 °F (36.6 °C)   Resp 19   Ht 5' 7\" (1.702 m)   Wt 98.1 kg (216 lb 4.3 oz)   SpO2 100%   BMI 33.87 kg/m²       Intake/Output Summary (Last 24 hours) at 6/14/2022 0740  Last data filed at 6/14/2022 0400  Gross per 24 hour   Intake 150 ml   Output 1900 ml   Net -1750 ml     General appearance - alert, well appearing, and in no distress  Mental status - affect appropriate to mood  Eyes - sclera anicteric, moist mucous membranes  Neck - supple, no significant adenopathy  Chest - clear to auscultation, no wheezes, rales or rhonchi  Heart - normal rate, irregular rhythm, normal S1, S2, no murmurs, rubs, clicks or gallops  Abdomen - soft, nontender, nondistended, no masses or organomegaly  Extremities - peripheral pulses normal, no pedal edema  Skin: psoriasis lesions.     Current Facility-Administered Medications   Medication Dose Route Frequency    amiodarone (CORDARONE) tablet 400 mg  400 mg Oral BID    apixaban (ELIQUIS) tablet 5 mg  5 mg Oral BID    amiodarone (CORDARONE) 375 mg in dextrose 5% 250 mL infusion  0.5-1 mg/min IntraVENous TITRATE    metoprolol tartrate (LOPRESSOR) tablet 75 mg  75 mg Oral Q12H    digoxin (LANOXIN) tablet 0.125 mg  0.125 mg Oral DAILY    cholecalciferol (VITAMIN D3) (1000 Units /25 mcg) tablet 2,000 Units  2,000 Units Oral DAILY    donepeziL (ARICEPT) tablet 10 mg  10 mg Oral QHS    [Held by provider] DULoxetine (CYMBALTA) capsule 60 mg  60 mg Oral DAILY    levothyroxine (SYNTHROID) tablet 150 mcg  150 mcg Oral ACB    triamcinolone acetonide (KENALOG) 0.1 % cream   Topical BID    timolol (TIMOPTIC) 0.5 % ophthalmic solution 1 Drop  1 Drop Both Eyes DAILY    sodium chloride (BRANDON 128) 2 % ophthalmic drops 1 Drop (Patient Supplied)  1 Drop Left Eye BID    sodium chloride (NS) flush 5-40 mL  5-40 mL IntraVENous Q8H    sodium chloride (NS) flush 5-40 mL  5-40 mL IntraVENous PRN    acetaminophen (TYLENOL) suppository 650 mg  650 mg Rectal Q6H PRN    polyethylene glycol (MIRALAX) packet 17 g  17 g Oral DAILY PRN    ondansetron (ZOFRAN) injection 4 mg  4 mg IntraVENous Q6H PRN    albuterol (ACCUNEB) nebulizer solution 1.25 mg  1.25 mg Nebulization Q6H PRN        [facility] cardiology records  CHF  CAD  PA flutter 2015 converting to SR ECHO (2015) EF 50%-55% , betapace 120 mg BID    Assessment/ Plan:       1) Afib with RVR   - HR 70's today  - long history of PAF  - off IV amio to po would discharge on 200 mg every day, keep 400 mg BID till d/c  - on eliquis   - cont metoprolol 75 mg BID. Follow-up Information     Follow up With Specialties Details Why Contact Info    return patient's own med. Located in [facility] patient specific bin        Marcell Cuellar MD Cardiovascular Disease Physician On 7/14/2022 1:20 pm [address] [address] [facility]  [phone]      [facility]   [phone]            2) Acute resp failure: thought to be from obesity hypoventilation / TYRONE     3) RAN   - renal function stable       4) Dementia:   On donepezil and Cymbalta     5) HTN   - cont BB. Will sign off and see prn         ___________________________________________________    Elia Lemus.  Ramón Ramos MD, [facility]

## 2022-06-14 NOTE — PROGRESS NOTES
RUR 12 %(low readmission risk score )    Glenny Molina with [facility] informed me that the contract for trilogy has been submitted to the [address] of [facility]. Once the [facility] signs and submits the contract for the trilogy back to [facility] will notify me so transport can be arranged.     Currently,pt is on will call so I am not sure why transport arrived last night @ 10:00pm.    Kathia Manzo

## 2022-06-14 NOTE — PROGRESS NOTES
Problem: Self Care Deficits Care Plan (Adult)  Goal: *Acute Goals and Plan of Care (Insert Text)  Description: FUNCTIONAL STATUS PRIOR TO ADMISSION: Unable to ascertain due to patient's cognitive status/dementia. HOME SUPPORT PRIOR TO ADMISSION: The patient lived with her  and daughter but unsure what assistance she may have required. Occupational Therapy Goals  Initiated 6/7/2022  1. Patient will perform self feeding with minimal assistance within 7 day(s). 2.  Patient will perform grooming with moderate assistance  within 7 day(s). 3.  Patient will perform upper body dressing and bathing with moderate assistance  within 7 day(s). 4.  Patient will tolerate sitting EOB while completing functional tasks with moderate assistance within 7 days. 5.  Patient will participate in upper extremity therapeutic exercise/activities with minimal assistance for 10 minutes within 7 day(s). Outcome: Progressing Towards Goal   OCCUPATIONAL THERAPY TREATMENT  Patient: Ingrid Pollard (17 y.o. female)  Date: 6/14/2022  Diagnosis: Encephalopathy acute [G93.40] Acute on chronic respiratory failure with hypoxia and hypercapnia (HCC)       Precautions:  fall  Chart, occupational therapy assessment, plan of care, and goals were reviewed. ASSESSMENT  Patient continues with skilled OT services and is progressing towards goals. Ms. Rowan Fajardo was received in bed able to arouse with max stimuli and required constant stimuli to maintain arousal this AM.  Patient agreeable to minimal activity with max encouragement. She required max A to reposition in the bed and provides very little initiation. Patient educated on the benefits of exercise and proper technique for UE/LE exercises. Tolerance was fair to poor overall. She fatigues easily and requires frequent rest breaks for recovery. Patient would benefit from continued skilled OT. Continue to recommend SNF rehab at discharge.       Current Level of Function Impacting Discharge (ADLs): Patient requires max A for bed mobility. Patient with fair to poor tolerance for UE/LE exercises. PLAN :  Patient continues to benefit from skilled intervention to address the above impairments. Continue treatment per established plan of care to address goals. Recommend with staff:   Recommend patient be placed in chair position frequently throughout the day. For toileting needs, recommend bed level. Encourage patient involvement in personal care as able. Encourage exercises frequently throughout the day. Recommend next OT session: Continue towards set goals. Recommendation for discharge: (in order for the patient to meet his/her long term goals)  Therapy up to 5 days/week in SNF setting    This discharge recommendation:  Has been made in collaboration with the attending provider and/or case management    IF patient discharges home will need the following DME: none       SUBJECTIVE:   Patient agreeable to OT tx. OBJECTIVE DATA SUMMARY:   Cognitive/Behavioral Status:  Neurologic State: Drowsy; Eyes open to voice  Orientation Level: Oriented to place;Oriented to person;Disoriented to situation;Disoriented to time  Cognition: Decreased attention/concentration;Decreased command following      Functional Mobility and Transfers for ADLs:  Bed Mobility:   Rolling + scooting higher in the bed: max A       Therapeutic Exercises:   Patient educated on the benefits of exercise and proper technique for following exercises:       EXERCISE   Sets   Reps   Active Active Assist   Passive   Shoulder Flexion 3 5 []    [x]    []      Shoulder Shrugs 3 5 []    [x]    []      Chest Press/Back Row 3 5 []    [x]    []      Elbow flexion/extension 3 5 [x]    []    []      Wrist flexion/extension 3 5 [x]    []    []      Finger flexion/extension 3 5 [x]    []    []             Activity Tolerance:   Fair and Poor    After treatment patient left in no apparent distress: Supine in bed, Call bell within reach, and Bed / chair alarm activated    COMMUNICATION/COLLABORATION:   The patients plan of care was discussed with: Physical therapist, Registered nurse, and patient .      Edin Akbar, OTR/L  Time Calculation: 23 mins

## 2022-06-14 NOTE — PROGRESS NOTES
Attending has cleared pt to be discharged. [facility] transport set up for 6 pm.    I notified ,Btuch Morales regarding discharge /transport time .  agrees with the plan. PCS for transport ,AVS and DDNR prepared for discharge to the [address] [place] [address] SNF for rehab.     Mae Gaucher

## 2022-06-14 NOTE — PROGRESS NOTES
0700- Bedside and Verbal shift change report given to ROLAND Gay  (oncoming nurse) by Shilpa Davis RN  (offgoing nurse). Report included the following information SBAR, Kardex, Intake/Output, MAR, Recent Results, Cardiac Rhythm SR and Quality Measures. Primary Nurse Erin Sarmiento and Hdez RN performed a dual skin assessment on this patient Impairment noted- see wound doc flow sheet  Ivan score is see flow sheet. 0800- Assessment completed, see flow sheet. Pt is drowsy, eyes open to voice. Oriented to person and place, disoriented to time and situation. Heart rate irregular, a fib on monitor. Lungs clear upper, lower diminished, on 1 L NC. Bowels sounds active. Pt denies any chest pain, sob, or nausea at this time. Pt placed in supine positioning to eat breakfast.     0930- Dr. Selma Teran present at bedside, updated on patient condition, discussed pt condition and plan of care with patient family. Orders for ABG. 56- Dr. Devendra Britt Present at bedside, updated on patient HR and PO amiodarone, digoxin and metoprolol. 1030- Pt with increased coughing after eating with family, sating mid 90s on 1 L NC, but sounds more wet. Dr. Selma Teran updated. 1200- Reassessment completed, see flow sheet. 1330- pt off floor to CT. 1400- Pt returned to unit from CT.     1415- Labs drawn and sent. 5- Lab and CT results reviewed with MD. Per Dr. Selma Teran, plan for patient to discharge to the [address]. 1600- Reassessment completed, see flow sheet. 1630- Pt alert, eyes open spontaneously. Oriented to person and place. Assisted patient with sips of water, tolerated well. 1735- TRANSFER - OUT REPORT:    Verbal report given to Lila Walker RN (name) on Elvera December  being transferred to [facility]) for routine progression of care       Report consisted of patients Situation, Background, Assessment and   Recommendations(SBAR).      Information from the following report(s) SBAR, Kardex, ED Summary, Intake/Output, MAR, Recent Results, Cardiac Rhythm Afib and Quality Measures was reviewed with the receiving nurse. Opportunity for questions and clarification was provided. Patient transported with:   O2 @ 1 liters  Patient's medications from home  Patient-specific medications from Pharmacy     1830- [facility] transport present at bedside to bring patient to the [address]. Pt home medications and lotions provided to transport team as well as Discharge Packet from [facility] Case Management. No further needs.

## 2022-06-14 NOTE — PROGRESS NOTES
Attending informed me head CT is being done prior to pt.'s discharge as pt is more lethargic and has been on lovenox. Currently ,transport is arranged for a 6 pm transport but this can be changed if necessary. I updated the [facility].  has not been informed regarding transport time in case there are findings on  Head CT.     Jude Chavira

## 2022-06-14 NOTE — PROGRESS NOTES
Transition of Care Plan to SNF/Rehab    SNF/Rehab Transition:  Patient has been accepted to [facility] and meets criteria for admission. Patient will transported by [facility]([phone])and expected to leave at 3 pm or 15:00 pm.    Communication to Patient/Family:  Met with patient and  ,Butch Griffin they are agreeable to the transition plan. Communication to SNF/Rehab:  Bedside RN, Jose Lan, has been notified to update the transition plan to the facility and call report (phone number [phone]). Ask for [facility]. Pt will be admitted to room  148  Discharge information has been updated on the AVS.     Discharge instructions to be fax'd to facility through [employer]. Nursing Please include all hard scripts for controlled substances, med rec and dc summary, and AVS in packet. Reviewed and confirmed with facility, [facility], can manage the patient care needs for the following:     Jeff Price with (X) only those applicable:    Medication:  [x]  Medications will be available at the facility  []  IV Antibiotics   []  Controlled Substance - hard copy to be sent with patient   []  Weekly Labs   Documents:  [] Hard RX  [x] MAR  [] Kardex  [x] AVS  [x]Transfer Summary  [x]Discharge   Equipment:  []  CPAP/BiPAP  []  Wound Vacuum  []  Waller or Urinary Device  []  PICC/Central Line  []  Nebulizer  []  Ventilator  Trilogy -see note below   Treatment:  []Isolation (for MRSA, VRE, etc.)  []Surgical Drain Management  []Tracheostomy Care  []Dressing Changes  []Dialysis with transportation and chair time  PEG Care  [x]Oxygen 1 liter nc   []Daily Weights for Heart Failure   Dietary: regular diet  []Any diet limitations  []Tube Feedings   []Total Parenteral Management (TPN)   Eligible for Medicaid Long Term Services and Supports  Yes:  [] Eligible for medical assistance or will become eligible within 180 days and UAI completed. [] Provider/Patient and/or support system has requested screening.   [] UAI copy provided to patient or responsible party,   [] UAI unavailable at discharge will send once processed to SNF provider. [] UAI unavailable at discharged mailed to patient  No:   [x] Private pay and is not financially eligible for Medicaid within the next 180 days. [] Reside out-of-state. [] A residents of a state owned/operated facility that is licensed  by [address] and [facility] or [facility]  [] Enrollment in [facility] hospice services  [] [address] [place] [address] [facility]  [] Patient /Family declines to have screening completed or provide financial information for screening     Financial Resources:  Medicaid    [] Initiated and application pending   [] Full coverage     Advanced Care Plan:  []Surrogate Decision Maker of Care  []POA  []Communicated Code Status DDNR   Other Trilogy through [employer] -delivered and set up for pt @ the [address] [facility] as worked out on a contract between [employer] and [facility]    I confirmed with both [email or web address] and the [employer] that pt has been accepted. The [facility] is asking for a 3:30 pm admission. I have requested a 3 pm transport through [facility]. PCS for transport prepared and given to nurse. DDNR to go with transport,.     Kathia Manzo

## 2022-06-14 NOTE — PALLIATIVE CARE DISCHARGE
The Palliative Medicine team was consulted as part of your / your loved one's care in the hospital. Our team is a supportive service; we strive to relieve suffering and improve quality of life. You identified the following goal(s) as your main focus for healthcare:  Build up strength before returning home    We reviewed advance care planning information, which includes the following:  Advance Care Planning 6/13/2022   Patient's Healthcare Decision Maker is: Legal Next of [address] Name:    Gabby Benoit,    Confirm Advance Directive Not on file   Does the patient have other document types Do Not Resuscitate     We reviewed / discussed your code status as: DNR     Full Code means perform CPR in the event of cardiac arrest     [facility] means do NOT perform CPR in the event of cardiac arrest     Partial Code means you have specific preferences, please discuss with your health care team     No Order means this issue was not addressed / resolved during your stay    You have a Durable Do Not Resuscitate Order in place, which should travel with you. When you are in a facility, this form should be placed on your chart. Once you are home, it is recommended that the [facility] form be placed in a visible location such as on the refrigerator or bedroom door. Because of the importance of this information, we are providing you with a printed copy to share with other healthcare providers after this hospitalization is complete. If any of the above information is incomplete or incorrect, please contact the Palliative Medicine team at [phone].

## 2022-06-14 NOTE — PROGRESS NOTES
Update:  LOS 9 days,GLOS 3.6    I updated pt.'s  regarding trilogy contract and waiting to hear back from the [address] of [facility] regarding pt coming to SNF today.  remains in agreement with the plan for eventual discharge. [facility] is on will call. Today is pt.'s 79 rd birthday.     Dominguez Betancourt

## 2022-06-15 ENCOUNTER — PATIENT OUTREACH (OUTPATIENT)
Dept: CASE MANAGEMENT | Age: 82
End: 2022-06-15

## 2022-06-15 NOTE — PROGRESS NOTES
Transition of care outreach postponed for 14 days due to patient's discharge to SNF.   D/C to [address] [place] [address] E 6/14/22 f/u 14d

## 2022-06-21 ENCOUNTER — TELEPHONE (OUTPATIENT)
Dept: INTERNAL MEDICINE CLINIC | Age: 82
End: 2022-06-21

## 2022-06-21 ENCOUNTER — TELEPHONE (OUTPATIENT)
Dept: CARDIOLOGY CLINIC | Age: 82
End: 2022-06-21

## 2022-06-21 NOTE — TELEPHONE ENCOUNTER
Patient's daughter called to advise the patient has extensive swelling in her  Right hand as well as other areas. Patient's daughter Jaxson Maher states the patient is in the same condition she was at prior to being admitted in the hospital.  Patient is declining.             [facility]:[phone]

## 2022-06-21 NOTE — TELEPHONE ENCOUNTER
Daughter expressing concern that pt's condition is starting occurring again. Pt is currently at [facility]. Supposed to be there for PT, but that is not happening due to the facility being understaffed.   Daughter is concerned because:    Pt acting lethargic  Feet, ankles, and hands swollen  Doctor on comes Mon/Wed/Fri at facility  Pt on level 1 O2  Concerned CO2 levels rising again    Daughter requesting urgent call back because she fears the pt's condition is worsening and is unsure whether or not to take her back to the hospital.    Call back number:  [phone]

## 2022-06-21 NOTE — TELEPHONE ENCOUNTER
Pt's daughter c/b,    Per Dr. Miguelangel Cohen: \"Her recent hospital problems were primarily non-cardiac and multifactorial (pulmonary problems, encephalopathy, etc) thus she should see her PCP if she is declining.  Or if doing poorly, head to the ED. We can also have her set up to see us in office next few days--- but if really doing poorly, ED would be best.\"    Relayed Dr. Travis Lewis, she is concerned with fluid overload vs dehydration problems that she had previously. Even from this morning vs yesterday, edema of R foot, ankle >L side, now swelling in hands also. Pt is currently in rehab at [facility]. Dr/NP didn't round today and she is quite concerned. Asked her to reach out to PCP for orders of O2 (she said that O2 sats were needed to stay at 99 % per nurse at rehab, but she was told that it shouldn't be that high). Discussed that if she is concerned with her wellbeing that she can request to escalate care to ED especially if an increase in SOB, weight gain, fatigue/lethargy, etc.     She expressed understanding and stated she would get with PCP and discuss with rehab nurse. No further questions at this time.     Offered to get her scheduled with MD/NP soon if needed, but that she needed more immediate attention, labs, imaging to r/o fluid overload, etc.

## 2022-06-22 ENCOUNTER — PATIENT OUTREACH (OUTPATIENT)
Dept: CASE MANAGEMENT | Age: 82
End: 2022-06-22

## 2022-06-22 NOTE — PROGRESS NOTES
At request of SRJ, contacted patient's daughter, Tammie Jensen (on HIPAA). Patient was hospitalized 6/4/22 - 6/14/22; dx: acute on chronic respiratory failure with hypoxia and hypercapnia, PAF, HTN, dementia. Discharged to SNF- [facility]. Daughter expressed concerns about patient's current condition. The intention of my call was to provide additional resources if her concerns were not addressed at SNF. Ms. Loretha Jeans reported she had a \"good\" conversation with medical director today after he assessed patient and labs were ordered. She said pt is in great need of PT which has been ordered. Explained that while patient is at [facility], patient is under the care of the medical director. Concerns and requests for orders need to go through that physician. Asked to be notified once a discharge date is known to ascertain a follow up appt with SRJ and to ensure family has services needed at home, including [facility]. MsFrederic Yanira Jeans verbalized understanding.

## 2022-06-22 NOTE — TELEPHONE ENCOUNTER
Returned call to patient daughter Irma Culp, verified patient IDx2 identifiers. Explained to Irma Culp that she needs to reach out to director or nursing at facility to discuss her concerns.

## 2022-06-24 NOTE — PROGRESS NOTES
Post Acute Facility Update     *The information contained in this note was received during a weekly care coordination call with the post acute facility*    Current Facility: [address])  Anticipated Discharge Date: 7/12/2022    Facility Nursing Update:  BMP pending. On oxygen (trilogy)  between . Ordered weekly weights. Facility Social Work Update: Plan to return home with spouse. Bundle Program Status: none  Upper Extremity Assistance: Mod/Max Assistance  Lower Extremity Assistance: Maximum Assistance  Bed Mobility: Mod/Max Assistance  Transfers: Dependent  Ambulation: non-ambulatory  How far (in feet) is the patient ambulating?  0  Device Used: walker  Barriers to Discharge: RUDOLPH GREEN, RN  [employer]

## 2022-06-29 ENCOUNTER — PATIENT OUTREACH (OUTPATIENT)
Dept: CASE MANAGEMENT | Age: 82
End: 2022-06-29

## 2022-06-30 ENCOUNTER — PATIENT OUTREACH (OUTPATIENT)
Dept: CASE MANAGEMENT | Age: 82
End: 2022-06-30

## 2022-06-30 NOTE — PROGRESS NOTES
Transition of care outreach postponed for 14 days due to patient's discharge to SNF.   D/C to [address] [place] [address] E 6/14/22 still admitted follow up 14d

## 2022-06-30 NOTE — PROGRESS NOTES
Post Acute Facility Update     *The information contained in this note was received during a weekly care coordination call with the post acute facility*    Current Facility: [address] [place] [address] N (SNF)  Anticipated Discharge Date: 7/12/22    Facility Nursing Update:   Facility Social Work Update:   Bundle Program Status: none  Upper Extremity Assistance: Maximum Assistance  Lower Extremity Assistance: Maximum Assistance  Bed Mobility: Maximum Assistance  Transfers: Minimal Assistance   Ambulation: Minimal Assistance   How far (in feet) is the patient ambulating?  5  Device Used: walker  Barriers to Discharge: TBD  Other: dementia    Shiv GREEN, RN  [employer]

## 2022-07-06 ENCOUNTER — PATIENT OUTREACH (OUTPATIENT)
Dept: CASE MANAGEMENT | Age: 82
End: 2022-07-06

## 2022-07-07 NOTE — PROGRESS NOTES
Post Acute Facility Update     *The information contained in this note was received during a weekly care coordination call with the post acute facility*    Current Facility: [address] (SNF)  Anticipated Discharge Date: 7/13/22    Facility Nursing Update: dementia, very non-compliant with keeping trilogy on, BMP ordered, Psych MD saw pt., new medication ordered family wanted medication d/c'd. Facility Social Work Update: meeting with daughter and  dementia has really progressed. Family will get help in home, have LTC insurance. Bundle Program Status: none  Upper Extremity Assistance: Mod/Max Assistance  Lower Extremity Assistance: Dependent  Bed Mobility: Min/Mod Assistance  Transfers: Mod/Max Assistance  Ambulation: Contact Guard Assist - hands on patient for balance   How far (in feet) is the patient ambulating?  20  Device Used: walker  Barriers to Discharge: RUDOLPH GREEN, RN  [employer]

## 2022-07-13 ENCOUNTER — PATIENT OUTREACH (OUTPATIENT)
Dept: CASE MANAGEMENT | Age: 82
End: 2022-07-13

## 2022-07-13 NOTE — PROGRESS NOTES
Post Acute Facility Update     *The information contained in this note was received during a weekly care coordination call with the post acute facility*    Current Facility: [facility] ([facility])  Anticipated Discharge Date: 7/14/2022    Facility Nursing Update:  Foul smelling urine results not back yet  Facility Social Work Update:  [facility] or At [address]? SS provided resouces for privite duty caregivers. Has insurance to cover caregivers. PCP appt on 7/20/2022 @ 9:00with Dr. Tello Story Program Status: none  Upper Extremity Assistance: Maximum Assistance  Lower Extremity Assistance: Dependent  Bed Mobility: Minimal Assistance   Transfers: Minimal Assistance   Ambulation: Contact Guard Assist - hands on patient for balance   How far (in feet) is the patient ambulating?  40ft   Device Used: Wheelchair   Barriers to Discharge: RUDOLPH Traore, MSW  [facility]

## 2022-07-14 ENCOUNTER — TELEPHONE (OUTPATIENT)
Dept: INTERNAL MEDICINE CLINIC | Age: 82
End: 2022-07-14

## 2022-07-14 ENCOUNTER — OFFICE VISIT (OUTPATIENT)
Dept: CARDIOLOGY CLINIC | Age: 82
End: 2022-07-14
Payer: MEDICARE

## 2022-07-14 VITALS
DIASTOLIC BLOOD PRESSURE: 70 MMHG | BODY MASS INDEX: 32.96 KG/M2 | SYSTOLIC BLOOD PRESSURE: 110 MMHG | OXYGEN SATURATION: 94 % | HEIGHT: 67 IN | HEART RATE: 47 BPM | WEIGHT: 210 LBS

## 2022-07-14 DIAGNOSIS — I10 HTN (HYPERTENSION), BENIGN: ICD-10-CM

## 2022-07-14 DIAGNOSIS — G30.0 EARLY ONSET ALZHEIMER'S DEMENTIA WITHOUT BEHAVIORAL DISTURBANCE (HCC): ICD-10-CM

## 2022-07-14 DIAGNOSIS — F02.80 EARLY ONSET ALZHEIMER'S DEMENTIA WITHOUT BEHAVIORAL DISTURBANCE (HCC): ICD-10-CM

## 2022-07-14 DIAGNOSIS — I48.92 PAROXYSMAL ATRIAL FLUTTER (HCC): ICD-10-CM

## 2022-07-14 DIAGNOSIS — R06.02 SOB (SHORTNESS OF BREATH): Primary | ICD-10-CM

## 2022-07-14 PROCEDURE — 1123F ACP DISCUSS/DSCN MKR DOCD: CPT | Performed by: SPECIALIST

## 2022-07-14 PROCEDURE — 99215 OFFICE O/P EST HI 40 MIN: CPT | Performed by: SPECIALIST

## 2022-07-14 PROCEDURE — 1101F PT FALLS ASSESS-DOCD LE1/YR: CPT | Performed by: SPECIALIST

## 2022-07-14 PROCEDURE — G0463 HOSPITAL OUTPT CLINIC VISIT: HCPCS | Performed by: SPECIALIST

## 2022-07-14 PROCEDURE — 93010 ELECTROCARDIOGRAM REPORT: CPT | Performed by: SPECIALIST

## 2022-07-14 PROCEDURE — 1111F DSCHRG MED/CURRENT MED MERGE: CPT | Performed by: SPECIALIST

## 2022-07-14 PROCEDURE — G8417 CALC BMI ABV UP PARAM F/U: HCPCS | Performed by: SPECIALIST

## 2022-07-14 PROCEDURE — 93005 ELECTROCARDIOGRAM TRACING: CPT | Performed by: SPECIALIST

## 2022-07-14 PROCEDURE — G8536 NO DOC ELDER MAL SCRN: HCPCS | Performed by: SPECIALIST

## 2022-07-14 PROCEDURE — G8754 DIAS BP LESS 90: HCPCS | Performed by: SPECIALIST

## 2022-07-14 PROCEDURE — G8752 SYS BP LESS 140: HCPCS | Performed by: SPECIALIST

## 2022-07-14 PROCEDURE — G8427 DOCREV CUR MEDS BY ELIG CLIN: HCPCS | Performed by: SPECIALIST

## 2022-07-14 PROCEDURE — 1090F PRES/ABSN URINE INCON ASSESS: CPT | Performed by: SPECIALIST

## 2022-07-14 PROCEDURE — G8432 DEP SCR NOT DOC, RNG: HCPCS | Performed by: SPECIALIST

## 2022-07-14 PROCEDURE — G8399 PT W/DXA RESULTS DOCUMENT: HCPCS | Performed by: SPECIALIST

## 2022-07-14 RX ORDER — METOPROLOL TARTRATE 25 MG/1
75 TABLET, FILM COATED ORAL 2 TIMES DAILY
COMMUNITY
End: 2022-08-23 | Stop reason: SDUPTHER

## 2022-07-14 NOTE — TELEPHONE ENCOUNTER
Spoke with Renetta Holliday and advised per [facility] he will follow patient.     Future Appointments   Date Time Provider Heide Diaz   7/14/2022  1:20 PM MD AZEB Greco BS AMB   7/20/2022  9:00 AM Clark Gaffney MD [facility] KATHY REYES AMB

## 2022-07-14 NOTE — TELEPHONE ENCOUNTER
Reason for call:    Grupo Tyler from [facility] called. She would like to know  if Dr. Mauricio Marrero will follow her care and sign orders for patients OT and PT.       Is this a new problem: yes     Date of last appointment:  7/7/2021     Can we respond via Sc[employer]atch[employer]: no    Best call back number:     Rico Amel - [phone]

## 2022-07-14 NOTE — PROGRESS NOTES
Chief Complaint   Patient presents with   [facility] Follow Up     ER fu 64/14     Hypertension    CHF     Visit Vitals  /70 (BP 1 Location: Left upper arm, BP Patient Position: Sitting)   Pulse (!) 47   Ht 5' 7\" (1.702 m)   Wt 210 lb (95.3 kg)   SpO2 94%   BMI 32.89 kg/m²     Chest pain denied   SOB Yes. Palpitations denied   Swelling in hands/feet Yes both feet.    Dizziness denied   Recent hospital stays denied   Refills denied

## 2022-07-14 NOTE — TELEPHONE ENCOUNTER
Received incoming call from [facility] ([facility]) for triage for constipation. Spoke with patients caretaker Veronica Lees (on HIPAA). States pt has not passed BM in 2 days, denies abdominal pain, patient taking miralax daily, wonders what else can take to help. Advised to start with 1/2 bottle mag citrate and continue daily miralax. Red flags reviewed, Veronica Lees voiced understanding.

## 2022-07-14 NOTE — PROGRESS NOTES
Hilda Jacob MD. [facility]              Patient: Santy Sal  : 1940      Today's Date: 2022          HISTORY OF PRESENT ILLNESS:     History of Present Illness:  She had admission with Acute resp failure and encephalopathy. Seemed more altered at rehab. She is at home now with her 78 yo . She is on CPAP at night. Just left rehab today. For swelling she had hose placed and legs elevated. She was getting PT at rehab and looking better. Did fine until past couple of days - more lethargic now. PAST MEDICAL HISTORY:     Past Medical History:   Diagnosis Date    Arthritis     Basal cell carcinoma 2012    Calculus of kidney     Cancer ([facility]     skin    Cancer ([facility]     thyroid    Dementia ([facility]     Glaucoma 2010    Hypertension     OA (osteoarthritis) 2010    Obesity hypoventilation syndrome (HCC)     Obesity, morbid (HCC)     TYRONE (obstructive sleep apnea)     PAF (paroxysmal atrial fibrillation) ([facility]     Psoriasis 2010       Past Surgical History:   Procedure Laterality Date    HX CATARACT REMOVAL      bilat    HX CHOLECYSTECTOMY      HX CRANIOTOMY      HX DILATION AND CURETTAGE      HX HYSTERECTOMY      HX KNEE ARTHROSCOPY      HX KNEE REPLACEMENT      HX TONSILLECTOMY      HX WRIST FRACTURE TX Left 8/21/15    AL THYROIDECTOMY           MEDICATIONS:     Current Outpatient Medications   Medication Sig Dispense Refill    metoprolol tartrate (LOPRESSOR) 25 mg tablet Take 75 mg by mouth two (2) times a day.  apixaban (ELIQUIS) 5 mg tablet Take 1 Tablet by mouth two (2) times a day for 30 days. Indications: treatment to prevent blood clots in chronic atrial fibrillation 60 Tablet 0    digoxin (LANOXIN) 0.125 mg tablet Take 1 Tablet by mouth daily for 30 days. 30 Tablet 0    polyethylene glycol (MIRALAX) 17 gram packet Take 1 Packet by mouth daily as needed for Constipation for up to 30 doses.  Indications: constipation 30 Packet 0    amiodarone (CORDARONE) 200 mg tablet Take 1 Tablet by mouth daily for 30 days. 30 Tablet 0    sodium chloride (Kemal 128) 2 % ophthalmic solution Administer 1 Drop to left eye two (2) times a day. Administer 1 Drop to left eye two (2) times a day. Morning and at night 15 minutes after timolol eye drops      Gemtesa 75 mg tab Take 75 mg by mouth every evening.  triamcinolone acetonide (KENALOG) 0.1 % topical cream Apply 0.1 Packages to affected area two (2) times a day. Apply to rash under pannus      levothyroxine (Synthroid) 150 mcg tablet Take 1 Tablet by mouth Daily (before breakfast). 90 Tablet 0    donepeziL (ARICEPT) 10 mg tablet TAKE ONE TABLET BY MOUTH ONCE NIGHTLY 90 Tablet 3    vit A/vit C/vit E/zinc/copper (PRESERVISION AREDS PO) Take 2 Tablets by mouth two (2) times a day.  Cholecalciferol, Vitamin D3, (VITAMIN D3) 2,000 unit cap capsule Take 2,000 Units by mouth two (2) times a day. 90 Cap 1    timolol (TIMOPTIC) 0.5 % ophthalmic solution Administer 1 Drop to both eyes daily. In the morning      aspirin 81 mg Tab Take  by mouth.          Allergies   Allergen Reactions    Cephalexin Itching    Codeine Rash    Gabapentin Other (comments)    Neomycin Rash    Polysporin [Bacitracin-Polymyxin B] Rash    Protonix [Pantoprazole] Other (comments)     Gi upset             SOCIAL HISTORY:     Social History     Tobacco Use    Smoking status: Former Smoker     Packs/day: 0.50     Years: 4.00     Pack years: 2.00     Types: Cigarettes     Quit date: 1961     Years since quittin.5    Smokeless tobacco: Never Used   Vaping Use    Vaping Use: Never used   Substance Use Topics    Alcohol use: No     Alcohol/week: 0.0 standard drinks    Drug use: No         FAMILY HISTORY:     Family History   Problem Relation Age of Onset    Hypertension Mother     Heart Disease Mother     Heart Disease Father     Hypertension Father     Elevated Lipids Father     Heart Disease Brother     Psychiatric Disorder Brother     Diabetes Brother     Breast Cancer Maternal Aunt     Breast Cancer Paternal Aunt            REVIEW OF SYMPTOMS:     Review of Symptoms:  Constitutional: Negative for fever, chills  HEENT: Negative for nosebleeds, tinnitus, and vision changes. Respiratory: Negative for cough, wheezing  Cardiovascular: Negative for syncope  Gastrointestinal: Negative for abdominal pain, diarrhea, melena. + constipated  Genitourinary: Negative for dysuria  Musculoskeletal: Negative for myalgias. Skin: Negative for rash  Heme: No problems bleeding. Neurological: Negative for speech change and focal weakness. + lethargy      PHYSICAL EXAM:     Physical Exam:  Visit Vitals  /70 (BP 1 Location: Left upper arm, BP Patient Position: Sitting)   Pulse (!) 47   Ht 5' 7\" (1.702 m)   Wt 210 lb (95.3 kg)   SpO2 94%   BMI 32.89 kg/m²     Patient more lethargic although arousable   HEENT:  Hearing intact, non-icteric, normocephalic, atraumatic. Neck Exam: Supple  Lung Exam: Clear to auscultation, even breath sounds. Cardiac Exam: Regular rate and rhythm with no murmur or rub  Abdomen: Soft, non-tender, normal bowel sounds. Obese   Extremities: Moves all ext well. No lower extremity edema - has TEDS. MSKTL: Overall good ROM ext  Skin: No significant rashes  Psych: sleepy but arousable   Neuro - Grossly intact (in a wheelchair)       LABS / OTHER STUDIES reviewed:         Labs 7/6/22 - CO2 35, Cr 0.85, K 4.4      CARDIAC DIAGNOSTICS:     Cardiac Evaluation Includes:  I reviewed the results below. Echo 9/2020 - LVEF 60-65%, PASP 38   CXR 6/4/22 - Moderate pulmonary edema pattern. No consolidative pneumonia. Echo 6/5/22 - LVEF 55-60%, grade 1 diastology, mild MICHELLE, mild AI   CTA chest 6/6/22 -  No evidence of acute pulmonary embolus. Bilateral pleural-based pulmonary Nodules,  Bilateral dependent atelectasis. Mild cardiomegaly.         EKG 6/4/22 - sinus la nena, LVH   EKG 6/6/22 - afib, , LVH  EKG 7/14/22 - sinus la nena, 51 bpm       ASSESSMENT AND PLAN:     Assessment and Plan:    1) Acute respiratory failure - admission 6/4/22   - thought to be from obesity hypoventilation, TYRONE, encephalopathy +/- diastolic HF   - She improved after Bipap therapy in hospital and was sent to rehab   - On 7/14/22 - she was just sent home from rehab. Has had more lethargy past couple of days ---> I asked daughter to take her to ED, but she would prefer to avoid that unless condition worsens. Will check proBNP. Will have her FU with palliative care and pulmonary. She seems volume compensated on exam, although difficult to say. When Dr. Berton Osler added diuretics when had lethargy and SOB, her conditioned worsened and she was admitted 6/4/22 (with RAN, encephalopathy). 2) PAF  - she had been on sotalol for many years until admitted with acute resp failure, RAN, and bradycardia on 6/4/22   - On 7/14/22 she is in sinus la nena --> will continue amiodarone and metoprolol (as long as HR > 60). Will stop Digoxin (HR 47 bpm on arrival). - continue eliquis     3) HTN   - stopped diovan given low BP during 6/22 admission   - BP OK - follow BP     4) RAN 6/22  - renal function better on discharge     5) Dementia:   - per PCP     6) TYRONE on CPAP    7) See me in 3 weeks      Lives with 78 yo  - daughter helps care for her         Sheri Edmondson MD, [address], [address] [place] [address]. [address], [address]  Ph: [phone]   Ph [phone]      Total time (preparing to see the patient, reviewing data, seeing patient face to face, taking history, examining patient, counseling patient, documenting information, coordinating care, etc) was  60  min.

## 2022-07-15 ENCOUNTER — PATIENT OUTREACH (OUTPATIENT)
Dept: CASE MANAGEMENT | Age: 82
End: 2022-07-15

## 2022-07-15 ENCOUNTER — TELEPHONE (OUTPATIENT)
Dept: CARDIOLOGY CLINIC | Age: 82
End: 2022-07-15

## 2022-07-15 NOTE — TELEPHONE ENCOUNTER
[facility] is calling because the doctor order a prescription request for Eliquis for the patient and insurance don't cover this medication. They would florence to know if the patient can have Xarelto because it is covered under the insurance. Send this over to the pharmacy so that the patient can have this filled. If Xarelto is not an option for the patient have our office contact them for pre authorization for the Eliquis.     [phone]

## 2022-07-15 NOTE — PROGRESS NOTES
[address] [place] [address] Dr Discharge Follow-Up      Date/Time:  7/15/2022 4:31 PM    Patient was admitted to [facility] on 22 and discharged to [address] [place] [address] on 22. The patients discharge diagnosis was acute on chronic respiratory failure with hypoxia and hypercapnia. Patient was discharge on 22 from [id number] [place] [address]. The discharge summary from the post acute facilty was not available at the time of outreach. Patient was contacted within 1 business days of discharge from the post acute facility. LPN Care Coordinator contacted the family spoke to  and daughter Jacob Zhong by telephone to perform post hospital discharge follow up. Verified name and  with family as identifiers. Provided introduction to self, and explanation of the LPN Care Coordinator role. Family did not receive post acute facility discharge instructions. LPN Care Coordinator reviewed red flags with family who verbalized understanding. Family given an opportunity to ask questions and does not have any further questions or concerns at this time. The family agrees to contact the PCP office for questions related to their healthcare. LPN Care Coordinator provided contact information for future reference. Advance Care Planning:   Does patient have an Advance Directive:  reviewed and current     Home Health orders at discharge: PT, OT, [address]: [address]  Date of initial visit: 7/15/22    [facility] ordered at discharge: none  [address] received: none  Patient has her own walker, W/C and uses trilogy at night, has oxygen but does not use it. Medication(s):   The post acute facility medication discharge list was not available for this call.     Advised to take medications to PCP follow up 22    [facility] follow up appointment(s):   Future Appointments   Date Time Provider Heide Diaz   2022  1:15 PM Yanna Che III, MD [facility]    seen by cardiology 7/14/22    Non-[facility] follow up appointment(s): n/a  [facility]:  information provided as a resource

## 2022-07-15 NOTE — TELEPHONE ENCOUNTER
Sent emili msg,  Per Dr. Ramona Woodward: Redd Mundo switch eliquis over to xarelto 20 mg once daily with largest meal\"    Forwarded to in [facility] pharmacy and mail order.

## 2022-07-18 ENCOUNTER — TELEPHONE (OUTPATIENT)
Dept: INTERNAL MEDICINE CLINIC | Age: 82
End: 2022-07-18

## 2022-07-18 NOTE — TELEPHONE ENCOUNTER
Called pt to correct appt scheduled 07/25/22. Corrected scheduling. Daughter requested that we request records from [facility] at [facility]. Faxed request to [phone]. daughter also states that prior to being discharged from rehab, it was believed that pt had a UTI. One nurse informed her that the preliminary results were positive for bateria and they were awaiting the culture results to know what antibiotic to put pt on. Another nurse informed her that the preliminary results were negative. Requesting records be reviewed to see if test/culture were even done. Also requesting order from [facility] for [facility] nurse to complete urinalysis to test pt for UTI during visit this week. Stated that [facility] should also be calling to request order from [facility].     Daughter call back number:  [phone]

## 2022-07-18 NOTE — TELEPHONE ENCOUNTER
Spoke with Nadia Campos with [facility]. Order given for U/A C&S per VORB from Dr. Mariaelena Salmeron. Nadia Campos states they will be out to home tomorrow for sample. Spoke with [facility] and notified of the above, she voiced understanding.

## 2022-07-20 ENCOUNTER — DOCUMENTATION ONLY (OUTPATIENT)
Dept: PALLATIVE CARE | Age: 82
End: 2022-07-20

## 2022-07-20 ENCOUNTER — PATIENT OUTREACH (OUTPATIENT)
Dept: CASE MANAGEMENT | Age: 82
End: 2022-07-20

## 2022-07-20 NOTE — PROGRESS NOTES
[address] [place] [address] Dr Discharge Note    *The information contained in this note was received during a weekly care coordination call with the post acute facility*      Patient was discharged from [address] ([facility]) on 7/14/2022 to Home with family support. PCP: MD SHELLEY Miguel appointment:   Future Appointments   Date Time Provider Heide Diaz   7/25/2022  1:00 PM Alida Gill [facility]       Home Health/Outpatient orders at discharge: home health care  [address]: [address] ordered at discharge:  None  [address] received prior to discharge: Amaury Everette Coordinator managing patient: KARLI Lui. Community Care Team will sign off at this time. Medications were not reconciled and general patient assessment was not completed during this skilled nursing facility outreach.      LIGIA Palacios  [facility] Team

## 2022-07-21 ENCOUNTER — TELEPHONE (OUTPATIENT)
Dept: INTERNAL MEDICINE CLINIC | Age: 82
End: 2022-07-21

## 2022-07-21 NOTE — TELEPHONE ENCOUNTER
Reason for call:  pt's  requesting a call back regarding two meds dr Abdoulaye Christopher put on to help with her constipation and he says that her stools are now to loose.   Is this a new problem: yes     Date of last appointment:  7/7/2021     Can we respond via [employer]: no    Best call back number:    AndrewButch () [phone] (Home)

## 2022-07-21 NOTE — TELEPHONE ENCOUNTER
Returned call to patient spouse Roberto Franz, verified IDx2. Notified the mag citrate is only for prn, can d/c and only use daily miralax for now. Roberto Franz voiced understanding.

## 2022-07-21 NOTE — TELEPHONE ENCOUNTER
Reason for call:   Stuart Block returning Sasha's call.     Is this a new problem: yes     Date of last appointment:  7/7/2021     Can we respond via [employer]: no    Best call back number:     Stuart Mckayla - [phone]

## 2022-07-22 ENCOUNTER — TELEPHONE (OUTPATIENT)
Dept: INTERNAL MEDICINE CLINIC | Age: 82
End: 2022-07-22

## 2022-07-22 NOTE — TELEPHONE ENCOUNTER
Spoke with Catherine Flowers states Pt. Is sleeping a lot, has foul odor with urine, wears depends, has to be changed once during the night. See urine result from home health urinalysis looks positive but culture was negative, result on Dr Ghislaine Chandler desk and Pt. Has an appt. 7/25/22.

## 2022-07-25 ENCOUNTER — TELEPHONE (OUTPATIENT)
Dept: CARDIOLOGY CLINIC | Age: 82
End: 2022-07-25

## 2022-07-25 ENCOUNTER — OFFICE VISIT (OUTPATIENT)
Dept: INTERNAL MEDICINE CLINIC | Age: 82
End: 2022-07-25
Payer: MEDICARE

## 2022-07-25 VITALS
BODY MASS INDEX: 32.9 KG/M2 | HEIGHT: 67 IN | SYSTOLIC BLOOD PRESSURE: 130 MMHG | OXYGEN SATURATION: 97 % | DIASTOLIC BLOOD PRESSURE: 90 MMHG | TEMPERATURE: 98 F | HEART RATE: 56 BPM | RESPIRATION RATE: 18 BRPM | WEIGHT: 209.6 LBS

## 2022-07-25 DIAGNOSIS — I10 ESSENTIAL HYPERTENSION: ICD-10-CM

## 2022-07-25 DIAGNOSIS — I48.92 PAROXYSMAL ATRIAL FLUTTER (HCC): ICD-10-CM

## 2022-07-25 DIAGNOSIS — E55.9 VITAMIN D DEFICIENCY: ICD-10-CM

## 2022-07-25 DIAGNOSIS — R06.89 HYPERCAPNIA: Primary | ICD-10-CM

## 2022-07-25 DIAGNOSIS — E78.2 MIXED HYPERLIPIDEMIA: ICD-10-CM

## 2022-07-25 DIAGNOSIS — L40.9 PSORIASIS: ICD-10-CM

## 2022-07-25 DIAGNOSIS — I48.91 ATRIAL FIBRILLATION WITH RAPID VENTRICULAR RESPONSE (HCC): ICD-10-CM

## 2022-07-25 DIAGNOSIS — Z09 HOSPITAL DISCHARGE FOLLOW-UP: ICD-10-CM

## 2022-07-25 DIAGNOSIS — R06.89 HYPERCAPNIA: ICD-10-CM

## 2022-07-25 DIAGNOSIS — F03.90 DEMENTIA WITHOUT BEHAVIORAL DISTURBANCE, UNSPECIFIED DEMENTIA TYPE: ICD-10-CM

## 2022-07-25 LAB
COMMENT, HOLDF: NORMAL
SAMPLES BEING HELD,HOLD: NORMAL

## 2022-07-25 PROCEDURE — G8427 DOCREV CUR MEDS BY ELIG CLIN: HCPCS | Performed by: INTERNAL MEDICINE

## 2022-07-25 PROCEDURE — 99495 TRANSJ CARE MGMT MOD F2F 14D: CPT | Performed by: INTERNAL MEDICINE

## 2022-07-25 PROCEDURE — 1111F DSCHRG MED/CURRENT MED MERGE: CPT | Performed by: INTERNAL MEDICINE

## 2022-07-25 RX ORDER — CETIRIZINE HCL 10 MG
TABLET ORAL
COMMUNITY

## 2022-07-25 RX ORDER — AMIODARONE HYDROCHLORIDE 200 MG/1
TABLET ORAL
COMMUNITY
Start: 2022-07-14 | End: 2022-08-11 | Stop reason: SDUPTHER

## 2022-07-25 RX ORDER — POLYETHYLENE GLYCOL 3350 17 G/17G
17 POWDER, FOR SOLUTION ORAL DAILY
COMMUNITY
End: 2022-10-27

## 2022-07-25 RX ORDER — TRIAMCINOLONE ACETONIDE 1 MG/G
OINTMENT TOPICAL 2 TIMES DAILY
COMMUNITY
End: 2022-10-27

## 2022-07-25 NOTE — TELEPHONE ENCOUNTER
Per Dr. Alexandria Grimes meds as prescribed. F/up in clinic if still fatigued        Spoke with the pt's daughter,Rosio, identified the pt with name and . Informed her of Dr Yenny Hernandez instructions. I also suggested she keep a log of the pts B/P and HR, then note if medication was given or not. Bring this to her appointment. Rosio expressed understanding and agreement. Pt has no further questions or concerns at this time.

## 2022-07-25 NOTE — PROGRESS NOTES
Transitional Care Management Progress Note    Patient: Allison Bertrand  : 1940  PCP: Dipesh Aranda MD    Date of office visit: 2022   Date of admission: 22  Date of discharge: 22  Hospital: [facility]    Call initiated w/i 2 business dates of discharge: *No response documented in the last 14 days   Date of the most recent call to the patient: *No documented post hospital discharge outreach found in the last 14 days      Assessment/Plan:   Diagnoses and all orders for this visit:    1. Harman has seen cardiology for follow-up. They wanted her to have lab work and wanted to discontinue digoxin due to bradycardia. We will continue to monitor heart rates and dose metoprolol as appropriate.  -     CBC WITH AUTOMATED DIFF; Future  -     NT-PRO BNP; Future    2. Dementia without behavioral disturbance, unspecified dementia type (HCC)-continue Aricept. 3. Atrial fibrillation with rapid ventricular response (HCC)-no recurrence. -     NT-PRO BNP; Future  -     METABOLIC PANEL, BASIC; Future    4. Essential hypertension-blood pressure reasonably controlled on meds. -     NT-PRO BNP; Future  -     METABOLIC PANEL, BASIC; Future    5. Mixed hyperlipidemia-controlled on meds. 6. Paroxysmal atrial flutter (HCC)  -     NT-PRO BNP; Future  -     METABOLIC PANEL, BASIC; Future    7. Vitamin D deficiency-some concerned about the level of supplement she is taking. We will check levels to see where they are in the continue them. She is currently taking a total of 5000 units of vitamin D in supplement and her multivitamin.  -     VITAMIN D, 25 HYDROXY; Future    8. Psoriasis    9. Hospital discharge follow-up  -     AR DISCHARGE MEDS RECONCILED W/ CURRENT OUTPATIENT MED LIST       Subjective:   Allison Bertrand is a 79 y.o. female presenting today for follow-up after hospital discharge. This encounter and supporting documentation was reviewed if available.   Medication reconciliation was performed today. The main problem requiring admission was respiratory failure associated with obesity and sleep apnea. .   Complications during admission: Sedation and difficult to treat respiratory failure. Some debilitation requiring inpatient rehab in a nursing facility until 11 days ago. She initially was doing quite well at home but over the last few days has noted some increased sedation. Interval history/Current status: Improved since admission. She is noted some increased sedation from her family recently. She is using her BiPAP all the time. She had a urinalysis done that did not reveal any evidence of significant infection. Her daughter has been checking her pulse rate and holding her metoprolol if her heart rate is less than 60. She has rarely needed to take the metoprolol. No nausea or vomiting. Bowel movements have been constipated but seem to be getting back on track with MiraLAX. No melena or hematochezia. No fevers or chills. No bladder issues. Some itching on her scalp. Admitting symptoms have: improved      Medications marked \"taking\" at this time:  Prior to Admission medications    Medication Sig Start Date End Date Taking? Authorizing Provider   OTHER Move free 2 tabs daily   Yes Provider, Historical   amiodarone (CORDARONE) 200 mg tablet  7/14/22  Yes Provider, Historical   polyethylene glycol (Miralax) 17 gram/dose powder Take 17 g by mouth in the morning. Yes Provider, Historical   multivit/folic acid/vit K1 (ONE-A-DAY WOMEN'S 50 PLUS PO) Take  by mouth. Yes Provider, Historical   BETAMETHASONE by Does Not Apply route. Yes Provider, Historical   triamcinolone acetonide (KENALOG) 0.1 % ointment Apply  to affected area two (2) times a day. use thin layer   Yes Provider, Historical   cetirizine (ZyrTEC) 10 mg tablet Take  by mouth daily as needed for Allergies.    Yes Provider, Historical   rivaroxaban (Xarelto) 20 mg tab tablet Take 1 Tablet by mouth daily (with dinner). 7/15/22  Yes Keri Ch MD   metoprolol tartrate (LOPRESSOR) 25 mg tablet Take 75 mg by mouth two (2) times a day. Yes Provider, Historical   polyethylene glycol (MIRALAX) 17 gram packet Take 1 Packet by mouth daily as needed for Constipation for up to 30 doses. Indications: constipation 6/14/22  Yes Fara Hurt MD   sodium chloride ([address] 128) 2 % ophthalmic solution Administer 1 Drop to left eye two (2) times a day. Administer 1 Drop to left eye two (2) times a day. Morning and at night 15 minutes after timolol eye drops   Yes Provider, Historical   Gemtesa 75 mg tab Take 75 mg by mouth every evening. 5/30/22  Yes Stacy Shaw MD   triamcinolone acetonide (KENALOG) 0.1 % topical cream Apply 0.1 Packages to affected area two (2) times a day. Apply to rash under pannus 3/9/22  Yes Other, MD Stacy   levothyroxine (Synthroid) 150 mcg tablet Take 1 Tablet by mouth Daily (before breakfast). 6/3/22  Yes Aquiles Jackson MD   donepeziL (ARICEPT) 10 mg tablet TAKE ONE TABLET BY MOUTH ONCE NIGHTLY 11/12/21  Yes Aquiles Jackson MD   vit A/vit C/vit E/zinc/copper (PRESERVISION AREDS PO) Take 2 Tablets by mouth two (2) times a day. Yes Provider, Historical   timolol (TIMOPTIC) 0.5 % ophthalmic solution Administer 1 Drop to both eyes daily. In the morning 12/17/10  Yes Provider, Historical   Cholecalciferol, Vitamin D3, (VITAMIN D3) 2,000 unit cap capsule Take 2,000 Units by mouth two (2) times a day.  2/16/17   Aquiles Jackson MD        ROS:  Negative except per HPI       Patient Active Problem List    Diagnosis Date Noted    Acute on chronic respiratory failure with hypoxia and hypercapnia ([facility] 06/08/2022    Physical debility 06/08/2022    Dementia ([facility] 06/08/2022    Encephalopathy acute 06/04/2022    Obesity, morbid ([facility] 11/28/2017    Advanced directives, counseling/discussion 03/24/2016    Advance care planning 12/17/2015    Paroxysmal atrial flutter ([facility] 11/23/2015    Mixed hyperlipidemia 05/28/2014    Headache 07/07/2010    Acquired hypothyroidism 07/07/2010    Psoriasis 04/30/2010    HTN (hypertension), benign 04/30/2010    Allergic rhinitis, cause unspecified 04/30/2010    OA (osteoarthritis) 04/30/2010    Glaucoma 04/30/2010     Current Outpatient Medications   Medication Sig Dispense Refill    OTHER Move free 2 tabs daily      amiodarone (CORDARONE) 200 mg tablet       polyethylene glycol (Miralax) 17 gram/dose powder Take 17 g by mouth in the morning.      multivit/folic acid/vit K1 (ONE-A-DAY WOMEN'S 50 PLUS PO) Take  by mouth. BETAMETHASONE by Does Not Apply route. triamcinolone acetonide (KENALOG) 0.1 % ointment Apply  to affected area two (2) times a day. use thin layer      cetirizine (ZyrTEC) 10 mg tablet Take  by mouth daily as needed for Allergies. rivaroxaban (Xarelto) 20 mg tab tablet Take 1 Tablet by mouth daily (with dinner). 90 Tablet 1    metoprolol tartrate (LOPRESSOR) 25 mg tablet Take 75 mg by mouth two (2) times a day. polyethylene glycol (MIRALAX) 17 gram packet Take 1 Packet by mouth daily as needed for Constipation for up to 30 doses. Indications: constipation 30 Packet 0    sodium chloride (Kemal 128) 2 % ophthalmic solution Administer 1 Drop to left eye two (2) times a day. Administer 1 Drop to left eye two (2) times a day. Morning and at night 15 minutes after timolol eye drops      Gemtesa 75 mg tab Take 75 mg by mouth every evening. triamcinolone acetonide (KENALOG) 0.1 % topical cream Apply 0.1 Packages to affected area two (2) times a day. Apply to rash under pannus      levothyroxine (Synthroid) 150 mcg tablet Take 1 Tablet by mouth Daily (before breakfast). 90 Tablet 0    donepeziL (ARICEPT) 10 mg tablet TAKE ONE TABLET BY MOUTH ONCE NIGHTLY 90 Tablet 3    vit A/vit C/vit E/zinc/copper (PRESERVISION AREDS PO) Take 2 Tablets by mouth two (2) times a day.       timolol (TIMOPTIC) 0.5 % ophthalmic solution Administer 1 Drop to both eyes daily. In the morning      Cholecalciferol, Vitamin D3, (VITAMIN D3) 2,000 unit cap capsule Take 2,000 Units by mouth two (2) times a day.  90 Cap 1     Allergies   Allergen Reactions    Cephalexin Itching    Codeine Rash    Gabapentin Other (comments)    Neomycin Rash    Polysporin [Bacitracin-Polymyxin B] Rash    Protonix [Pantoprazole] Other (comments)     Gi upset     Past Medical History:   Diagnosis Date    Arthritis     Basal cell carcinoma 2012    Calculus of kidney     Cancer (HCC)     skin    Cancer ([facility]     thyroid    Dementia ([facility]     Glaucoma 2010    Hypertension     OA (osteoarthritis) 2010    Obesity hypoventilation syndrome (HCC)     Obesity, morbid (HCC)     TYRONE (obstructive sleep apnea)     PAF (paroxysmal atrial fibrillation) ([facility]     Psoriasis 2010     Past Surgical History:   Procedure Laterality Date    HX CATARACT REMOVAL      bilat    HX CHOLECYSTECTOMY      HX CRANIOTOMY      HX DILATION AND CURETTAGE      HX HYSTERECTOMY      HX KNEE ARTHROSCOPY      HX KNEE REPLACEMENT      HX TONSILLECTOMY      HX WRIST FRACTURE TX Left 8/21/15    MI THYROIDECTOMY       Family History   Problem Relation Age of Onset    Hypertension Mother     Heart Disease Mother     Heart Disease Father     Hypertension Father     Elevated Lipids Father     Heart Disease Brother     Psychiatric Disorder Brother     Diabetes Brother     Breast Cancer Maternal Aunt     Breast Cancer Paternal Aunt      Social History     Tobacco Use    Smoking status: Former     Packs/day: 0.50     Years: 4.00     Pack years: 2.00     Types: Cigarettes     Quit date: 1961     Years since quittin.6    Smokeless tobacco: Never   Substance Use Topics    Alcohol use: No     Alcohol/week: 0.0 standard drinks          Objective:   Visit Vitals  BP (!) 130/90 (BP 1 Location: Left upper arm, BP Patient Position: Sitting, BP Cuff Size: Large adult)   Pulse (!) 56   Temp 98 °F (36.7 °C) (Temporal)   Resp 18 Ht 5' 7\" (1.702 m)   Wt 209 lb 9.6 oz (95.1 kg)   SpO2 97%   BMI 32.83 kg/m²        Physical Examination: General appearance - normal appearing weight and quiet but arrousable. Neck - supple, no significant adenopathy  Chest - clear to auscultation, no wheezes, rales or rhonchi, symmetric air entry  Heart - normal rate and regular rhythm  Abdomen - soft, nontender, nondistended, no masses or organomegaly  Extremities - peripheral pulses normal, no pedal edema, no clubbing or cyanosis       We discussed the expected course, resolution and complications of the diagnosis(es) in detail. Medication risks, benefits, costs, interactions, and alternatives were discussed as indicated. I advised her to contact the office if her condition worsens, changes or fails to improve as anticipated. She expressed understanding with the diagnosis(es) and plan.      Marie Stinson MD

## 2022-07-25 NOTE — TELEPHONE ENCOUNTER
Patient's daughter is calling to discuss the patient's med. Patient was given another prescription of metoprolol for 75mg. What should she take?     Please advise          [facility]:[phone]

## 2022-07-25 NOTE — TELEPHONE ENCOUNTER
Spoke with the pt's daughter,Rosio, identified the pt with name and . Rosio inquiring about if she should continue the medications as instructed by Dr. Sadaf Nichols:    Give metoprolol 75 mg PRN if HR>60, continue amiodarone 200 mg daily. She said that when she checks the HR and it's like 62 BPM, she gives her the metoprolol 75 mg, but then the next day her HR is down in the 50's. So the pt is only taking the metoprolol every other day. Should she keep doing this? Or should the dosage be adjusted? She reports the pt is very tired all the time. Rosio did not have B/Ps. I will consult one of our providers and get back to her. Also pt has not had Labs done, but is seeing PCP today. I faxed the orders to Dr. West Bauer office asking they draw them if possible. Also I made the follow up appointment with Dr. Sadaf Nichols, as it should have been made at check out. This was made for 22 @ 1320 at [facility].

## 2022-07-26 LAB
25(OH)D3 SERPL-MCNC: 49.3 NG/ML (ref 30–100)
ANION GAP SERPL CALC-SCNC: 7 MMOL/L (ref 5–15)
BASOPHILS # BLD: 0.1 K/UL (ref 0–0.1)
BASOPHILS NFR BLD: 1 % (ref 0–1)
BNP SERPL-MCNC: 148 PG/ML
BUN SERPL-MCNC: 24 MG/DL (ref 6–20)
BUN/CREAT SERPL: 24 (ref 12–20)
CALCIUM SERPL-MCNC: 8.8 MG/DL (ref 8.5–10.1)
CHLORIDE SERPL-SCNC: 102 MMOL/L (ref 97–108)
CO2 SERPL-SCNC: 28 MMOL/L (ref 21–32)
CREAT SERPL-MCNC: 1.02 MG/DL (ref 0.55–1.02)
DIFFERENTIAL METHOD BLD: ABNORMAL
EOSINOPHIL # BLD: 0.1 K/UL (ref 0–0.4)
EOSINOPHIL NFR BLD: 1 % (ref 0–7)
ERYTHROCYTE [DISTWIDTH] IN BLOOD BY AUTOMATED COUNT: 14.4 % (ref 11.5–14.5)
FERRITIN SERPL-MCNC: 14 NG/ML (ref 26–388)
GLUCOSE SERPL-MCNC: 88 MG/DL (ref 65–100)
HCT VFR BLD AUTO: 31.1 % (ref 35–47)
HGB BLD-MCNC: 9.6 G/DL (ref 11.5–16)
IMM GRANULOCYTES # BLD AUTO: 0 K/UL (ref 0–0.04)
IMM GRANULOCYTES NFR BLD AUTO: 0 % (ref 0–0.5)
LYMPHOCYTES # BLD: 1.6 K/UL (ref 0.8–3.5)
LYMPHOCYTES NFR BLD: 19 % (ref 12–49)
MCH RBC QN AUTO: 30.7 PG (ref 26–34)
MCHC RBC AUTO-ENTMCNC: 30.9 G/DL (ref 30–36.5)
MCV RBC AUTO: 99.4 FL (ref 80–99)
MONOCYTES # BLD: 0.8 K/UL (ref 0–1)
MONOCYTES NFR BLD: 10 % (ref 5–13)
NEUTS SEG # BLD: 5.7 K/UL (ref 1.8–8)
NEUTS SEG NFR BLD: 69 % (ref 32–75)
NRBC # BLD: 0 K/UL (ref 0–0.01)
NRBC BLD-RTO: 0 PER 100 WBC
PLATELET # BLD AUTO: 278 K/UL (ref 150–400)
PMV BLD AUTO: 11.3 FL (ref 8.9–12.9)
POTASSIUM SERPL-SCNC: 4.6 MMOL/L (ref 3.5–5.1)
RBC # BLD AUTO: 3.13 M/UL (ref 3.8–5.2)
SODIUM SERPL-SCNC: 137 MMOL/L (ref 136–145)
TIBC SERPL-MCNC: 428 UG/DL (ref 250–450)
WBC # BLD AUTO: 8.2 K/UL (ref 3.6–11)

## 2022-07-28 ENCOUNTER — TELEPHONE (OUTPATIENT)
Dept: PALLATIVE CARE | Age: 82
End: 2022-07-28

## 2022-07-28 NOTE — PROGRESS NOTES
[facility] Palliative Medicine Office  Nursing Note  [phone]  Fax [phone]      Name:  Tommy Davison  YOB: 1940    Received outpatient Palliative Medicine referral from Liliana Salazar MD (Cardiology) to see patient for \"EOL discussion\". Chart  reviewed. Tommy Davison is a 79 y.o. female. She was hospitalized at [facility]  6/4/22-6/14/22 and treated for    acute on chronic respiratory failure with hypoxia and hypercapnia (required intermittent BiPAP, supplemental oxygen at 1L/min), paroxysmal atrial flutter, acquired hypothyroidism (hx of thyroid cancer s/p thyroidectomy), acute encephalopathy (likely from CO2 retention with her underlying dementia), physical debility. She was discharged to [address] [place] [address] for rehab. Patient was discharged back to her home on 7/14/22. ACP:     No AMD is on file. DDNR signed on 6/10/22 is on file. Discussed referral in weekly outpatient Palliative IDT meeting on 7/20/22. Patient has already been seen by inpatient Palliative team during her June hospitalization and goals of care conversation took place with patient's  (see Dr. Vasu Carrera' consult notes from 6/10/22 and 6/13/22).  signed DDNR for patient on 6/10/22. This nurse will call patient's  to determine if patient has any continuing Palliative needs.      Love Barksdale, ERICN, RN-BC, [facility]

## 2022-07-28 NOTE — TELEPHONE ENCOUNTER
[facility] Palliative Medicine Office  Nursing Note  [phone])  Fax [phone]      Name:  Tommy Davison  YOB: 1940        Outgoing call to patient's  to follow up on outpatient Palliative referral from Liliana Salazar MD (Cardiology) to see patient for \"EOL discussion\". See this nurse's note from 7/20/22. Tommy Davison is a 79 y.o. female. She was hospitalized at [facility]  6/4/22-6/14/22 and treated for acute on chronic respiratory failure with hypoxia and hypercapnia (required intermittent BiPAP, supplemental oxygen at 1L/min), paroxysmal atrial flutter, acquired hypothyroidism (hx of thyroid cancer s/p thyroidectomy), acute encephalopathy (likely from CO2 retention with her underlying dementia), physical debility. She was discharged to [address] [place] [address] for rehab. Patient was discharged back to her home on 7/14/22. ACP:     No AMD is on file. DDNR signed on 6/10/22 is on file. The outpatient Palliative referral was discussed  in weekly outpatient Palliative IDT meeting on 7/20/22. Patient has already been seen by inpatient Palliative team during her June hospitalization and goals of care conversation took place with patient's  (see Dr. Vasu Carrera' consult notes from 6/10/22 and 6/13/22).  signed DDNR for patient on 6/10/22. This nurse called patient's  today to determine if patient has any continuing Palliative needs. Mr. Shakir Bridges was not aware of the outpatient Palliative referral.  After reminding him,  he did remember speaking with Dr. Stacia Juarez about goals of care for his wife. He reports that his wife is doing better since coming home from rehab. She is being seen by [facility]. As per his discussion with Dr. Vasu Carrera on 6/10/22, Mr. Shakir Bridges is hopeful that his wife will continue with her recent increase in strength. He says their daughter Husam Santizo lives with them and she helps with Ms. Morales's care also. They have long term care insurance and  is aware of the option to hire in-home caregivers if extra help is needed. On 6/10/22,  discussed patient's high risk for further decline and respiratory complications that may lead to future hospitalization. Dr. Geovany Porter also discussed that her recovery of physical function may be prolonged and that she may not be able to recover to her previous level of function. Today Mr. Morales sounds realistic about his wife's condition and the possibility of decline.   This nurse encouraged Mr. Becca Bermudez to call our Palliative office if has questions      PETER Kumari, RN-BC, [facility]

## 2022-08-09 ENCOUNTER — TELEPHONE (OUTPATIENT)
Dept: INTERNAL MEDICINE CLINIC | Age: 82
End: 2022-08-09

## 2022-08-09 NOTE — TELEPHONE ENCOUNTER
Reason for call:   Please call to discuss the form for insurance. Has it been filled out and faxed yet?     Is this a new problem: yes     Date of last appointment:  7/25/2022     Can we respond via [employer]: no    Best call back number: [facility] [phone]

## 2022-08-10 ENCOUNTER — TELEPHONE (OUTPATIENT)
Dept: INTERNAL MEDICINE CLINIC | Age: 82
End: 2022-08-10

## 2022-08-10 NOTE — TELEPHONE ENCOUNTER
Spoke with patient spouse Roberto Franz on HIPAA, verified IDx2. Notified for [facility] response. Plans to try OTC Melatonin to see if that helps.

## 2022-08-10 NOTE — TELEPHONE ENCOUNTER
Reason for call:    Patient's  called. He said  his wife is having a hard time adjusting to her trilogy CO2 machine and has difficulty sleeping. He wanted to know if something can be prescribed to help her sleep.     Is this a new problem: yes     Date of last appointment:  7/25/2022     Can we respond via [employer]: no    Best call back number:     Courtney Omalley - [phone]

## 2022-08-11 ENCOUNTER — TELEPHONE (OUTPATIENT)
Dept: CARDIOLOGY CLINIC | Age: 82
End: 2022-08-11

## 2022-08-11 NOTE — TELEPHONE ENCOUNTER
Patient's spouse Carlita Dewey is calling to request a refill, states patient is out of the [address] [phone]

## 2022-08-12 RX ORDER — AMIODARONE HYDROCHLORIDE 200 MG/1
200 TABLET ORAL DAILY
Qty: 30 TABLET | Refills: 2 | Status: SHIPPED | OUTPATIENT
Start: 2022-08-12 | End: 2022-08-23 | Stop reason: SDUPTHER

## 2022-08-12 NOTE — TELEPHONE ENCOUNTER
Refill per VO of Dr. Perez Smart  Last appt: 7/14/22  Future Appointments   Date Time Provider Heide Diaz   8/23/2022  1:40 PM MD EDGAR Alcantara BS AMB       Requested Prescriptions     Signed Prescriptions Disp Refills    amiodarone (CORDARONE) 200 mg tablet 30 Tablet 2     Sig: Take 1 Tablet by mouth in the morning. Authorizing Provider: Marvin Wright     Ordering User: Lilia Kajal"- On 7/14/22 she is in sinus la nena --> will continue amiodarone and metoprolol (as long as HR > 60). Will stop Digoxin (HR 47 bpm on arrival).   \"

## 2022-08-22 ENCOUNTER — TELEPHONE (OUTPATIENT)
Dept: INTERNAL MEDICINE CLINIC | Age: 82
End: 2022-08-22

## 2022-08-22 NOTE — TELEPHONE ENCOUNTER
R/t call,  Let them know that because it is a non-cardiac concern, to follow up with PCP. Expressed understanding.

## 2022-08-23 ENCOUNTER — TELEPHONE (OUTPATIENT)
Dept: INTERNAL MEDICINE CLINIC | Age: 82
End: 2022-08-23

## 2022-08-23 ENCOUNTER — OFFICE VISIT (OUTPATIENT)
Dept: CARDIOLOGY CLINIC | Age: 82
End: 2022-08-23
Payer: MEDICARE

## 2022-08-23 VITALS
HEART RATE: 63 BPM | WEIGHT: 209 LBS | HEIGHT: 67 IN | DIASTOLIC BLOOD PRESSURE: 78 MMHG | OXYGEN SATURATION: 93 % | BODY MASS INDEX: 32.8 KG/M2 | SYSTOLIC BLOOD PRESSURE: 188 MMHG

## 2022-08-23 DIAGNOSIS — G30.0 EARLY ONSET ALZHEIMER'S DEMENTIA WITHOUT BEHAVIORAL DISTURBANCE (HCC): ICD-10-CM

## 2022-08-23 DIAGNOSIS — F02.80 EARLY ONSET ALZHEIMER'S DEMENTIA WITHOUT BEHAVIORAL DISTURBANCE (HCC): ICD-10-CM

## 2022-08-23 DIAGNOSIS — I48.92 PAROXYSMAL ATRIAL FLUTTER (HCC): Primary | ICD-10-CM

## 2022-08-23 DIAGNOSIS — I10 HTN (HYPERTENSION), BENIGN: ICD-10-CM

## 2022-08-23 PROCEDURE — 1123F ACP DISCUSS/DSCN MKR DOCD: CPT | Performed by: SPECIALIST

## 2022-08-23 PROCEDURE — G0463 HOSPITAL OUTPT CLINIC VISIT: HCPCS | Performed by: SPECIALIST

## 2022-08-23 PROCEDURE — 1101F PT FALLS ASSESS-DOCD LE1/YR: CPT | Performed by: SPECIALIST

## 2022-08-23 PROCEDURE — G8432 DEP SCR NOT DOC, RNG: HCPCS | Performed by: SPECIALIST

## 2022-08-23 PROCEDURE — G8399 PT W/DXA RESULTS DOCUMENT: HCPCS | Performed by: SPECIALIST

## 2022-08-23 PROCEDURE — G8417 CALC BMI ABV UP PARAM F/U: HCPCS | Performed by: SPECIALIST

## 2022-08-23 PROCEDURE — G8536 NO DOC ELDER MAL SCRN: HCPCS | Performed by: SPECIALIST

## 2022-08-23 PROCEDURE — 99214 OFFICE O/P EST MOD 30 MIN: CPT | Performed by: SPECIALIST

## 2022-08-23 PROCEDURE — G8427 DOCREV CUR MEDS BY ELIG CLIN: HCPCS | Performed by: SPECIALIST

## 2022-08-23 PROCEDURE — 1090F PRES/ABSN URINE INCON ASSESS: CPT | Performed by: SPECIALIST

## 2022-08-23 PROCEDURE — G8753 SYS BP > OR = 140: HCPCS | Performed by: SPECIALIST

## 2022-08-23 PROCEDURE — G8754 DIAS BP LESS 90: HCPCS | Performed by: SPECIALIST

## 2022-08-23 RX ORDER — AMIODARONE HYDROCHLORIDE 200 MG/1
200 TABLET ORAL DAILY
Qty: 90 TABLET | Refills: 3 | Status: SHIPPED | OUTPATIENT
Start: 2022-08-23

## 2022-08-23 RX ORDER — LANOLIN ALCOHOL/MO/W.PET/CERES
CREAM (GRAM) TOPICAL
COMMUNITY
End: 2022-10-27

## 2022-08-23 RX ORDER — METOPROLOL TARTRATE 25 MG/1
12.5 TABLET, FILM COATED ORAL 2 TIMES DAILY
Qty: 90 TABLET | Refills: 3 | Status: SHIPPED | OUTPATIENT
Start: 2022-08-23

## 2022-08-23 NOTE — PROGRESS NOTES
Cheyenne Gupta MD. [facility]              Patient: Andreea Fam  : 1940      Today's Date: 2022          HISTORY OF PRESENT ILLNESS:     History of Present Illness:  On 22 - She had admission with Acute resp failure and encephalopathy. Seemed more altered at rehab. She is at home now with her 80 yo . She is on CPAP at night. Just left rehab today. For swelling she had hose placed and legs elevated. She was getting PT at rehab and looking better. Did fine until past couple of days - more lethargic now. On 22 - She has been doing better lately - more alert - tired. Getting around more. Having sleep issues. Breathing has been fair. PAST MEDICAL HISTORY:     Past Medical History:   Diagnosis Date    Arthritis     Basal cell carcinoma 2012    Calculus of kidney     Cancer ([address]     skin    Cancer ([address]     thyroid    Dementia ([address]     Glaucoma 2010    Hypertension     OA (osteoarthritis) 2010    Obesity hypoventilation syndrome (HCC)     Obesity, morbid (HCC)     TYRONE (obstructive sleep apnea)     PAF (paroxysmal atrial fibrillation) ([address]     Psoriasis 2010       Past Surgical History:   Procedure Laterality Date    HX CATARACT REMOVAL      bilat    HX CHOLECYSTECTOMY      HX CRANIOTOMY      HX DILATION AND CURETTAGE      HX HYSTERECTOMY      HX KNEE ARTHROSCOPY      HX KNEE REPLACEMENT      HX TONSILLECTOMY      HX WRIST FRACTURE TX Left 8/21/15    AK THYROIDECTOMY             CURRENT MEDICATIONS:      Current Outpatient Medications   Medication Sig Dispense Refill    fluticasone propionate (FLONASE NA) by Nasal route as needed. psyllium (METAMUCIL) pack (sugar free) packet Take 1 Packet by mouth daily. melatonin 3 mg tablet Take  by mouth. amiodarone (CORDARONE) 200 mg tablet Take 1 Tablet by mouth in the morning.  30 Tablet 2    OTHER Move free 2 tabs daily      multivit/folic acid/vit K1 (ONE-A-DAY WOMEN'S 50 PLUS PO) Take by mouth. BETAMETHASONE by Does Not Apply route. triamcinolone acetonide (KENALOG) 0.1 % ointment Apply  to affected area two (2) times a day. use thin layer      cetirizine (ZYRTEC) 10 mg tablet Take  by mouth daily as needed for Allergies. rivaroxaban (Xarelto) 20 mg tab tablet Take 1 Tablet by mouth daily (with dinner). 90 Tablet 1    metoprolol tartrate (LOPRESSOR) 25 mg tablet Take 75 mg by mouth two (2) times a day. PRN if bpm 60+      polyethylene glycol (MIRALAX) 17 gram packet Take 1 Packet by mouth daily as needed for Constipation for up to 30 doses. Indications: constipation 30 Packet 0    sodium chloride ([address]) 2 % ophthalmic solution Administer 1 Drop to left eye two (2) times a day. Administer 1 Drop to left eye two (2) times a day. Morning and at night 15 minutes after timolol eye drops      Gemtesa 75 mg tab Take 75 mg by mouth every evening. triamcinolone acetonide (KENALOG) 0.1 % topical cream Apply 0.1 Packages to affected area two (2) times a day. Apply to rash under pannus      levothyroxine (Synthroid) 150 mcg tablet Take 1 Tablet by mouth Daily (before breakfast). 90 Tablet 0    donepeziL (ARICEPT) 10 mg tablet TAKE ONE TABLET BY MOUTH ONCE NIGHTLY 90 Tablet 3    vit A/vit C/vit E/zinc/copper (PRESERVISION AREDS PO) Take 2 Tablets by mouth two (2) times a day. Cholecalciferol, Vitamin D3, (VITAMIN D3) 2,000 unit cap capsule Take 2,000 Units by mouth two (2) times a day. 90 Cap 1    timolol (TIMOPTIC) 0.5 % ophthalmic solution Administer 1 Drop to both eyes daily. In the morning      polyethylene glycol (MIRALAX) 17 gram/dose powder Take 17 g by mouth in the morning.  (Patient not taking: Reported on 8/23/2022)         Allergies   Allergen Reactions    Cephalexin Itching    Codeine Rash    Gabapentin Other (comments)    Neomycin Rash    Polysporin [Bacitracin-Polymyxin B] Rash    Protonix [Pantoprazole] Other (comments)     Gi upset             SOCIAL HISTORY: Social History     Tobacco Use    Smoking status: Former     Packs/day: 0.50     Years: 4.00     Pack years: 2.00     Types: Cigarettes     Quit date: 1961     Years since quittin.6    Smokeless tobacco: Never   Vaping Use    Vaping Use: Never used   Substance Use Topics    Alcohol use: No     Alcohol/week: 0.0 standard drinks    Drug use: No         FAMILY HISTORY:     Family History   Problem Relation Age of Onset    Hypertension Mother     Heart Disease Mother     Heart Disease Father     Hypertension Father     Elevated Lipids Father     Heart Disease Brother     Psychiatric Disorder Brother     Diabetes Brother     Breast Cancer Maternal Aunt     Breast Cancer Paternal Aunt             REVIEW OF SYMPTOMS:      Review of Symptoms:  Constitutional: Negative for fever, chills  HEENT: Negative for nosebleeds, tinnitus, and vision changes. Respiratory: Negative for cough, wheezing  Cardiovascular: Negative for syncope  Gastrointestinal: Negative for abdominal pain, diarrhea, melena. + constipated  Genitourinary: Negative for dysuria  Musculoskeletal: Negative for myalgias. Skin: Negative for rash  Heme: No problems bleeding. Neurological: Negative for speech change and focal weakness. + lethargy        PHYSICAL EXAM:      Physical Exam:  Visit Vitals  BP (!) 188/78 (BP 1 Location: Left upper arm, BP Patient Position: Sitting, BP Cuff Size: Large adult)   Pulse 63   Ht 5' 7\" (1.702 m)   Wt 209 lb (94.8 kg)   SpO2 93%   BMI 32.73 kg/m²       Patient more lethargic although arousable   HEENT:  Hearing intact, non-icteric, normocephalic, atraumatic. Neck Exam: Supple  Lung Exam: Clear to auscultation, even breath sounds. Cardiac Exam: Regular rate and rhythm with no murmur or rub  Abdomen: Soft, non-tender, normal bowel sounds. Obese   Extremities: Moves all ext well.  No lower extremity edema   MSKTL: Overall good ROM ext  Skin: No significant rashes  Psych: calm, cooperative   Neuro - Grossly intact          LABS / OTHER STUDIES reviewed:      Lab Results   Component Value Date/Time    Sodium 137 07/25/2022 02:10 PM    Potassium 4.6 07/25/2022 02:10 PM    Chloride 102 07/25/2022 02:10 PM    CO2 28 07/25/2022 02:10 PM    Anion gap 7 07/25/2022 02:10 PM    Glucose 88 07/25/2022 02:10 PM    BUN 24 (H) 07/25/2022 02:10 PM    Creatinine 1.02 07/25/2022 02:10 PM    BUN/Creatinine ratio 24 (H) 07/25/2022 02:10 PM    GFR est AA >60 07/25/2022 02:10 PM    GFR est non-AA 52 (L) 07/25/2022 02:10 PM    Calcium 8.8 07/25/2022 02:10 PM    Bilirubin, total 0.7 06/06/2022 09:46 PM    Alk. phosphatase 88 06/06/2022 09:46 PM    Protein, total 6.8 06/06/2022 09:46 PM    Albumin 2.8 (L) 06/06/2022 09:46 PM    Globulin 4.0 06/06/2022 09:46 PM    A-G Ratio 0.7 (L) 06/06/2022 09:46 PM    ALT (SGPT) 22 06/06/2022 09:46 PM    AST (SGOT) 14 (L) 06/06/2022 09:46 PM     Lab Results   Component Value Date/Time    WBC 8.2 07/25/2022 02:10 PM    HGB 9.6 (L) 07/25/2022 02:10 PM    HCT 31.1 (L) 07/25/2022 02:10 PM    PLATELET 411 92/55/9888 02:10 PM    MCV 99.4 (H) 07/25/2022 02:10 PM       Lab Results   Component Value Date/Time    TSH 3.79 (H) 06/06/2022 09:46 PM     Component      Latest Ref Rng & Units 7/25/2022           2:10 PM   NT pro-BNP      <450 PG/             CARDIAC DIAGNOSTICS:      Cardiac Evaluation Includes:  I reviewed the results below. Echo 9/2020 - LVEF 60-65%, PASP 38   CXR 6/4/22 - Moderate pulmonary edema pattern. No consolidative pneumonia. Echo 6/5/22 - LVEF 55-60%, grade 1 diastology, mild MICHELLE, mild AI   CTA chest 6/6/22 -  No evidence of acute pulmonary embolus. Bilateral pleural-based pulmonary Nodules,  Bilateral dependent atelectasis. Mild cardiomegaly.            EKG 6/4/22 - sinus la nena, LVH   EKG 6/6/22 - afib, , LVH  EKG 7/14/22 - sinus la nena, 51 bpm         ASSESSMENT AND PLAN:      Assessment and Plan:     1) Acute respiratory failure - admission 6/4/22   - thought to be from obesity hypoventilation, TYRONE, encephalopathy +/- diastolic HF   - She improved after Bipap therapy in hospital and was sent to rehab   - On 7/14/22 - she was just sent home from rehab. Has had more lethargy past couple of days ---> I asked daughter to take her to ED, but she would prefer to avoid that unless condition worsens. Will check proBNP. Will have her FU with palliative care and pulmonary. She seems volume compensated on exam, although difficult to say. When Dr. Christina Tatum added diuretics when had lethargy and SOB, her conditioned worsened and she was admitted 6/4/22 (with RAN, encephalopathy). - On 8/23/22 -  Breathing stable - she is getting around better - Seeing Pulmonary - On Trelegy     2) PAF  - she had been on sotalol for many years until admitted with acute resp failure, RAN, and bradycardia on 6/4/22   - On 8/23/22 --> Cont amiodarone and cut metoprolol to 12.5 mg BID (as long as HR > 50 bpm)      3) HTN   - diovan stopped given low BP during 6/22 admission   - BP high on 8/23/22  - follow BP at home to help guide management --> If BP is high, can resume diovan      4) RAN 6/22  - renal function is now normal      5) Dementia:   - per PCP      6) TYRONE on CPAP     7) See me in 3 months     Lives with 78 yo  - daughter helps care for her            Sharyn Chan MD, [address]   [address], [address] [facility]  [address]  [place], [facility] [address].                 [place], [address] [place] [address]  Ph: [phone]                               Ph [phone]

## 2022-08-23 NOTE — PROGRESS NOTES
Chief Complaint   Patient presents with    Follow-up     4-6 weeks    Hypertension    Irregular Heart Beat    Cholesterol Problem     Vitals:    08/23/22 1348 08/23/22 1409   BP: (!) 178/82 (!) 188/78   BP 1 Location: Left upper arm Left upper arm   BP Patient Position: Sitting Sitting   BP Cuff Size: Large adult Large adult   Pulse: 63    Height: 5' 7\" (1.702 m)    Weight: 209 lb (94.8 kg)    SpO2: 93%      Chest pain denied   SOB PATHAK  Palpitations denied   Swelling in hands/feet denied   Dizziness denied   Recent hospital stays denied   Refills denied     Taking Metoprolol about every other day d/t pulse rate. Had called the other day asking about getting an rx for Trazodone for sleep--PCP stated to speak with Neurology, but they don't have a Neuro. Using Trelegy mask 5 hrs daily. Urinating 5x's nightly.

## 2022-08-23 NOTE — TELEPHONE ENCOUNTER
Reason for call:  Daughter recvd a message on the portal to contact her mother's neurologist concerning the sleep issue. She does not know the name of the neurologist.  Is it possible to check and see who this is and let her know.     Is this a new problem: yes     Date of last appointment:  7/25/2022     Can we respond via [employer]: no    Best call back number:   Afua Radha [facility] [phone] [facility]

## 2022-08-24 ENCOUNTER — TELEPHONE (OUTPATIENT)
Dept: CARDIOLOGY CLINIC | Age: 82
End: 2022-08-24

## 2022-08-24 ENCOUNTER — TELEPHONE (OUTPATIENT)
Dept: INTERNAL MEDICINE CLINIC | Age: 82
End: 2022-08-24

## 2022-08-24 DIAGNOSIS — G47.09 OTHER INSOMNIA: ICD-10-CM

## 2022-08-24 DIAGNOSIS — I10 HYPERTENSION, UNSPECIFIED TYPE: Primary | ICD-10-CM

## 2022-08-24 DIAGNOSIS — Z23 ENCOUNTER FOR IMMUNIZATION: ICD-10-CM

## 2022-08-24 DIAGNOSIS — F03.90 DEMENTIA WITHOUT BEHAVIORAL DISTURBANCE, UNSPECIFIED DEMENTIA TYPE: Primary | ICD-10-CM

## 2022-08-24 RX ORDER — VALSARTAN 40 MG/1
40 TABLET ORAL 2 TIMES DAILY
Qty: 60 TABLET | Refills: 1 | Status: SHIPPED | OUTPATIENT
Start: 2022-08-24 | End: 2022-10-14

## 2022-08-24 NOTE — TELEPHONE ENCOUNTER
Reason for call:    Roxanna Martínez, patient's daughter called, regarding her mother and getting a referral for a neurologist. She also wanted to know when her mother's last Pneumonia vaccination was. She would also like to know when her father's Sherial  4/9/36) last Pneumonia vaccination was.     Is this a new problem: yes     Date of last appointment:  7/25/2022     Can we respond via [employer] no    Best call back number:     Eric Barrera - [phone]

## 2022-08-24 NOTE — TELEPHONE ENCOUNTER
Spoke with patient daughter Nathalia Tyler on HIPAA. Referral info provided for scheduling with neurology - Dr. Jame Maldonado. Patient received [id number] on 4/13/2015, no other records of any other pneumonia vaccines. Rosio would like to know if pt needs another pneumonia vaccine d/t her lung issues and if so which type.

## 2022-08-24 NOTE — TELEPHONE ENCOUNTER
Keegan Keys with [facility] calling to advise the patient's bp was 188/82    If needed Keegan Keys can be reached at [phone]    [facility]

## 2022-08-24 NOTE — TELEPHONE ENCOUNTER
Per Dr. Cailin Dsouza: \"Start valsartan 40 mg BID. Keep log of BP and send it to us in 2 weeks. Check BMP via home health in 2 weeks. \"    Spoke to [facility] nurse; she took a VO and will fax us a form as she was unsure of their fax number at this time. Called, DUARTE on daughter's phone.   Sent T-Quad 22 msg

## 2022-08-24 NOTE — TELEPHONE ENCOUNTER
Orders Placed This [address] [place] [address] Neurology [facility] EMPL     Referral Priority:   Routine     Referral Type:   Consultation     Referral Reason:   Specialty Services Required     Referred to Provider:   Brodie Dance, MD     Number of Visits Requested:   1     The above orders were approved via [employer] per Dr. Washington Lee, III.

## 2022-08-24 NOTE — TELEPHONE ENCOUNTER
Spoke with Rosio and notified per [facility] should get the Prevnar 20. Rx sent to [address] [place] [address] [facility] per Rosio's request.    Orders Placed This Encounter    pneumococcal 20-wm conj-dip, PF, (PREVNAR 20) 0.5 mL syrg injection     Si.5 mL by IntraMUSCular route once for 1 dose. Dispense:  1 Each     Refill:  0     The above orders were approved via [facility] per Dr. Denia Hernandez, III.

## 2022-08-25 ENCOUNTER — TELEPHONE (OUTPATIENT)
Dept: CARDIOLOGY CLINIC | Age: 82
End: 2022-08-25

## 2022-08-25 NOTE — TELEPHONE ENCOUNTER
Patient is rtc back. Patient's daughter said we started her mother on new rx for Valsartan 40 mg. She will start the medicine today. Patient's daughter Sni Toth wanted the doctor to know that her mother previously use to take this medication Valsartan 325 mg which was making her mother tired a lot. She would like to know what is the side effects she should be looking out for. Patient's daughter said please leave a voice message because she will be at work and unable to receive calls.     [phone] Sin Toth

## 2022-08-25 NOTE — TELEPHONE ENCOUNTER
Pt daughter called to speak with nurse about the pt new medication, has a question about the dosage    Caitlin Ledesma (daughter)  [phone]

## 2022-08-25 NOTE — TELEPHONE ENCOUNTER
Pt's daughter was spoken with about medication being a lower dose. She understands that Dr. Annamarie Doyle does know that she was on the medication before and that it cause fatigue. Daughter informed to track BP twice a day after medication is given. To wait 30 mins after medication is given & 15mins for the pt to be at rest. BP is to be taken in the am/pm. To avoid feeling sick Pt is to take medication for some type of food. Medication is Valsartan 40 mg BID. Was informed that BP will bounce around 10 points until body get adjusted to medication. /100 Pt is to go to ER right away and to inform us. Daughter stated understanding.

## 2022-10-10 ENCOUNTER — TELEPHONE (OUTPATIENT)
Dept: CARDIOLOGY CLINIC | Age: 82
End: 2022-10-10

## 2022-10-10 RX ORDER — AMLODIPINE BESYLATE 2.5 MG/1
2.5 TABLET ORAL DAILY
Qty: 30 TABLET | Refills: 1 | Status: SHIPPED
Start: 2022-10-10 | End: 2022-10-27

## 2022-10-10 NOTE — TELEPHONE ENCOUNTER
Pt daughter would like to know if the doctor can increase the pt bp med, pt bp seems to be elevated and becoming more elevated over time and mostly in the am, this morning pt bp was 187/85 please advise    Bob Chandler (daughter)  [phone]

## 2022-10-10 NOTE — TELEPHONE ENCOUNTER
R/t call to pt's daughter,    Per Dr. Yasmany Griggs: \"Let's add norvasc 2.5 mg daily --- can increase this dose as needed -- reassess in 2 weeks\"    Warm prune juice, metamucil has regulated her. Discussed that she takes it about 2 hours before/after medication. Discussed when to be concerned with stroke level bp's.

## 2022-10-14 RX ORDER — VALSARTAN 40 MG/1
TABLET ORAL
Qty: 180 TABLET | Refills: 1 | Status: SHIPPED
Start: 2022-10-14 | End: 2022-10-27

## 2022-10-14 NOTE — TELEPHONE ENCOUNTER
Refill per VO of Dr. Arielle Esparza  Last appt: 8/23/2022  Future Appointments   Date Time Provider Heide Emily   12/12/2022  1:00 PM Vasiliy Jackson MD [facility] BS AMB       Requested Prescriptions     Signed Prescriptions Disp Refills    valsartan (DIOVAN) 40 mg tablet 180 Tablet 1     Sig: TAKE ONE TABLET BY MOUTH TWICE A DAY     Authorizing Provider: Amador Sabillon     Ordering User: Lewis Singh

## 2022-10-17 ENCOUNTER — APPOINTMENT (OUTPATIENT)
Dept: GENERAL RADIOLOGY | Age: 82
DRG: 643 | End: 2022-10-17
Attending: EMERGENCY MEDICINE
Payer: MEDICARE

## 2022-10-17 ENCOUNTER — TELEPHONE (OUTPATIENT)
Dept: INTERNAL MEDICINE CLINIC | Age: 82
End: 2022-10-17

## 2022-10-17 ENCOUNTER — HOSPITAL ENCOUNTER (INPATIENT)
Age: 82
LOS: 10 days | Discharge: HOSPICE | DRG: 643 | End: 2022-10-27
Attending: EMERGENCY MEDICINE | Admitting: HOSPITALIST
Payer: MEDICARE

## 2022-10-17 DIAGNOSIS — R09.02 HYPOXIA: Primary | ICD-10-CM

## 2022-10-17 DIAGNOSIS — E87.1 HYPONATREMIA: ICD-10-CM

## 2022-10-17 DIAGNOSIS — D64.9 ANEMIA, UNSPECIFIED TYPE: ICD-10-CM

## 2022-10-17 DIAGNOSIS — R41.0 CONFUSION: Primary | ICD-10-CM

## 2022-10-17 LAB
ALBUMIN SERPL-MCNC: 2.9 G/DL (ref 3.5–5)
ALBUMIN/GLOB SERPL: 0.9 {RATIO} (ref 1.1–2.2)
ALP SERPL-CCNC: 68 U/L (ref 45–117)
ALT SERPL-CCNC: 21 U/L (ref 12–78)
ANION GAP SERPL CALC-SCNC: 4 MMOL/L (ref 5–15)
APPEARANCE UR: ABNORMAL
AST SERPL-CCNC: 15 U/L (ref 15–37)
BACTERIA URNS QL MICRO: ABNORMAL /HPF
BASOPHILS # BLD: 0.1 K/UL (ref 0–0.1)
BASOPHILS NFR BLD: 1 % (ref 0–1)
BILIRUB SERPL-MCNC: 0.4 MG/DL (ref 0.2–1)
BILIRUB UR QL: NEGATIVE
BNP SERPL-MCNC: 312 PG/ML
BUN SERPL-MCNC: 23 MG/DL (ref 6–20)
BUN/CREAT SERPL: 30 (ref 12–20)
CALCIUM SERPL-MCNC: 8.3 MG/DL (ref 8.5–10.1)
CHLORIDE SERPL-SCNC: 88 MMOL/L (ref 97–108)
CO2 SERPL-SCNC: 32 MMOL/L (ref 21–32)
COLOR UR: ABNORMAL
CREAT SERPL-MCNC: 0.77 MG/DL (ref 0.55–1.02)
DIFFERENTIAL METHOD BLD: ABNORMAL
EOSINOPHIL # BLD: 0 K/UL (ref 0–0.4)
EOSINOPHIL NFR BLD: 0 % (ref 0–7)
EPITH CASTS URNS QL MICRO: ABNORMAL /LPF
ERYTHROCYTE [DISTWIDTH] IN BLOOD BY AUTOMATED COUNT: 16.3 % (ref 11.5–14.5)
GLOBULIN SER CALC-MCNC: 3.3 G/DL (ref 2–4)
GLUCOSE SERPL-MCNC: 94 MG/DL (ref 65–100)
GLUCOSE UR STRIP.AUTO-MCNC: NEGATIVE MG/DL
HCT VFR BLD AUTO: 21.9 % (ref 35–47)
HGB BLD-MCNC: 6.6 G/DL (ref 11.5–16)
HGB UR QL STRIP: NEGATIVE
HISTORY CHECKED?,CKHIST: NORMAL
IMM GRANULOCYTES # BLD AUTO: 0.1 K/UL (ref 0–0.04)
IMM GRANULOCYTES NFR BLD AUTO: 1 % (ref 0–0.5)
KETONES UR QL STRIP.AUTO: NEGATIVE MG/DL
LEUKOCYTE ESTERASE UR QL STRIP.AUTO: ABNORMAL
LYMPHOCYTES # BLD: 1.1 K/UL (ref 0.8–3.5)
LYMPHOCYTES NFR BLD: 13 % (ref 12–49)
MCH RBC QN AUTO: 24.8 PG (ref 26–34)
MCHC RBC AUTO-ENTMCNC: 30.1 G/DL (ref 30–36.5)
MCV RBC AUTO: 82.3 FL (ref 80–99)
MONOCYTES # BLD: 0.8 K/UL (ref 0–1)
MONOCYTES NFR BLD: 9 % (ref 5–13)
NEUTS SEG # BLD: 6.5 K/UL (ref 1.8–8)
NEUTS SEG NFR BLD: 76 % (ref 32–75)
NITRITE UR QL STRIP.AUTO: NEGATIVE
NRBC # BLD: 0.02 K/UL (ref 0–0.01)
NRBC BLD-RTO: 0.2 PER 100 WBC
PH UR STRIP: 7 [PH] (ref 5–8)
PLATELET # BLD AUTO: 344 K/UL (ref 150–400)
PMV BLD AUTO: 10.7 FL (ref 8.9–12.9)
POTASSIUM SERPL-SCNC: 5 MMOL/L (ref 3.5–5.1)
PROT SERPL-MCNC: 6.2 G/DL (ref 6.4–8.2)
PROT UR STRIP-MCNC: 100 MG/DL
RBC # BLD AUTO: 2.66 M/UL (ref 3.8–5.2)
RBC #/AREA URNS HPF: ABNORMAL /HPF (ref 0–5)
RBC MORPH BLD: ABNORMAL
SODIUM SERPL-SCNC: 124 MMOL/L (ref 136–145)
SP GR UR REFRACTOMETRY: 1.01 (ref 1–1.03)
UROBILINOGEN UR QL STRIP.AUTO: 0.2 EU/DL (ref 0.2–1)
WBC # BLD AUTO: 8.6 K/UL (ref 3.6–11)
WBC URNS QL MICRO: ABNORMAL /HPF (ref 0–4)

## 2022-10-17 PROCEDURE — 83880 ASSAY OF NATRIURETIC PEPTIDE: CPT

## 2022-10-17 PROCEDURE — 71045 X-RAY EXAM CHEST 1 VIEW: CPT

## 2022-10-17 PROCEDURE — 86923 COMPATIBILITY TEST ELECTRIC: CPT

## 2022-10-17 PROCEDURE — 80053 COMPREHEN METABOLIC PANEL: CPT

## 2022-10-17 PROCEDURE — 65270000029 HC RM PRIVATE

## 2022-10-17 PROCEDURE — 85025 COMPLETE CBC W/AUTO DIFF WBC: CPT

## 2022-10-17 PROCEDURE — 87077 CULTURE AEROBIC IDENTIFY: CPT

## 2022-10-17 PROCEDURE — 99285 EMERGENCY DEPT VISIT HI MDM: CPT

## 2022-10-17 PROCEDURE — 36415 COLL VENOUS BLD VENIPUNCTURE: CPT

## 2022-10-17 PROCEDURE — 81001 URINALYSIS AUTO W/SCOPE: CPT

## 2022-10-17 PROCEDURE — 86900 BLOOD TYPING SEROLOGIC ABO: CPT

## 2022-10-17 PROCEDURE — 87186 SC STD MICRODIL/AGAR DIL: CPT

## 2022-10-17 PROCEDURE — 87086 URINE CULTURE/COLONY COUNT: CPT

## 2022-10-17 RX ORDER — SULFAMETHOXAZOLE AND TRIMETHOPRIM 400; 80 MG/1; MG/1
1 TABLET ORAL
Status: DISCONTINUED | OUTPATIENT
Start: 2022-10-17 | End: 2022-10-17 | Stop reason: DRUGHIGH

## 2022-10-17 RX ORDER — SULFAMETHOXAZOLE AND TRIMETHOPRIM 800; 160 MG/1; MG/1
1 TABLET ORAL
Status: COMPLETED | OUTPATIENT
Start: 2022-10-17 | End: 2022-10-18

## 2022-10-17 RX ORDER — ENOXAPARIN SODIUM 100 MG/ML
40 INJECTION SUBCUTANEOUS DAILY
Status: CANCELLED | OUTPATIENT
Start: 2022-10-18

## 2022-10-17 RX ORDER — SODIUM CHLORIDE 9 MG/ML
250 INJECTION, SOLUTION INTRAVENOUS AS NEEDED
Status: DISCONTINUED | OUTPATIENT
Start: 2022-10-17 | End: 2022-10-27 | Stop reason: HOSPADM

## 2022-10-17 NOTE — TELEPHONE ENCOUNTER
Patient's daughter states her mother is acting as if she either has a uti or her CO2 level is low -- asked if we had a nurse who could come check on her. I gave her Amaury Marte phone number, so she will call and schedule a time for her mother. Be on lookout for a report.

## 2022-10-17 NOTE — TELEPHONE ENCOUNTER
ON CALL NOTE:  Call received from Amaury Marte NP. GALLO w/ possible infection. POC testing with Na of 123 and HGB in the 7's. Both are distinctly abnormal from most recent labs. Due to confusion and age recommended evaluation in the ER. Needs repeat labs to verify sodium level.

## 2022-10-17 NOTE — ED TRIAGE NOTES
Per EMS dispatch health was at home and advised lab values were abnormal. Sodium 123 HCT 23 and possible UTI.      Pt has baseline dementia however family states she is more confused than normal.

## 2022-10-17 NOTE — ED PROVIDER NOTES
Date of Service:  10/17/2022    Patient:  Bill Morales    Chief Complaint:  Abnormal Lab Results and Shortness of Breath       HPI:  Jovon Monge is a 79 y.o.  female who presents for evaluation of abnormal lab results. Patient was sent here by [facility] for reported positive urine dipstick and low sodium. Sodium was reported to be 123. Patient arrives here with dry mouth stating that she just does not feel well. History of dementia with limited ability to provide detailed history however per family he is not at the bedside currently they report that she has been more confused recently.   No other information is available       Past Medical History:   Diagnosis Date    Arthritis     Basal cell carcinoma 05/20/2012    Calculus of kidney     Cancer ([facility] [address]     skin    Cancer (HCC)     thyroid    Dementia ([facility] [address]     Glaucoma 4/30/2010    Hypertension     OA (osteoarthritis) 4/30/2010    Obesity hypoventilation syndrome (HCC)     Obesity, morbid (HCC)     TYRONE (obstructive sleep apnea)     PAF (paroxysmal atrial fibrillation) ([facility] [address]     Psoriasis 4/30/2010       Past Surgical History:   Procedure Laterality Date    HX CATARACT REMOVAL      bilat    HX CHOLECYSTECTOMY      HX CRANIOTOMY      HX DILATION AND CURETTAGE      HX HYSTERECTOMY      HX KNEE ARTHROSCOPY      HX KNEE REPLACEMENT      HX TONSILLECTOMY      HX WRIST FRACTURE TX Left 8/21/15    ME THYROIDECTOMY           Family History:   Problem Relation Age of Onset    Hypertension Mother     Heart Disease Mother     Heart Disease Father     Hypertension Father     Elevated Lipids Father     Heart Disease Brother     Psychiatric Disorder Brother     Diabetes Brother     Breast Cancer Maternal Aunt     Breast Cancer Paternal Aunt        Social History     Socioeconomic History    Marital status:      Spouse name: Not on file    Number of children: Not on file    Years of education: Not on file    Highest education level: Not on file Occupational History    Not on file   Tobacco Use    Smoking status: Former     Packs/day: 0.50     Years: 4.00     Pack years: 2.00     Types: Cigarettes     Quit date: 1961     Years since quittin.8    Smokeless tobacco: Never   Vaping Use    Vaping Use: Never used   Substance and Sexual Activity    Alcohol use: No     Alcohol/week: 0.0 standard drinks    Drug use: No    Sexual activity: Not on file   Other Topics Concern    Not on file   Social History Narrative    Not on file     Social Determinants of Health     Financial Resource Strain: Not on file   Food Insecurity: Not on file   Transportation Needs: Not on file   Physical Activity: Not on file   Stress: Not on file   Social Connections: Not on file   Intimate Partner Violence: Not on file   Housing Stability: Not on file         ALLERGIES: Cephalexin, Codeine, Gabapentin, Neomycin, Polysporin [bacitracin-polymyxin b], and Protonix [pantoprazole]    Review of Systems   Unable to perform ROS: Dementia     Vitals:    10/17/22 1858   BP: (!) 177/55   Pulse: (!) 59   Resp: 22   SpO2: (!) 86%   Weight: 102.1 kg (225 lb)   Height: 5' 4\" (1.626 m)            Physical Exam  Vitals and nursing note reviewed. Constitutional:       Appearance: She is well-developed. HENT:      Head: Normocephalic. Mouth/Throat:      Comments: Dry    Eyes:      Pupils: Pupils are equal, round, and reactive to light. Cardiovascular:      Rate and Rhythm: Normal rate. Pulmonary:      Effort: Pulmonary effort is normal.   Chest:      Chest wall: No mass or tenderness. Abdominal:      Palpations: Abdomen is soft. Musculoskeletal:      Right lower leg: No edema. Left lower leg: No edema. Skin:     General: Skin is warm. Neurological:      Mental Status: She is alert. Comments: Alert and oriented x2. Does not know the year.   Otherwise she states that she does not feel well patient follows commands   Psychiatric:         Mood and Affect: Mood normal. MDM     Amount and/or Complexity of Data Reviewed  Decide to obtain previous medical records or to obtain history from someone other than the patient: yes      ED Course as of 10/17/22 2332   Mon Oct 17, 2022   2250 Bacteria(!): 4+ [GG]   2250 WBC: 10-20 [GG]      ED Course User Index  [GG] Kai De Los Santos DO     VITAL SIGNS:  Patient Vitals for the past 4 hrs:   Pulse Resp BP SpO2   10/17/22 1858 (!) 59 22 (!) 177/55 (!) 86 %         LABS:  Recent Results (from the past 6 hour(s))   CBC WITH AUTOMATED DIFF    Collection Time: 10/17/22  7:46 PM   Result Value Ref Range    WBC 8.6 3.6 - 11.0 K/uL    RBC 2.66 (L) 3.80 - 5.20 M/uL    HGB 6.6 (L) 11.5 - 16.0 g/dL    HCT 21.9 (L) 35.0 - 47.0 %    MCV 82.3 80.0 - 99.0 FL    MCH 24.8 (L) 26.0 - 34.0 PG    MCHC 30.1 30.0 - 36.5 g/dL    RDW 16.3 (H) 11.5 - 14.5 %    PLATELET 109 675 - 453 K/uL    MPV 10.7 8.9 - 12.9 FL    NRBC 0.2 (H) 0  WBC    ABSOLUTE NRBC 0.02 (H) 0.00 - 0.01 K/uL    NEUTROPHILS 76 (H) 32 - 75 %    LYMPHOCYTES 13 12 - 49 %    MONOCYTES 9 5 - 13 %    EOSINOPHILS 0 0 - 7 %    BASOPHILS 1 0 - 1 %    IMMATURE GRANULOCYTES 1 (H) 0.0 - 0.5 %    ABS. NEUTROPHILS 6.5 1.8 - 8.0 K/UL    ABS. LYMPHOCYTES 1.1 0.8 - 3.5 K/UL    ABS. MONOCYTES 0.8 0.0 - 1.0 K/UL    ABS. EOSINOPHILS 0.0 0.0 - 0.4 K/UL    ABS. BASOPHILS 0.1 0.0 - 0.1 K/UL    ABS. IMM.  GRANS. 0.1 (H) 0.00 - 0.04 K/UL    DF SMEAR SCANNED      RBC COMMENTS HYPOCHROMIA  1+       METABOLIC PANEL, COMPREHENSIVE    Collection Time: 10/17/22  7:46 PM   Result Value Ref Range    Sodium 124 (L) 136 - 145 mmol/L    Potassium 5.0 3.5 - 5.1 mmol/L    Chloride 88 (L) 97 - 108 mmol/L    CO2 32 21 - 32 mmol/L    Anion gap 4 (L) 5 - 15 mmol/L    Glucose 94 65 - 100 mg/dL    BUN 23 (H) 6 - 20 MG/DL    Creatinine 0.77 0.55 - 1.02 MG/DL    BUN/Creatinine ratio 30 (H) 12 - 20      eGFR >60 >60 ml/min/1.73m2    Calcium 8.3 (L) 8.5 - 10.1 MG/DL    Bilirubin, total 0.4 0.2 - 1.0 MG/DL    ALT (SGPT) 21 12 - 78 U/L    AST (SGOT) 15 15 - 37 U/L    Alk. phosphatase 68 45 - 117 U/L    Protein, total 6.2 (L) 6.4 - 8.2 g/dL    Albumin 2.9 (L) 3.5 - 5.0 g/dL    Globulin 3.3 2.0 - 4.0 g/dL    A-G Ratio 0.9 (L) 1.1 - 2.2     NT-PRO BNP    Collection Time: 10/17/22  7:46 PM   Result Value Ref Range    NT pro- <450 PG/ML   URINALYSIS W/ RFLX MICROSCOPIC    Collection Time: 10/17/22  7:46 PM   Result Value Ref Range    Color YELLOW/STRAW      Appearance CLOUDY (A) CLEAR      Specific gravity 1.012 1.003 - 1.030      pH (UA) 7.0 5.0 - 8.0      Protein 100 (A) NEG mg/dL    Glucose Negative NEG mg/dL    Ketone Negative NEG mg/dL    Bilirubin Negative NEG      Blood Negative NEG      Urobilinogen 0.2 0.2 - 1.0 EU/dL    Nitrites Negative NEG      Leukocyte Esterase TRACE (A) NEG      WBC 10-20 0 - 4 /hpf    RBC 0-5 0 - 5 /hpf    Epithelial cells FEW FEW /lpf    Bacteria 4+ (A) NEG /hpf        IMAGING:  XR CHEST PORT   Final Result   1. Suspected small left pleural effusion and left basilar atelectasis. Unchanged   cardiomegaly. Medications During Visit:  Medications   trimethoprim-sulfamethoxazole (BACTRIM, SEPTRA)  mg per tablet 1 Tablet (has no administration in time range)         DECISION MAKING:  Melani Naqvi is a 79 y.o. female who comes in as above. On arrival, patient is hypoxic. Requiring supplemental oxygen. Found to be hyponatremic at 124, this is the reason she was sent in. Also found to have a UTI as well as anemia at 6.6. Type and cross ordered. Plan to transfuse 1 unit. Provide IV fluid for hyponatremia and admit for further evaluation. IMPRESSION:  1. Hypoxia    2. Anemia, unspecified type    3.  Hyponatremia      Total critical care time (not including time spent performing separately reportable procedures): 35 minutes    DISPOSITION:  Admitted        Procedures      Perfect Serve Consult for Admission  10:54 PM    ED Room Number: ER20/20  Patient Name and age:  Magdi Loaiza Andrew 79 y.o.  female  Working Diagnosis:   1. Hypoxia    2. Anemia, unspecified type    3. Hyponatremia        COVID-19 Suspicion:  no  Sepsis present:  no  Reassessment needed: no  Code Status:  DNR on last admission  Readmission: no  Isolation Requirements:  no  Recommended Level of Care:  telemetry  Department:[facility] ED - [phone]  Other: On arrival, patient is hypoxic. Requiring supplemental oxygen. Found to be hyponatremic at 124, this is the reason she was sent in. Also found to have a UTI as well as anemia at 6.6. Type and cross ordered. Plan to transfuse 1 unit. Provide IV fluid for hyponatremia and admit for further evaluation.

## 2022-10-18 ENCOUNTER — APPOINTMENT (OUTPATIENT)
Dept: CT IMAGING | Age: 82
DRG: 643 | End: 2022-10-18
Attending: HOSPITALIST
Payer: MEDICARE

## 2022-10-18 ENCOUNTER — APPOINTMENT (OUTPATIENT)
Dept: NON INVASIVE DIAGNOSTICS | Age: 82
DRG: 643 | End: 2022-10-18
Attending: HOSPITALIST
Payer: MEDICARE

## 2022-10-18 LAB
ALBUMIN SERPL-MCNC: 3.1 G/DL (ref 3.5–5)
ALBUMIN SERPL-MCNC: 3.1 G/DL (ref 3.5–5)
ANION GAP SERPL CALC-SCNC: 2 MMOL/L (ref 5–15)
ANION GAP SERPL CALC-SCNC: 3 MMOL/L (ref 5–15)
ANION GAP SERPL CALC-SCNC: 3 MMOL/L (ref 5–15)
BASOPHILS # BLD: 0.1 K/UL (ref 0–0.1)
BASOPHILS NFR BLD: 1 % (ref 0–1)
BUN SERPL-MCNC: 16 MG/DL (ref 6–20)
BUN SERPL-MCNC: 17 MG/DL (ref 6–20)
BUN SERPL-MCNC: 19 MG/DL (ref 6–20)
BUN/CREAT SERPL: 19 (ref 12–20)
BUN/CREAT SERPL: 21 (ref 12–20)
BUN/CREAT SERPL: 24 (ref 12–20)
CALCIUM SERPL-MCNC: 8.1 MG/DL (ref 8.5–10.1)
CALCIUM SERPL-MCNC: 8.3 MG/DL (ref 8.5–10.1)
CALCIUM SERPL-MCNC: 8.5 MG/DL (ref 8.5–10.1)
CHLORIDE SERPL-SCNC: 88 MMOL/L (ref 97–108)
CHLORIDE SERPL-SCNC: 89 MMOL/L (ref 97–108)
CHLORIDE SERPL-SCNC: 91 MMOL/L (ref 97–108)
CO2 SERPL-SCNC: 35 MMOL/L (ref 21–32)
CO2 SERPL-SCNC: 35 MMOL/L (ref 21–32)
CO2 SERPL-SCNC: 37 MMOL/L (ref 21–32)
COMMENT, HOLDF: NORMAL
CREAT SERPL-MCNC: 0.78 MG/DL (ref 0.55–1.02)
CREAT SERPL-MCNC: 0.78 MG/DL (ref 0.55–1.02)
CREAT SERPL-MCNC: 0.9 MG/DL (ref 0.55–1.02)
DIFFERENTIAL METHOD BLD: ABNORMAL
ECHO AO ASC DIAM: 3.5 CM
ECHO AO ASCENDING AORTA INDEX: 1.7 CM/M2
ECHO AR MAX VEL PISA: 3.6 M/S
ECHO AV AREA PEAK VELOCITY: 3.5 CM2
ECHO AV AREA VTI: 3.2 CM2
ECHO AV AREA/BSA PEAK VELOCITY: 1.7 CM2/M2
ECHO AV AREA/BSA VTI: 1.6 CM2/M2
ECHO AV MEAN GRADIENT: 5 MMHG
ECHO AV MEAN VELOCITY: 1 M/S
ECHO AV PEAK GRADIENT: 10 MMHG
ECHO AV PEAK VELOCITY: 1.6 M/S
ECHO AV REGURGITANT PHT: 451.8 MILLISECOND
ECHO AV VELOCITY RATIO: 0.81
ECHO AV VTI: 37.7 CM
ECHO LA DIAMETER INDEX: 2.23 CM/M2
ECHO LA DIAMETER: 4.6 CM
ECHO LA VOL 2C: 75 ML (ref 22–52)
ECHO LA VOL 4C: 76 ML (ref 22–52)
ECHO LA VOL BP: 78 ML (ref 22–52)
ECHO LA VOL/BSA BIPLANE: 38 ML/M2 (ref 16–34)
ECHO LA VOLUME AREA LENGTH: 85 ML
ECHO LA VOLUME INDEX A2C: 36 ML/M2 (ref 16–34)
ECHO LA VOLUME INDEX A4C: 37 ML/M2 (ref 16–34)
ECHO LA VOLUME INDEX AREA LENGTH: 41 ML/M2 (ref 16–34)
ECHO LV E' LATERAL VELOCITY: 12 CM/S
ECHO LV E' SEPTAL VELOCITY: 9 CM/S
ECHO LV FRACTIONAL SHORTENING: 33 % (ref 28–44)
ECHO LV INTERNAL DIMENSION DIASTOLE INDEX: 2.77 CM/M2
ECHO LV INTERNAL DIMENSION DIASTOLIC: 5.7 CM (ref 3.9–5.3)
ECHO LV INTERNAL DIMENSION SYSTOLIC INDEX: 1.84 CM/M2
ECHO LV INTERNAL DIMENSION SYSTOLIC: 3.8 CM
ECHO LV IVSD: 1.1 CM (ref 0.6–0.9)
ECHO LV MASS 2D: 256.7 G (ref 67–162)
ECHO LV MASS INDEX 2D: 124.6 G/M2 (ref 43–95)
ECHO LV POSTERIOR WALL DIASTOLIC: 1.1 CM (ref 0.6–0.9)
ECHO LV RELATIVE WALL THICKNESS RATIO: 0.39
ECHO LVOT AREA: 4.2 CM2
ECHO LVOT AV VTI INDEX: 0.76
ECHO LVOT DIAM: 2.3 CM
ECHO LVOT MEAN GRADIENT: 3 MMHG
ECHO LVOT PEAK GRADIENT: 7 MMHG
ECHO LVOT PEAK VELOCITY: 1.3 M/S
ECHO LVOT STROKE VOLUME INDEX: 58.1 ML/M2
ECHO LVOT SV: 119.6 ML
ECHO LVOT VTI: 28.8 CM
ECHO MV A VELOCITY: 0.95 M/S
ECHO MV E DECELERATION TIME (DT): 179.2 MS
ECHO MV E VELOCITY: 1.05 M/S
ECHO MV E/A RATIO: 1.11
ECHO MV E/E' LATERAL: 8.75
ECHO MV E/E' RATIO (AVERAGED): 10.21
ECHO MV E/E' SEPTAL: 11.67
ECHO MV REGURGITANT PEAK GRADIENT: 96 MMHG
ECHO MV REGURGITANT PEAK VELOCITY: 4.9 M/S
ECHO PULMONARY ARTERY END DIASTOLIC PRESSURE: 6 MMHG
ECHO PV MAX VELOCITY: 1.2 M/S
ECHO PV PEAK GRADIENT: 6 MMHG
ECHO PV REGURGITANT MAX VELOCITY: 1.2 M/S
ECHO RV INTERNAL DIMENSION: 3.9 CM
ECHO RV TAPSE: 2.7 CM (ref 1.7–?)
ECHO RVOT PEAK GRADIENT: 4 MMHG
ECHO RVOT PEAK VELOCITY: 1 M/S
ECHO TV REGURGITANT MAX VELOCITY: 3.16 M/S
ECHO TV REGURGITANT PEAK GRADIENT: 40 MMHG
EOSINOPHIL # BLD: 0.1 K/UL (ref 0–0.4)
EOSINOPHIL NFR BLD: 1 % (ref 0–7)
ERYTHROCYTE [DISTWIDTH] IN BLOOD BY AUTOMATED COUNT: 16.3 % (ref 11.5–14.5)
FOLATE SERPL-MCNC: 27.7 NG/ML (ref 5–21)
GLUCOSE SERPL-MCNC: 100 MG/DL (ref 65–100)
GLUCOSE SERPL-MCNC: 85 MG/DL (ref 65–100)
GLUCOSE SERPL-MCNC: 93 MG/DL (ref 65–100)
HCT VFR BLD AUTO: 21.2 % (ref 35–47)
HCT VFR BLD AUTO: 27.3 % (ref 35–47)
HGB BLD-MCNC: 6.4 G/DL (ref 11.5–16)
HGB BLD-MCNC: 8.3 G/DL (ref 11.5–16)
IMM GRANULOCYTES # BLD AUTO: 0.1 K/UL (ref 0–0.04)
IMM GRANULOCYTES NFR BLD AUTO: 1 % (ref 0–0.5)
IRON SATN MFR SERPL: 3 % (ref 20–50)
IRON SERPL-MCNC: 12 UG/DL (ref 35–150)
LYMPHOCYTES # BLD: 1.2 K/UL (ref 0.8–3.5)
LYMPHOCYTES NFR BLD: 17 % (ref 12–49)
MCH RBC QN AUTO: 24.6 PG (ref 26–34)
MCHC RBC AUTO-ENTMCNC: 30.2 G/DL (ref 30–36.5)
MCV RBC AUTO: 81.5 FL (ref 80–99)
MONOCYTES # BLD: 0.8 K/UL (ref 0–1)
MONOCYTES NFR BLD: 11 % (ref 5–13)
NEUTS SEG # BLD: 5 K/UL (ref 1.8–8)
NEUTS SEG NFR BLD: 69 % (ref 32–75)
NRBC # BLD: 0 K/UL (ref 0–0.01)
NRBC BLD-RTO: 0 PER 100 WBC
OSMOLALITY SERPL: 265 MOSM/KG H2O
OSMOLALITY UR: 369 MOSM/KG H2O
PHOSPHATE SERPL-MCNC: 3.3 MG/DL (ref 2.6–4.7)
PHOSPHATE SERPL-MCNC: 3.6 MG/DL (ref 2.6–4.7)
PLATELET # BLD AUTO: 339 K/UL (ref 150–400)
PMV BLD AUTO: 10.3 FL (ref 8.9–12.9)
POTASSIUM SERPL-SCNC: 4.4 MMOL/L (ref 3.5–5.1)
POTASSIUM SERPL-SCNC: 4.5 MMOL/L (ref 3.5–5.1)
POTASSIUM SERPL-SCNC: 4.5 MMOL/L (ref 3.5–5.1)
PROCALCITONIN SERPL-MCNC: <0.05 NG/ML
RBC # BLD AUTO: 2.6 M/UL (ref 3.8–5.2)
RBC MORPH BLD: ABNORMAL
RBC MORPH BLD: ABNORMAL
SAMPLES BEING HELD,HOLD: NORMAL
SODIUM SERPL-SCNC: 126 MMOL/L (ref 136–145)
SODIUM SERPL-SCNC: 128 MMOL/L (ref 136–145)
SODIUM SERPL-SCNC: 129 MMOL/L (ref 136–145)
TIBC SERPL-MCNC: 406 UG/DL (ref 250–450)
VIT B12 SERPL-MCNC: 636 PG/ML (ref 193–986)
WBC # BLD AUTO: 7.3 K/UL (ref 3.6–11)

## 2022-10-18 PROCEDURE — 83540 ASSAY OF IRON: CPT

## 2022-10-18 PROCEDURE — 83935 ASSAY OF URINE OSMOLALITY: CPT

## 2022-10-18 PROCEDURE — 84145 PROCALCITONIN (PCT): CPT

## 2022-10-18 PROCEDURE — 83930 ASSAY OF BLOOD OSMOLALITY: CPT

## 2022-10-18 PROCEDURE — 30233N1 TRANSFUSION OF NONAUTOLOGOUS RED BLOOD CELLS INTO PERIPHERAL VEIN, PERCUTANEOUS APPROACH: ICD-10-PCS | Performed by: EMERGENCY MEDICINE

## 2022-10-18 PROCEDURE — 93306 TTE W/DOPPLER COMPLETE: CPT

## 2022-10-18 PROCEDURE — 74011250637 HC RX REV CODE- 250/637: Performed by: EMERGENCY MEDICINE

## 2022-10-18 PROCEDURE — 85025 COMPLETE CBC W/AUTO DIFF WBC: CPT

## 2022-10-18 PROCEDURE — 74011000250 HC RX REV CODE- 250: Performed by: HOSPITALIST

## 2022-10-18 PROCEDURE — 77010033678 HC OXYGEN DAILY

## 2022-10-18 PROCEDURE — 82607 VITAMIN B-12: CPT

## 2022-10-18 PROCEDURE — 65270000029 HC RM PRIVATE

## 2022-10-18 PROCEDURE — 36430 TRANSFUSION BLD/BLD COMPNT: CPT

## 2022-10-18 PROCEDURE — 85018 HEMOGLOBIN: CPT

## 2022-10-18 PROCEDURE — 94761 N-INVAS EAR/PLS OXIMETRY MLT: CPT

## 2022-10-18 PROCEDURE — 74011250637 HC RX REV CODE- 250/637: Performed by: HOSPITALIST

## 2022-10-18 PROCEDURE — 80069 RENAL FUNCTION PANEL: CPT

## 2022-10-18 PROCEDURE — 94762 N-INVAS EAR/PLS OXIMTRY CONT: CPT

## 2022-10-18 PROCEDURE — 74011250636 HC RX REV CODE- 250/636: Performed by: HOSPITALIST

## 2022-10-18 PROCEDURE — P9016 RBC LEUKOCYTES REDUCED: HCPCS

## 2022-10-18 PROCEDURE — 82746 ASSAY OF FOLIC ACID SERUM: CPT

## 2022-10-18 PROCEDURE — 93306 TTE W/DOPPLER COMPLETE: CPT | Performed by: STUDENT IN AN ORGANIZED HEALTH CARE EDUCATION/TRAINING PROGRAM

## 2022-10-18 PROCEDURE — 36415 COLL VENOUS BLD VENIPUNCTURE: CPT

## 2022-10-18 PROCEDURE — 80048 BASIC METABOLIC PNL TOTAL CA: CPT

## 2022-10-18 PROCEDURE — 74176 CT ABD & PELVIS W/O CONTRAST: CPT

## 2022-10-18 RX ORDER — VALSARTAN 40 MG/1
40 TABLET ORAL 2 TIMES DAILY
Status: DISCONTINUED | OUTPATIENT
Start: 2022-10-18 | End: 2022-10-27 | Stop reason: HOSPADM

## 2022-10-18 RX ORDER — SODIUM CHLORIDE 0.9 % (FLUSH) 0.9 %
5-40 SYRINGE (ML) INJECTION EVERY 8 HOURS
Status: DISCONTINUED | OUTPATIENT
Start: 2022-10-18 | End: 2022-10-27 | Stop reason: HOSPADM

## 2022-10-18 RX ORDER — FUROSEMIDE 10 MG/ML
40 INJECTION INTRAMUSCULAR; INTRAVENOUS 2 TIMES DAILY
Status: DISCONTINUED | OUTPATIENT
Start: 2022-10-18 | End: 2022-10-20

## 2022-10-18 RX ORDER — METOPROLOL TARTRATE 25 MG/1
12.5 TABLET, FILM COATED ORAL 2 TIMES DAILY
Status: DISCONTINUED | OUTPATIENT
Start: 2022-10-18 | End: 2022-10-27 | Stop reason: HOSPADM

## 2022-10-18 RX ORDER — SODIUM CHLORIDE 9 MG/ML
100 INJECTION, SOLUTION INTRAVENOUS CONTINUOUS
Status: DISCONTINUED | OUTPATIENT
Start: 2022-10-18 | End: 2022-10-18

## 2022-10-18 RX ORDER — TIMOLOL MALEATE 5 MG/ML
1 SOLUTION/ DROPS OPHTHALMIC DAILY
Status: DISCONTINUED | OUTPATIENT
Start: 2022-10-18 | End: 2022-10-27 | Stop reason: HOSPADM

## 2022-10-18 RX ORDER — CETIRIZINE HYDROCHLORIDE 10 MG/1
10 TABLET ORAL DAILY
Status: DISCONTINUED | OUTPATIENT
Start: 2022-10-19 | End: 2022-10-27 | Stop reason: HOSPADM

## 2022-10-18 RX ORDER — FUROSEMIDE 10 MG/ML
40 INJECTION INTRAMUSCULAR; INTRAVENOUS ONCE
Status: COMPLETED | OUTPATIENT
Start: 2022-10-18 | End: 2022-10-18

## 2022-10-18 RX ORDER — LEVOTHYROXINE SODIUM 75 UG/1
150 TABLET ORAL
Status: DISCONTINUED | OUTPATIENT
Start: 2022-10-18 | End: 2022-10-27 | Stop reason: HOSPADM

## 2022-10-18 RX ORDER — DONEPEZIL HYDROCHLORIDE 5 MG/1
10 TABLET, FILM COATED ORAL
Status: DISCONTINUED | OUTPATIENT
Start: 2022-10-18 | End: 2022-10-27 | Stop reason: HOSPADM

## 2022-10-18 RX ORDER — AMIODARONE HYDROCHLORIDE 200 MG/1
200 TABLET ORAL DAILY
Status: DISCONTINUED | OUTPATIENT
Start: 2022-10-18 | End: 2022-10-27 | Stop reason: HOSPADM

## 2022-10-18 RX ORDER — ONDANSETRON 4 MG/1
4 TABLET, ORALLY DISINTEGRATING ORAL
Status: DISCONTINUED | OUTPATIENT
Start: 2022-10-18 | End: 2022-10-27 | Stop reason: HOSPADM

## 2022-10-18 RX ORDER — AMLODIPINE BESYLATE 2.5 MG/1
2.5 TABLET ORAL DAILY
Status: DISCONTINUED | OUTPATIENT
Start: 2022-10-18 | End: 2022-10-22

## 2022-10-18 RX ORDER — SODIUM CHLORIDE 9 MG/ML
250 INJECTION, SOLUTION INTRAVENOUS AS NEEDED
Status: DISCONTINUED | OUTPATIENT
Start: 2022-10-18 | End: 2022-10-27 | Stop reason: HOSPADM

## 2022-10-18 RX ORDER — POLYETHYLENE GLYCOL 3350 17 G/17G
17 POWDER, FOR SOLUTION ORAL DAILY PRN
Status: DISCONTINUED | OUTPATIENT
Start: 2022-10-18 | End: 2022-10-27 | Stop reason: HOSPADM

## 2022-10-18 RX ORDER — ACETAMINOPHEN 650 MG/1
650 SUPPOSITORY RECTAL
Status: DISCONTINUED | OUTPATIENT
Start: 2022-10-18 | End: 2022-10-27 | Stop reason: HOSPADM

## 2022-10-18 RX ORDER — SODIUM CHLORIDE 0.9 % (FLUSH) 0.9 %
5-40 SYRINGE (ML) INJECTION AS NEEDED
Status: DISCONTINUED | OUTPATIENT
Start: 2022-10-18 | End: 2022-10-27 | Stop reason: HOSPADM

## 2022-10-18 RX ORDER — ONDANSETRON 2 MG/ML
4 INJECTION INTRAMUSCULAR; INTRAVENOUS
Status: DISCONTINUED | OUTPATIENT
Start: 2022-10-18 | End: 2022-10-27 | Stop reason: HOSPADM

## 2022-10-18 RX ORDER — ACETAMINOPHEN 325 MG/1
650 TABLET ORAL
Status: DISCONTINUED | OUTPATIENT
Start: 2022-10-18 | End: 2022-10-27 | Stop reason: HOSPADM

## 2022-10-18 RX ADMIN — DONEPEZIL HYDROCHLORIDE 10 MG: 5 TABLET, FILM COATED ORAL at 22:31

## 2022-10-18 RX ADMIN — TIMOLOL MALEATE 1 DROP: 5 SOLUTION OPHTHALMIC at 09:21

## 2022-10-18 RX ADMIN — I-VITE, TAB 1000-60-2MG (60/BT) 1 TABLET: TAB at 22:30

## 2022-10-18 RX ADMIN — SODIUM CHLORIDE, PRESERVATIVE FREE 10 ML: 5 INJECTION INTRAVENOUS at 22:00

## 2022-10-18 RX ADMIN — FUROSEMIDE 40 MG: 10 INJECTION, SOLUTION INTRAMUSCULAR; INTRAVENOUS at 04:48

## 2022-10-18 RX ADMIN — LEVOTHYROXINE SODIUM 150 MCG: 0.07 TABLET ORAL at 09:20

## 2022-10-18 RX ADMIN — SULFAMETHOXAZOLE AND TRIMETHOPRIM 1 TABLET: 800; 160 TABLET ORAL at 00:55

## 2022-10-18 RX ADMIN — VALSARTAN 40 MG: 40 TABLET, FILM COATED ORAL at 22:30

## 2022-10-18 NOTE — H&P
Owen Luna [facility] [address]  [address] [facility], [address] [place] [address]  [phone]    Admission History and Physical      NAME:  Tenisha Bhat   :   1940   MRN:  [id number]     PCP:  Geraldine Leonard MD     Date/Time of service:  10/17/2022  11:53 PM        Subjective:     CHIEF COMPLAINT: Abnormal labs, short of breath     HISTORY OF PRESENT ILLNESS:     Ms. Cam Iqbal is a 79 y.o.   with a past medical history significant for obstructive sleep apnea on CPAP, obesity hypoventilation syndrome known CO2 retainer, paroxysmal atrial fibrillation currently on Xarelto presents to the emergency department with the above complaint. Patient was brought by EMS for a positive urine dipstick and also low sodium level that was obtained earlier today. Patient also stating that she is not feeling well. She has been having generalized weakness. In addition she has been having shortness of breath but is unable to give me more detailed information. Family endorses patient having worsening confusion lately. In the emergency department x-ray appears relatively unchanged with some pleural effusion as well. BNP is within normal limits. She was in mid to upper 80s on room air. Patient placed on oxygen with improvement in the lower 90s. Hemoglobin also checked which was 6.6 and patient is getting 1 unit. Allergies   Allergen Reactions    Cephalexin Itching    Codeine Rash    Gabapentin Other (comments)    Neomycin Rash    Polysporin [Bacitracin-Polymyxin B] Rash    Protonix [Pantoprazole] Other (comments)     Gi upset       Prior to Admission medications    Medication Sig Start Date End Date Taking? Authorizing Provider   valsartan (DIOVAN) 40 mg tablet TAKE ONE TABLET BY MOUTH TWICE A DAY 10/14/22  Yes Yaneli Holland MD   amLODIPine (NORVASC) 2.5 mg tablet Take 1 Tablet by mouth daily. 10/10/22  Yes Yaneli Holland MD   psyllium (METAMUCIL) pack (sugar free) packet Take 1 Packet by mouth daily. Yes Provider, Historical   metoprolol tartrate (LOPRESSOR) 25 mg tablet Take 0.5 Tablets by mouth two (2) times a day. 8/23/22  Yes Dallas Saeed MD   rivaroxaban (Xarelto) 20 mg tab tablet Take 1 Tablet by mouth daily (with dinner). 8/23/22  Yes Dallas Saeed MD   amiodarone (CORDARONE) 200 mg tablet Take 1 Tablet by mouth daily. 8/23/22  Yes Dallas Saeed MD   multivit/folic acid/vit K1 (ONE-A-DAY WOMEN'S 50 PLUS PO) Take  by mouth. Yes Provider, Historical   BETAMETHASONE by Does Not Apply route. Yes Provider, Historical   cetirizine (ZYRTEC) 10 mg tablet Take  by mouth daily as needed for Allergies. Yes Provider, Historical   sodium chloride (Kemal 128) 2 % ophthalmic solution Administer 1 Drop to left eye two (2) times a day. Administer 1 Drop to left eye two (2) times a day. Morning and at night 15 minutes after timolol eye drops   Yes Provider, Historical   Gemtesa 75 mg tab Take 75 mg by mouth every evening. 5/30/22  Yes Colin, MD Stacy   levothyroxine (Synthroid) 150 mcg tablet Take 1 Tablet by mouth Daily (before breakfast). 6/3/22  Yes Debbie Alva MD   donepeziL (ARICEPT) 10 mg tablet TAKE ONE TABLET BY MOUTH ONCE NIGHTLY 11/12/21  Yes Debbie Alva MD   vit A/vit C/vit E/zinc/copper (PRESERVISION AREDS PO) Take 2 Tablets by mouth two (2) times a day. Yes Provider, Historical   Cholecalciferol, Vitamin D3, (VITAMIN D3) 2,000 unit cap capsule Take 2,000 Units by mouth two (2) times a day. 2/16/17  Yes Debbie Alva MD   timolol (TIMOPTIC) 0.5 % ophthalmic solution Administer 1 Drop to both eyes daily. In the morning 12/17/10  Yes Provider, Historical   fluticasone propionate (FLONASE NA) by Nasal route as needed. Patient not taking: Reported on 10/17/2022    Provider, Historical   melatonin 3 mg tablet Take  by mouth.   Patient not taking: Reported on 10/17/2022    Provider, Historical   OTHER Move free 2 tabs daily    Provider, Historical   polyethylene glycol (MIRALAX) 17 gram/dose powder Take 17 g by mouth in the morning. Patient not taking: Reported on 2022    Provider, Historical   triamcinolone acetonide (KENALOG) 0.1 % ointment Apply  to affected area two (2) times a day. use thin layer  Patient not taking: Reported on 10/17/2022    Provider, Historical   polyethylene glycol (MIRALAX) 17 gram packet Take 1 Packet by mouth daily as needed for Constipation for up to 30 doses. Indications: constipation  Patient not taking: Reported on 10/17/2022 6/14/22   Mamie Harada, MD   triamcinolone acetonide (KENALOG) 0.1 % topical cream Apply 0.1 Packages to affected area two (2) times a day.  Apply to rash under pannus  Patient not taking: Reported on 10/17/2022 3/9/22   Other, MD Stacy       Past Medical History:   Diagnosis Date    Arthritis     Basal cell carcinoma 2012    Calculus of kidney     Cancer ([facility] [address]     skin    Cancer ([facility] [address]     thyroid    Dementia ([facility] [address]     Glaucoma 2010    Hypertension     OA (osteoarthritis) 2010    Obesity hypoventilation syndrome (HCC)     Obesity, morbid (HCC)     TYRONE (obstructive sleep apnea)     PAF (paroxysmal atrial fibrillation) ([address]     Psoriasis 2010        Past Surgical History:   Procedure Laterality Date    HX CATARACT REMOVAL      bilat    HX CHOLECYSTECTOMY      HX CRANIOTOMY      HX DILATION AND CURETTAGE      HX HYSTERECTOMY      HX KNEE ARTHROSCOPY      HX KNEE REPLACEMENT      HX TONSILLECTOMY      HX WRIST FRACTURE TX Left 8/21/15    [facility] THYROIDECTOM[facility]         Social History     Tobacco Use    Smoking status: Former     Packs/day: 0.50     Years: 4.00     Pack years: 2.00     Types: Cigarettes     Quit date: 1961     Years since quittin.8    Smokeless tobacco: Never   Substance Use Topics    Alcohol use: No     Alcohol/week: 0.0 standard drinks        Family History   Problem Relation Age of Onset    Hypertension Mother     Heart Disease Mother     Heart Disease Father     Hypertension Father Elevated Lipids Father     Heart Disease Brother     Psychiatric Disorder Brother     Diabetes Brother     Breast Cancer Maternal Aunt     Breast Cancer Paternal Aunt         Review of Systems:  (bold if positive, if negative)    Gen:  fatigue  Eyes:  ENT:  CVS:  Pulm:  dyspnea,GI:  :  MS:  Skin:  Psych:  Endo:  Hem:  Renal:  Neuro:          Objective:      VITALS:    Vital signs reviewed; most recent are:    Visit Vitals  BP (!) 150/40   Pulse (!) 59   Resp 22   Ht 5' 4\" (1.626 m)   Wt 102.1 kg (225 lb)   SpO2 95%   BMI 38.62 kg/m²     SpO2 Readings from Last 6 Encounters:   10/17/22 95%   08/23/22 93%   07/25/22 97%   07/14/22 94%   06/14/22 100%   07/07/21 95%        No intake or output data in the 24 hours ending 10/17/22 0751     Exam:     Physical Exam:    Gen:  Well-developed, well-nourished, in no acute distress  HEENT:  Pink conjunctivae, PERRL, hearing intact to voice, moist mucous membranes  Neck:  Supple, without masses, thyroid non-tender  Resp:  No accessory muscle use, decreased breath sunds in the bases, defuse crackles   Card:  No murmurs, normal S1, S2 without thrills, bruits or 1+ peripheral edema, + JVD to the mandible  Abd:  Soft, non-tender, non-distended, normoactive bowel sounds are present, no palpable organomegaly and no detectable hernias  Lymph:  No cervical adenopathy  Musc:  No cyanosis or clubbing  Skin:  No rashes or ulcers, skin turgor is good, flank hematoma   Neuro:  Cranial nerves 3-12 are grossly intact,  strength is 5/5 bilaterally, dorsi / plantarflexion strength is 5/5 bilaterally, follows commands appropriately  Psych:  Alert with good insight.   Oriented to person, place, and time      Labs:    Recent Labs     10/17/22  1946   WBC 8.6   HGB 6.6*   HCT 21.9*        Recent Labs     10/17/22  1946   *   K 5.0   CL 88*   CO2 32   GLU 94   BUN 23*   CREA 0.77   CA 8.3*   ALB 2.9*   TBILI 0.4   ALT 21     Lab Results   Component Value Date/Time    Glucose (POC) 186 (H) 06/12/2022 08:18 PM    Glucose (POC) 85 06/05/2022 09:03 AM     No results for input(s): PH, PCO2, PO2, HCO3, FIO2 in the last 72 hours. No results for input(s): INR, INREXT in the last 72 hours. Radiology and EKG reviewed:    chest x-ray ;atelectasis, pleural effusion    **Old Records reviewed in [facility]**       Assessment/Plan:       Active Problems:    HTN (hypertension), benign (4/30/2010)    Monitor blood pressure      Paroxysmal atrial flutter ([facility] [place] [address] (11/23/2015)   Patient has paroxysmal atrial fibrillation continue Xarelto and metoprolol. chronic respiratory failure with hypoxia and hypercapnia ([address] (6/8/2022)  Not on oxygen at home. She was slightly hypoxic in the emergency department.    -Will obtain ABG   - start patient on nocturnal CPAP  -Tele monitoring  -Vitals q.4 hours   - give 1 unit of blood   - incentive spirometry      Dementia ([facility] [place] [address] (6/8/2022)   Pleasantly confused. Unable to give any meaningful information. Acute CHF   - likely right sided in nature and possibly has diastolic dysfunction   -telemetry monitoring  -Fluid restriction to 1.2 L  -Sodium restriction to 2 g a day  -Daily weights, strict intake and output measurement  -Lasix 40 mg b.i.d. IV  - check echo     Anemia - no signs of bleeding. Check hemoccult, b12, folate and iron profile         Hyponatremia (10/17/2022)   Appears wet on exam. Has JVD, pleural effusion. Patient has hypervolemic hyponatermia despite normal bnp which can be normal in patient with obesity.    - treat as above  - fluid restriction   - urine osmo and serum osmo obtained       Risk of deterioration: medium      Total time spent with patient: 27 Minutes **I personally saw and examined the patient during this time period**                 Care Plan discussed with: Patient and Family    Discussed:  Code Status and Care Plan    Prophylaxis:   Xarelto    Probable Disposition:  Home w/Family           ___________________________________________________    Attending Physician: Carol Ann Urbina MD

## 2022-10-18 NOTE — PROGRESS NOTES
Occupational Therapy Note  10/18/2022    OT eval order received and acknowledged and attempted at 0910 after nursing cleared patient to work with therapy. Patient received sleeping in room, somnolent and unable to maintain arousal for participation with therapy at this time. Will continue to follow pt and attempt OT eval at a later time as able.      Thank you,  Gabo Henry OTR/MADALYN

## 2022-10-18 NOTE — ED NOTES
Verbal shift change report given to [address] [place] [address] Line Jose MCNEAL (oncoming nurse) by Ricardo WATKINS (offgoing nurse). Report included the following information SBAR, Kardex, ED Summary, MAR, and Recent Results.

## 2022-10-18 NOTE — ED NOTES
Pt continues to bend arm at site of blood administration. Site has been switched and arm board has been applied.

## 2022-10-18 NOTE — ED NOTES
Call daughter for any questions or consent.      [facility] [phone]

## 2022-10-18 NOTE — PROGRESS NOTES
Physical therapy note    Chart reviewed in preparation for physical therapy evaluation. Patient cleared by RN to participate. Initiated PT evaluation this morning at 9:10, patient somnolent, not able to maintain arousal for conversation or participation in therapy. Will continue to follow and re-attempt evaluation later as able.      Thank you,  Krishna Benitez, PT, DPT  5 minutes

## 2022-10-18 NOTE — PROGRESS NOTES
[facility]  [address], [facility] [address]  [phone]         Hospitalist Progress Note        NAME:  Giovana Ceballos   :  1940   MRN:  [id number]    Date/Time:  10/18/2022     Patient PCP:  Mainor Ziegler MD    Emergency Contact:    Extended Emergency Contact Information  Primary Emergency Contact: Butch Morales   [address] Phone: [phone]  Mobile Phone: [phone]  Relation: Spouse  Secondary Emergency Contact: [address] [facility] [address]  Mobile Phone: [phone]  Relation: Child      Code: DNR     Isolation Precautions: There are currently no Active Isolations        Subjective:     REASON FOR VISIT:  Recheck hyponatremia and  anemia    HPI & INTERVAL HISTORY:     Patient seen and examined. Appears to be improved compared to on admission based on my exam and the H&P. Family at bedside. Hemoglobin has improved. However hyponatremia though improved from admission has not improved over the last several checks. Currently denying chest pain and shortness of breath.       ALLERGIES  Allergies   Allergen Reactions    Cephalexin Itching    Codeine Rash    Gabapentin Other (comments)    Neomycin Rash    Polysporin [Bacitracin-Polymyxin B] Rash    Protonix [Pantoprazole] Other (comments)     Gi upset         ROS:  Gen:   generalized weakness, denies chills, and denies fevers  Resp:  denies cough, shortness of breath, sputum, pleuritis, hemoptysis, wheezing  CVS:   denies palpitations, CP, dizziness, syncope, edema, PND, PATHAK, orthopnea  GI:       denies abd pain, nausea, vomit, diarrhea, constipation, GERD, melena, hematochezia           Objective:      Visit Vitals  BP (!) 153/57   Pulse (!) 58   Temp 98 °F (36.7 °C)   Resp 17   Ht 5' 4\" (1.626 m)   Wt 102.1 kg (225 lb)   SpO2 97%   BMI 38.62 kg/m²       Physical Exam:  General: alert, well appearing, no distress, and obese  Head: Normocephalic, without obvious abnormality, atraumatic  Eyes: PERRL, EOMI, anicteric sclerae, and conjuntiva clear  ENT: lips, mucosa, and tongue normal  Neck: normal, supple, and no tenderness  Lungs: clear to auscultation with good breath sounds and normal respiratory effort  Heart: S1, S2 normal, regular rate, and regular rhythm  Abd: not distended, soft, nontender, BS present and normactive  Ext: no cyanosis and bilateral lower extremity 1+ edema  Skin:  Rash BLE d/t psoriasis  Neuro:  Alert and oreinted to self only. Psych: not anxious, cooperative, appropriate affect      Medications:  Current Facility-Administered Medications   Medication Dose Route Frequency    amiodarone (CORDARONE) tablet 200 mg  200 mg Oral DAILY    amLODIPine (NORVASC) tablet 2.5 mg  2.5 mg Oral DAILY    levothyroxine (SYNTHROID) tablet 150 mcg  150 mcg Oral ACB    metoprolol tartrate (LOPRESSOR) tablet 12.5 mg  12.5 mg Oral BID    [Held by provider] rivaroxaban (XARELTO) tablet 20 mg  20 mg Oral DAILY    timolol (TIMOPTIC) 0.5 % ophthalmic solution 1 Drop  1 Drop Both Eyes DAILY    sodium chloride (NS) flush 5-40 mL  5-40 mL IntraVENous Q8H    sodium chloride (NS) flush 5-40 mL  5-40 mL IntraVENous PRN    acetaminophen (TYLENOL) tablet 650 mg  650 mg Oral Q6H PRN    Or    acetaminophen (TYLENOL) suppository 650 mg  650 mg Rectal Q6H PRN    polyethylene glycol (MIRALAX) packet 17 g  17 g Oral DAILY PRN    ondansetron (ZOFRAN ODT) tablet 4 mg  4 mg Oral Q8H PRN    Or    ondansetron (ZOFRAN) injection 4 mg  4 mg IntraVENous Q6H PRN    furosemide (LASIX) injection 40 mg  40 mg IntraVENous BID    0.9% sodium chloride infusion 250 mL  250 mL IntraVENous PRN     Current Outpatient Medications   Medication Sig    valsartan (DIOVAN) 40 mg tablet TAKE ONE TABLET BY MOUTH TWICE A DAY    amLODIPine (NORVASC) 2.5 mg tablet Take 1 Tablet by mouth daily. psyllium (METAMUCIL) pack (sugar free) packet Take 1 Packet by mouth daily.     metoprolol tartrate (LOPRESSOR) 25 mg tablet Take 0.5 Tablets by mouth two (2) times a day. rivaroxaban (Xarelto) 20 mg tab tablet Take 1 Tablet by mouth daily (with dinner). amiodarone (CORDARONE) 200 mg tablet Take 1 Tablet by mouth daily. multivit/folic acid/vit K1 (ONE-A-DAY WOMEN'S 50 PLUS PO) Take  by mouth. BETAMETHASONE by Does Not Apply route. cetirizine (ZYRTEC) 10 mg tablet Take  by mouth daily as needed for Allergies. sodium chloride (Kemal 128) 2 % ophthalmic solution Administer 1 Drop to left eye two (2) times a day. Administer 1 Drop to left eye two (2) times a day. Morning and at night 15 minutes after timolol eye drops    Gemtesa 75 mg tab Take 75 mg by mouth every evening. levothyroxine (Synthroid) 150 mcg tablet Take 1 Tablet by mouth Daily (before breakfast). donepeziL (ARICEPT) 10 mg tablet TAKE ONE TABLET BY MOUTH ONCE NIGHTLY    vit A/vit C/vit E/zinc/copper (PRESERVISION AREDS PO) Take 2 Tablets by mouth two (2) times a day. Cholecalciferol, Vitamin D3, (VITAMIN D3) 2,000 unit cap capsule Take 2,000 Units by mouth two (2) times a day. timolol (TIMOPTIC) 0.5 % ophthalmic solution Administer 1 Drop to both eyes daily. In the morning    fluticasone propionate (FLONASE NA) by Nasal route as needed. (Patient not taking: Reported on 10/17/2022)    melatonin 3 mg tablet Take  by mouth. (Patient not taking: Reported on 10/17/2022)    OTHER Move free 2 tabs daily    polyethylene glycol (MIRALAX) 17 gram/dose powder Take 17 g by mouth in the morning. (Patient not taking: Reported on 8/23/2022)    triamcinolone acetonide (KENALOG) 0.1 % ointment Apply  to affected area two (2) times a day. use thin layer (Patient not taking: Reported on 10/17/2022)    polyethylene glycol (MIRALAX) 17 gram packet Take 1 Packet by mouth daily as needed for Constipation for up to 30 doses.  Indications: constipation (Patient not taking: Reported on 10/17/2022)    triamcinolone acetonide (KENALOG) 0.1 % topical cream Apply 0.1 Packages to affected area two (2) times a day. Apply to rash under pannus (Patient not taking: Reported on 10/17/2022)        Labs:  Recent Labs     10/18/22  0124   WBC 7.3   HGB 6.4*   HCT 21.2*        Recent Labs     10/18/22  1453 10/18/22  0124 10/17/22  1946   *   < > 124*   K 4.5   < > 5.0   CL 88*   < > 88*   CO2 37*   < > 32      < > 94   BUN 17   < > 23*   CREA 0.90   < > 0.77   CA 8.5   < > 8.3*   PHOS 3.6   < >  --    ALB 3.1*   < > 2.9*   TBILI  --   --  0.4   ALT  --   --  21    < > = values in this interval not displayed. Radiology:  CT ABD PELV WO CONT    Result Date: 10/18/2022  No bowel obstruction, ileus or perforation. No intra-abdominal abscess. XR CHEST PORT    Result Date: 10/17/2022  1. Suspected small left pleural effusion and left basilar atelectasis. Unchanged cardiomegaly. I personally reviewed and interpreted the imaging studies and agree with official reading    The labs, imaging studies, and medications was reviewed by me on: October 18, 2022         Assessment/Plan:      Acute on chronic respiratory failure with hypoxia and hypercapnia possibly due to altered mental status and fluid overload acute CHF  Continue with Lasix but hold next dose pending BMP  As needed oxygen  Continue with home cardiac medications -restart losartan  Beta-blocker ordered but held due to heart rate  Fluid restrict and low Na diet  Weights and I&Os    Anemia likely acute blood loss requiring transfusion  Status post 1 unit PRBC and transfuse if hemoglobin less than 7 and/or symptomatic   monitor H&H  Hold Xarelto  Stool for occult blood and check serologies    Hyponatremia  Monitor BMP closely  Urine and serum osmolality  Fluid restriction  Goal correction of 0.2 to 0.3 mEq/h or 6 to 8 mEq in 24 hours to avoid central pontine myelinolysis   Consult:  Nephrology.     Acute metabolic encephalopathy POA atop of baseline dementia  Improving per family  Continue treating issues above  Continue home medications    Paroxysmal atrial fibrillation  Continue with home medications except Xarelto due to the anemia    Body mass index is 38.62 kg/m².:  30.0 - 39.9:  Obese        Risk of deterioration: high      Discussed:  Pt's condition, Imaging findings, Lab findings, Assessment, and Care Plan discussed with: Patient, Spouse at bedside , Family at bedsude, and RN    Prophylaxis:  SCD's    Probable disposition:  [place] [id number]      Total time: 40 minutes **I personally saw and examined the patient during this time period**      Date of service:    10/18/2022                ___________________________________________________    Admitting Physician: Spike Spear MD

## 2022-10-18 NOTE — ED NOTES
Report to [facility] RN Rm [id number]. Pt transported on stretcher to floor. VSS.

## 2022-10-19 LAB
ABO + RH BLD: NORMAL
ALBUMIN SERPL-MCNC: 3 G/DL (ref 3.5–5)
ALBUMIN/GLOB SERPL: 0.9 {RATIO} (ref 1.1–2.2)
ALP SERPL-CCNC: 72 U/L (ref 45–117)
ALT SERPL-CCNC: 22 U/L (ref 12–78)
ANION GAP SERPL CALC-SCNC: 4 MMOL/L (ref 5–15)
ANION GAP SERPL CALC-SCNC: 5 MMOL/L (ref 5–15)
ANION GAP SERPL CALC-SCNC: 5 MMOL/L (ref 5–15)
AST SERPL-CCNC: 13 U/L (ref 15–37)
BASOPHILS # BLD: 0.1 K/UL (ref 0–0.1)
BASOPHILS NFR BLD: 1 % (ref 0–1)
BILIRUB SERPL-MCNC: 0.6 MG/DL (ref 0.2–1)
BLD PROD TYP BPU: NORMAL
BLOOD GROUP ANTIBODIES SERPL: NORMAL
BPU ID: NORMAL
BUN SERPL-MCNC: 19 MG/DL (ref 6–20)
BUN SERPL-MCNC: 19 MG/DL (ref 6–20)
BUN SERPL-MCNC: 21 MG/DL (ref 6–20)
BUN SERPL-MCNC: 25 MG/DL (ref 6–20)
BUN SERPL-MCNC: 25 MG/DL (ref 6–20)
BUN/CREAT SERPL: 20 (ref 12–20)
BUN/CREAT SERPL: 21 (ref 12–20)
BUN/CREAT SERPL: 23 (ref 12–20)
BUN/CREAT SERPL: 23 (ref 12–20)
BUN/CREAT SERPL: 25 (ref 12–20)
CALCIUM SERPL-MCNC: 8.5 MG/DL (ref 8.5–10.1)
CALCIUM SERPL-MCNC: 8.6 MG/DL (ref 8.5–10.1)
CALCIUM SERPL-MCNC: 8.7 MG/DL (ref 8.5–10.1)
CALCIUM SERPL-MCNC: 8.8 MG/DL (ref 8.5–10.1)
CALCIUM SERPL-MCNC: 8.8 MG/DL (ref 8.5–10.1)
CHLORIDE SERPL-SCNC: 86 MMOL/L (ref 97–108)
CHLORIDE SERPL-SCNC: 86 MMOL/L (ref 97–108)
CHLORIDE SERPL-SCNC: 87 MMOL/L (ref 97–108)
CHLORIDE SERPL-SCNC: 87 MMOL/L (ref 97–108)
CHLORIDE SERPL-SCNC: 89 MMOL/L (ref 97–108)
CO2 SERPL-SCNC: 37 MMOL/L (ref 21–32)
CO2 SERPL-SCNC: 37 MMOL/L (ref 21–32)
CO2 SERPL-SCNC: 38 MMOL/L (ref 21–32)
COMMENT, HOLDF: NORMAL
CORTIS SERPL-MCNC: 19.3 UG/DL
CREAT SERPL-MCNC: 0.85 MG/DL (ref 0.55–1.02)
CREAT SERPL-MCNC: 0.91 MG/DL (ref 0.55–1.02)
CREAT SERPL-MCNC: 0.93 MG/DL (ref 0.55–1.02)
CREAT SERPL-MCNC: 1.07 MG/DL (ref 0.55–1.02)
CREAT SERPL-MCNC: 1.08 MG/DL (ref 0.55–1.02)
CROSSMATCH RESULT,%XM: NORMAL
DIFFERENTIAL METHOD BLD: ABNORMAL
EOSINOPHIL # BLD: 0.1 K/UL (ref 0–0.4)
EOSINOPHIL NFR BLD: 1 % (ref 0–7)
ERYTHROCYTE [DISTWIDTH] IN BLOOD BY AUTOMATED COUNT: 16 % (ref 11.5–14.5)
GLOBULIN SER CALC-MCNC: 3.3 G/DL (ref 2–4)
GLUCOSE SERPL-MCNC: 124 MG/DL (ref 65–100)
GLUCOSE SERPL-MCNC: 79 MG/DL (ref 65–100)
GLUCOSE SERPL-MCNC: 80 MG/DL (ref 65–100)
GLUCOSE SERPL-MCNC: 81 MG/DL (ref 65–100)
GLUCOSE SERPL-MCNC: 87 MG/DL (ref 65–100)
HCT VFR BLD AUTO: 26.4 % (ref 35–47)
HGB BLD-MCNC: 8 G/DL (ref 11.5–16)
HISTORY CHECKED?,CKHIST: NORMAL
IMM GRANULOCYTES # BLD AUTO: 0.1 K/UL (ref 0–0.04)
IMM GRANULOCYTES NFR BLD AUTO: 1 % (ref 0–0.5)
LYMPHOCYTES # BLD: 1.1 K/UL (ref 0.8–3.5)
LYMPHOCYTES NFR BLD: 13 % (ref 12–49)
MCH RBC QN AUTO: 25 PG (ref 26–34)
MCHC RBC AUTO-ENTMCNC: 30.3 G/DL (ref 30–36.5)
MCV RBC AUTO: 82.5 FL (ref 80–99)
MONOCYTES # BLD: 0.9 K/UL (ref 0–1)
MONOCYTES NFR BLD: 11 % (ref 5–13)
NEUTS SEG # BLD: 5.9 K/UL (ref 1.8–8)
NEUTS SEG NFR BLD: 73 % (ref 32–75)
NRBC # BLD: 0.03 K/UL (ref 0–0.01)
NRBC BLD-RTO: 0.4 PER 100 WBC
OSMOLALITY UR: 192 MOSM/KG H2O
PHOSPHATE SERPL-MCNC: 3.1 MG/DL (ref 2.6–4.7)
PHOSPHATE SERPL-MCNC: 3.7 MG/DL (ref 2.6–4.7)
PHOSPHATE SERPL-MCNC: 4.1 MG/DL (ref 2.6–4.7)
PLATELET # BLD AUTO: 338 K/UL (ref 150–400)
PMV BLD AUTO: 10 FL (ref 8.9–12.9)
POTASSIUM SERPL-SCNC: 4.1 MMOL/L (ref 3.5–5.1)
POTASSIUM SERPL-SCNC: 4.3 MMOL/L (ref 3.5–5.1)
POTASSIUM SERPL-SCNC: 4.3 MMOL/L (ref 3.5–5.1)
POTASSIUM SERPL-SCNC: 4.8 MMOL/L (ref 3.5–5.1)
POTASSIUM SERPL-SCNC: 5 MMOL/L (ref 3.5–5.1)
PROT SERPL-MCNC: 6.3 G/DL (ref 6.4–8.2)
RBC # BLD AUTO: 3.2 M/UL (ref 3.8–5.2)
SAMPLES BEING HELD,HOLD: NORMAL
SODIUM SERPL-SCNC: 127 MMOL/L (ref 136–145)
SODIUM SERPL-SCNC: 129 MMOL/L (ref 136–145)
SODIUM SERPL-SCNC: 131 MMOL/L (ref 136–145)
SODIUM UR-SCNC: 33 MMOL/L
SPECIMEN EXP DATE BLD: NORMAL
STATUS OF UNIT,%ST: NORMAL
TSH SERPL DL<=0.05 MIU/L-ACNC: 11.7 UIU/ML (ref 0.36–3.74)
UNIT DIVISION, %UDIV: 0
URATE SERPL-MCNC: 4.7 MG/DL (ref 2.6–6)
WBC # BLD AUTO: 8.1 K/UL (ref 3.6–11)

## 2022-10-19 PROCEDURE — 80048 BASIC METABOLIC PNL TOTAL CA: CPT

## 2022-10-19 PROCEDURE — 74011000250 HC RX REV CODE- 250: Performed by: HOSPITALIST

## 2022-10-19 PROCEDURE — 85025 COMPLETE CBC W/AUTO DIFF WBC: CPT

## 2022-10-19 PROCEDURE — 97165 OT EVAL LOW COMPLEX 30 MIN: CPT

## 2022-10-19 PROCEDURE — 36415 COLL VENOUS BLD VENIPUNCTURE: CPT

## 2022-10-19 PROCEDURE — 94761 N-INVAS EAR/PLS OXIMETRY MLT: CPT

## 2022-10-19 PROCEDURE — 84443 ASSAY THYROID STIM HORMONE: CPT

## 2022-10-19 PROCEDURE — 97530 THERAPEUTIC ACTIVITIES: CPT

## 2022-10-19 PROCEDURE — 82272 OCCULT BLD FECES 1-3 TESTS: CPT

## 2022-10-19 PROCEDURE — 80053 COMPREHEN METABOLIC PANEL: CPT

## 2022-10-19 PROCEDURE — 77010033678 HC OXYGEN DAILY

## 2022-10-19 PROCEDURE — 83935 ASSAY OF URINE OSMOLALITY: CPT

## 2022-10-19 PROCEDURE — 84550 ASSAY OF BLOOD/URIC ACID: CPT

## 2022-10-19 PROCEDURE — 97535 SELF CARE MNGMENT TRAINING: CPT

## 2022-10-19 PROCEDURE — 97162 PT EVAL MOD COMPLEX 30 MIN: CPT

## 2022-10-19 PROCEDURE — 80069 RENAL FUNCTION PANEL: CPT

## 2022-10-19 PROCEDURE — 74011250636 HC RX REV CODE- 250/636: Performed by: HOSPITALIST

## 2022-10-19 PROCEDURE — 84300 ASSAY OF URINE SODIUM: CPT

## 2022-10-19 PROCEDURE — 97110 THERAPEUTIC EXERCISES: CPT

## 2022-10-19 PROCEDURE — 65270000029 HC RM PRIVATE

## 2022-10-19 PROCEDURE — 77030038269 HC DRN EXT URIN PURWCK BARD -A

## 2022-10-19 PROCEDURE — 82533 TOTAL CORTISOL: CPT

## 2022-10-19 PROCEDURE — 74011250637 HC RX REV CODE- 250/637: Performed by: HOSPITALIST

## 2022-10-19 PROCEDURE — 84100 ASSAY OF PHOSPHORUS: CPT

## 2022-10-19 RX ORDER — FAMOTIDINE 10 MG/ML
20 INJECTION INTRAVENOUS EVERY 12 HOURS
Status: DISCONTINUED | OUTPATIENT
Start: 2022-10-19 | End: 2022-10-20

## 2022-10-19 RX ORDER — TROSPIUM CHLORIDE 20 MG/1
20 TABLET, FILM COATED ORAL
Status: DISCONTINUED | OUTPATIENT
Start: 2022-10-19 | End: 2022-10-21

## 2022-10-19 RX ORDER — CETIRIZINE HYDROCHLORIDE 10 MG/1
10 TABLET ORAL DAILY
Status: DISCONTINUED | OUTPATIENT
Start: 2022-10-19 | End: 2022-10-19

## 2022-10-19 RX ORDER — FACIAL-BODY WIPES
10 EACH TOPICAL
Status: COMPLETED | OUTPATIENT
Start: 2022-10-19 | End: 2022-10-19

## 2022-10-19 RX ORDER — FACIAL-BODY WIPES
10 EACH TOPICAL DAILY PRN
Status: DISCONTINUED | OUTPATIENT
Start: 2022-10-19 | End: 2022-10-19

## 2022-10-19 RX ADMIN — VALSARTAN 40 MG: 40 TABLET, FILM COATED ORAL at 09:55

## 2022-10-19 RX ADMIN — ACETAMINOPHEN 650 MG: 325 TABLET, FILM COATED ORAL at 23:29

## 2022-10-19 RX ADMIN — SODIUM CHLORIDE, PRESERVATIVE FREE 5 ML: 5 INJECTION INTRAVENOUS at 14:52

## 2022-10-19 RX ADMIN — SODIUM CHLORIDE 1 G: 9 INJECTION INTRAMUSCULAR; INTRAVENOUS; SUBCUTANEOUS at 18:15

## 2022-10-19 RX ADMIN — METOPROLOL TARTRATE 12.5 MG: 25 TABLET, FILM COATED ORAL at 18:16

## 2022-10-19 RX ADMIN — FAMOTIDINE 20 MG: 10 INJECTION INTRAVENOUS at 23:29

## 2022-10-19 RX ADMIN — VALSARTAN 40 MG: 40 TABLET, FILM COATED ORAL at 23:29

## 2022-10-19 RX ADMIN — DONEPEZIL HYDROCHLORIDE 10 MG: 5 TABLET, FILM COATED ORAL at 23:29

## 2022-10-19 RX ADMIN — FUROSEMIDE 40 MG: 10 INJECTION, SOLUTION INTRAMUSCULAR; INTRAVENOUS at 18:15

## 2022-10-19 RX ADMIN — CETIRIZINE HYDROCHLORIDE 10 MG: 10 TABLET, FILM COATED ORAL at 09:55

## 2022-10-19 RX ADMIN — METOPROLOL TARTRATE 12.5 MG: 25 TABLET, FILM COATED ORAL at 09:55

## 2022-10-19 RX ADMIN — I-VITE, TAB 1000-60-2MG (60/BT) 1 TABLET: TAB at 09:55

## 2022-10-19 RX ADMIN — FUROSEMIDE 40 MG: 10 INJECTION, SOLUTION INTRAMUSCULAR; INTRAVENOUS at 09:55

## 2022-10-19 RX ADMIN — ACETAMINOPHEN 650 MG: 325 TABLET, FILM COATED ORAL at 05:43

## 2022-10-19 RX ADMIN — AMLODIPINE BESYLATE 2.5 MG: 2.5 TABLET ORAL at 09:55

## 2022-10-19 RX ADMIN — SODIUM CHLORIDE, PRESERVATIVE FREE 10 ML: 5 INJECTION INTRAVENOUS at 05:47

## 2022-10-19 RX ADMIN — AMIODARONE HYDROCHLORIDE 200 MG: 200 TABLET ORAL at 09:55

## 2022-10-19 RX ADMIN — BISACODYL 10 MG: 10 SUPPOSITORY RECTAL at 18:08

## 2022-10-19 RX ADMIN — SODIUM CHLORIDE, PRESERVATIVE FREE 10 ML: 5 INJECTION INTRAVENOUS at 23:29

## 2022-10-19 RX ADMIN — LEVOTHYROXINE SODIUM 150 MCG: 0.07 TABLET ORAL at 05:45

## 2022-10-19 RX ADMIN — TROSPIUM CHLORIDE 20 MG: 20 TABLET, FILM COATED ORAL at 18:15

## 2022-10-19 NOTE — PROGRESS NOTES
Problem: Self Care Deficits Care Plan (Adult)  Goal: *Acute Goals and Plan of Care (Insert Text)  Description: FUNCTIONAL STATUS PRIOR TO ADMISSION: At baseline, patient able to ambulate with RW. She requires assistance with some ADL tasks but has daughter, spouse, and caregiver assist as needed. HOME SUPPORT: The patient lived with family. Occupational Therapy Goals  Initiated 10/19/2022  1. Patient will perform self-feeding with supervision/set-up within 7 day(s). 2.  Patient will perform grooming with supervision/set-up within 7 day(s). 3.  Patient will perform toilet transfers with minimal assistance/contact guard assist within 7 day(s). 4.  Patient will perform all aspects of toileting with minimal assistance/contact guard assist within 7 day(s). 5.  Patient will participate in upper extremity therapeutic exercise/activities with supervision/set-up for 5 minutes within 7 day(s). Outcome: Progressing Towards Goal   OCCUPATIONAL THERAPY EVALUATION  Patient: Dre Hitchcock (78 y.o. female)  Date: 10/19/2022  Primary Diagnosis: Hyponatremia [E87.1]       Precautions:   Fall    ASSESSMENT  Based on the objective data described below, the patient presents with hospital admission secondary to hyponatremia. Patient received supine in bed with HOB elevated. Patient today with decreased strength, activity tolerance, balance in standing, and functional mobility. Patient following commands, best when given by daughter, but does well overall. Patient requires mod assist x 2 for sit to stand with 2 trials. Patient with LE weakness, and only able to take 1 step toward [facility]. Patient returned to sitting with uncontrolled descent. Patient returned to supine with mod assist x 2. Per family, she is able to perform dressing and toileting tasks without assistance, but does require assist for bathing, which daughter completes. Patient now requires assist x 2 and is below baseline.   Recommend rehab setting, however family desires patient return home. Current Level of Function Impacting Discharge (ADLs/self-care): up to mod x 2 for mobility,  up to max assist for ADLs    Functional Outcome Measure: The patient scored 20/100 on the Barthel INdex  outcome measure. Other factors to consider for discharge: lives with family, has caregiver     Patient will benefit from skilled therapy intervention to address the above noted impairments. PLAN :  Recommendations and Planned Interventions: self care training, functional mobility training, therapeutic exercise, balance training, therapeutic activities, endurance activities, patient education, home safety training, and family training/education    Frequency/Duration: Patient will be followed by occupational therapy 5 times a week to address goals. Recommendation for discharge: (in order for the patient to meet his/her long term goals)  To be determined: rehab setting to maximize function prior to return home. Patient family with desire to return home    This discharge recommendation:  Has been made in collaboration with the attending provider and/or case management    IF patient discharges home will need the following DME: TBD       SUBJECTIVE:   Patient stated Mueller Imogene.     OBJECTIVE DATA SUMMARY:   HISTORY:   Past Medical History:   Diagnosis Date    Arthritis     Basal cell carcinoma 05/20/2012    Calculus of kidney     Cancer ([facility] [address]     skin    Cancer ([facility] [address]     thyroid    Dementia ([facility] [address]     Glaucoma 4/30/2010    Hypertension     OA (osteoarthritis) 4/30/2010    Obesity hypoventilation syndrome (HCC)     Obesity, morbid (HCC)     TYRONE (obstructive sleep apnea)     PAF (paroxysmal atrial fibrillation) ([facility] [address]     Psoriasis 4/30/2010     Past Surgical History:   Procedure Laterality Date    HX CATARACT REMOVAL      bilat    HX CHOLECYSTECTOMY      HX CRANIOTOMY      HX DILATION AND CURETTAGE      HX HYSTERECTOMY      HX KNEE ARTHROSCOPY      HX KNEE REPLACEMENT HX TONSILLECTOMY      HX WRIST FRACTURE TX Left 8/21/15    OR THYROIDECTOMY         Expanded or extensive additional review of patient history:     Home Situation  Home Environment: Private residence  # Steps to Enter: 2  Rails to Enter: Yes  Hand Rails : Right  Living Alone: No  Support Systems: Spouse/Significant Other, Child(brooklynn), Caregiver/Home Care Staff  Current DME Used/Available at Home: Walker, rolling, Grab bars, Shower chair, Safety frame [facility], [address] [facility] [place], straight  Tub or Shower Type: Shower    Hand dominance: Right    EXAMINATION OF PERFORMANCE DEFICITS:  Cognitive/Behavioral Status:  Neurologic State: Drowsy  Orientation Level: Oriented to person  Cognition: Follows commands;Memory loss             Skin: intact as seen    Edema: LEs    Hearing:       Vision/Perceptual:                                     Range of Motion:  AROM: Generally decreased, functional                         Strength:  Strength: Generally decreased, functional                Coordination:  Coordination: Generally decreased, functional            Tone & Sensation:  Tone: Normal                         Balance:  Sitting: Impaired; Without support  Sitting - Static: Fair (occasional)  Sitting - Dynamic: Fair (occasional)  Standing: Impaired; With support  Standing - Static: Poor;Constant support  Standing - Dynamic : Poor;Constant support    Functional Mobility and Transfers for ADLs:  Bed Mobility:  Supine to Sit: Minimum assistance;Assist x2  Sit to Supine: Moderate assistance;Assist x2  Scooting: Moderate assistance;Assist x1    Transfers:  Sit to Stand: Moderate assistance;Assist x2  Stand to Sit: Moderate assistance;Assist x2    ADL Assessment:  Feeding: Contact guard assistance    Oral Facial Hygiene/Grooming: Minimum assistance    Bathing: Moderate assistance         Upper Body Dressing: Minimum assistance    Lower Body Dressing:  Moderate assistance    Toileting: Maximum assistance                  ADL Intervention and task modifications:                                                 Therapeutic Exercise:     Functional Measure:    Barthel Index:  Bathin  Bladder: 5  Bowels: 5  Groomin  Dressin  Feedin  Mobility: 0  Stairs: 0  Toilet Use: 0  Transfer (Bed to Chair and Back): 5  Total: 20/100      The Barthel ADL Index: Guidelines  1. The index should be used as a record of what a patient does, not as a record of what a patient could do. 2. The main aim is to establish degree of independence from any help, physical or verbal, however minor and for whatever reason. 3. The need for supervision renders the patient not independent. 4. A patient's performance should be established using the best available evidence. Asking the patient, friends/relatives and nurses are the usual sources, but direct observation and common sense are also important. However direct testing is not needed. 5. Usually the patient's performance over the preceding 24-48 hours is important, but occasionally longer periods will be relevant. 6. Middle categories imply that the patient supplies over 50 per cent of the effort. 7. Use of aids to be independent is allowed. Score Interpretation (from [address] [place] [address])    Independent   60-79 Minimally independent   40-59 Partially dependent   20-39 Very dependent   <20 Totally dependent     -Roseann Kincaid., Barthel, D.W. (1965). Functional evaluation: the Barthel Index. [facility] ([address] (1997). The Barthel activities of daily living index: self-reporting versus actual performance in the old (> or = 75 years). Journal of [address] [place] [address] 45(7), [facility], JTRICIA, Jeannie Kline., Joshua Casas. (1999). Measuring the change in disability after inpatient rehabilitation; comparison of the responsiveness of the Barthel Index and Functional [place] Measure. Journal of Neurology, Neurosurgery, and Psychiatry, 66(4), 567-256.   Graham Madera LINA, MAURO Tarango, & Lefty Bruner M.A. (2004) Assessment of post-stroke quality of life in cost-effectiveness studies: The usefulness of the Barthel Index and the EuroQoL-5D. Quality of Life Research, 15, 364-83         Occupational Therapy Evaluation Charge Determination   History Examination Decision-Making   LOW Complexity : Brief history review  LOW Complexity : 1-3 performance deficits relating to physical, cognitive , or psychosocial skils that result in activity limitations and / or participation restrictions  LOW Complexity : No comorbidities that affect functional and no verbal or physical assistance needed to complete eval tasks       Based on the above components, the patient evaluation is determined to be of the following complexity level: LOW   Pain Rating:      Activity Tolerance:   Fair    After treatment patient left in no apparent distress:    Supine in bed and Call bell within reach    COMMUNICATION/EDUCATION:   The patients plan of care was discussed with: Physical therapist and Registered nurse. Home safety education was provided and the patient/caregiver indicated understanding., Patient/family have participated as able in goal setting and plan of care. , and Patient/family agree to work toward stated goals and plan of care. This patients plan of care is appropriate for delegation to [facility] [place].     Thank you for this referral.  Kermit Sarah OTR/L  Time Calculation: 41 mins

## 2022-10-19 NOTE — PROGRESS NOTES
No return reply from Dr Washburn Commandanette. Dr Arash blanchard serve at this time. NNO at this time. Stated will follow up with patient. .Bedside and Verbal shift change report given to Shae Jordan (oncoming nurse) by Denilson Kilpatrick RN (offgoing nurse). Report included the following information SBAR, ED Summary, and Recent Results.

## 2022-10-19 NOTE — PROGRESS NOTES
Problem: Pressure Injury - Risk of  Goal: *Prevention of pressure injury  Description: Document Ivan Scale and appropriate interventions in the flowsheet.   Outcome: Progressing Towards Goal  Note: Pressure Injury Interventions:  Sensory Interventions: Keep linens dry and wrinkle-free    Moisture Interventions: Absorbent underpads, Internal/External urinary devices    Activity Interventions: PT/OT evaluation    Mobility Interventions: HOB 30 degrees or less, PT/OT evaluation    Nutrition Interventions: Document food/fluid/supplement intake    Friction and Shear Interventions: Lift sheet, HOB 30 degrees or less                Problem: Patient Education: Go to Patient Education Activity  Goal: Patient/Family Education  Outcome: Progressing Towards Goal

## 2022-10-19 NOTE — PROGRESS NOTES
[address]  [phone]         Hospitalist Progress Note        NAME:  Claudia Finnegan   :  1940   MRN:  [id number]    Date/Time:  10/19/2022     Patient PCP:  Marline Palmer MD    Emergency Contact:    Extended Emergency Contact Information  Primary Emergency Contact: Butch Morales   [address] Phone: [phone]  Mobile Phone: [phone]  Relation: Spouse  Secondary Emergency Contact: [address] [facility] [address]  Mobile Phone: [phone]  Relation: Child      Code: DNR     Isolation Precautions: There are currently no Active Isolations        Subjective:     REASON FOR VISIT:  Recheck hyponatremia and  anemia    HPI & INTERVAL HISTORY:     Patient seen and examined still confused. Found to have a UTI with GNR. The daughter said that the patient had Keflex in the past and it caused some minor itching which Zyrtec resolved quickly. She is willing to try Rocephin. Patient is already on Zyrtec.       ALLERGIES  Allergies   Allergen Reactions    Cephalexin Itching    Codeine Rash    Gabapentin Other (comments)    Neomycin Rash    Polysporin [Bacitracin-Polymyxin B] Rash    Protonix [Pantoprazole] Other (comments)     Gi upset         ROS:  Gen:   generalized weakness  Resp:  negative  CVS:   negative  GI:       constipation and denies abdominal pain           Objective:      Visit Vitals  BP (!) 106/58 (BP 1 Location: Left lower arm, BP Patient Position: Sitting)   Pulse 63   Temp 98.8 °F (37.1 °C)   Resp 20   Ht 5' 4\" (1.626 m)   Wt 102.1 kg (225 lb)   SpO2 94%   BMI 38.62 kg/m²       Physical Exam:  General: Confused  Head: Normocephalic, without obvious abnormality, atraumatic  Eyes: anicteric sclerae and conjuntiva clear  ENT: lips, mucosa, and tongue normal  Neck: normal, supple, and no tenderness  Lungs: CTA  Heart: S1, S2 normal, regular rate, and regular rhythm  Abd: not distended, soft, nontender, BS present and normactive  Ext: no cyanosis and bilateral lower extremity 1+ edema  Skin:  Rash BLE d/t psoriasis  Neuro:  Alert and oreinted to self only.    Psych: not anxious, cooperative, appropriate affect      Medications:  Current Facility-Administered Medications   Medication Dose Route Frequency    trospium (SANCTURA) tablet 20 mg  20 mg Oral ACB&D    bisacodyL (DULCOLAX) suppository 10 mg  10 mg Rectal DAILY PRN    amiodarone (CORDARONE) tablet 200 mg  200 mg Oral DAILY    amLODIPine (NORVASC) tablet 2.5 mg  2.5 mg Oral DAILY    levothyroxine (SYNTHROID) tablet 150 mcg  150 mcg Oral ACB    metoprolol tartrate (LOPRESSOR) tablet 12.5 mg  12.5 mg Oral BID    [Held by provider] rivaroxaban (XARELTO) tablet 20 mg  20 mg Oral DAILY    timolol (TIMOPTIC) 0.5 % ophthalmic solution 1 Drop  1 Drop Both Eyes DAILY    sodium chloride (NS) flush 5-40 mL  5-40 mL IntraVENous Q8H    sodium chloride (NS) flush 5-40 mL  5-40 mL IntraVENous PRN    acetaminophen (TYLENOL) tablet 650 mg  650 mg Oral Q6H PRN    Or    acetaminophen (TYLENOL) suppository 650 mg  650 mg Rectal Q6H PRN    polyethylene glycol (MIRALAX) packet 17 g  17 g Oral DAILY PRN    ondansetron (ZOFRAN ODT) tablet 4 mg  4 mg Oral Q8H PRN    Or    ondansetron (ZOFRAN) injection 4 mg  4 mg IntraVENous Q6H PRN    furosemide (LASIX) injection 40 mg  40 mg IntraVENous BID    cetirizine (ZYRTEC) tablet 10 mg  10 mg Oral DAILY    donepeziL (ARICEPT) tablet 10 mg  10 mg Oral QHS    valsartan (DIOVAN) tablet 40 mg  40 mg Oral BID    sodium chloride (BRANDON 128) 2 % ophthalmic drops 1 Drop (Patient Supplied)  1 Drop Left Eye BID    0.9% sodium chloride infusion 250 mL  250 mL IntraVENous PRN    vitamin E-F-A-lutein-minerals (OCUVITE) tablet 1 Tablet  1 Tablet Oral BID    psyllium husk-aspartame (METAMUCIL FIBER) packet 1 Packet  1 Packet Oral DAILY    0.9% sodium chloride infusion 250 mL  250 mL IntraVENous PRN        Labs:  Recent Labs     10/19/22  0923   WBC 8.1   HGB 8.0*   HCT 26.4*          Recent Labs     10/19/22  1157 10/19/22  0923 10/19/22  0442   *   < > 129*   K 4.1   < > 4.3   CL 87*   < > 87*   CO2 37*   < > 38*   *   < > 79   BUN 19   < > 21*   CREA 0.93   < > 0.85   CA 8.7   < > 8.8   PHOS 3.1  --  3.7   ALB 3.0*  --  3.0*   TBILI  --   --  0.6   ALT  --   --  22    < > = values in this interval not displayed. Radiology:  No results found. I personally reviewed and interpreted the imaging studies and agree with official reading    The labs, imaging studies, and medications was reviewed by me on: October 19, 2022         Assessment/Plan:      Acute urinary tract infection with GNR  Rocephin IV along with Zyrtec and if it causes allergic reaction consider Cipro  Cultures pending  Supportive care    Acute on chronic respiratory failure with hypoxia and hypercapnia possibly due to altered mental status and fluid overload acute CHF  Continue with Lasix but hold next dose pending BMP  Supplemental oxygen as needed  Continue with home cardiac medications -restart losartan  Beta-blocker ordered but held due to heart rate  Fluid restrict and low Na diet  Weights and I&Os    Iron deficiency anemia likely from GIB in setting of xarelto with requirement of PRBC transfusion  STABLE - likely GIB (FOBT pending) has been trending down  PPI   IV iron tomorrow so as not to start along with rocephin (allergies?)  Holding Xarelto  Monitor labs   Consult GI    Hyponatremia - ?  SIADH  Monitor BMP closely  Urine and serum osmolality  Fluid restriction  Nephrology note reviewed (work-up and consider tovalpran or urea)    Acute metabolic encephalopathy POA atop of baseline dementia  Likely UTI main cause  Continue treating issues above  Continue home medications    Paroxysmal atrial fibrillation  Continue with home medications except Xarelto due to the anemia    Body mass index is 38.62 kg/m².:  30.0 - 39.9:  Obese        Risk of deterioration: high Discussed:  Pt's condition, Imaging findings, Lab findings, Assessment, and Care Plan discussed with: Patient, Spouse at bedside , Family at bedsude, and RN    Prophylaxis:  SCD's    Probable disposition:  Lissett Ibrahim      Total time: -28- minutes **I personally saw and examined the patient during this time period**      Date of service:    10/19/2022                ___________________________________________________    Admitting Physician: Ismael Whittington MD

## 2022-10-19 NOTE — PROGRESS NOTES
Problem: Mobility Impaired (Adult and Pediatric)  Goal: *Acute Goals and Plan of Care (Insert Text)  Description: FUNCTIONAL STATUS PRIOR TO ADMISSION: Patient was modified independent using a rolling walker for functional mobility. HOME SUPPORT PRIOR TO ADMISSION: The patient lived with her daughter, spouse and . They report history of 3 falls in the past month, + buckling. Physical Therapy Goals  Initiated 10/19/2022  1. Patient will move from supine to sit and sit to supine , scoot up and down, and roll side to side in bed with minimal assistance/contact guard assist within 7 day(s). 2.  Patient will transfer from bed to chair and chair to bed with moderate assistance  using the least restrictive device within 7 day(s). 3.  Patient will perform sit to stand with minimal assistance/contact guard assist within 7 day(s). 4.  Patient will ambulate with moderate assistance  for 5 feet with the least restrictive device within 7 day(s). Outcome: Not Met   PHYSICAL THERAPY EVALUATION  Patient: Tracy Doss (52 y.o. female)  Date: 10/19/2022  Primary Diagnosis: Hyponatremia [E87.1]       Precautions:   Fall      ASSESSMENT  Based on the objective data described below, the patient presents with high risk of repeat falls, + tobias LE buckling after only a few seconds of standing, slightly decreased command following, grossly reduced LE strength, and overall reduced functional mobility in the setting of hospital admission for hyponatremia. Family expresses significant concerns for disposition with strong desire to not go to rehab after one month experience following last admission. Patient today requires short, motor commands but does participate well with daughter's and therapist's encouragement. Patient requires modAx2 for sit <> stand transfer and almost immediately demonstrates tobias LE buckling.  Patient does not wait for therapist cues and uncontrolled descents to bed surface in sitting. Attempted sit > stand and short ambulation for second trial with continued tobias LE buckling. Patient with difficulty clearing the floor for steps and is very high fall risk secondary to some impulsivity. Patient reports feeling nauseous and dizzy with direct questioning, vitals soft as below. Returned patient to supine with modAx2. Provided therapeutic exercises as below and patient with good demonstration of these. Patient presents extremely high repeat risk of falls and injury, requires assist x 2 for sit <> stand transfer today and decreased safety awareness. Highly recommend rehab upon d/c. Current Level of Function Impacting Discharge (mobility/balance): high fall risk; max/modAx2 + buckling with RW    Functional Outcome Measure: The patient scored Total Score: 1/28 on the Tinetti outcome measure which is indicative of high fall risk. Other factors to consider for discharge: spoke with CM regarding disposition recommendation     Patient will benefit from skilled therapy intervention to address the above noted impairments. PLAN :  Recommendations and Planned Interventions: bed mobility training, transfer training, gait training, therapeutic exercises, edema management/control, patient and family training/education, and therapeutic activities      Frequency/Duration: Patient will be followed by physical therapy:  5 times a week to address goals. Recommendation for discharge: (in order for the patient to meet his/her long term goals)  Therapy up to 5 days/week in rehab setting    This discharge recommendation:  Has been made in collaboration with the attending provider and/or case management    IF patient discharges home will need the following DME: mechanical lift, wheelchair, and sliding/transfer board; w/c transport in to home         SUBJECTIVE:   Patient stated yes.  re: patient answers only short direct questions during session    OBJECTIVE DATA SUMMARY:   Patient received supine in bed and was agreeable to participate in PT session. Patient was cleared by nursing to participate in PT session.        Vitals:    10/19/22 1141 10/19/22 1148 10/19/22 1159 10/19/22 1249   BP: 101/74 (!) 100/46  Comment: Family Requested For Another (!) 140/61 (!) 106/58   BP 1 Location: Left leg Left upper arm Left upper arm Left lower arm   BP Patient Position: At rest;Lying right side At rest;Lying right side At rest Sitting (after repeat tries)   Pulse: 61   63   Temp: 98.8 °F (37.1 °C)      Resp: 20      Height:       Weight:       SpO2: 98%  97% 94%         HISTORY:    Past Medical History:   Diagnosis Date    Arthritis     Basal cell carcinoma 05/20/2012    Calculus of kidney     Cancer (HCC)     skin    Cancer (HCC)     thyroid    Dementia ([facility] [address]     Glaucoma 4/30/2010    Hypertension     OA (osteoarthritis) 4/30/2010    Obesity hypoventilation syndrome (HCC)     Obesity, morbid (HCC)     TYRONE (obstructive sleep apnea)     PAF (paroxysmal atrial fibrillation) (HCC)     Psoriasis 4/30/2010     Past Surgical History:   Procedure Laterality Date    HX CATARACT REMOVAL      bilat    HX CHOLECYSTECTOMY      HX CRANIOTOMY      HX DILATION AND CURETTAGE      HX HYSTERECTOMY      HX KNEE ARTHROSCOPY      HX KNEE REPLACEMENT      HX TONSILLECTOMY      HX WRIST FRACTURE TX Left 8/21/15    DE THYROIDECTOMY         Personal factors and/or comorbidities impacting plan of care: none additional    Home Situation  Home Environment: Private residence  # Steps to Enter: 2  Rails to Enter: Yes  Hand Rails : Right  Living Alone: No  Support Systems: Spouse/Significant Other, Child(brooklynn), Caregiver/Home Care Staff  Current DME Used/Available at Home: Walker, rolling, Grab bars, Shower chair, Safety frame toliet, Cane, straight  Tub or Shower Type: Shower    EXAMINATION/PRESENTATION/DECISION MAKING:   Critical Behavior:  Neurologic State: Drowsy  Orientation Level: Oriented to person  Cognition: Follows commands     Hearing: Skin:  significant bruising to L posterior trunk from fall history  Edema: none apparent   Range Of Motion:  AROM: Generally decreased, functional                       Strength:    Strength: Generally decreased, functional                    Tone & Sensation:   Tone: Normal                              Coordination:  Coordination: Generally decreased, functional  Vision:      Functional Mobility:  Bed Mobility:     Supine to Sit: Minimum assistance;Assist x2  Sit to Supine: Moderate assistance;Assist x2  Scooting: Moderate assistance;Assist x1  Transfers:  Sit to Stand: Moderate assistance;Assist x2  Stand to Sit: Moderate assistance;Assist x2                       Balance:   Sitting: Impaired; Without support  Sitting - Static: Fair (occasional)  Sitting - Dynamic: Fair (occasional)  Standing: Impaired; With support  Standing - Static: Poor;Constant support  Standing - Dynamic : Poor;Constant support  Ambulation/Gait Training:  Distance (ft): 1 Feet (ft)  Assistive Device: Gait belt;Walker, rolling  Ambulation - Level of Assistance: Maximum assistance;Assist x2        Gait Abnormalities: Shuffling gait;Trunk sway increased        Base of Support: Widened     Speed/Jovita: Pace decreased (<100 feet/min); Shuffled             Therapeutic Exercises:   Therapeutic Exercises:       EXERCISE   Sets   Reps   Active Active Assist   Passive Self ROM   Comments   Ankle Pumps 1 5 [x] [] [] []    Glut Sets 1 5 [x] [] [] []    Hamstring Sets   [] [] [] []    Short Arc Quads   [] [] [] []    Heel Slides   [] [] [] []    Straight Leg Raises   [] [] [] []    Hip abd/add   [] [] [] []    Long Arc Quads 1 10 [x] [] [] []    Marching 1 5 [x] [] [] [] Requires repeat cues (tactile and verbal)      [] [] [] []          Functional Measure:  Tinetti test:    Sitting Balance: 1  Arises: 0  Attempts to Rise: 0  Immediate Standing Balance: 0  Standing Balance: 0  Nudged: 0  Eyes Closed: 0  Turn 360 Degrees - Continuous/Discontinuous: 0  Turn 360 Degrees - Steady/Unsteady: 0  Sitting Down: 0  Balance Score: 1 Balance total score  Indication of Gait: 0  R Step Length/Height: 0  L Step Length/Height: 0  R Foot Clearance: 0  L Foot Clearance: 0  Step Symmetry: 0  Step Continuity: 0  Path: 0  Trunk: 0  Walking Time: 0  Gait Score: 0 Gait total score  Total Score: 1/28 Overall total score         Tinetti Tool Score Risk of Falls  <19 = High Fall Risk  19-24 = Moderate Fall Risk  25-28 = Low Fall Risk  Tinetti ME. Performance-Oriented Assessment of Mobility Problems in Elderly Patients. [facility]; [id number].  (Scoring Description: PT Bulletin Feb. 10, 1993)    Older adults: Kylah Deal et al, 2009; n = 1000 [facility] elderly evaluated with ABC, BOBO, ADL, and IADL)  · Mean BOBO score for males aged 69-68 years = 26.21(3.40)  · Mean BOBO score for females age 69-68 years = 25.16(4.30)  · Mean BOBO score for males over 80 years = 23.29(6.02)  · Mean BOBO score for females over 80 years = 17.20(8.32)        Physical Therapy Evaluation Charge Determination   History Examination Presentation Decision-Making   MEDIUM  Complexity : 1-2 comorbidities / personal factors will impact the outcome/ POC  MEDIUM Complexity : 3 Standardized tests and measures addressing body structure, function, activity limitation and / or participation in recreation  MEDIUM Complexity : Evolving with changing characteristics  MEDIUM Complexity : FOTO score of 26-74      Based on the above components, the patient evaluation is determined to be of the following complexity level: MEDIUM    Pain Rating:  Patient without reports of pain during therapy      Activity Tolerance:   Fair, requires rest breaks, and spo2 stable on 2L NC      After treatment patient left in no apparent distress:   Supine in bed, Call bell within reach, Bed / chair alarm activated, Caregiver / family present, and Side rails x 3    COMMUNICATION/EDUCATION:   The patients plan of care was discussed with: Occupational therapist, Registered nurse, and Case management. Fall prevention education was provided and the patient/caregiver indicated understanding. and Patient/family have participated as able in goal setting and plan of care.     Thank you for this referral.  Girish Turk PT, DPT   Time Calculation: 38 mins

## 2022-10-19 NOTE — PROGRESS NOTES
RRT RN assessed patient at request of primary RN for concerns of restlessness. Per RN patient has been sleeping since her arrival to the unit, now that she is awake she appears to be in pain and is restless. Patient has baseline dementia and is admitted for hyponatremia- able to tell me her name and where she is. Denies pain but does wince sometimes. Patient repositioned in bed for comfort and pillow placed between legs. VSS. No interventions warranted by RRT RN at this time, encouraged primary to reach out with further concerns/changes.

## 2022-10-19 NOTE — PROGRESS NOTES
Pt rested quietly in bed all shift. At 0503 pt noted with restlessness. Appears to be uncomfortable. However unable to give details. Pt noted with facial grimacing at times. Yet denies pain and discomfort. Receive Tylenol 650 mg at 0600. v/s 98.8-20-/88- 02 sat 100% at 1 LPM. RRT RN call to assess. She was unable to find cause of restlessness. Felt as restless is due to low NA with dementia. Perfect serve Dr Andreas Melvin on call. Will continue to monitor.

## 2022-10-19 NOTE — PROGRESS NOTES
3:07 PM  Paged Dr. Veronica Bermeo to inform his of the daughter's request to have pt's home eye supplement, preservision areds 2, ordered to use in the hospital

## 2022-10-19 NOTE — CONSULTS
[place] office   [address] [place] [address], [address] [place] [address]  Phone: [phone]  [phone]   [email or web address]    NEPHROLOGY CONSULT NOTE     Patient: Donovan Eaton MRN: [id number]  PCP: Sheila Strauss MD   :     1940  Age:   79 y.o. Sex:  female      Referring physician: Reuben Marinelli MD  Reason for consultation: 79 y.o. 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 with past medical history as mentioned below who admitted to the hospital with the complaint of shortness of breath on 10/17/2022. In the ER she was found to be hypoxic in the 80s  Chemistry shows serum sodium level is 124. Patient started on diuretics. Sodium level improved to 128 on 10/18. CBC shows hemoglobin of 6.6. Patient received PRBC transfusion. Repeat sodium level today is 131  Patient is poor historian due to dementia. History is obtained from medical records.   Serum osmolality is 265 and urine osmolality is 369      Past Medical Hx:   Past Medical History:   Diagnosis Date    Arthritis     Basal cell carcinoma 2012    Calculus of kidney     Cancer ([address]     skin    Cancer ([address]     thyroid    Dementia ([address]     Glaucoma 2010    Hypertension     OA (osteoarthritis) 2010    Obesity hypoventilation syndrome (HCC)     Obesity, morbid (HCC)     TYRONE (obstructive sleep apnea)     PAF (paroxysmal atrial fibrillation) ([address]     Psoriasis 2010        Past Surgical Hx:     Past Surgical History:   Procedure Laterality Date    HX CATARACT REMOVAL      bilat    HX CHOLECYSTECTOMY      HX CRANIOTOMY      HX DILATION AND CURETTAGE      HX HYSTERECTOMY      HX KNEE ARTHROSCOPY      HX KNEE REPLACEMENT      HX TONSILLECTOMY      HX WRIST FRACTURE TX Left 8/21/15    NM THYROIDECTOMY         Medications:  Prior to Admission medications    Medication Sig Start Date End Date Taking? Authorizing Provider   valsartan (DIOVAN) 40 mg tablet TAKE ONE TABLET BY MOUTH TWICE A DAY 10/14/22  Yes Sanjiv Alexander MD   amLODIPine (NORVASC) 2.5 mg tablet Take 1 Tablet by mouth daily. 10/10/22  Yes Sanjiv Alexander MD   psyllium (METAMUCIL) pack (sugar free) packet Take 1 Packet by mouth daily. Yes Provider, Historical   metoprolol tartrate (LOPRESSOR) 25 mg tablet Take 0.5 Tablets by mouth two (2) times a day. 8/23/22  Yes Sanjiv Alexander MD   rivaroxaban (Xarelto) 20 mg tab tablet Take 1 Tablet by mouth daily (with dinner). 8/23/22  Yes Sanjiv Alexander MD   amiodarone (CORDARONE) 200 mg tablet Take 1 Tablet by mouth daily. 8/23/22  Yes Sanjiv Alexander MD   multivit/folic acid/vit K1 (ONE-A-DAY WOMEN'S 50 PLUS PO) Take  by mouth. Yes Provider, Historical   BETAMETHASONE by Does Not Apply route. Yes Provider, Historical   cetirizine (ZYRTEC) 10 mg tablet Take  by mouth daily as needed for Allergies. Yes Provider, Historical   sodium chloride ([address]) 2 % ophthalmic solution Administer 1 Drop to left eye two (2) times a day. Administer 1 Drop to left eye two (2) times a day. Morning and at night 15 minutes after timolol eye drops   Yes Provider, Historical   Gemtesa 75 mg tab Take 75 mg by mouth every evening. 5/30/22  Yes Colin, MD Stacy   levothyroxine (Synthroid) 150 mcg tablet Take 1 Tablet by mouth Daily (before breakfast).  6/3/22  Yes Jersey Ramirez MD   donepeziL (ARICEPT) 10 mg tablet TAKE ONE TABLET BY MOUTH ONCE NIGHTLY 11/12/21  Yes Jersey Ramirez MD   vit A/vit C/vit E/zinc/copper (PRESERVISION AREDS PO) Take 2 Tablets by mouth two (2) times a day. Yes Provider, Historical   Cholecalciferol, Vitamin D3, (VITAMIN D3) 2,000 unit cap capsule Take 2,000 Units by mouth two (2) times a day. 2/16/17  Yes David Greer MD   timolol (TIMOPTIC) 0.5 % ophthalmic solution Administer 1 Drop to both eyes daily. In the morning 12/17/10  Yes Provider, Historical   fluticasone propionate (FLONASE NA) by Nasal route as needed. Patient not taking: Reported on 10/17/2022    Provider, Historical   melatonin 3 mg tablet Take  by mouth. Patient not taking: Reported on 10/17/2022    Provider, Historical   OTHER Move free 2 tabs daily    Provider, Historical   polyethylene glycol (MIRALAX) 17 gram/dose powder Take 17 g by mouth in the morning. Patient not taking: Reported on 8/23/2022    Provider, Historical   triamcinolone acetonide (KENALOG) 0.1 % ointment Apply  to affected area two (2) times a day. use thin layer  Patient not taking: Reported on 10/17/2022    Provider, Historical   polyethylene glycol (MIRALAX) 17 gram packet Take 1 Packet by mouth daily as needed for Constipation for up to 30 doses. Indications: constipation  Patient not taking: Reported on 10/17/2022 6/14/22   Vivi Cedillo MD   triamcinolone acetonide (KENALOG) 0.1 % topical cream Apply 0.1 Packages to affected area two (2) times a day. Apply to rash under pannus  Patient not taking: Reported on 10/17/2022 3/9/22   Other, MD Stacy       Allergies   Allergen Reactions    Cephalexin Itching    Codeine Rash    Gabapentin Other (comments)    Neomycin Rash    Polysporin [Bacitracin-Polymyxin B] Rash    Protonix [Pantoprazole] Other (comments)     Gi upset       Social Hx:  reports that she quit smoking about 61 years ago. Her smoking use included cigarettes. She has a 2.00 pack-year smoking history. She has never used smokeless tobacco. She reports that she does not drink alcohol and does not use drugs.      Family History   Problem Relation Age of Onset    Hypertension Mother     Heart Disease Mother     Heart Disease Father     Hypertension Father     Elevated Lipids Father     Heart Disease Brother     Psychiatric Disorder Brother     Diabetes Brother     Breast Cancer Maternal Aunt     Breast Cancer Paternal Aunt        Review of Systems:  Unable to obtain   Objective:    Vitals:    Vitals:    10/19/22 0526 10/19/22 0634 10/19/22 0700 10/19/22 0952   BP: (!) 156/75 (!) 171/88  (!) 142/64   Pulse: 61 75 60 69   Resp:  20  20   Temp:  98.8 °F (37.1 °C)  97.8 °F (36.6 °C)   SpO2:  100%  100%   Weight:       Height:         I&O's:  10/18 0701 - 10/19 0700  In: 500 [P.O.:500]  Out: 6300 [Urine:6300]  Visit Vitals  BP (!) 142/64 (BP 1 Location: Left upper arm, BP Patient Position: At rest)   Pulse 69   Temp 97.8 °F (36.6 °C)   Resp 20   Ht 5' 4\" (1.626 m)   Wt 102.1 kg (225 lb)   SpO2 100%   BMI 38.62 kg/m²       Physical Exam:  General: Pleasantly confused  HEENT: Eyes are PERRL. Conjunctiva without pallor ,erythema.  Sclerae: +/- Icterus  Lungs : Diminished breath sound  CVS: RRR, S1 S2 normal, No rub,   Abdomen: Soft, Non tender, No hepatosplenomegaly, bowel sounds present  Extremities: No cyanosis, No clubbing  Neurologic: Unable to examine    Laboratory Results:    BMP:   Lab Results   Component Value Date/Time     (L) 10/19/2022 04:42 AM    K 4.3 10/19/2022 04:42 AM    CL 87 (L) 10/19/2022 04:42 AM    CO2 38 (H) 10/19/2022 04:42 AM    AGAP 4 (L) 10/19/2022 04:42 AM    GLU 79 10/19/2022 04:42 AM    BUN 21 (H) 10/19/2022 04:42 AM    CREA 0.85 10/19/2022 04:42 AM         Results for orders placed or performed in visit on 07/07/21   AMB POC URINALYSIS DIP STICK AUTO W/O MICRO     Status: None   Result Value Ref Range Status    Color (UA POC) Yellow  Final    Clarity (UA POC) Clear  Final    Glucose (UA POC) Negative Negative Final    Bilirubin (UA POC) Negative Negative Final    Ketones (UA POC) Trace Negative Final    Specific gravity (UA POC)   Final     Comment: >1.030 Blood (UA POC) Negative Negative Final    pH (UA POC) 5.5 4.6 - 8.0 Final    Protein (UA POC) 3+ Negative Final    Urobilinogen (UA POC) 0.2 mg/dL 0.2 - 1 Final    Nitrites (UA POC) Negative Negative Final    Leukocyte esterase (UA POC) Negative Negative Final   Results for orders placed or performed in visit on 08/18/15   AMB POC URINALYSIS DIP STICK AUTO W/ MICRO     Status: None   Result Value Ref Range Status    Color (UA POC) Yellow  Final    Clarity (UA POC) Slightly Cloudy  Final    Glucose (UA POC) Negative Negative Final    Bilirubin (UA POC) Negative Negative Final    Ketones (UA POC) Negative Negative Final    Specific gravity (UA POC) 1.025 1.001 - 1.035 Final    Blood (UA POC) Trace Negative Final    pH (UA POC) 7.0 4.6 - 8.0 Final    Protein (UA POC) 1+ Negative mg/dL Final    Urobilinogen (UA POC) 1 mg/dL 0.2 - 1 Final    Nitrites (UA POC) Negative Negative Final    Leukocyte esterase (UA POC) Trace Negative Final           CBC WITH AUTOMATED DIFF    Collection Time: 10/19/22  9:23 AM   Result Value Ref Range    WBC 8.1 3.6 - 11.0 K/uL    RBC 3.20 (L) 3.80 - 5.20 M/uL    HGB 8.0 (L) 11.5 - 16.0 g/dL    HCT 26.4 (L) 35.0 - 47.0 %    MCV 82.5 80.0 - 99.0 FL    MCH 25.0 (L) 26.0 - 34.0 PG    MCHC 30.3 30.0 - 36.5 g/dL    RDW 16.0 (H) 11.5 - 14.5 %    PLATELET 886 085 - 440 K/uL    MPV 10.0 8.9 - 12.9 FL    NRBC 0.4 (H) 0  WBC    ABSOLUTE NRBC 0.03 (H) 0.00 - 0.01 K/uL    NEUTROPHILS 73 32 - 75 %    LYMPHOCYTES 13 12 - 49 %    MONOCYTES 11 5 - 13 %    EOSINOPHILS 1 0 - 7 %    BASOPHILS 1 0 - 1 %    IMMATURE GRANULOCYTES 1 (H) 0.0 - 0.5 %    ABS. NEUTROPHILS 5.9 1.8 - 8.0 K/UL    ABS. LYMPHOCYTES 1.1 0.8 - 3.5 K/UL    ABS. MONOCYTES 0.9 0.0 - 1.0 K/UL    ABS. EOSINOPHILS 0.1 0.0 - 0.4 K/UL    ABS. BASOPHILS 0.1 0.0 - 0.1 K/UL    ABS. IMM. GRANS. 0.1 (H) 0.00 - 0.04 K/UL    DF AUTOMATED            Thank you for consulting [facility] in the care of your patient.

## 2022-10-20 LAB
ALBUMIN SERPL-MCNC: 2.9 G/DL (ref 3.5–5)
ANION GAP SERPL CALC-SCNC: 6 MMOL/L (ref 5–15)
BASOPHILS # BLD: 0.1 K/UL (ref 0–0.1)
BASOPHILS NFR BLD: 1 % (ref 0–1)
BNP SERPL-MCNC: 146 PG/ML
BUN SERPL-MCNC: 25 MG/DL (ref 6–20)
BUN/CREAT SERPL: 21 (ref 12–20)
CALCIUM SERPL-MCNC: 8.5 MG/DL (ref 8.5–10.1)
CHLORIDE SERPL-SCNC: 85 MMOL/L (ref 97–108)
CO2 SERPL-SCNC: 38 MMOL/L (ref 21–32)
COMMENT, HOLDF: NORMAL
CREAT SERPL-MCNC: 1.21 MG/DL (ref 0.55–1.02)
DIFFERENTIAL METHOD BLD: ABNORMAL
EOSINOPHIL # BLD: 0.2 K/UL (ref 0–0.4)
EOSINOPHIL NFR BLD: 1 % (ref 0–7)
ERYTHROCYTE [DISTWIDTH] IN BLOOD BY AUTOMATED COUNT: 16.2 % (ref 11.5–14.5)
GLUCOSE SERPL-MCNC: 86 MG/DL (ref 65–100)
HCT VFR BLD AUTO: 26.5 % (ref 35–47)
HEMOCCULT STL QL: NEGATIVE
HGB BLD-MCNC: 8 G/DL (ref 11.5–16)
IMM GRANULOCYTES # BLD AUTO: 0.1 K/UL (ref 0–0.04)
IMM GRANULOCYTES NFR BLD AUTO: 1 % (ref 0–0.5)
LYMPHOCYTES # BLD: 1.7 K/UL (ref 0.8–3.5)
LYMPHOCYTES NFR BLD: 16 % (ref 12–49)
MCH RBC QN AUTO: 25.2 PG (ref 26–34)
MCHC RBC AUTO-ENTMCNC: 30.2 G/DL (ref 30–36.5)
MCV RBC AUTO: 83.3 FL (ref 80–99)
MONOCYTES # BLD: 1.1 K/UL (ref 0–1)
MONOCYTES NFR BLD: 10 % (ref 5–13)
NEUTS SEG # BLD: 7.8 K/UL (ref 1.8–8)
NEUTS SEG NFR BLD: 71 % (ref 32–75)
NRBC # BLD: 0 K/UL (ref 0–0.01)
NRBC BLD-RTO: 0 PER 100 WBC
PHOSPHATE SERPL-MCNC: 4.4 MG/DL (ref 2.6–4.7)
PLATELET # BLD AUTO: 357 K/UL (ref 150–400)
PMV BLD AUTO: 10.2 FL (ref 8.9–12.9)
POTASSIUM SERPL-SCNC: 4.8 MMOL/L (ref 3.5–5.1)
RBC # BLD AUTO: 3.18 M/UL (ref 3.8–5.2)
SAMPLES BEING HELD,HOLD: NORMAL
SODIUM SERPL-SCNC: 129 MMOL/L (ref 136–145)
WBC # BLD AUTO: 10.9 K/UL (ref 3.6–11)

## 2022-10-20 PROCEDURE — 65270000029 HC RM PRIVATE

## 2022-10-20 PROCEDURE — 97530 THERAPEUTIC ACTIVITIES: CPT

## 2022-10-20 PROCEDURE — 80069 RENAL FUNCTION PANEL: CPT

## 2022-10-20 PROCEDURE — 83880 ASSAY OF NATRIURETIC PEPTIDE: CPT

## 2022-10-20 PROCEDURE — 74011000250 HC RX REV CODE- 250: Performed by: HOSPITALIST

## 2022-10-20 PROCEDURE — 94761 N-INVAS EAR/PLS OXIMETRY MLT: CPT

## 2022-10-20 PROCEDURE — 77010033678 HC OXYGEN DAILY

## 2022-10-20 PROCEDURE — 77030038269 HC DRN EXT URIN PURWCK BARD -A

## 2022-10-20 PROCEDURE — 74011250636 HC RX REV CODE- 250/636: Performed by: HOSPITALIST

## 2022-10-20 PROCEDURE — 74011250637 HC RX REV CODE- 250/637: Performed by: HOSPITALIST

## 2022-10-20 PROCEDURE — 85025 COMPLETE CBC W/AUTO DIFF WBC: CPT

## 2022-10-20 RX ORDER — FAMOTIDINE 10 MG/ML
20 INJECTION INTRAVENOUS DAILY
Status: DISCONTINUED | OUTPATIENT
Start: 2022-10-21 | End: 2022-10-24

## 2022-10-20 RX ADMIN — SODIUM CHLORIDE 1 G: 9 INJECTION INTRAMUSCULAR; INTRAVENOUS; SUBCUTANEOUS at 18:03

## 2022-10-20 RX ADMIN — SODIUM CHLORIDE, PRESERVATIVE FREE 10 ML: 5 INJECTION INTRAVENOUS at 13:49

## 2022-10-20 RX ADMIN — LACTULOSE 30 ML: 20 SOLUTION ORAL at 21:39

## 2022-10-20 RX ADMIN — SODIUM CHLORIDE, PRESERVATIVE FREE 10 ML: 5 INJECTION INTRAVENOUS at 21:40

## 2022-10-20 RX ADMIN — TROSPIUM CHLORIDE 20 MG: 20 TABLET, FILM COATED ORAL at 18:03

## 2022-10-20 RX ADMIN — LEVOTHYROXINE SODIUM 150 MCG: 0.07 TABLET ORAL at 09:55

## 2022-10-20 RX ADMIN — METOPROLOL TARTRATE 12.5 MG: 25 TABLET, FILM COATED ORAL at 18:03

## 2022-10-20 RX ADMIN — LACTULOSE 30 ML: 20 SOLUTION ORAL at 13:48

## 2022-10-20 RX ADMIN — IRON SUCROSE 100 MG: 20 INJECTION, SOLUTION INTRAVENOUS at 09:56

## 2022-10-20 RX ADMIN — TIMOLOL MALEATE 1 DROP: 5 SOLUTION OPHTHALMIC at 10:21

## 2022-10-20 RX ADMIN — DONEPEZIL HYDROCHLORIDE 10 MG: 5 TABLET, FILM COATED ORAL at 21:39

## 2022-10-20 RX ADMIN — SODIUM CHLORIDE, PRESERVATIVE FREE 10 ML: 5 INJECTION INTRAVENOUS at 05:31

## 2022-10-20 RX ADMIN — CETIRIZINE HYDROCHLORIDE 10 MG: 10 TABLET, FILM COATED ORAL at 09:55

## 2022-10-20 RX ADMIN — FAMOTIDINE 20 MG: 10 INJECTION INTRAVENOUS at 09:55

## 2022-10-20 RX ADMIN — VALSARTAN 40 MG: 40 TABLET, FILM COATED ORAL at 21:44

## 2022-10-20 NOTE — PROGRESS NOTES
Problem: Mobility Impaired (Adult and Pediatric)  Goal: *Acute Goals and Plan of Care (Insert Text)  Description: FUNCTIONAL STATUS PRIOR TO ADMISSION: Patient was modified independent using a rolling walker for functional mobility. HOME SUPPORT PRIOR TO ADMISSION: The patient lived with her daughter, spouse and . They report history of 3 falls in the past month, + buckling. Physical Therapy Goals  Initiated 10/19/2022  1. Patient will move from supine to sit and sit to supine , scoot up and down, and roll side to side in bed with minimal assistance/contact guard assist within 7 day(s). 2.  Patient will transfer from bed to chair and chair to bed with moderate assistance  using the least restrictive device within 7 day(s). 3.  Patient will perform sit to stand with minimal assistance/contact guard assist within 7 day(s). 4.  Patient will ambulate with moderate assistance  for 5 feet with the least restrictive device within 7 day(s). Outcome: Progressing Towards Goal   PHYSICAL THERAPY TREATMENT  Patient: Obie Sandoval (1 y.o. female)  Date: 10/20/2022  Diagnosis: Hyponatremia [E87.1] <principal problem not specified>      Precautions: Fall  Chart, physical therapy assessment, plan of care and goals were reviewed. ASSESSMENT  Patient continues with skilled PT services and is minimally progressing towards goals. Patient did not wish to participate today but finally agreeable with encouragement of her personal aide present during session today. Demonstrated improved independence with bed mobility  and slightly improved balance EOB. Came to standing with only MIN A x 2 but again was only able to stand for a max of 5-6 seconds before knees buckle and she sits back uncontrolled on the bed.   Repeated this two more trials with same results, attempting to facilitate blocking the knees and reinforcing her active knee extension but patient not following commands with this and sitting abruptly, declined any further activity and returned to supine. Cont to highly recommend rehab as patient below her baseline, had multiple falls described just like this (knee buckling) prior to admission and is at risk for significant deterioration if d/c's home. Family does not wish for her to go to rehab/SNF. If patient discharges home, she would need 24/7 hands on assistance and would only be safe at bed level. Would need a hospital bed and Andrey Bunde lift and wheelchair, bedside commode. Current Level of Function Impacting Discharge (mobility/balance): MIN A x 2 for bed mob and to stand for < 6 seconds    Other factors to consider for discharge: multiple falls, only one person assistance at home now         PLAN :  Patient continues to benefit from skilled intervention to address the above impairments. Continue treatment per established plan of care. to address goals. Recommendation for discharge: (in order for the patient to meet his/her long term goals)  Therapy up to 5 days/week in SNF setting    This discharge recommendation:  Has been made in collaboration with the attending provider and/or case management    IF patient discharges home will need the following DME: hospital bed, mechanical lift, and wheelchair       SUBJECTIVE:   Patient stated I don't know you.     OBJECTIVE DATA SUMMARY:   Critical Behavior:  Neurologic State: Alert, Confused  Orientation Level: Oriented to person  Cognition: Memory loss, Follows commands     Functional Mobility Training:  Bed Mobility:     Supine to Sit: Minimum assistance;Assist x2  Sit to Supine: Minimum assistance;Assist x2           Transfers:  Sit to Stand: Minimum assistance;Assist x2  Stand to Sit: Minimum assistance;Assist x2                             Balance:  Sitting: Impaired  Sitting - Static: Fair (occasional)  Sitting - Dynamic: Fair (occasional)  Standing: Impaired  Standing - Static: Poor;Constant support  Standing - Dynamic : Poor;Constant support  Ambulation/Gait Training:                                                        Pain Rating:  None reported    Activity Tolerance:   Fair    After treatment patient left in no apparent distress:   Supine in bed, Call bell within reach, Bed / chair alarm activated, and Caregiver / family present    COMMUNICATION/COLLABORATION:   The patients plan of care was discussed with: Occupational therapist and Registered nurse.      Lilia Rosado, PT   Time Calculation: 21 mins

## 2022-10-20 NOTE — PROGRESS NOTES
Problem: Self Care Deficits Care Plan (Adult)  Goal: *Acute Goals and Plan of Care (Insert Text)  Description: FUNCTIONAL STATUS PRIOR TO ADMISSION: At baseline, patient able to ambulate with RW. She requires assistance with some ADL tasks but has daughter, spouse, and caregiver assist as needed. HOME SUPPORT: The patient lived with family. Occupational Therapy Goals  Initiated 10/19/2022  1. Patient will perform self-feeding with supervision/set-up within 7 day(s). 2.  Patient will perform grooming with supervision/set-up within 7 day(s). 3.  Patient will perform toilet transfers with minimal assistance/contact guard assist within 7 day(s). 4.  Patient will perform all aspects of toileting with minimal assistance/contact guard assist within 7 day(s). 5.  Patient will participate in upper extremity therapeutic exercise/activities with supervision/set-up for 5 minutes within 7 day(s). Outcome: Progressing Towards Goal   OCCUPATIONAL THERAPY TREATMENT  Patient: Donovan Eaton (38 y.o. female)  Date: 10/20/2022  Diagnosis: Hyponatremia [E87.1] <principal problem not specified>      Precautions: Fall  Chart, occupational therapy assessment, plan of care, and goals were reviewed. ASSESSMENT    Patient continues with skilled OT services and is minimally progressing towards goals. Patient did not wish to participate today but finally agreeable with encouragement of her personal aide present during session today. Pt sat edge of bed with no initiation towards putting on her shoes and dep to task. Pt performed sit to stand with only MIN A x 2 but  was only able to stand for a max of 5-6 seconds before knees buckle and she sits back uncontrolled descent on the bed. Repeated this two more trials with same results.   Cont to highly recommend rehab as patient below her baseline, had multiple falls described just like this (knee buckling) prior to admission (per talking to personal aide) and is at risk for significant deterioration if d/c's home. Family does not wish for her to go to rehab/SNF. If patient discharges home, she would need 24/7 hands on assistance and would only be safe at bed level. Would need a hospital bed and Georgette Navneet lift and wheelchair, bedside commode. Current Level of Function Impacting Discharge (ADLs): max assist LB bathe/dress, not safe to transfer to [facility]    Other factors to consider for discharge:          PLAN :  Patient continues to benefit from skilled intervention to address the above impairments. Continue treatment per established plan of care to address goals. Recommend with staff: ADl's bed level, there ex, there act    Recommend next OT session: cont towards goals    Recommendation for discharge: (in order for the patient to meet his/her long term goals)  Therapy up to 5 days/week in SNF setting    This discharge recommendation:  Has not yet been discussed the attending provider and/or case management    IF patient discharges home will need the following DME:        SUBJECTIVE:   Patient stated Hospital .    OBJECTIVE DATA SUMMARY:   Cognitive/Behavioral Status:  Neurologic State: Alert;Confused  Orientation Level: Oriented to person  Cognition: Memory loss; Follows commands             Functional Mobility and Transfers for ADLs:  Bed Mobility:  Supine to Sit: Minimum assistance;Assist x2  Sit to Supine: Minimum assistance;Assist x2    Transfers:  Sit to Stand: Minimum assistance;Assist x2          Balance:  Sitting: Impaired  Sitting - Static: Fair (occasional)  Sitting - Dynamic: Fair (occasional)  Standing: Impaired  Standing - Static: Poor;Constant support  Standing - Dynamic : Poor;Constant support    ADL Intervention:       Lower Body Dressing Assistance  Slip on Shoes with Back: Maximum assistance  Position Performed: Seated edge of bed  Cues: Physical assistance;Don;Doff         Activity Tolerance:   Poor    After treatment patient left in no apparent distress: Supine in bed and Call bell within reach    COMMUNICATION/COLLABORATION:   The patients plan of care was discussed with: Physical therapist and Occupational therapist.     FRANKLIN Grigsby  Time Calculation: 18 mins

## 2022-10-20 NOTE — CONSULTS
Tammy Farias, KATYA-C  [phone] cell     Gastroenterology Consultation Note      Admit Date: 10/17/2022  Consult Date: 10/20/2022   I greatly appreciate your asking me to see Jonny Laguerre, thank you very much for the opportunity to participate in her care. Narrative Assessment and Plan   GI consultation for iron deficiency anemia. 42-year-old female admitted with weakness, shortness of breath, increasing confusion. Found to have UTI with GNR. Hemoglobin 6.6 at presentation, stool is heme-negative. Hemoglobin was 11.5 in June and dropped to 9.6 in July. CT without contrast shows diverticulosis and moderate amount of stool in the colon. She has history of PAF and is on Xarelto which is now being held. There are no GI complaints other than her chronic constipation which is managed with prune juice and Metamucil. Her daughter states that she has frequent coughing and gagging which is not associated only with eating and drinking. Her last colonoscopy was many years ago. Discussed EGD and colonoscopy with the patient's daughter and her . I included the prep, risk, and benefits. Her  is extremely resistant and said no to the procedures. The daughter is agreeing to procedures but is deferring to the patient's . I explained that we would not be able to address resuming Xarelto without endoscopic evaluation but her  continues to say no. The earliest the procedures would be able to be done given this resistance is Monday, 10/24/2022, if the endoscopy schedule allows. I will follow-up with him tomorrow and arrange for the prep and procedures for Monday. The other consideration for scheduling her endoscopic procedures is abnormal electrolytes. Anesthesia would likely request that these be corrected prior to sedation. Sodium was 124 at admission, 129 today.   Follow CBC, transfuse as needed  Subjective:     Chief Complaint: ANGELO    History of Present Illness: GI consultation for iron deficiency anemia. 30-year-old female with history of dementia brought in by her family with generalized weakness, worsening shortness of breath, and confusion. Diagnosed with a UTI and has had some improvement. Past medical history includes TYRONE on CPAP and PAF on Xarelto. When looking at labs her hemoglobin was normal in June at 11.5 and dropped to 9.6 in July. At admission it was 6.6. Stool was heme-negative. She has no GI complaints but her daughter says she coughs and gags frequently. No dysphagia when these episodes occur outside of eating or drinking. CT showed diverticulosis and a moderate amount of stool in the colon. She has had a fall but there was no RP hematoma on CT to explain her hemoglobin. She had a colonoscopy many years ago by Dr. Daphne Chandler. There is no family history of CRC or colon polyps. She has history of constipation which is managed at home with daily prune juice and Metamucil. Labs: WBC 10.9, Hgb 8, HCT 26.5, MCV 83.3, platelets 851, sodium 129, potassium 4.8, BUN 25, creatinine 1.21, B12 636, folate 27.7, iron 12, TIBC 406, saturation 3%.     PCP:  Daniel Mcmullen MD    Past Medical History:   Diagnosis Date    Arthritis     Basal cell carcinoma 05/20/2012    Calculus of kidney     Cancer ([facility] [address]     skin    Cancer ([address]     thyroid    Dementia ([facility] [address]     Glaucoma 4/30/2010    Hypertension     OA (osteoarthritis) 4/30/2010    Obesity hypoventilation syndrome (HCC)     Obesity, morbid (HCC)     TYRONE (obstructive sleep apnea)     PAF (paroxysmal atrial fibrillation) ([address]     Psoriasis 4/30/2010        Past Surgical History:   Procedure Laterality Date    HX CATARACT REMOVAL      bilat    HX CHOLECYSTECTOMY      HX CRANIOTOMY      HX DILATION AND CURETTAGE      HX HYSTERECTOMY      HX KNEE ARTHROSCOPY      HX KNEE REPLACEMENT      HX TONSILLECTOMY      HX WRIST FRACTURE TX Left 8/21/15    KS THYROIDECTOMY         Social History Tobacco Use    Smoking status: Former     Packs/day: 0.50     Years: 4.00     Pack years: 2.00     Types: Cigarettes     Quit date: 1961     Years since quittin.8    Smokeless tobacco: Never   Substance Use Topics    Alcohol use: No     Alcohol/week: 0.0 standard drinks        Family History   Problem Relation Age of Onset    Hypertension Mother     Heart Disease Mother     Heart Disease Father     Hypertension Father     Elevated Lipids Father     Heart Disease Brother     Psychiatric Disorder Brother     Diabetes Brother     Breast Cancer Maternal Aunt     Breast Cancer Paternal Aunt         Allergies   Allergen Reactions    Cephalexin Itching    Codeine Rash    Gabapentin Other (comments)    Neomycin Rash    Polysporin [Bacitracin-Polymyxin B] Rash    Protonix [Pantoprazole] Other (comments)     Gi upset            Home Medications:  Prior to Admission Medications   Prescriptions Last Dose Informant Patient Reported? Taking? BETAMETHASONE 2022  Yes Yes   Sig: by Does Not Apply route. Cholecalciferol, Vitamin D3, (VITAMIN D3) 2,000 unit cap capsule 10/17/2022 at 97737  Yes Yes   Sig: Take 2,000 Units by mouth two (2) times a day. Gemtesa 75 mg tab 10/16/2022 at 200  Yes Yes   Sig: Take 75 mg by mouth every evening. OTHER   Yes No   Sig: Move free 2 tabs daily   amLODIPine (NORVASC) 2.5 mg tablet 10/16/2022 at 2200  No Yes   Sig: Take 1 Tablet by mouth daily. amiodarone (CORDARONE) 200 mg tablet 10/17/2022 at 0900  No Yes   Sig: Take 1 Tablet by mouth daily. cetirizine (ZYRTEC) 10 mg tablet 2022  Yes Yes   Sig: Take  by mouth daily as needed for Allergies. donepeziL (ARICEPT) 10 mg tablet 10/16/2022 at 2200  No Yes   Sig: TAKE ONE TABLET BY MOUTH ONCE NIGHTLY   fluticasone propionate (FLONASE NA) Not Taking  Yes No   Sig: by Nasal route as needed.    Patient not taking: Reported on 10/17/2022   levothyroxine (Synthroid) 150 mcg tablet 10/17/2022 at 0600  No Yes   Sig: Take 1 Tablet by mouth Daily (before breakfast). melatonin 3 mg tablet Not Taking  Yes No   Sig: Take  by mouth. Patient not taking: Reported on 10/17/2022   metoprolol tartrate (LOPRESSOR) 25 mg tablet 10/17/2022 at 0900  No Yes   Sig: Take 0.5 Tablets by mouth two (2) times a day. multivit/folic acid/vit K1 (ONE-A-DAY WOMEN'S 50 PLUS PO) 10/16/2022 at 2200  Yes Yes   Sig: Take  by mouth.   polyethylene glycol (MIRALAX) 17 gram packet Not Taking  No No   Sig: Take 1 Packet by mouth daily as needed for Constipation for up to 30 doses. Indications: constipation   Patient not taking: Reported on 10/17/2022   polyethylene glycol (MIRALAX) 17 gram/dose powder   Yes No   Sig: Take 17 g by mouth in the morning. Patient not taking: Reported on 8/23/2022   psyllium (METAMUCIL) pack (sugar free) packet 10/17/2022 at 1200  Yes Yes   Sig: Take 1 Packet by mouth daily. rivaroxaban (Xarelto) 20 mg tab tablet 10/16/2022 at 1700  No Yes   Sig: Take 1 Tablet by mouth daily (with dinner). sodium chloride (Kemal 128) 2 % ophthalmic solution 10/17/2022 at 0900  Yes Yes   Sig: Administer 1 Drop to left eye two (2) times a day. Administer 1 Drop to left eye two (2) times a day. Morning and at night 15 minutes after timolol eye drops   timolol (TIMOPTIC) 0.5 % ophthalmic solution 10/17/2022 at 0900  Yes Yes   Sig: Administer 1 Drop to both eyes daily. In the morning   triamcinolone acetonide (KENALOG) 0.1 % ointment Not Taking  Yes No   Sig: Apply  to affected area two (2) times a day. use thin layer   Patient not taking: Reported on 10/17/2022   triamcinolone acetonide (KENALOG) 0.1 % topical cream Not Taking  Yes No   Sig: Apply 0.1 Packages to affected area two (2) times a day.  Apply to rash under pannus   Patient not taking: Reported on 10/17/2022   valsartan (DIOVAN) 40 mg tablet 10/17/2022 at 0900  No Yes   Sig: TAKE ONE TABLET BY MOUTH TWICE A DAY   vit A/vit C/vit E/zinc/copper (PRESERVISION AREDS PO) 10/17/2022 at 0900  Yes Yes   Sig: Take 2 Tablets by mouth two (2) times a day.       Facility-Administered Medications: None       Hospital Medications:  Current Facility-Administered Medications   Medication Dose Route Frequency    vit C,H-Jc-wczqt-lutein-zeaxan (PRESERVISION AREDS-2) capsule 1 Capsule (Patient Supplied)  1 Capsule Oral BID WITH MEALS    trospium (SANCTURA) tablet 20 mg  20 mg Oral ACB&D    cefTRIAXone (ROCEPHIN) 1 g in 0.9% sodium chloride 10 mL IV syringe  1 g IntraVENous Q24H    iron sucrose (VENOFER) injection 100 mg  100 mg IntraVENous DAILY    psyllium husk (with sugar) (METAMUCIL FIBER) packet 1 Packet (Patient Supplied)  1 Packet Oral DAILY    famotidine (PF) (PEPCID) injection 20 mg  20 mg IntraVENous Q12H    amiodarone (CORDARONE) tablet 200 mg  200 mg Oral DAILY    amLODIPine (NORVASC) tablet 2.5 mg  2.5 mg Oral DAILY    levothyroxine (SYNTHROID) tablet 150 mcg  150 mcg Oral ACB    metoprolol tartrate (LOPRESSOR) tablet 12.5 mg  12.5 mg Oral BID    [Held by provider] rivaroxaban (XARELTO) tablet 20 mg  20 mg Oral DAILY    timolol (TIMOPTIC) 0.5 % ophthalmic solution 1 Drop  1 Drop Both Eyes DAILY    sodium chloride (NS) flush 5-40 mL  5-40 mL IntraVENous Q8H    sodium chloride (NS) flush 5-40 mL  5-40 mL IntraVENous PRN    acetaminophen (TYLENOL) tablet 650 mg  650 mg Oral Q6H PRN    Or    acetaminophen (TYLENOL) suppository 650 mg  650 mg Rectal Q6H PRN    polyethylene glycol (MIRALAX) packet 17 g  17 g Oral DAILY PRN    ondansetron (ZOFRAN ODT) tablet 4 mg  4 mg Oral Q8H PRN    Or    ondansetron (ZOFRAN) injection 4 mg  4 mg IntraVENous Q6H PRN    cetirizine (ZYRTEC) tablet 10 mg  10 mg Oral DAILY    donepeziL (ARICEPT) tablet 10 mg  10 mg Oral QHS    valsartan (DIOVAN) tablet 40 mg  40 mg Oral BID    sodium chloride (BRANDON 128) 2 % ophthalmic drops 1 Drop (Patient Supplied)  1 Drop Left Eye BID    0.9% sodium chloride infusion 250 mL  250 mL IntraVENous PRN    0.9% sodium chloride infusion 250 mL  250 mL IntraVENous PRN       Review of Systems: Unable to obtain detailed ROS due to dementia. Objective:     Physical Exam:  Visit Vitals  /64   Pulse 66   Temp 98.6 °F (37 °C)   Resp 18   Ht 5' 4\" (1.626 m)   Wt 102.1 kg (225 lb)   SpO2 94%   BMI 38.62 kg/m²     SpO2 Readings from Last 6 Encounters:   10/20/22 94%   08/23/22 93%   07/25/22 97%   07/14/22 94%   06/14/22 100%   07/07/21 95%    O2 Flow Rate (L/min): 1 l/min     Intake/Output Summary (Last 24 hours) at 10/20/2022 1129  Last data filed at 10/20/2022 0949  Gross per 24 hour   Intake 736 ml   Output 1600 ml   Net -864 ml      General: no distress, comfortable  Skin:  No rash or palpable dermatologic mass lesions  HEENT: Pupils equal, sclera anicteric, oropharynx with no gross lesions  Cardiovascular: No abnormal audible heart sounds, well perfused, no edema  Respiratory:  No abnormal audible breath sounds, normal respiratory effort, no throacic deformity  GI:  Abdomen nondistended, grimaced with palpation, no mass, no free fluid, no rebound or guarding. Musculoskeletal:  No skeletal deformity nor acute arthritis noted. Neurological:  Motor and sensory function intact in upper extremeties  Psychiatric:  Confused, unable to participate very much in visit.     Laboratory:    Recent Results (from the past 24 hour(s))   RENAL FUNCTION PANEL    Collection Time: 10/19/22 11:57 AM   Result Value Ref Range    Sodium 129 (L) 136 - 145 mmol/L    Potassium 4.1 3.5 - 5.1 mmol/L    Chloride 87 (L) 97 - 108 mmol/L    CO2 37 (H) 21 - 32 mmol/L    Anion gap 5 5 - 15 mmol/L    Glucose 124 (H) 65 - 100 mg/dL    BUN 19 6 - 20 MG/DL    Creatinine 0.93 0.55 - 1.02 MG/DL    BUN/Creatinine ratio 20 12 - 20      eGFR >60 >60 ml/min/1.73m2    Calcium 8.7 8.5 - 10.1 MG/DL    Phosphorus 3.1 2.6 - 4.7 MG/DL    Albumin 3.0 (L) 3.5 - 5.0 g/dL   CORTISOL    Collection Time: 10/19/22 11:57 AM   Result Value Ref Range    Cortisol, random 68.6 ug/dL   METABOLIC PANEL, BASIC Collection Time: 10/19/22  6:21 PM   Result Value Ref Range    Sodium 127 (L) 136 - 145 mmol/L    Potassium 4.8 3.5 - 5.1 mmol/L    Chloride 86 (L) 97 - 108 mmol/L    CO2 37 (H) 21 - 32 mmol/L    Anion gap 4 (L) 5 - 15 mmol/L    Glucose 87 65 - 100 mg/dL    BUN 25 (H) 6 - 20 MG/DL    Creatinine 1.07 (H) 0.55 - 1.02 MG/DL    BUN/Creatinine ratio 23 (H) 12 - 20      eGFR 52 (L) >60 ml/min/1.73m2    Calcium 8.5 8.5 - 10.1 MG/DL   RENAL FUNCTION PANEL    Collection Time: 10/19/22  6:21 PM   Result Value Ref Range    Sodium 129 (L) 136 - 145 mmol/L    Potassium 5.0 3.5 - 5.1 mmol/L    Chloride 86 (L) 97 - 108 mmol/L    CO2 38 (H) 21 - 32 mmol/L    Anion gap 5 5 - 15 mmol/L    Glucose 81 65 - 100 mg/dL    BUN 25 (H) 6 - 20 MG/DL    Creatinine 1.08 (H) 0.55 - 1.02 MG/DL    BUN/Creatinine ratio 23 (H) 12 - 20      eGFR 51 (L) >60 ml/min/1.73m2    Calcium 8.6 8.5 - 10.1 MG/DL    Phosphorus 4.1 2.6 - 4.7 MG/DL    Albumin 3.0 (L) 3.5 - 5.0 g/dL   CBC WITH AUTOMATED DIFF    Collection Time: 10/20/22  2:47 AM   Result Value Ref Range    WBC 10.9 3.6 - 11.0 K/uL    RBC 3.18 (L) 3.80 - 5.20 M/uL    HGB 8.0 (L) 11.5 - 16.0 g/dL    HCT 26.5 (L) 35.0 - 47.0 %    MCV 83.3 80.0 - 99.0 FL    MCH 25.2 (L) 26.0 - 34.0 PG    MCHC 30.2 30.0 - 36.5 g/dL    RDW 16.2 (H) 11.5 - 14.5 %    PLATELET 988 331 - 190 K/uL    MPV 10.2 8.9 - 12.9 FL    NRBC 0.0 0  WBC    ABSOLUTE NRBC 0.00 0.00 - 0.01 K/uL    NEUTROPHILS 71 32 - 75 %    LYMPHOCYTES 16 12 - 49 %    MONOCYTES 10 5 - 13 %    EOSINOPHILS 1 0 - 7 %    BASOPHILS 1 0 - 1 %    IMMATURE GRANULOCYTES 1 (H) 0.0 - 0.5 %    ABS. NEUTROPHILS 7.8 1.8 - 8.0 K/UL    ABS. LYMPHOCYTES 1.7 0.8 - 3.5 K/UL    ABS. MONOCYTES 1.1 (H) 0.0 - 1.0 K/UL    ABS. EOSINOPHILS 0.2 0.0 - 0.4 K/UL    ABS. BASOPHILS 0.1 0.0 - 0.1 K/UL    ABS. IMM.  GRANS. 0.1 (H) 0.00 - 0.04 K/UL    DF AUTOMATED     RENAL FUNCTION PANEL    Collection Time: 10/20/22  2:47 AM   Result Value Ref Range    Sodium 129 (L) 136 - 145 mmol/L    Potassium 4.8 3.5 - 5.1 mmol/L    Chloride 85 (L) 97 - 108 mmol/L    CO2 38 (H) 21 - 32 mmol/L    Anion gap 6 5 - 15 mmol/L    Glucose 86 65 - 100 mg/dL    BUN 25 (H) 6 - 20 MG/DL    Creatinine 1.21 (H) 0.55 - 1.02 MG/DL    BUN/Creatinine ratio 21 (H) 12 - 20      eGFR 44 (L) >60 ml/min/1.73m2    Calcium 8.5 8.5 - 10.1 MG/DL    Phosphorus 4.4 2.6 - 4.7 MG/DL    Albumin 2.9 (L) 3.5 - 5.0 g/dL   SAMPLES BEING HELD    Collection Time: 10/20/22  2:47 AM   Result Value Ref Range    SAMPLES BEING HELD 1PST     COMMENT        Add-on orders for these samples will be processed based on acceptable specimen integrity and analyte stability, which may vary by analyte. NT-PRO BNP    Collection Time: 10/20/22  2:47 AM   Result Value Ref Range    NT pro- <450 PG/ML         Assessment/Plan:     Active Problems:    HTN (hypertension), benign (4/30/2010)      Paroxysmal atrial flutter (HCC) (11/23/2015)      Acute on chronic respiratory failure with hypoxia and hypercapnia ([facility] [address] (6/8/2022)      Dementia ([facility] [address] (6/8/2022)      Hyponatremia (10/17/2022)         See above narrative for full detail. Lis Jean NP  Assuming there is continued absence of consent for endoscopic examination would include oral iron plus proton pump inhibitor on discharge medications for maintenance treatment.       I have interviewed and examined patient with addendum to note above and formulation care plan to reflect my evaluation    Spencer Munson M.D.

## 2022-10-20 NOTE — PROGRESS NOTES
[facility] Dosing Services: 10/20/22  Pepcid dose change per renal P&T protocol  Physician: Dr Severo Holts    Previous Regimen Pepcid 20 mg po BID   Serum Creatinine Lab Results   Component Value Date/Time    Creatinine 1.21 (H) 10/20/2022 02:47 AM    Creatinine (POC) 0.7 01/31/2017 03:04 PM      Creatinine Clearance Estimated Creatinine Clearance: 41 mL/min (A) (based on SCr of 1.21 mg/dL (H)).    BUN Lab Results   Component Value Date/Time    BUN 25 (H) 10/20/2022 02:47 AM       Plan: Changed Pepcid to 20 mg po daily per renal P&T protocol     Thank you  Inez Ford, PharmD  [phone]

## 2022-10-20 NOTE — PROGRESS NOTES
Na stable 129  Cr up slightly and furosemide d/c after last dose yesterday PM  Dr. Rosetta Neville back tomorrow

## 2022-10-20 NOTE — PROGRESS NOTES
7:30PM  Bedside and Verbal shift change report given to Lola (oncoming nurse) by Elias Christianson (offgoing nurse). Report included the following information SBAR, Intake/Output, MAR, Recent Results, and Cardiac Rhythm sinus .

## 2022-10-20 NOTE — NURSE NAVIGATOR
Chart reviewed by Heart Failure Nurse Navigator. Heart Failure database completed. Patient sent to ED for abnormal labs and more confusion from baseline dementia. She is admitted with hyponatremia,  multifactorial respiratory failure, and anemia requiring transfusion. She has PMH of TYRONE on home trilogy,  obesity hypoventilation syndrome with CO2 retention, paroxysmal atrial fib, HTN, and hypothyroidism. Gastroeneterology and Nephrology are consulted. EF:  55 to 60% with LV size normal, moderate basal septal thickening, normal wall motion, RA mildly dilated. ACEi/ARB/ARNi: **    BB: Metoprolol tartrate 12.5 mg BID    Aldosterone Antagonist: **    Obstructive Sleep Apnea Screening:   Referred to Sleep Medicine:     CRT **. NYHA Functional Class **. Heart Failure Teach Back in Patient Education. Heart Failure Avoiding Triggers on Discharge Instructions. Cardiologist: Dr Stallworth Short discharge follow up phone call to be made within 48-72 hours of discharge.

## 2022-10-20 NOTE — PROGRESS NOTES
RRT evaluation:    MEWS 1  Sepsis Score 1  Deterioration Index 61    Spoke with primary RN regarding pt status. Pt resting comfortably in bed. No further interventions at this time.      Petra Tamayo RN

## 2022-10-20 NOTE — PROGRESS NOTES
10/20/2022  3:11 PM  Care Management Assessment      Reason for Admission: Emergency -       ICD-10-CM ICD-9-CM    1. Hypoxia  R09.02 799.02       2. Anemia, unspecified type  D64.9 285.9       3. Hyponatremia  E87.1 276.1           Assessment:   []In person with pt   []Via p/c with pt   []With family member in person. Who/Relation:     [x]With family member via p/c. Who/Relation:   []Chart Review    RUR: 16%  Risk Level: []Low [x]Moderate []High  Value-based purchasing: [x] Yes [] No    Advance Directive: DNR. [x] No AD on file. [] AD on file. [] Current AD not on file. Copy requested. [] Requests AD, and referral submitted to [facility]. Healthcare Decision Maker:   Primary Decision Maker: Lo Riedr Spouse - [phone]        Assessment:    Age: 79 y.o. Sex: [] Male [x]Female     Residency: [x]Private residence []Apartment []Assisted Living []LTC []Other:   Exterior Steps: 2  Interior Steps: 0    Lives With: [x]With spouse [x]Other family members (DTR - Rosio) []Underage children []Alone []Care provider []Other:    Prior functioning:  []Independent with ADLs and iADLS []Dependent with ADLs and iADLs [x]Partial dependence, Specify: Pt can feed herself. Pt's aide and DTR assists with other ADLs. *Pt has an aide through [employer] who stays with pt when pt's DTR is working. Hours are typically 8:45 AM - 6:30 PM.     Cognition: []Intact [x]Some spheres some of the time. []Some spheres all of the time. []All spheres all of the time. Prior transportation method: []Self []Spouse [x]Other family members []Medicaid transport []Other:     Prior DME required:  []None [x]RW (typically) []Cane []Crutches [x]Bedside commode (Pt's DTR expressed intent to purchase a BSC if needed). []CPAP []Home O2 (Liter/Provider: ) []Nebulizer   []Shower Chair []Wheelchair []Hospital Bed []Shana []Stair lift []Rollator [x]Other: Trilegy and Home O2 (as needed). DTR doesn't know the provider.      Rehab history: []None []Outpatient PT [x]Home Health (Provider/Date: [facility] / 06/2022) [x]SNF (Provider/Date: [facility] / 06/2022) []IPR (Provider/Date: ) []LTC (Provider/Date: ) []Hospice (Provider/Date: )  []Other:     Covid vaccination status:   [] Yes, Type:  [] Booster 1 [] Booster 2  [] No  [x] N/A / Not asked    Insurer:   Insurance Information                  [employer] Phone: [phone]    Subscriber: Aura Ambrosio Subscriber#: [id number]    Group#: -- Precert#: --        BLUE CROSS/[facility] Phone: --    SubscriberMnisha Rhodes Subscriber#: [id number]    Group#: [id number] Precert#: --            PCP: Fabi Silver III   Address: [address] [place] [address] / [facility] [address] [address]   Phone number: [phone]   Current patient: [x]Yes []No   Approximate date of last visit: 07/25/2022   Access to virtual PCP visits: []Yes [x]No     Financial concerns/barriers: []Yes, explain: [x]No []Unknown/Not discussed    Pharmacy: [address] [place] [address] Transport: TBD     Discharge Concerns: [x]Yes []No []Unknown   Describe: Pt's family does not want pt going to rehab. Pt's family requests pt return home with [facility]. Comments:           Transition of care plan:    []Unable to determine at this time. Awaiting clinical progress, and disposition recommendations. [] Home with family assistance as needed, and outpatient follow-up. [] Home with Outpatient PT and outpatient follow-up   Pt aware of OP appt? []Yes, Provider:   []Not scheduled   Transport provider:     [x] Home with Home Health   - Provider: [place] [facility] offered. Pt preference indicated as [facility]. Order needed.      []SNF/IPR   -[]Preferences given:   []Listing provided and preferences requested   -Status: []Pending []Accepted:    -Auth required: []Yes []No    -Auth initiated date:   -3 midnight stay required: []Yes []No  Date satisfied:     [] LTC:     [] Home with Hospice   -Provider:     [] [facility] information provided. [] Other:     Aden Madden MA    Care Management Interventions  PCP Verified by CM: Yes  Mode of Transport at Discharge:  Other (see comment)  MyChart Signup: No  Discharge Durable Medical Equipment: No  Physical Therapy Consult: Yes  Occupational Therapy Consult: Yes  Speech Therapy Consult: No  Support Systems: Spouse/Significant Other, Child(brooklynn)  Confirm Follow Up Transport: Family  The Plan for Transition of Care is Related to the Following Treatment Goals : Hyponatremia  The Patient and/or Patient Representative was Provided with a Choice of Provider and Agrees with the Discharge Plan?: (S) Yes  Name of the Patient Representative Who was Provided with a Choice of Provider and Agrees with the Discharge Plan: Self  Freedom of Choice List was Provided with Basic Dialogue that Supports the Patient's Individualized Plan of Care/Goals, Treatment Preferences and Shares the Quality Data Associated with the Providers?: (S) Yes  Discharge Location  Patient Expects to be Discharged to[de-identified] Home with home health

## 2022-10-20 NOTE — PROGRESS NOTES
[facility]  [address], [facility] [address]  [phone]         Hospitalist Progress Note        NAME:  Anselmo Cruz   :  1940   MRN:  [id number]    Date/Time:  10/20/2022     Patient PCP:  Daniel Mcmullen MD    Emergency Contact:    Extended Emergency Contact Information  Primary Emergency Contact: Butch Morales   [address] Phone: [phone]  Mobile Phone: [phone]  Relation: Spouse  Secondary Emergency Contact: [address]  Mobile Phone: [phone]  Relation: Child      Code: DNR     Isolation Precautions: There are currently no Active Isolations        Subjective:     REASON FOR VISIT:  Recheck hyponatremia and  anemia    HPI & INTERVAL HISTORY:     Patient was seen and examined. She appears to be more alert and interactive today. Not sure if she is back at her baseline but it does not appear so as of yet. Did not have allergic reaction to Rocephin. Sodium remains at 129. Was seen by GI who would like to proceed with EGD and colonoscopy but family is hesitant. ALLERGIES  Allergies   Allergen Reactions    Cephalexin Itching    Codeine Rash    Gabapentin Other (comments)    Neomycin Rash    Polysporin [Bacitracin-Polymyxin B] Rash    Protonix [Pantoprazole] Other (comments)     Gi upset         ROS:  Gen:   generalized weakness  Resp:  negative  CVS:   negative  GI:       constipation and denies abdominal pain           Objective:      Visit Vitals  /61 (BP 1 Location: Left upper arm, BP Patient Position: At rest)   Pulse 65   Temp 97.9 °F (36.6 °C)   Resp 18   Ht 5' 4\" (1.626 m)   Wt 102.1 kg (225 lb)   SpO2 96%   BMI 38.62 kg/m²       Physical Exam:  General: Pleasantly confused but in no distress. Improved compared to yesterday.   Head: Normocephalic, without obvious abnormality, atraumatic  Eyes: anicteric sclerae and conjuntiva clear  ENT: lips, mucosa, and tongue normal  Neck: normal, supple, and no tenderness  Lungs: CTA and normal respiratory effort  Heart: S1, S2 normal, regular rate, and regular rhythm  Abd: not distended, soft, nontender, BS present and normactive  Ext: no cyanosis and no edema  Skin:  Rash BLE d/t psoriasis  chronic  Neuro:  Alert and oreinted to self only.    Psych: not anxious, cooperative, appropriate affect      Medications:  Current Facility-Administered Medications   Medication Dose Route Frequency    trospium (SANCTURA) tablet 20 mg  20 mg Oral ACB&D    cefTRIAXone (ROCEPHIN) 1 g in 0.9% sodium chloride 10 mL IV syringe  1 g IntraVENous Q24H    iron sucrose (VENOFER) injection 100 mg  100 mg IntraVENous DAILY    psyllium husk (with sugar) (METAMUCIL FIBER) packet 1 Packet (Patient Supplied)  1 Packet Oral DAILY    vit C,M-Ah-vegfa-lutein-zeaxan (PRESERVISION AREDS-2) capsule 1 Capsule (Patient Supplied)  1 Capsule Oral DAILY    famotidine (PF) (PEPCID) injection 20 mg  20 mg IntraVENous Q12H    amiodarone (CORDARONE) tablet 200 mg  200 mg Oral DAILY    amLODIPine (NORVASC) tablet 2.5 mg  2.5 mg Oral DAILY    levothyroxine (SYNTHROID) tablet 150 mcg  150 mcg Oral ACB    metoprolol tartrate (LOPRESSOR) tablet 12.5 mg  12.5 mg Oral BID    [Held by provider] rivaroxaban (XARELTO) tablet 20 mg  20 mg Oral DAILY    timolol (TIMOPTIC) 0.5 % ophthalmic solution 1 Drop  1 Drop Both Eyes DAILY    sodium chloride (NS) flush 5-40 mL  5-40 mL IntraVENous Q8H    sodium chloride (NS) flush 5-40 mL  5-40 mL IntraVENous PRN    acetaminophen (TYLENOL) tablet 650 mg  650 mg Oral Q6H PRN    Or    acetaminophen (TYLENOL) suppository 650 mg  650 mg Rectal Q6H PRN    polyethylene glycol (MIRALAX) packet 17 g  17 g Oral DAILY PRN    ondansetron (ZOFRAN ODT) tablet 4 mg  4 mg Oral Q8H PRN    Or    ondansetron (ZOFRAN) injection 4 mg  4 mg IntraVENous Q6H PRN    furosemide (LASIX) injection 40 mg  40 mg IntraVENous BID    cetirizine (ZYRTEC) tablet 10 mg  10 mg Oral DAILY    donepeziL (ARICEPT) tablet 10 mg 10 mg Oral QHS    valsartan (DIOVAN) tablet 40 mg  40 mg Oral BID    sodium chloride (BRANDON 128) 2 % ophthalmic drops 1 Drop (Patient Supplied)  1 Drop Left Eye BID    0.9% sodium chloride infusion 250 mL  250 mL IntraVENous PRN    0.9% sodium chloride infusion 250 mL  250 mL IntraVENous PRN        Labs:  Recent Labs     10/20/22  0247   WBC 10.9   HGB 8.0*   HCT 26.5*          Recent Labs     10/20/22  0247 10/19/22  0923 10/19/22  0442   *   < > 129*   K 4.8   < > 4.3   CL 85*   < > 87*   CO2 38*   < > 38*   GLU 86   < > 79   BUN 25*   < > 21*   CREA 1.21*   < > 0.85   CA 8.5   < > 8.8   PHOS 4.4   < > 3.7   ALB 2.9*   < > 3.0*   TBILI  --   --  0.6   ALT  --   --  22    < > = values in this interval not displayed. Radiology:  No results found. I personally reviewed and interpreted the imaging studies and agree with official reading    The labs, imaging studies, and medications was reviewed by me on: October 20, 2022         Assessment/Plan:      Acute urinary tract infection with GNR  Cont. CTX IV along with Zyrtec. If it causes allergic reaction consider Cipro  Cultures no significant change growing GNR greater than 100,000 colonies thus far  Supportive care    Acute on chronic respiratory failure with hypoxia and hypercapnia possibly due to altered mental status and fluid overload acute CHF  Continue with Lasix but hold next dose pending BMP  Supplemental oxygen as needed  Continue with home cardiac medications -restart losartan  Beta-blocker ordered but held due to heart rate  Fluid restrict and low Na diet  Weights and I&Os    Iron deficiency anemia likely from GIB in setting of xarelto with requirement of PRBC transfusion  STABLE   Likely from GI bleed however Hemoccult was negative  H2B IV instead of PPI due to allergy  IV iron today   Holding Xarelto  Monitor labs   Consulted GI who would like to proceed with endoscopy however family somewhat hesitant at this time. Hyponatremia - ? SIADH  No significant change  Monitor BMP closely  Urine and serum osmolality  Fluid restriction  Nephrology note reviewed (work-up and consider tovalpran or urea)    Acute metabolic encephalopathy POA atop of baseline dementia  Likely UTI main cause  Continue treating issues above  Continue home medications    Paroxysmal atrial fibrillation  Continue with home medications except Xarelto due to the anemia    Body mass index is 38.62 kg/m².:  30.0 - 39.9:  Obese        Risk of deterioration: high      Discussed:  Pt's condition, Imaging findings, Lab findings, Assessment, and Care Plan discussed with: Patient, Spouse at bedside , Family at bedsude, and RN    Prophylaxis:  SCD's    [facility] disposition:  [facility]      Total time: 50 minutes **I personally saw and examined the patient during this time period**       Date of service:    10/20/2022                ___________________________________________________    Admitting Physician: Jocelin Duffy MD

## 2022-10-21 ENCOUNTER — TELEPHONE (OUTPATIENT)
Dept: INTERNAL MEDICINE CLINIC | Age: 82
End: 2022-10-21

## 2022-10-21 LAB
ALBUMIN SERPL-MCNC: 2.8 G/DL (ref 3.5–5)
ANION GAP SERPL CALC-SCNC: 7 MMOL/L (ref 5–15)
BACTERIA SPEC CULT: ABNORMAL
BASOPHILS # BLD: 0.1 K/UL (ref 0–0.1)
BASOPHILS NFR BLD: 1 % (ref 0–1)
BUN SERPL-MCNC: 38 MG/DL (ref 6–20)
BUN/CREAT SERPL: 24 (ref 12–20)
CALCIUM SERPL-MCNC: 8.8 MG/DL (ref 8.5–10.1)
CC UR VC: ABNORMAL
CHLORIDE SERPL-SCNC: 86 MMOL/L (ref 97–108)
CO2 SERPL-SCNC: 37 MMOL/L (ref 21–32)
COMMENT, HOLDF: NORMAL
CREAT SERPL-MCNC: 1.58 MG/DL (ref 0.55–1.02)
DIFFERENTIAL METHOD BLD: ABNORMAL
EOSINOPHIL # BLD: 0.1 K/UL (ref 0–0.4)
EOSINOPHIL NFR BLD: 1 % (ref 0–7)
ERYTHROCYTE [DISTWIDTH] IN BLOOD BY AUTOMATED COUNT: 16.7 % (ref 11.5–14.5)
GLUCOSE SERPL-MCNC: 92 MG/DL (ref 65–100)
HCT VFR BLD AUTO: 25.3 % (ref 35–47)
HGB BLD-MCNC: 7.5 G/DL (ref 11.5–16)
IMM GRANULOCYTES # BLD AUTO: 0 K/UL (ref 0–0.04)
IMM GRANULOCYTES NFR BLD AUTO: 0 % (ref 0–0.5)
LYMPHOCYTES # BLD: 1.5 K/UL (ref 0.8–3.5)
LYMPHOCYTES NFR BLD: 14 % (ref 12–49)
MCH RBC QN AUTO: 24.9 PG (ref 26–34)
MCHC RBC AUTO-ENTMCNC: 29.6 G/DL (ref 30–36.5)
MCV RBC AUTO: 84.1 FL (ref 80–99)
MONOCYTES # BLD: 1.1 K/UL (ref 0–1)
MONOCYTES NFR BLD: 11 % (ref 5–13)
NEUTS SEG # BLD: 7.5 K/UL (ref 1.8–8)
NEUTS SEG NFR BLD: 73 % (ref 32–75)
NRBC # BLD: 0 K/UL (ref 0–0.01)
NRBC BLD-RTO: 0 PER 100 WBC
PHOSPHATE SERPL-MCNC: 5.8 MG/DL (ref 2.6–4.7)
PLATELET # BLD AUTO: 346 K/UL (ref 150–400)
PMV BLD AUTO: 10.4 FL (ref 8.9–12.9)
POTASSIUM SERPL-SCNC: 4.6 MMOL/L (ref 3.5–5.1)
RBC # BLD AUTO: 3.01 M/UL (ref 3.8–5.2)
SAMPLES BEING HELD,HOLD: NORMAL
SERVICE CMNT-IMP: ABNORMAL
SODIUM SERPL-SCNC: 130 MMOL/L (ref 136–145)
WBC # BLD AUTO: 10.3 K/UL (ref 3.6–11)

## 2022-10-21 PROCEDURE — 74011250637 HC RX REV CODE- 250/637: Performed by: HOSPITALIST

## 2022-10-21 PROCEDURE — 80069 RENAL FUNCTION PANEL: CPT

## 2022-10-21 PROCEDURE — 74011250636 HC RX REV CODE- 250/636: Performed by: HOSPITALIST

## 2022-10-21 PROCEDURE — 85025 COMPLETE CBC W/AUTO DIFF WBC: CPT

## 2022-10-21 PROCEDURE — 2709999900 HC NON-CHARGEABLE SUPPLY

## 2022-10-21 PROCEDURE — 77030020847 HC STATLOK BARD -A

## 2022-10-21 PROCEDURE — 36415 COLL VENOUS BLD VENIPUNCTURE: CPT

## 2022-10-21 PROCEDURE — 65270000029 HC RM PRIVATE

## 2022-10-21 PROCEDURE — 74011000250 HC RX REV CODE- 250: Performed by: HOSPITALIST

## 2022-10-21 PROCEDURE — 97530 THERAPEUTIC ACTIVITIES: CPT

## 2022-10-21 PROCEDURE — 77010033678 HC OXYGEN DAILY

## 2022-10-21 PROCEDURE — 74011250636 HC RX REV CODE- 250/636: Performed by: INTERNAL MEDICINE

## 2022-10-21 PROCEDURE — 94761 N-INVAS EAR/PLS OXIMETRY MLT: CPT

## 2022-10-21 RX ORDER — SODIUM CHLORIDE 9 MG/ML
75 INJECTION, SOLUTION INTRAVENOUS CONTINUOUS
Status: DISPENSED | OUTPATIENT
Start: 2022-10-21 | End: 2022-10-21

## 2022-10-21 RX ADMIN — SODIUM CHLORIDE 1 G: 9 INJECTION INTRAMUSCULAR; INTRAVENOUS; SUBCUTANEOUS at 17:29

## 2022-10-21 RX ADMIN — LACTULOSE 30 ML: 20 SOLUTION ORAL at 17:29

## 2022-10-21 RX ADMIN — LEVOTHYROXINE SODIUM 150 MCG: 0.07 TABLET ORAL at 10:00

## 2022-10-21 RX ADMIN — SODIUM CHLORIDE, PRESERVATIVE FREE 10 ML: 5 INJECTION INTRAVENOUS at 17:30

## 2022-10-21 RX ADMIN — SODIUM CHLORIDE, PRESERVATIVE FREE 10 ML: 5 INJECTION INTRAVENOUS at 23:45

## 2022-10-21 RX ADMIN — METOPROLOL TARTRATE 12.5 MG: 25 TABLET, FILM COATED ORAL at 17:30

## 2022-10-21 RX ADMIN — AMLODIPINE BESYLATE 2.5 MG: 2.5 TABLET ORAL at 09:59

## 2022-10-21 RX ADMIN — SODIUM CHLORIDE, PRESERVATIVE FREE 10 ML: 5 INJECTION INTRAVENOUS at 05:32

## 2022-10-21 RX ADMIN — CETIRIZINE HYDROCHLORIDE 10 MG: 10 TABLET, FILM COATED ORAL at 09:59

## 2022-10-21 RX ADMIN — LACTULOSE 30 ML: 20 SOLUTION ORAL at 10:00

## 2022-10-21 RX ADMIN — SODIUM CHLORIDE 75 ML/HR: 9 INJECTION, SOLUTION INTRAVENOUS at 17:29

## 2022-10-21 RX ADMIN — TIMOLOL MALEATE 1 DROP: 5 SOLUTION OPHTHALMIC at 10:31

## 2022-10-21 RX ADMIN — METOPROLOL TARTRATE 12.5 MG: 25 TABLET, FILM COATED ORAL at 10:00

## 2022-10-21 RX ADMIN — AMIODARONE HYDROCHLORIDE 200 MG: 200 TABLET ORAL at 09:59

## 2022-10-21 NOTE — TELEPHONE ENCOUNTER
Reason for call:  Spoke  with Andie Hanks [phone] from [facility]   They would like to know if Tu Hunter will be  the one who will  signed the Pt order.   Is this a new problem: yes     Date of last appointment:  7/25/2022     Can we respond via [employer]: no    Best call back number: Andie Hanks [phone]       [address] [phone]

## 2022-10-21 NOTE — PROGRESS NOTES
Problem: Mobility Impaired (Adult and Pediatric)  Goal: *Acute Goals and Plan of Care (Insert Text)  Description: FUNCTIONAL STATUS PRIOR TO ADMISSION: Patient was modified independent using a rolling walker for functional mobility. HOME SUPPORT PRIOR TO ADMISSION: The patient lived with her daughter, spouse and . They report history of 3 falls in the past month, + buckling. Physical Therapy Goals  Initiated 10/19/2022  1. Patient will move from supine to sit and sit to supine , scoot up and down, and roll side to side in bed with minimal assistance/contact guard assist within 7 day(s). 2.  Patient will transfer from bed to chair and chair to bed with moderate assistance  using the least restrictive device within 7 day(s). 3.  Patient will perform sit to stand with minimal assistance/contact guard assist within 7 day(s). 4.  Patient will ambulate with moderate assistance  for 5 feet with the least restrictive device within 7 day(s). Note:   PHYSICAL THERAPY TREATMENT  Patient: Shekhar Casey (9 y.o. female)  Date: 10/21/2022  Diagnosis: Hyponatremia [E87.1] <principal problem not specified>      Precautions: Fall  Chart, physical therapy assessment, plan of care and goals were reviewed. ASSESSMENT  Patient continues with skilled PT services and is not progressing towards goals. 80 yo patient with persistent AMS and following ~ 25% of verbal cues today with efficiency. Tx completed today ~ 1515 and unfortunately dgt and family not present. Nurse relayed dgt had questions regarding in house therapy services. Patient with 1:1 supervision and assistance of her home care full time staff member who relays patient was conversant and Indepen amb with RW prior to several months ago. Patient did not verbalize most of session and appears with increased WOB at rest as well as during activity. Increased from 1 L to 2L O2NC to recover to 91%. Otherwise activity on RA 80%.   Patient is far below her reported baseline per caregiver and AMS not improving. Recommend home with 24/7 care and supervision if remains unchanged and likely to need bariatric W/C, [facility] Lift, Hospital bed, BSC at d/c. Family is opposed to SNF for rehab again. Current Level of Function Impacting Discharge (mobility/balance): Max x 2 for bed mob, sit balance and attempted sit<>stand  Patient supported with rolling in bed, sitting edge of bed for prolonged period working on sitting balance and change of bed linens with attempted stance 4x. Patient needing max x 2 and able to clear her buttocks but frequent knee buckling and uncontrolled decent back on to bed. Will drift and lean completely posterior falling backward on bed if not physically supported. Documentation for home O2:     ROOM AIR    AT REST   O2 SATS  90 HR     ROOM AIR WITH ACTIVITY 02 SATS  80 HR     (1 then 2L    ) LITERS OF O2 WITH ACTIVITY O2 SATS  89 HR     (2    )LITERS OF 02 PATIENT LEFT COMFORTABLY  SITTING/SUPINE 02 SATS  92 HR          Other factors to consider for discharge: dementia, baseline level of function, advanced age         PLAN :  Patient continues to benefit from skilled intervention to address the above impairments. Continue treatment per established plan of care. to address goals.     Recommendation for discharge: (in order for the patient to meet his/her long term goals)  Therapy up to 5 days/week in SNF setting or HHPT 2x week for family edu if status unchanged; 24/7 1:1 or 1:2 dependent care     This discharge recommendation:  Has not yet been discussed the attending provider and/or case management    IF patient discharges home will need the following DME: bedside commode, hospital bed, mechanical lift, wheelchair, and sliding/transfer board       SUBJECTIVE:   Patient stated .    OBJECTIVE DATA SUMMARY:   Critical Behavior:  Neurologic State: Alert, Confused  Orientation Level: Oriented to person  Cognition: Memory loss, Follows commands     Functional Mobility Training:  Bed Mobility:  Rolling: Maximum assistance;Assist x2; Additional time  Supine to Sit: Maximum assistance; Additional time;Assist x2;Bed Modified  Sit to Supine: Total assistance;Assist x2; Additional time;Bed Modified  Scooting: Maximum assistance;Assist x2; Additional time        Transfers:  Sit to Stand: Maximum assistance;Assist x2; Additional time  Stand to Sit: Maximum assistance;Assist x2; Additional time        Bed to Chair:  (unsafe unless kenan transfer)    Balance:  Sitting: Impaired  Sitting - Static: Occassional;Poor (constant support)  Sitting - Dynamic: Poor (constant support)  Standing: Impaired  Standing - Static: Constant support;Poor  Standing - Dynamic : Constant support;Poor    Ambulation/Gait Training:  Gait Description (WDL):  (unable at this time)    Pain Rating:  None reported    Activity Tolerance:   Poor, desaturates with exertion and requires oxygen, requires frequent rest breaks, and observed SOB with activity    After treatment patient left in no apparent distress:   Supine in bed, Call bell within reach, Bed / chair alarm activated, Caregiver / family present, and Side rails x 3    COMMUNICATION/COLLABORATION:   The patients plan of care was discussed with: Registered nurse and Rehabilitation technician.      Reji Jackson, PT, DPT   Time Calculation: 39 mins

## 2022-10-21 NOTE — PROGRESS NOTES
[facility]  [address] [address]  [phone]    Hospitalist Progress Note      NAME: Ruddy Mace   :  1940  MRM:  [id number]    Date/Time of service 10/21/2022  1:03 PM          Assessment and Plan:     Acute urinary tract infection - POA, so far GNR on culture. For now continue empiric ceftriaxone. Acute on chronic respiratory failure with hypoxia and hypercapnia - POA, likely due to decreased respiratory drive from altered mental status. I now doubt it was fluid overload, ECHO does not support Dx of CHF, and lasix led to RAN. Stop lasix    Acute metabolic encephalopathy / dementia - POA, worse than baseline due to hypoNA, and UTI, and just progressive decline of dementia. Continue donepazil    Hyponatremia - POA, mild and stable. Unclear etiology. Consulted renal. May be SIADH. Fluid restriction led to RAN. They may try tolvaptan or urea    Hypertension - Stop losartan due to RAN and low BP. Stop Norvasc due to low BP. Iron deficiency anemia - POA and likely from GIB in setting of xarelto. However, hemoccult negative. Tolerated PRBC transfusion. Continue pepcid IV, getting IV iron. Holding xarelto. GI and family are considering endoscopy. I would rather recommend stopping xarelto altogether. Paroxysmal atrial fibrillation - Continue amiodarone, and low dose metoprolol, she has a low pulse. Holding Xarelto due to anemia. Constipation - continue lactulose    Hypothyroidism - Continue synthroid. Normal TSH    Glaucoma - Continue timolol     Obese - Advise weight loss    Bladder spasms - Stop sanctura       Subjective:     Chief Complaint:  weak, less alert    ROS:  (bold if positive, if negative)    Tolerating minimal PT  Tolerating minimal Diet        Objective:     Last 24hrs VS reviewed since prior progress note.  Most recent are:    Visit Vitals  /62 (BP 1 Location: Right lower arm, BP Patient Position: At rest) Pulse 64   Temp 98.7 °F (37.1 °C)   Resp 14   Ht 5' 4\" (1.626 m)   Wt 102.1 kg (225 lb)   SpO2 97%   BMI 38.62 kg/m²     SpO2 Readings from Last 6 Encounters:   10/21/22 97%   08/23/22 93%   07/25/22 97%   07/14/22 94%   06/14/22 100%   07/07/21 95%    O2 Flow Rate (L/min): 1.5 l/min     Intake/Output Summary (Last 24 hours) at 10/21/2022 0848  Last data filed at 10/21/2022 7883  Gross per 24 hour   Intake 800 ml   Output 1200 ml   Net -400 ml        Physical Exam:    Gen:  Obese, in no acute distress  HEENT:  Pink conjunctivae, PERRL, hearing intact to voice, moist mucous membranes  Neck:  Supple, without masses, thyroid non-tender  Resp:  No accessory muscle use, clear breath sounds without wheezes rales or rhonchi  Card:  No murmurs, normal S1, S2 without thrills, bruits or peripheral edema  Abd:  Soft, non-tender, non-distended, normoactive bowel sounds are present, no mass  Lymph:  No cervical or inguinal adenopathy  Musc:  No cyanosis or clubbing  Skin:  No rashes or ulcers, skin turgor is good  Neuro:  Cranial nerves are grossly intact, no focal motor weakness, follows commands vaguely  Psych:  Poor insight, oriented to person    Telemetry reviewed:   normal sinus rhythm  __________________________________________________________________  Medications Reviewed: (see below)  Medications:     Current Facility-Administered Medications   Medication Dose Route Frequency    vit C,X-Vk-fpbco-lutein-zeaxan (PRESERVISION AREDS-2) capsule 1 Capsule (Patient Supplied)  1 Capsule Oral BID WITH MEALS    lactulose (CHRONULAC) 10 gram/15 mL solution 30 mL  20 g Oral TID    famotidine (PF) (PEPCID) injection 20 mg  20 mg IntraVENous DAILY    trospium (SANCTURA) tablet 20 mg  20 mg Oral ACB&D    cefTRIAXone (ROCEPHIN) 1 g in 0.9% sodium chloride 10 mL IV syringe  1 g IntraVENous Q24H    iron sucrose (VENOFER) injection 100 mg  100 mg IntraVENous DAILY    psyllium husk (with sugar) (METAMUCIL FIBER) packet 1 Packet (Patient Supplied)  1 Packet Oral DAILY    amiodarone (CORDARONE) tablet 200 mg  200 mg Oral DAILY    amLODIPine (NORVASC) tablet 2.5 mg  2.5 mg Oral DAILY    levothyroxine (SYNTHROID) tablet 150 mcg  150 mcg Oral ACB    metoprolol tartrate (LOPRESSOR) tablet 12.5 mg  12.5 mg Oral BID    [Held by provider] rivaroxaban (XARELTO) tablet 20 mg  20 mg Oral DAILY    timolol (TIMOPTIC) 0.5 % ophthalmic solution 1 Drop  1 Drop Both Eyes DAILY    sodium chloride (NS) flush 5-40 mL  5-40 mL IntraVENous Q8H    sodium chloride (NS) flush 5-40 mL  5-40 mL IntraVENous PRN    acetaminophen (TYLENOL) tablet 650 mg  650 mg Oral Q6H PRN    Or    acetaminophen (TYLENOL) suppository 650 mg  650 mg Rectal Q6H PRN    polyethylene glycol (MIRALAX) packet 17 g  17 g Oral DAILY PRN    ondansetron (ZOFRAN ODT) tablet 4 mg  4 mg Oral Q8H PRN    Or    ondansetron (ZOFRAN) injection 4 mg  4 mg IntraVENous Q6H PRN    cetirizine (ZYRTEC) tablet 10 mg  10 mg Oral DAILY    donepeziL (ARICEPT) tablet 10 mg  10 mg Oral QHS    [Held by provider] valsartan (DIOVAN) tablet 40 mg  40 mg Oral BID    sodium chloride (BRANDON 128) 2 % ophthalmic drops 1 Drop (Patient Supplied)  1 Drop Left Eye BID    0.9% sodium chloride infusion 250 mL  250 mL IntraVENous PRN    0.9% sodium chloride infusion 250 mL  250 mL IntraVENous PRN        Lab Data Reviewed: (see below)  Lab Review:     Recent Labs     10/21/22  0312 10/20/22  0247 10/19/22  0923   WBC 10.3 10.9 8.1   HGB 7.5* 8.0* 8.0*   HCT 25.3* 26.5* 26.4*    357 338     Recent Labs     10/21/22  0312 10/20/22  0247 10/19/22  1821 10/19/22  0923 10/19/22  0442   * 129* 129*  127*   < > 129*   K 4.6 4.8 5.0  4.8   < > 4.3   CL 86* 85* 86*  86*   < > 87*   CO2 37* 38* 38*  37*   < > 38*   GLU 92 86 81  87   < > 79   BUN 38* 25* 25*  25*   < > 21*   CREA 1.58* 1.21* 1.08*  1.07*   < > 0.85   CA 8.8 8.5 8.6  8.5   < > 8.8   PHOS 5.8* 4.4 4.1   < > 3.7   ALB 2.8* 2.9* 3.0*   < > 3.0*   TBILI  --   --   -- --  0.6   ALT  --   --   --   --  22    < > = values in this interval not displayed. Lab Results   Component Value Date/Time    Glucose (POC) 186 (H) 06/12/2022 08:18 PM    Glucose (POC) 85 06/05/2022 09:03 AM     No results for input(s): PH, PCO2, PO2, HCO3, FIO2 in the last 72 hours. No results for input(s): INR, INREXT in the last 72 hours. All Micro Results       Procedure Component Value Units Date/Time    CULTURE, URINE [[id number]]  (Abnormal) Collected: 10/17/22 1946    Order Status: Completed Specimen: Cath Urine Updated: 10/19/22 1615     Special Requests: NO SPECIAL REQUESTS        [place] Count --        >100,000  COLONIES/mL       Culture result: GRAM NEGATIVE RODS               Other pertinent lab: none    Total time spent with patient: 30 Minutes I personally reviewed chart, notes, data and current medications in the medical record. I have personally examined and treated the patient at bedside during this period. To assist coordination of care and communication with nursing and staff, this note may be preliminary early in the day, but finalized by end of the day.                  Care Plan discussed with: Patient, Family, Care Manager, Nursing Staff, Consultant/Specialist, and >50% of time spent in counseling and coordination of care    Discussed:  Care Plan and D/C Planning    Prophylaxis:  SCD's and H2B/PPI    Disposition:  Home w/Family and HH PT, OT, RN           ___________________________________________________    Attending Physician: Balbir Moon MD

## 2022-10-21 NOTE — PROGRESS NOTES
RRT Note    Pt rounded on for MEWS 3. Pt assessed. Pt resting comfortably in bed in no visible distress. A Large Long BP cuff on pt lower arm was used for previous set vitals, which yielded a low BP reading 97/54. A small BP cuff was fitted correctly to pt lower arm. New /61, which pt has been in this range normally. Full set of vitals, see below. No further interventions at this time.          Visit Vitals  /61 (BP 1 Location: Right lower arm, BP Patient Position: At rest)   Pulse 64   Temp 98.3 °F (36.8 °C)   Resp 19   Ht 5' 4\" (1.626 m)   Wt 102.1 kg (225 lb)   SpO2 95%   BMI 38.62 kg/m²

## 2022-10-21 NOTE — PROGRESS NOTES
Magdi Morales  YOB: 1940      Assessment & Plan: RAN likely d/t prerenal stat  Hyponatremia   Change of MS likely d/t UTI  Anemia    -stop diovan  -start NS @ 75 cc/hr for 1L  -Hold Loop diuretics for now  -bladder scan for PVR and if UOP>300 then straight cath  -daily labs           Subjective:   CC: f/u RAN and hyponatremia  HPI: Patient seen, unable to provide history.  Cr 1.5 and    ROS:  Current Facility-Administered Medications   Medication Dose Route Frequency    0.9% sodium chloride infusion  75 mL/hr IntraVENous CONTINUOUS    vit C,S-Rb-lxvcg-lutein-zeaxan (PRESERVISION AREDS-2) capsule 1 Capsule (Patient Supplied)  1 Capsule Oral BID WITH MEALS    lactulose (CHRONULAC) 10 gram/15 mL solution 30 mL  20 g Oral TID    famotidine (PF) (PEPCID) injection 20 mg  20 mg IntraVENous DAILY    trospium (SANCTURA) tablet 20 mg  20 mg Oral ACB&D    cefTRIAXone (ROCEPHIN) 1 g in 0.9% sodium chloride 10 mL IV syringe  1 g IntraVENous Q24H    iron sucrose (VENOFER) injection 100 mg  100 mg IntraVENous DAILY    psyllium husk (with sugar) (METAMUCIL FIBER) packet 1 Packet (Patient Supplied)  1 Packet Oral DAILY    amiodarone (CORDARONE) tablet 200 mg  200 mg Oral DAILY    amLODIPine (NORVASC) tablet 2.5 mg  2.5 mg Oral DAILY    levothyroxine (SYNTHROID) tablet 150 mcg  150 mcg Oral ACB    metoprolol tartrate (LOPRESSOR) tablet 12.5 mg  12.5 mg Oral BID    [Held by provider] rivaroxaban (XARELTO) tablet 20 mg  20 mg Oral DAILY    timolol (TIMOPTIC) 0.5 % ophthalmic solution 1 Drop  1 Drop Both Eyes DAILY    sodium chloride (NS) flush 5-40 mL  5-40 mL IntraVENous Q8H    sodium chloride (NS) flush 5-40 mL  5-40 mL IntraVENous PRN    acetaminophen (TYLENOL) tablet 650 mg  650 mg Oral Q6H PRN    Or    acetaminophen (TYLENOL) suppository 650 mg  650 mg Rectal Q6H PRN    polyethylene glycol (MIRALAX) packet 17 g  17 g Oral DAILY PRN    ondansetron (ZOFRAN ODT) tablet 4 mg  4 mg Oral Q8H PRN    Or ondansetron (ZOFRAN) injection 4 mg  4 mg IntraVENous Q6H PRN    cetirizine (ZYRTEC) tablet 10 mg  10 mg Oral DAILY    donepeziL (ARICEPT) tablet 10 mg  10 mg Oral QHS    [Held by provider] valsartan (DIOVAN) tablet 40 mg  40 mg Oral BID    sodium chloride (BRANDON 128) 2 % ophthalmic drops 1 Drop (Patient Supplied)  1 Drop Left Eye BID    0.9% sodium chloride infusion 250 mL  250 mL IntraVENous PRN    0.9% sodium chloride infusion 250 mL  250 mL IntraVENous PRN          Objective:     Vitals:  Blood pressure 121/62, pulse 64, temperature 98.7 °F (37.1 °C), resp. rate 14, height 5' 4\" (1.626 m), weight 102.1 kg (225 lb), SpO2 97 %. Temp (24hrs), Av.7 °F (37.1 °C), Min:98.3 °F (36.8 °C), Max:99.4 °F (37.4 °C)      Intake and Output:  10/21 0701 - 10/21 1900  In: -   Out: 100 [Urine:100]  10/19 1901 - 10/21 0700  In: 950 [P.O.:950]  Out: 9604 [Urine:1550]    Physical Exam:                   Physical Examination:   GENERAL ASSESSMENT: confused   HEENT:Nontraumatic   CHEST: CTA  HEART: S1S2  ABDOMEN: Soft,NT,  :purewic catheter  EXTREMITY: trace EDEMA  NEURO:unable to examined          ECG/rhythm:    Data Review      No results for input(s): TNIPOC in the last 72 hours. No lab exists for component: ITNL   No results for input(s): CPK, CKMB, TROIQ in the last 72 hours. Recent Labs     10/21/22  0312 10/20/22  0247 10/19/22  1821 10/19/22  1157 10/19/22  0923   * 129* 129*  127*   < > 131*   K 4.6 4.8 5.0  4.8   < > 4.3   CL 86* 85* 86*  86*   < > 89*   CO2 37* 38* 38*  37*   < > 38*   BUN 38* 25* 25*  25*   < > 19   CREA 1.58* 1.21* 1.08*  1.07*   < > 0.91   GLU 92 86 81  87   < > 80   PHOS 5.8* 4.4 4.1   < >  --    CA 8.8 8.5 8.6  8.5   < > 8.8   ALB 2.8* 2.9* 3.0*   < >  --    WBC 10.3 10.9  --   --  8.1   HGB 7.5* 8.0*  --   --  8.0*   HCT 25.3* 26.5*  --   --  26.4*    357  --   --  338    < > = values in this interval not displayed.       No results for input(s): INR, PTP, APTT, INREXT in the last 72 hours. Needs: urine analysis, urine sodium, protein and creatinine  Lab Results   Component Value Date/Time    Sodium,urine random 33 10/19/2022 09:46 AM               Discussed with:  Staff    : Ray Neville MD  10/21/2022        [facility]:  [email or web address]  Yolande Hicsk office:  [address] [address]  [address]  Phone: [phone]  Fax :     [phone]    [place] office:  [address] [facility], [address] [facility] [address]  Phone - [phone]  Fax - [phone]

## 2022-10-21 NOTE — PROGRESS NOTES
Progress Note  Date:10/21/2022       Room:[facility]  Patient Name:Liseth Morales     Date of Birth:[birth year]     Age:82 y.o. Subjective    Subjective   Review of Systems   Unable to perform ROS: Dementia   Objective         Vitals Last 24 Hours:  TEMPERATURE:  Temp  Av.7 °F (37.1 °C)  Min: 98.3 °F (36.8 °C)  Max: 99.4 °F (37.4 °C)  RESPIRATIONS RANGE: Resp  Av.9  Min: 14  Max: 19  PULSE OXIMETRY RANGE: SpO2  Av.6 %  Min: 79 %  Max: 97 %  PULSE RANGE: Pulse  Av  Min: 60  Max: 71  BLOOD PRESSURE RANGE: Systolic (68TZS), TFK:779 , Min:97 , ZTV:321   ; Diastolic (82VGJ), OCB:86, Min:39, Max:64    I/O (24Hr): Intake/Output Summary (Last 24 hours) at 10/21/2022 0910  Last data filed at 10/21/2022 0826  Gross per 24 hour   Intake 800 ml   Output 1200 ml   Net -400 ml     Objective:  Vital signs: (most recent): Blood pressure (!) 109/57, pulse 66, temperature 99.3 °F (37.4 °C), resp. rate 14, height 5' 4\" (1.626 m), weight 102.1 kg (225 lb), SpO2 (!) 86 %. Labs/Imaging/Diagnostics    Labs:  CBC:  Recent Labs     10/21/22  0312 10/20/22  0247 10/19/22  0923   WBC 10.3 10.9 8.1   RBC 3.01* 3.18* 3.20*   HGB 7.5* 8.0* 8.0*   HCT 25.3* 26.5* 26.4*   MCV 84.1 83.3 82.5   RDW 16.7* 16.2* 16.0*    357 338     CHEMISTRIES:  Recent Labs     10/21/22  0312 10/20/22  0247 10/19/22  1821   * 129* 129*  127*   K 4.6 4.8 5.0  4.8   CL 86* 85* 86*  86*   CO2 37* 38* 38*  37*   BUN 38* 25* 25*  25*   CA 8.8 8.5 8.6  8.5   PHOS 5.8* 4.4 4.1   PT/INR:No results for input(s): INR, INREXT in the last 72 hours. No lab exists for component: PROTIME  APTT:No results for input(s): APTT in the last 72 hours. LIVER PROFILE:  Recent Labs     10/19/22  0442   AST 13*   ALT 22     Lab Results   Component Value Date/Time    ALT (SGPT) 22 10/19/2022 04:42 AM    AST (SGOT) 13 (L) 10/19/2022 04:42 AM    Alk.  phosphatase 72 10/19/2022 04:42 AM    Bilirubin, total 0.6 10/19/2022 04:42 AM       Imaging Last 24 Hours:  No results found. Assessment//Plan   Active Problems:    HTN (hypertension), benign (4/30/2010)      Paroxysmal atrial flutter (HCC) (11/23/2015)      Acute on chronic respiratory failure with hypoxia and hypercapnia ([facility] (6/8/2022)      Dementia ([facility] (6/8/2022)      Hyponatremia (10/17/2022)      Assessment:   (GI consultation for iron deficiency anemia. 76-year-old female admitted with weakness, shortness of breath, increasing confusion. Found to have UTI with GNR. Hemoglobin 6.6 at presentation, stool is heme-negative. Hemoglobin was 11.5 in June and dropped to 9.6 in July. CT without contrast shows diverticulosis and moderate amount of stool in the colon. She has history of PAF and is on Xarelto which is now being held. There are no GI complaints other than her chronic constipation which is managed with prune juice and Metamucil. Her daughter states that she has frequent coughing and gagging which is not associated only with eating and drinking. Her last colonoscopy was many years ago. 10/21/2022: Came by room to talk with patient's family regarding decisions for endoscopic exams. Contacted patient's daughter, Beto Raymond, who states they want to talk with a physician regarding an alternative prep using lactulose. She is unhappy that she did not get to talk with a gastroenterologist yesterday. I had advised her yesterday to give nursing staff her phone number and ask them to let her know when a physician came by. She states they were told by nursing that there were other preps besides Golytely that we could use. I advised that decision would need to be made by a physician who would be doing the procedure or one of their partners. I contacted endoscopy department this morning and was advised that the earliest a combo procedure could be done would be Tuesday, 10/25/2022, and it appears to be on Dr. Sukhjinder Pulliam schedule at this time.   This is subject to change, if there are cancellations on Monday then she could be moved to Dr. Lito Barber schedule. I tried to address her concerns as best as I could. She asked that I have Dr. Lito Barber call her on the telephone. ). Plan:    (- Again discussed EGD and colonoscopy with the patient's daughter. Her  was extremely resistant and said no to the procedures yesterday. The daughter is agreeing to procedures but is deferring to the patient's . I explained that we would not be able to address resuming Xarelto without endoscopic evaluation but her  continued to say no. The now appears the earliest the procedures would be able to be done is Tuesday, 10/25/2022, and if a decision is not made it could be moved farther out due to availability unless she were to start bleeding uncontrollably. I contacted Dr. Lito Barber with her daughter's name, phone number, and concerns. - The other consideration for scheduling her endoscopic procedures is abnormal electrolytes. Anesthesia would likely request that these be corrected prior to sedation. Sodium was 124 at admission, 130 today. - Could consider oral iron replacement and PPI if consent for endoscopic procedures is not given. - Follow CBC, transfuse as needed). Electronically signed by Yudelka Messina NP on 10/21/2022 at 9:10 AM    Physical examination is remarkable for decreased bilateral breath sounds, absence of abdominal tenderness, to the right of the umbilicus mass-effect which may represent the hernia seen on CT scan. Discussions with the patient's daughter indicate that she is in favor of her mother undergoing endoscopic evaluation. However it is unclear as to whether patient's , the legal power of  can be convinced. In any case patient currently is still with respiratory compromise, oxygen saturations of 87% with supplemental oxygen. Would like to improve this prior to any elective testing.     Awaiting 's approval, improvement of respiratory status prior to initiating preparation for endoscopic evaluation. GI on call available over the weekend as necessary.

## 2022-10-21 NOTE — PROGRESS NOTES
10/21/2022    2:26 PM  CM consult for hospice noted for future info. CM spoke with pt's DTR, Rosio, via p/c who wished to have info session with [facility]. [facility] liaison notified of request, and will call pt's DTR to schedule info session. 12:54 PM  Care Management Progress Note      ICD-10-CM ICD-9-CM    1. Hypoxia  R09.02 799.02       2. Anemia, unspecified type  D64.9 285.9       3. Hyponatremia  E87.1 276.1           RUR:  16%  Risk Level: []Low [x]Moderate []High  Value-based purchasing: [x] Yes [] No  Bundle patient: [] Yes [x] No   Specify:     Transition of care plan:  Awaiting medical clearance and DC order. PT/OT treating. Home with [facility] and aide/family's assistance (per family's request unless they change their minds and become agreeable to SNF. Pt preference for [facility] indicated as [facility]. CM submitted referral via [employer], and [facility] accepted. Outpatient follow-up. Pt's family to transport.

## 2022-10-21 NOTE — PROGRESS NOTES
Problem: Pressure Injury - Risk of  Goal: *Prevention of pressure injury  Description: Document Ivan Scale and appropriate interventions in the flowsheet. Outcome: Progressing Towards Goal  Note: Pressure Injury Interventions:  Sensory Interventions: Assess changes in LOC, Avoid rigorous massage over bony prominences, Check visual cues for pain, Discuss PT/OT consult with provider, Float heels, Keep linens dry and wrinkle-free, Maintain/enhance activity level, Minimize linen layers, Monitor skin under medical devices    Moisture Interventions: Apply protective barrier, creams and emollients, Absorbent underpads, Check for incontinence Q2 hours and as needed, Internal/External urinary devices, Limit adult briefs, Maintain skin hydration (lotion/cream), Minimize layers, Moisture barrier    Activity Interventions: PT/OT evaluation, Increase time out of bed    Mobility Interventions: PT/OT evaluation, HOB 30 degrees or less, Float heels, Pressure redistribution bed/mattress (bed type), Turn and reposition approx. every two hours(pillow and wedges)    Nutrition Interventions: Document food/fluid/supplement intake, Discuss nutritional consult with provider, Offer support with meals,snacks and hydration    Friction and Shear Interventions: Transfer aides:transfer board/Shana lift/ceiling lift, Minimize layers, Lift team/patient mobility team, Lift sheet, HOB 30 degrees or less, Feet elevated on foot rest, Apply protective barrier, creams and emollients                Problem: Patient Education: Go to Patient Education Activity  Goal: Patient/Family Education  Outcome: Progressing Towards Goal     Problem: Falls - Risk of  Goal: *Absence of Falls  Description: Document Yung Fall Risk and appropriate interventions in the flowsheet.   Outcome: Progressing Towards Goal  Note: Fall Risk Interventions:       Mentation Interventions: Adequate sleep, hydration, pain control, Bed/chair exit alarm, Door open when patient unattended, Evaluate medications/consider consulting pharmacy, Eyeglasses and hearing aids, Familiar objects from home, Family/sitter at bedside, More frequent rounding, Reorient patient, Toileting rounds, Update white board    Medication Interventions: Assess postural VS orthostatic hypotension, Bed/chair exit alarm, Evaluate medications/consider consulting pharmacy, Patient to call before getting OOB, Teach patient to arise slowly, Utilize gait belt for transfers/ambulation    Elimination Interventions:  Toileting schedule/hourly rounds, Toilet paper/wipes in reach, Stay With Me (per policy), Patient to call for help with toileting needs, Elevated toilet seat, Call light in reach, Bed/chair exit alarm    History of Falls Interventions: Bed/chair exit alarm, Consult care management for discharge planning, Door open when patient unattended, Evaluate medications/consider consulting pharmacy         Problem: Patient Education: Go to Patient Education Activity  Goal: Patient/Family Education  Outcome: Progressing Towards Goal     Problem: Patient Education: Go to Patient Education Activity  Goal: Patient/Family Education  Outcome: Progressing Towards Goal     Problem: Patient Education: Go to Patient Education Activity  Goal: Patient/Family Education  Outcome: Progressing Towards Goal

## 2022-10-21 NOTE — PROGRESS NOTES
Ultrasound IV by Sepideh Madrid RN :  Procedure Note    Patient meets criteria for US IV insertion in forearms. Patient is DNR, on Bipap and nonverbal at this time. A caregiver is at bedside, education given on US IV placement, voices understanding. Ultrasound used for PIV placement:  20 gauge 6 cm Arrow Endurance Extended Dwell(may stay in place for 29 days)  Left forearm location. 1 X Attempt(s). Flushed with ease; vigorous blood return. Procedure tolerated well. Primary RN aware of IV placement and added to LDA.      Sepideh Madrid RN

## 2022-10-21 NOTE — ACP (ADVANCE CARE PLANNING)
[address] Adult  Hospitalist Group                                      Advance Care Planning Note    Name: Claudia Finnegan  YOB: 1940  MRN: [id number]  Admission Date: 10/17/2022  6:55 PM    Date of discussion: 10/21/2022    Active Diagnoses:    Hospital Problems  Date Reviewed: 8/23/2022               Hyponatremia        Acute on chronic respiratory failure with hypoxia and hypercapnia (HCC)        Dementia (HCC)        Paroxysmal atrial flutter (HCC)        HTN (hypertension), benign           These active diagnoses are of sufficient risk that focused discussion on advance care planning is indicated in order to allow the patient to thoughtfully consider personal goals of care, and if situations arise that prevent the ability to personally give input, to ensure appropriate representation of their personal desires for different levels and aggressiveness of care. Discussion:     Persons present and participating in discussion: Claudia Finnegan, Cristiana Field MD, Daughter MORRO and  Isabella Whalen. Discussion: Discussed treatment and prognosis of UTI, encephalopathy, anemia and hyponatremia in setting  of progressive dementia and debility. Patient does not have capacity.  acts as MPOA. Patient already DNR. Daughter and  would like information on hospice as they realize patient is nearing death and they want to focus on comfort. Time Spent:     Total time spent face-to-face in education and discussion: 18 minutes.      Cristiana Field MD  Date of Service:  10/21/2022  2:15 PM

## 2022-10-22 ENCOUNTER — APPOINTMENT (OUTPATIENT)
Dept: GENERAL RADIOLOGY | Age: 82
DRG: 643 | End: 2022-10-22
Attending: INTERNAL MEDICINE
Payer: MEDICARE

## 2022-10-22 ENCOUNTER — APPOINTMENT (OUTPATIENT)
Dept: ULTRASOUND IMAGING | Age: 82
DRG: 643 | End: 2022-10-22
Attending: INTERNAL MEDICINE
Payer: MEDICARE

## 2022-10-22 LAB
ALBUMIN SERPL-MCNC: 2.6 G/DL (ref 3.5–5)
ALBUMIN SERPL-MCNC: 2.7 G/DL (ref 3.5–5)
ANION GAP SERPL CALC-SCNC: 4 MMOL/L (ref 5–15)
ANION GAP SERPL CALC-SCNC: 5 MMOL/L (ref 5–15)
APPEARANCE UR: CLEAR
B PERT DNA SPEC QL NAA+PROBE: NOT DETECTED
BACTERIA URNS QL MICRO: NEGATIVE /HPF
BASE EXCESS BLDV CALC-SCNC: 6.6 MMOL/L
BDY SITE: ABNORMAL
BILIRUB UR QL: NEGATIVE
BORDETELLA PARAPERTUSSIS PCR, BORPAR: NOT DETECTED
BUN SERPL-MCNC: 42 MG/DL (ref 6–20)
BUN SERPL-MCNC: 42 MG/DL (ref 6–20)
BUN/CREAT SERPL: 24 (ref 12–20)
BUN/CREAT SERPL: 26 (ref 12–20)
C PNEUM DNA SPEC QL NAA+PROBE: NOT DETECTED
CALCIUM SERPL-MCNC: 8.8 MG/DL (ref 8.5–10.1)
CALCIUM SERPL-MCNC: 9 MG/DL (ref 8.5–10.1)
CHLORIDE SERPL-SCNC: 89 MMOL/L (ref 97–108)
CHLORIDE SERPL-SCNC: 90 MMOL/L (ref 97–108)
CO2 SERPL-SCNC: 35 MMOL/L (ref 21–32)
CO2 SERPL-SCNC: 36 MMOL/L (ref 21–32)
COLOR UR: NORMAL
CREAT SERPL-MCNC: 1.62 MG/DL (ref 0.55–1.02)
CREAT SERPL-MCNC: 1.72 MG/DL (ref 0.55–1.02)
CREAT UR-MCNC: 68 MG/DL
EPITH CASTS URNS QL MICRO: NORMAL /LPF
ERYTHROCYTE [DISTWIDTH] IN BLOOD BY AUTOMATED COUNT: 17.2 % (ref 11.5–14.5)
FLUAV SUBTYP SPEC NAA+PROBE: NOT DETECTED
FLUBV RNA SPEC QL NAA+PROBE: NOT DETECTED
GLUCOSE SERPL-MCNC: 95 MG/DL (ref 65–100)
GLUCOSE SERPL-MCNC: 96 MG/DL (ref 65–100)
GLUCOSE UR STRIP.AUTO-MCNC: NEGATIVE MG/DL
HADV DNA SPEC QL NAA+PROBE: NOT DETECTED
HCO3 BLDV-SCNC: 36 MMOL/L (ref 23–28)
HCOV 229E RNA SPEC QL NAA+PROBE: NOT DETECTED
HCOV HKU1 RNA SPEC QL NAA+PROBE: NOT DETECTED
HCOV NL63 RNA SPEC QL NAA+PROBE: NOT DETECTED
HCOV OC43 RNA SPEC QL NAA+PROBE: NOT DETECTED
HCT VFR BLD AUTO: 25 % (ref 35–47)
HEMOCCULT STL QL: NEGATIVE
HGB BLD-MCNC: 7.4 G/DL (ref 11.5–16)
HGB UR QL STRIP: NEGATIVE
HMPV RNA SPEC QL NAA+PROBE: NOT DETECTED
HPIV1 RNA SPEC QL NAA+PROBE: NOT DETECTED
HPIV2 RNA SPEC QL NAA+PROBE: NOT DETECTED
HPIV3 RNA SPEC QL NAA+PROBE: NOT DETECTED
HPIV4 RNA SPEC QL NAA+PROBE: NOT DETECTED
HYALINE CASTS URNS QL MICRO: NORMAL /LPF (ref 0–2)
KETONES UR QL STRIP.AUTO: NEGATIVE MG/DL
LEUKOCYTE ESTERASE UR QL STRIP.AUTO: NEGATIVE
M PNEUMO DNA SPEC QL NAA+PROBE: NOT DETECTED
MCH RBC QN AUTO: 26 PG (ref 26–34)
MCHC RBC AUTO-ENTMCNC: 29.6 G/DL (ref 30–36.5)
MCV RBC AUTO: 87.7 FL (ref 80–99)
NITRITE UR QL STRIP.AUTO: NEGATIVE
NRBC # BLD: 0 K/UL (ref 0–0.01)
NRBC BLD-RTO: 0 PER 100 WBC
PCO2 BLDV: 71.6 MMHG (ref 41–51)
PH BLDV: 7.32 [PH] (ref 7.32–7.42)
PH UR STRIP: 5.5 [PH] (ref 5–8)
PHOSPHATE SERPL-MCNC: 5.7 MG/DL (ref 2.6–4.7)
PHOSPHATE SERPL-MCNC: 5.9 MG/DL (ref 2.6–4.7)
PLATELET # BLD AUTO: 300 K/UL (ref 150–400)
PMV BLD AUTO: 9.7 FL (ref 8.9–12.9)
PO2 BLDV: 48 MMHG (ref 25–40)
POTASSIUM SERPL-SCNC: 4.5 MMOL/L (ref 3.5–5.1)
POTASSIUM SERPL-SCNC: 4.6 MMOL/L (ref 3.5–5.1)
PROCALCITONIN SERPL-MCNC: 0.09 NG/ML
PROT UR STRIP-MCNC: NEGATIVE MG/DL
RBC # BLD AUTO: 2.85 M/UL (ref 3.8–5.2)
RBC #/AREA URNS HPF: NORMAL /HPF (ref 0–5)
RSV RNA SPEC QL NAA+PROBE: NOT DETECTED
RV+EV RNA SPEC QL NAA+PROBE: NOT DETECTED
SAO2 % BLDV: 79 % (ref 65–88)
SAO2% DEVICE SAO2% SENSOR NAME: ABNORMAL
SARS-COV-2 PCR, COVPCR: NOT DETECTED
SODIUM SERPL-SCNC: 129 MMOL/L (ref 136–145)
SODIUM SERPL-SCNC: 130 MMOL/L (ref 136–145)
SODIUM UR-SCNC: 15 MMOL/L
SP GR UR REFRACTOMETRY: 1.01 (ref 1–1.03)
SPECIMEN SITE: ABNORMAL
UROBILINOGEN UR QL STRIP.AUTO: 0.2 EU/DL (ref 0.2–1)
UUN UR-MCNC: 392 MG/DL
WBC # BLD AUTO: 11.8 K/UL (ref 3.6–11)
WBC URNS QL MICRO: NORMAL /HPF (ref 0–4)

## 2022-10-22 PROCEDURE — 36415 COLL VENOUS BLD VENIPUNCTURE: CPT

## 2022-10-22 PROCEDURE — 0202U NFCT DS 22 TRGT SARS-COV-2: CPT

## 2022-10-22 PROCEDURE — 74011250636 HC RX REV CODE- 250/636: Performed by: HOSPITALIST

## 2022-10-22 PROCEDURE — 84145 PROCALCITONIN (PCT): CPT

## 2022-10-22 PROCEDURE — 74011000250 HC RX REV CODE- 250: Performed by: HOSPITALIST

## 2022-10-22 PROCEDURE — 94761 N-INVAS EAR/PLS OXIMETRY MLT: CPT

## 2022-10-22 PROCEDURE — 82803 BLOOD GASES ANY COMBINATION: CPT

## 2022-10-22 PROCEDURE — 77030038269 HC DRN EXT URIN PURWCK BARD -A

## 2022-10-22 PROCEDURE — 77010033678 HC OXYGEN DAILY

## 2022-10-22 PROCEDURE — 82570 ASSAY OF URINE CREATININE: CPT

## 2022-10-22 PROCEDURE — 71045 X-RAY EXAM CHEST 1 VIEW: CPT

## 2022-10-22 PROCEDURE — 80069 RENAL FUNCTION PANEL: CPT

## 2022-10-22 PROCEDURE — 65270000029 HC RM PRIVATE

## 2022-10-22 PROCEDURE — 84540 ASSAY OF URINE/UREA-N: CPT

## 2022-10-22 PROCEDURE — 51798 US URINE CAPACITY MEASURE: CPT

## 2022-10-22 PROCEDURE — 82272 OCCULT BLD FECES 1-3 TESTS: CPT

## 2022-10-22 PROCEDURE — 74011250637 HC RX REV CODE- 250/637: Performed by: HOSPITALIST

## 2022-10-22 PROCEDURE — 76770 US EXAM ABDO BACK WALL COMP: CPT

## 2022-10-22 PROCEDURE — 77030019905 HC CATH URETH INTMIT MDII -A

## 2022-10-22 PROCEDURE — 81001 URINALYSIS AUTO W/SCOPE: CPT

## 2022-10-22 PROCEDURE — 85027 COMPLETE CBC AUTOMATED: CPT

## 2022-10-22 PROCEDURE — 87086 URINE CULTURE/COLONY COUNT: CPT

## 2022-10-22 PROCEDURE — 84300 ASSAY OF URINE SODIUM: CPT

## 2022-10-22 RX ADMIN — ACETAMINOPHEN 650 MG: 650 SUPPOSITORY RECTAL at 01:13

## 2022-10-22 RX ADMIN — AMIODARONE HYDROCHLORIDE 200 MG: 200 TABLET ORAL at 10:42

## 2022-10-22 RX ADMIN — SODIUM CHLORIDE 1 G: 9 INJECTION INTRAMUSCULAR; INTRAVENOUS; SUBCUTANEOUS at 18:23

## 2022-10-22 RX ADMIN — LEVOTHYROXINE SODIUM 150 MCG: 0.07 TABLET ORAL at 10:42

## 2022-10-22 RX ADMIN — IRON SUCROSE 100 MG: 20 INJECTION, SOLUTION INTRAVENOUS at 10:41

## 2022-10-22 RX ADMIN — SODIUM CHLORIDE, PRESERVATIVE FREE 10 ML: 5 INJECTION INTRAVENOUS at 18:24

## 2022-10-22 RX ADMIN — LACTULOSE 30 ML: 20 SOLUTION ORAL at 18:23

## 2022-10-22 RX ADMIN — FAMOTIDINE 20 MG: 10 INJECTION INTRAVENOUS at 10:41

## 2022-10-22 RX ADMIN — TIMOLOL MALEATE 1 DROP: 5 SOLUTION OPHTHALMIC at 10:45

## 2022-10-22 RX ADMIN — LACTULOSE 30 ML: 20 SOLUTION ORAL at 10:51

## 2022-10-22 RX ADMIN — CETIRIZINE HYDROCHLORIDE 10 MG: 10 TABLET, FILM COATED ORAL at 10:41

## 2022-10-22 RX ADMIN — SODIUM CHLORIDE, PRESERVATIVE FREE 10 ML: 5 INJECTION INTRAVENOUS at 22:38

## 2022-10-22 RX ADMIN — SODIUM CHLORIDE, PRESERVATIVE FREE 10 ML: 5 INJECTION INTRAVENOUS at 06:41

## 2022-10-22 NOTE — PROGRESS NOTES
Problem: Pressure Injury - Risk of  Goal: *Prevention of pressure injury  Description: Document Ivan Scale and appropriate interventions in the flowsheet. Outcome: Progressing Towards Goal  Note: Pressure Injury Interventions:  Sensory Interventions: Assess changes in LOC, Assess need for specialty bed, Discuss PT/OT consult with provider, Float heels, Keep linens dry and wrinkle-free, Minimize linen layers, Turn and reposition approx. every two hours (pillows and wedges if needed)    Moisture Interventions: Absorbent underpads, Check for incontinence Q2 hours and as needed, Internal/External urinary devices, Offer toileting Q_hr, Moisture barrier, Assess need for specialty bed    Activity Interventions: Assess need for specialty bed, Increase time out of bed, PT/OT evaluation, Pressure redistribution bed/mattress(bed type)    Mobility Interventions: Assess need for specialty bed, PT/OT evaluation, Turn and reposition approx. every two hours(pillow and wedges), Pressure redistribution bed/mattress (bed type)    Nutrition Interventions: Document food/fluid/supplement intake, Offer support with meals,snacks and hydration, Discuss nutritional consult with provider    Friction and Shear Interventions: Minimize layers, Apply protective barrier, creams and emollients, Lift sheet, Lift team/patient mobility team, Transfer aides:transfer board/Shana lift/ceiling lift, Transferring/repositioning devices                Problem: Patient Education: Go to Patient Education Activity  Goal: Patient/Family Education  Outcome: Progressing Towards Goal     Problem: Falls - Risk of  Goal: *Absence of Falls  Description: Document Tavia Shouts Fall Risk and appropriate interventions in the flowsheet.   Outcome: Progressing Towards Goal  Note: Fall Risk Interventions:  Mobility Interventions: Bed/chair exit alarm, Patient to call before getting OOB, PT Consult for mobility concerns, Utilize walker, cane, or other assistive device, Utilize gait belt for transfers/ambulation, Communicate number of staff needed for ambulation/transfer    Mentation Interventions: Adequate sleep, hydration, pain control, Bed/chair exit alarm, Door open when patient unattended, Family/sitter at bedside, More frequent rounding, Toileting rounds, Increase mobility    Medication Interventions: Patient to call before getting OOB, Teach patient to arise slowly    Elimination Interventions: Bed/chair exit alarm, Call light in reach, Patient to call for help with toileting needs, Toileting schedule/hourly rounds    History of Falls Interventions: Bed/chair exit alarm, Door open when patient unattended, Investigate reason for fall, Utilize gait belt for transfer/ambulation         Problem: Patient Education: Go to Patient Education Activity  Goal: Patient/Family Education  Outcome: Progressing Towards Goal     Problem: Patient Education: Go to Patient Education Activity  Goal: Patient/Family Education  Outcome: Progressing Towards Goal     Problem: Patient Education: Go to Patient Education Activity  Goal: Patient/Family Education  Outcome: Progressing Towards Goal

## 2022-10-22 NOTE — PROGRESS NOTES
Spiritual Care Assessment/Progress Note  [address]      NAME: Benito Pina      MRN: [id number]  AGE: 79 y.o. SEX: female  Congregational Affiliation: [employer]   Language: English     10/22/2022     Total Time (in minutes): (P) 15     Spiritual Assessment begun in [facility] POST SURG ORT 1 through conversation with:         [x]Patient        [] Family    [] Friend(s)        Reason for Consult: (P) Initial/Spiritual assessment, patient floor     Spiritual beliefs: (Please include comment if needed)     [] Identifies with a rito tradition:         [] Supported by a rito community:            [] Claims no spiritual orientation:           [] Seeking spiritual identity:                [] Adheres to an individual form of spirituality:           [x] Not able to assess:                           Identified resources for coping:      [] Prayer                               [] Music                  [] Guided Imagery     [] Family/friends                 [] Pet visits     [] Devotional reading                         [x] Unknown     [] Other:                                               Interventions offered during this visit: (See comments for more details)    Patient Interventions: (P) Prayer (assurance of), Initial/Spiritual assessment, patient floor           Plan of Care:     [] Support spiritual and/or cultural needs    [] Support AMD and/or advance care planning process      [] Support grieving process   [] Coordinate Rites and/or Rituals    [] Coordination with community clergy   [] No spiritual needs identified at this time   [] Detailed Plan of Care below (See Comments)  [] Make referral to Music Therapy  [] Make referral to Pet Therapy     [] Make referral to Addiction services  [] Make referral to [facility]  [] Make referral to Spiritual Care Partner  [] No future visits requested        [x] Contact Spiritual Care for further referrals     Comments:  initiated initial visit.  Reviewed chart and spoke with pt's nurse. Patient and other medical staff were present during the visit. Introduced self and chaplaincy. Patient was awake but did not verbally respond to me. Provided a ministry of presence. Advised of  availability. Advised nurse to contact [facility] for any further referrals.   [address], [address], [facility]  Staff   Paging service: [phone] (NEAL)

## 2022-10-22 NOTE — PROGRESS NOTES
[facility]  [address] [facility] 19  [phone]    Hospitalist Progress Note      NAME: Ruddy Mace   :  1940  MRM:  [id number]    Date/Time of service 10/22/2022  1:03 PM          Assessment and Plan:     Acute urinary tract infection / Fever- POA, cephalosporin sensitive E coli on culture. We will complete empiric ceftriaxone. Due to persistent fever, I will check RVP     Acute on chronic respiratory failure with hypoxia and hypercapnia - POA, likely due to decreased respiratory drive from altered mental status. Perhaps some aspiration? Check RVP. I now doubt it was fluid overload, ECHO does not support Dx of CHF, and lasix led to RAN. Stop lasix    Acute metabolic encephalopathy / dementia - POA, worse than baseline due to hypoNA, and UTI, and just progressive decline of dementia. Continue donepazil. Family met with hospice for info    Hyponatremia - POA, mild and stable. Unclear etiology. Consulted renal. May be SIADH. Fluid restriction led to RAN. They may try tolvaptan or urea    Hypertension - Stop losartan due to RAN and low BP. Stop Norvasc due to low BP. Iron deficiency anemia - POA and likely from GIB in setting of xarelto. However, hemoccult negative multiple times. Tolerated PRBC transfusion. Continue pepcid IV, getting IV iron. Holding xarelto. GI and family were considering endoscopy. After a long discussion with the family, we have now decided to simply stop xarelto altogether and forego any endoscopy. Paroxysmal atrial fibrillation - Continue amiodarone, and low dose metoprolol, she has a low pulse. Holding Xarelto due to anemia. Constipation - continue lactulose    Hypothyroidism - Continue synthroid.   Normal TSH    Glaucoma - Continue timolol     Obese - Advise weight loss    Bladder spasms - Stop sanctura       Subjective:     Chief Complaint:  weak, less alert    ROS:  (bold if positive, if negative)    Tolerating minimal PT  Tolerating minimal Diet        Objective:     Last 24hrs VS reviewed since prior progress note.  Most recent are:    Visit Vitals  BP (!) 141/58   Pulse (!) 56   Temp 98.7 °F (37.1 °C)   Resp 18   Ht 5' 4\" (1.626 m)   Wt 102.1 kg (225 lb)   SpO2 100%   BMI 38.62 kg/m²     SpO2 Readings from Last 6 Encounters:   10/22/22 100%   08/23/22 93%   07/25/22 97%   07/14/22 94%   06/14/22 100%   07/07/21 95%    O2 Flow Rate (L/min): 2 l/min     Intake/Output Summary (Last 24 hours) at 10/22/2022 0718  Last data filed at 10/22/2022 0542  Gross per 24 hour   Intake 510 ml   Output 800 ml   Net -290 ml          Physical Exam:    Gen:  Obese, in no acute distress  HEENT:  Pink conjunctivae, PERRL, hearing intact to voice, moist mucous membranes  Neck:  Supple, without masses, thyroid non-tender  Resp:  No accessory muscle use, clear breath sounds without wheezes rales or rhonchi  Card:  No murmurs, bradycardic S1, S2 without thrills, bruits or peripheral edema  Abd:  Soft, non-tender, non-distended, normoactive bowel sounds are present, no mass  Lymph:  No cervical or inguinal adenopathy  Musc:  No cyanosis or clubbing  Skin:  No rashes or ulcers, skin turgor is good  Neuro:  Cranial nerves are grossly intact, general motor weakness, follows commands vaguely  Psych:  Poor insight, oriented to person    Telemetry reviewed:   normal sinus rhythm  __________________________________________________________________  Medications Reviewed: (see below)  Medications:     Current Facility-Administered Medications   Medication Dose Route Frequency    vit C,C-On-czbxo-lutein-zeaxan (PRESERVISION AREDS-2) capsule 1 Capsule (Patient Supplied)  1 Capsule Oral BID WITH MEALS    lactulose (CHRONULAC) 10 gram/15 mL solution 30 mL  20 g Oral TID    famotidine (PF) (PEPCID) injection 20 mg  20 mg IntraVENous DAILY    cefTRIAXone (ROCEPHIN) 1 g in 0.9% sodium chloride 10 mL IV syringe  1 g IntraVENous Q24H    iron sucrose (VENOFER) injection 100 mg  100 mg IntraVENous DAILY    amiodarone (CORDARONE) tablet 200 mg  200 mg Oral DAILY    levothyroxine (SYNTHROID) tablet 150 mcg  150 mcg Oral ACB    metoprolol tartrate (LOPRESSOR) tablet 12.5 mg  12.5 mg Oral BID    [Held by provider] rivaroxaban (XARELTO) tablet 20 mg  20 mg Oral DAILY    timolol (TIMOPTIC) 0.5 % ophthalmic solution 1 Drop  1 Drop Both Eyes DAILY    sodium chloride (NS) flush 5-40 mL  5-40 mL IntraVENous Q8H    sodium chloride (NS) flush 5-40 mL  5-40 mL IntraVENous PRN    acetaminophen (TYLENOL) tablet 650 mg  650 mg Oral Q6H PRN    Or    acetaminophen (TYLENOL) suppository 650 mg  650 mg Rectal Q6H PRN    polyethylene glycol (MIRALAX) packet 17 g  17 g Oral DAILY PRN    ondansetron (ZOFRAN ODT) tablet 4 mg  4 mg Oral Q8H PRN    Or    ondansetron (ZOFRAN) injection 4 mg  4 mg IntraVENous Q6H PRN    cetirizine (ZYRTEC) tablet 10 mg  10 mg Oral DAILY    donepeziL (ARICEPT) tablet 10 mg  10 mg Oral QHS    [Held by provider] valsartan (DIOVAN) tablet 40 mg  40 mg Oral BID    sodium chloride (BRANDON 128) 2 % ophthalmic drops 1 Drop (Patient Supplied)  1 Drop Left Eye BID    0.9% sodium chloride infusion 250 mL  250 mL IntraVENous PRN    0.9% sodium chloride infusion 250 mL  250 mL IntraVENous PRN        Lab Data Reviewed: (see below)  Lab Review:     Recent Labs     10/21/22  0312 10/20/22  0247 10/19/22  0923   WBC 10.3 10.9 8.1   HGB 7.5* 8.0* 8.0*   HCT 25.3* 26.5* 26.4*    357 338       Recent Labs     10/22/22  0457 10/21/22  0312 10/20/22  0247   *  129* 130* 129*   K 4.6  4.5 4.6 4.8   CL 90*  89* 86* 85*   CO2 35*  36* 37* 38*   GLU 96  95 92 86   BUN 42*  42* 38* 25*   CREA 1.72*  1.62* 1.58* 1.21*   CA 9.0  8.8 8.8 8.5   PHOS 5.9*  5.7* 5.8* 4.4   ALB 2.6*  2.7* 2.8* 2.9*       Lab Results   Component Value Date/Time    Glucose (POC) 186 (H) 06/12/2022 08:18 PM    Glucose (POC) 85 06/05/2022 09:03 AM     No results for input(s): PH, PCO2, PO2, HCO3, FIO2 in the last 72 hours. No results for input(s): INR, INREXT, INREXT in the last 72 hours. All Micro Results       Procedure Component Value Units Date/Time    RESPIRATORY VIRUS PANEL W/COVID-19, PCR [[id number]]     Order Status: Sent Specimen: NASOPHARYNGEAL SWAB     CULTURE, URINE [[id number]]  (Abnormal)  (Susceptibility) Collected: 10/17/22 1946    Order Status: Completed Specimen: Cath Urine Updated: 10/21/22 7643     Special Requests: NO SPECIAL REQUESTS        [place] Count --        >100,000  COLONIES/mL       Culture result: ESCHERICHIA COLI               Other pertinent lab: none    Total time spent with patient: 30 Minutes I personally reviewed chart, notes, data and current medications in the medical record. I have personally examined and treated the patient at bedside during this period. To assist coordination of care and communication with nursing and staff, this note may be preliminary early in the day, but finalized by end of the day.                  Care Plan discussed with: Patient, Family, Care Manager, Nursing Staff, Consultant/Specialist, and >50% of time spent in counseling and coordination of care    Discussed:  Care Plan and D/C Planning    Prophylaxis:  SCD's and H2B/PPI    Disposition:  Home w/Family and HH PT, OT, RN           ___________________________________________________    Attending Physician: Balbir Moon MD

## 2022-10-22 NOTE — PROGRESS NOTES
RRT evaluation: oxygen sats is low due to pt mouth breathing. After change to oxy mask, pt O2 level comes back above 90%. RR is normal.     MEWS 1  Sepsis Score 1  Deterioration Index 64    Spoke with primary RN regarding pt status. Pt resting comfortably in bed with no s/s of distress. Denies CP or SOB. No further interventions at this time.      Oxana Kimbrough, RN

## 2022-10-22 NOTE — PROGRESS NOTES
Vimal Morales  YOB: 1940      Assessment & Plan: RAN etiology unclear   Hyponatremia   Change of MS likely d/t UTI vs underlying recurrent Fever  Anemia    -cr/GFR worse   -na stable  -check USG kidney and bladder  -cxr portable and VBG now  -Hold Loop diuretics for now  -bladder scan for PVR and if UOP>300 then straight cath  -daily labs  -plan d/w patient son Sybil Goodell over the phone           Subjective:   CC: f/u RAN and hyponatremia  HPI: Patient seen, very lethargic, cr up1.72 and na 130.   ROS:  Current Facility-Administered Medications   Medication Dose Route Frequency    vit C,U-Jr-wgaqx-lutein-zeaxan (PRESERVISION AREDS-2) capsule 1 Capsule (Patient Supplied)  1 Capsule Oral BID WITH MEALS    lactulose (CHRONULAC) 10 gram/15 mL solution 30 mL  20 g Oral TID    famotidine (PF) (PEPCID) injection 20 mg  20 mg IntraVENous DAILY    cefTRIAXone (ROCEPHIN) 1 g in 0.9% sodium chloride 10 mL IV syringe  1 g IntraVENous Q24H    iron sucrose (VENOFER) injection 100 mg  100 mg IntraVENous DAILY    amiodarone (CORDARONE) tablet 200 mg  200 mg Oral DAILY    levothyroxine (SYNTHROID) tablet 150 mcg  150 mcg Oral ACB    metoprolol tartrate (LOPRESSOR) tablet 12.5 mg  12.5 mg Oral BID    [Held by provider] rivaroxaban (XARELTO) tablet 20 mg  20 mg Oral DAILY    timolol (TIMOPTIC) 0.5 % ophthalmic solution 1 Drop  1 Drop Both Eyes DAILY    sodium chloride (NS) flush 5-40 mL  5-40 mL IntraVENous Q8H    sodium chloride (NS) flush 5-40 mL  5-40 mL IntraVENous PRN    acetaminophen (TYLENOL) tablet 650 mg  650 mg Oral Q6H PRN    Or    acetaminophen (TYLENOL) suppository 650 mg  650 mg Rectal Q6H PRN    polyethylene glycol (MIRALAX) packet 17 g  17 g Oral DAILY PRN    ondansetron (ZOFRAN ODT) tablet 4 mg  4 mg Oral Q8H PRN    Or    ondansetron (ZOFRAN) injection 4 mg  4 mg IntraVENous Q6H PRN    cetirizine (ZYRTEC) tablet 10 mg  10 mg Oral DAILY    donepeziL (ARICEPT) tablet 10 mg  10 mg Oral QHS    [Held by provider] valsartan (DIOVAN) tablet 40 mg  40 mg Oral BID    sodium chloride (BRANDON 128) 2 % ophthalmic drops 1 Drop (Patient Supplied)  1 Drop Left Eye BID    0.9% sodium chloride infusion 250 mL  250 mL IntraVENous PRN    0.9% sodium chloride infusion 250 mL  250 mL IntraVENous PRN          Objective:     Vitals:  Blood pressure 134/67, pulse (!) 57, temperature 98.3 °F (36.8 °C), resp. rate 18, height 5' 4\" (1.626 m), weight 102.1 kg (225 lb), SpO2 (!) 87 %. Temp (24hrs), Av.2 °F (37.3 °C), Min:97.8 °F (36.6 °C), Max:100.8 °F (38.2 °C)      Intake and Output:  No intake/output data recorded. 10/20 1901 - 10/22 0700  In: 660 [P.O.:660]  Out: 900 [Urine:900]    Physical Exam:                   Physical Examination:   GENERAL ASSESSMENT: confused   HEENT:Nontraumatic   CHEST: diminished BS +  HEART: S1S2  ABDOMEN: Soft,NT,  :purewic catheter  EXTREMITY: trace EDEMA  NEURO:unable to examined          ECG/rhythm:    Data Review      No results for input(s): TNIPOC in the last 72 hours. No lab exists for component: ITNL   No results for input(s): CPK, CKMB, TROIQ in the last 72 hours. Recent Labs     10/22/22  1405 10/22/22  0457 10/21/22  0312 10/20/22  0247   NA  --  130*  129* 130* 129*   K  --  4.6  4.5 4.6 4.8   CL  --  90*  89* 86* 85*   CO2  --  35*  36* 37* 38*   BUN  --  42*  42* 38* 25*   CREA  --  1.72*  1.62* 1.58* 1.21*   GLU  --  96  95 92 86   PHOS  --  5.9*  5.7* 5.8* 4.4   CA  --  9.0  8.8 8.8 8.5   ALB  --  2.6*  2.7* 2.8* 2.9*   WBC 11.8*  --  10.3 10.9   HGB 7.4*  --  7.5* 8.0*   HCT 25.0*  --  25.3* 26.5*     --  346 357      No results for input(s): INR, PTP, APTT, INREXT, INREXT in the last 72 hours.   Needs: urine analysis, urine sodium, protein and creatinine  Lab Results   Component Value Date/Time    Sodium,urine random 33 10/19/2022 09:46 AM               Discussed with:  [id number]helps PHONE    : Brian Valentin MD  10/22/2022        [facility]:  [email or web address]  [place] Sean office:  [address] [place] [address], [address] [place] [address],[address]  [place], [address] [facility]  Phone: [phone]  Fax :     [phone]    [place] office:  [address] [place] [address], [address]  Phone - [phone]  Fax - [phone]

## 2022-10-23 LAB
ANION GAP SERPL CALC-SCNC: 5 MMOL/L (ref 5–15)
ARTERIAL PATENCY WRIST A: YES
BACTERIA SPEC CULT: NORMAL
BASE EXCESS BLDA CALC-SCNC: 7.5 MMOL/L
BDY SITE: ABNORMAL
BUN SERPL-MCNC: 33 MG/DL (ref 6–20)
BUN/CREAT SERPL: 31 (ref 12–20)
CALCIUM SERPL-MCNC: 9.3 MG/DL (ref 8.5–10.1)
CHLORIDE SERPL-SCNC: 94 MMOL/L (ref 97–108)
CO2 SERPL-SCNC: 34 MMOL/L (ref 21–32)
CREAT SERPL-MCNC: 1.08 MG/DL (ref 0.55–1.02)
FIO2 ON VENT: 21 %
GLUCOSE SERPL-MCNC: 91 MG/DL (ref 65–100)
HCO3 BLDA-SCNC: 34 MMOL/L (ref 22–26)
MAGNESIUM SERPL-MCNC: 2.3 MG/DL (ref 1.6–2.4)
PCO2 BLDA: 53 MMHG (ref 35–45)
PEEP RESPIRATORY: 5 CM[H2O]
PH BLDA: 7.42 [PH] (ref 7.35–7.45)
PO2 BLDA: 61 MMHG (ref 80–100)
POTASSIUM SERPL-SCNC: 4.7 MMOL/L (ref 3.5–5.1)
SAO2 % BLD: 91 % (ref 92–97)
SAO2% DEVICE SAO2% SENSOR NAME: ABNORMAL
SERVICE CMNT-IMP: NORMAL
SODIUM SERPL-SCNC: 133 MMOL/L (ref 136–145)
SPECIMEN SITE: ABNORMAL
VT SETTING VENT: 500 ML

## 2022-10-23 PROCEDURE — 80048 BASIC METABOLIC PNL TOTAL CA: CPT

## 2022-10-23 PROCEDURE — 65270000029 HC RM PRIVATE

## 2022-10-23 PROCEDURE — 74011250637 HC RX REV CODE- 250/637: Performed by: HOSPITALIST

## 2022-10-23 PROCEDURE — 74011000250 HC RX REV CODE- 250: Performed by: HOSPITALIST

## 2022-10-23 PROCEDURE — 36600 WITHDRAWAL OF ARTERIAL BLOOD: CPT

## 2022-10-23 PROCEDURE — 77010033678 HC OXYGEN DAILY

## 2022-10-23 PROCEDURE — 94761 N-INVAS EAR/PLS OXIMETRY MLT: CPT

## 2022-10-23 PROCEDURE — 82803 BLOOD GASES ANY COMBINATION: CPT

## 2022-10-23 PROCEDURE — 74011250636 HC RX REV CODE- 250/636: Performed by: HOSPITALIST

## 2022-10-23 PROCEDURE — 83735 ASSAY OF MAGNESIUM: CPT

## 2022-10-23 PROCEDURE — 36415 COLL VENOUS BLD VENIPUNCTURE: CPT

## 2022-10-23 RX ADMIN — LEVOTHYROXINE SODIUM 150 MCG: 0.07 TABLET ORAL at 10:47

## 2022-10-23 RX ADMIN — AMIODARONE HYDROCHLORIDE 200 MG: 200 TABLET ORAL at 10:47

## 2022-10-23 RX ADMIN — FAMOTIDINE 20 MG: 10 INJECTION INTRAVENOUS at 10:47

## 2022-10-23 RX ADMIN — DONEPEZIL HYDROCHLORIDE 10 MG: 5 TABLET, FILM COATED ORAL at 22:09

## 2022-10-23 RX ADMIN — LACTULOSE 30 ML: 20 SOLUTION ORAL at 22:09

## 2022-10-23 RX ADMIN — METOPROLOL TARTRATE 12.5 MG: 25 TABLET, FILM COATED ORAL at 18:12

## 2022-10-23 RX ADMIN — LACTULOSE 30 ML: 20 SOLUTION ORAL at 10:46

## 2022-10-23 RX ADMIN — TIMOLOL MALEATE 1 DROP: 5 SOLUTION OPHTHALMIC at 10:47

## 2022-10-23 RX ADMIN — LACTULOSE 30 ML: 20 SOLUTION ORAL at 18:12

## 2022-10-23 RX ADMIN — SODIUM CHLORIDE, PRESERVATIVE FREE 10 ML: 5 INJECTION INTRAVENOUS at 18:12

## 2022-10-23 RX ADMIN — METOPROLOL TARTRATE 12.5 MG: 25 TABLET, FILM COATED ORAL at 10:47

## 2022-10-23 RX ADMIN — SODIUM CHLORIDE, PRESERVATIVE FREE 10 ML: 5 INJECTION INTRAVENOUS at 22:10

## 2022-10-23 RX ADMIN — SODIUM CHLORIDE 1 G: 9 INJECTION INTRAMUSCULAR; INTRAVENOUS; SUBCUTANEOUS at 20:29

## 2022-10-23 RX ADMIN — CETIRIZINE HYDROCHLORIDE 10 MG: 10 TABLET, FILM COATED ORAL at 10:47

## 2022-10-23 RX ADMIN — SODIUM CHLORIDE, PRESERVATIVE FREE 10 ML: 5 INJECTION INTRAVENOUS at 06:37

## 2022-10-23 NOTE — PROGRESS NOTES
Problem: Pressure Injury - Risk of  Goal: *Prevention of pressure injury  Description: Document Ivan Scale and appropriate interventions in the flowsheet. Outcome: Progressing Towards Goal  Note: Pressure Injury Interventions:  Sensory Interventions: Assess changes in LOC, Assess need for specialty bed, Keep linens dry and wrinkle-free, Minimize linen layers, Monitor skin under medical devices, Turn and reposition approx. every two hours (pillows and wedges if needed)    Moisture Interventions: Absorbent underpads, Apply protective barrier, creams and emollients, Assess need for specialty bed, Check for incontinence Q2 hours and as needed, Internal/External urinary devices, Offer toileting Q_hr    Activity Interventions: Increase time out of bed, PT/OT evaluation, Assess need for specialty bed    Mobility Interventions: PT/OT evaluation, Turn and reposition approx. every two hours(pillow and wedges), Assess need for specialty bed    Nutrition Interventions: Document food/fluid/supplement intake    Friction and Shear Interventions: Minimize layers, Apply protective barrier, creams and emollients, Transferring/repositioning devices, Transfer aides:transfer board/Shana lift/ceiling lift, Lift team/patient mobility team                Problem: Patient Education: Go to Patient Education Activity  Goal: Patient/Family Education  Outcome: Progressing Towards Goal     Problem: Falls - Risk of  Goal: *Absence of Falls  Description: Document Pippa Montalvo Fall Risk and appropriate interventions in the flowsheet.   Outcome: Progressing Towards Goal  Note: Fall Risk Interventions:  Mobility Interventions: Bed/chair exit alarm, Patient to call before getting OOB, PT Consult for mobility concerns, Utilize walker, cane, or other assistive device, Utilize gait belt for transfers/ambulation    Mentation Interventions: Bed/chair exit alarm, Adequate sleep, hydration, pain control, Door open when patient unattended, Family/sitter at bedside, Gait belt with transfers/ambulation, Increase mobility, More frequent rounding, Reorient patient, Toileting rounds    Medication Interventions: Patient to call before getting OOB, Teach patient to arise slowly    Elimination Interventions: Bed/chair exit alarm, Call light in reach, Patient to call for help with toileting needs, Toileting schedule/hourly rounds    History of Falls Interventions: Consult care management for discharge planning, Door open when patient unattended, Evaluate medications/consider consulting pharmacy         Problem: Patient Education: Go to Patient Education Activity  Goal: Patient/Family Education  Outcome: Progressing Towards Goal     Problem: Patient Education: Go to Patient Education Activity  Goal: Patient/Family Education  Outcome: Progressing Towards Goal     Problem: Patient Education: Go to Patient Education Activity  Goal: Patient/Family Education  Outcome: Progressing Towards Goal

## 2022-10-23 NOTE — PROGRESS NOTES
[facility]  [address] [facility], [facility] [address]  [phone]    Hospitalist Progress Note      NAME: Tracy Doss   :  1940  MRM:  [id number]    Date/Time of service 10/23/2022  1:03 PM          Assessment and Plan:     Acute urinary tract infection / Fever- POA, cephalosporin sensitive E coli on culture. We will complete empiric ceftriaxone today. UA now clean. Due to persistent fever, I checked RVP, but it was negative     Acute on chronic respiratory failure with hypoxia and hypercapnia - POA, likely due to decreased respiratory drive from altered mental status. Perhaps some aspiration? I now doubt it was fluid overload, ECHO does not support Dx of CHF, and lasix led to RAN. Stop lasix    Acute metabolic encephalopathy / dementia - POA, worse than baseline due to hypoNA, and UTI, and just progressive decline of dementia. Continue donepazil. Family met with hospice for info    Hyponatremia - POA, mild and a bit better. Unclear etiology. Consulted renal. May be SIADH. Fluid restriction led to RAN. They may try tolvaptan or urea    Hypertension - Stop losartan due to RAN and low BP. Stop Norvasc due to low BP. Iron deficiency anemia - POA and likely from GIB in setting of xarelto. However, hemoccult negative multiple times. Tolerated PRBC transfusion. Continue pepcid IV, getting IV iron. Holding xarelto. GI and family were considering endoscopy. After a long discussion with the family, we have now decided to simply stop xarelto altogether and forego any endoscopy. Paroxysmal atrial fibrillation - Continue amiodarone, and low dose metoprolol, she has a low pulse. Stopped Xarelto due to anemia. Constipation - continue lactulose    Hypothyroidism - Continue synthroid.   Normal TSH    Glaucoma - Continue timolol     Obese - Advise weight loss    Bladder spasms - Stop sanctura       Subjective:     Chief Complaint:  weak, less alert    ROS:  (bold if positive, if negative)    Tolerating minimal PT  Tolerating minimal Diet        Objective:     Last 24hrs VS reviewed since prior progress note.  Most recent are:    Visit Vitals  BP (!) 176/77 (BP 1 Location: Right lower arm, BP Patient Position: At rest)   Pulse 91   Temp 99 °F (37.2 °C)   Resp 18   Ht 5' 4\" (1.626 m)   Wt 102.1 kg (225 lb)   SpO2 93%   BMI 38.62 kg/m²     SpO2 Readings from Last 6 Encounters:   10/23/22 93%   08/23/22 93%   07/25/22 97%   07/14/22 94%   06/14/22 100%   07/07/21 95%    O2 Flow Rate (L/min): 2 l/min     Intake/Output Summary (Last 24 hours) at 10/23/2022 4112  Last data filed at 10/23/2022 4132  Gross per 24 hour   Intake 40 ml   Output --   Net 40 ml          Physical Exam:    Gen:  Obese, in no acute distress  HEENT:  Pink conjunctivae, PERRL, hearing intact to voice, moist mucous membranes  Neck:  Supple, without masses, thyroid non-tender  Resp:  No accessory muscle use, clear breath sounds without wheezes rales or rhonchi  Card:  No murmurs, bradycardic S1, S2 without thrills, bruits or peripheral edema  Abd:  Soft, non-tender, non-distended, normoactive bowel sounds are present, no mass  Lymph:  No cervical or inguinal adenopathy  Musc:  No cyanosis or clubbing  Skin:  No rashes or ulcers, skin turgor is good  Neuro:  Cranial nerves are grossly intact, general motor weakness, follows commands vaguely  Psych:  Poor insight, oriented to person    Telemetry reviewed:   normal sinus rhythm  __________________________________________________________________  Medications Reviewed: (see below)  Medications:     Current Facility-Administered Medications   Medication Dose Route Frequency    vit C,Z-Px-elxrg-lutein-zeaxan (PRESERVISION AREDS-2) capsule 1 Capsule (Patient Supplied)  1 Capsule Oral BID WITH MEALS    lactulose (CHRONULAC) 10 gram/15 mL solution 30 mL  20 g Oral TID    famotidine (PF) (PEPCID) injection 20 mg  20 mg IntraVENous DAILY    cefTRIAXone (ROCEPHIN) 1 g in 0.9% sodium chloride 10 mL IV syringe  1 g IntraVENous Q24H    iron sucrose (VENOFER) injection 100 mg  100 mg IntraVENous DAILY    amiodarone (CORDARONE) tablet 200 mg  200 mg Oral DAILY    levothyroxine (SYNTHROID) tablet 150 mcg  150 mcg Oral ACB    metoprolol tartrate (LOPRESSOR) tablet 12.5 mg  12.5 mg Oral BID    [Held by provider] rivaroxaban (XARELTO) tablet 20 mg  20 mg Oral DAILY    timolol (TIMOPTIC) 0.5 % ophthalmic solution 1 Drop  1 Drop Both Eyes DAILY    sodium chloride (NS) flush 5-40 mL  5-40 mL IntraVENous Q8H    sodium chloride (NS) flush 5-40 mL  5-40 mL IntraVENous PRN    acetaminophen (TYLENOL) tablet 650 mg  650 mg Oral Q6H PRN    Or    acetaminophen (TYLENOL) suppository 650 mg  650 mg Rectal Q6H PRN    polyethylene glycol (MIRALAX) packet 17 g  17 g Oral DAILY PRN    ondansetron (ZOFRAN ODT) tablet 4 mg  4 mg Oral Q8H PRN    Or    ondansetron (ZOFRAN) injection 4 mg  4 mg IntraVENous Q6H PRN    cetirizine (ZYRTEC) tablet 10 mg  10 mg Oral DAILY    donepeziL (ARICEPT) tablet 10 mg  10 mg Oral QHS    [Held by provider] valsartan (DIOVAN) tablet 40 mg  40 mg Oral BID    sodium chloride (BRANDON 128) 2 % ophthalmic drops 1 Drop (Patient Supplied)  1 Drop Left Eye BID    0.9% sodium chloride infusion 250 mL  250 mL IntraVENous PRN    0.9% sodium chloride infusion 250 mL  250 mL IntraVENous PRN        Lab Data Reviewed: (see below)  Lab Review:     Recent Labs     10/22/22  1405 10/21/22  0312   WBC 11.8* 10.3   HGB 7.4* 7.5*   HCT 25.0* 25.3*    346       Recent Labs     10/23/22  0412 10/22/22  0457 10/21/22  0312   * 130*  129* 130*   K 4.7 4.6  4.5 4.6   CL 94* 90*  89* 86*   CO2 34* 35*  36* 37*   GLU 91 96  95 92   BUN 33* 42*  42* 38*   CREA 1.08* 1.72*  1.62* 1.58*   CA 9.3 9.0  8.8 8.8   MG 2.3  --   --    PHOS  --  5.9*  5.7* 5.8*   ALB  --  2.6*  2.7* 2.8*       Lab Results   Component Value Date/Time    Glucose (POC) 186 (H) 06/12/2022 08:18 PM    Glucose (POC) 85 06/05/2022 09:03 AM     Recent Labs     10/23/22  0515   PH 7.42   PCO2 53*   PO2 61*   HCO3 34*   FIO2 21     No results for input(s): INR, INREXT, INREXT in the last 72 hours. All Micro Results       Procedure Component Value Units Date/Time    CULTURE, URINE [[id number]] Collected: 10/22/22 1506    Order Status: Sent Specimen: Cath Urine Updated: 10/22/22 2358    RESPIRATORY VIRUS PANEL W/COVID-19, PCR [[id number]] Collected: 10/22/22 1205    Order Status: Completed Specimen: Nasopharyngeal Updated: 10/22/22 1825     Adenovirus Not detected        Coronavirus 229E Not detected        Coronavirus HKU1 Not detected        Coronavirus CVNL63 Not detected        Coronavirus OC43 Not detected        SARS-CoV-2, PCR Not detected        Metapneumovirus Not detected        Rhinovirus and Enterovirus Not detected        Influenza A Not detected        Influenza B Not detected        Parainfluenza 1 Not detected        Parainfluenza 2 Not detected        Parainfluenza 3 Not detected        Parainfluenza virus 4 Not detected        RSV by PCR Not detected        B. parapertussis, PCR Not detected        Bordetella pertussis - PCR Not detected        Chlamydophila pneumoniae DNA, QL, PCR Not detected        Mycoplasma pneumoniae DNA, QL, PCR Not detected       CULTURE, URINE [[id number]]  (Abnormal)  (Susceptibility) Collected: 10/17/22 1946    Order Status: Completed Specimen: Cath Urine Updated: 10/21/22 1109     Special Requests: NO SPECIAL REQUESTS        [place] Count --        >100,000  COLONIES/mL       Culture result: ESCHERICHIA COLI               Other pertinent lab: none    Total time spent with patient: 30 Minutes I personally reviewed chart, notes, data and current medications in the medical record. I have personally examined and treated the patient at bedside during this period.  To assist coordination of care and communication with nursing and staff, this note may be preliminary early in the day, but finalized by end of the day.                  Care Plan discussed with: Patient, Family, Care Manager, Nursing Staff, Consultant/Specialist, and >50% of time spent in counseling and coordination of care    Discussed:  Care Plan and D/C Planning    Prophylaxis:  SCD's and H2B/PPI    Disposition:  Home w/Family and HH PT, OT, RN           ___________________________________________________    Attending Physician: Mable Pang MD

## 2022-10-23 NOTE — PROGRESS NOTES
Arnulfo Morales  YOB: 1940      Assessment & Plan: RAN d/t VILL[facility]  Hyponatremia   Change of MS likely d/t UTI in [facility]  Anemia    -cr/GFR better with relieve the obstruction  -na better  -usg reviewed.   -daily labs  -plan d/w patient taiwo Benjamin over the phone 10/22           Subjective:   CC: f/u RAN and hyponatremia  HPI: Patient seen, pleasantly confused cr 1.0 and na 133  ROS:  Current Facility-Administered Medications   Medication Dose Route Frequency    vit C,H-Gz-pqcxr-lutein-zeaxan (PRESERVISION AREDS-2) capsule 1 Capsule (Patient Supplied)  1 Capsule Oral BID WITH MEALS    lactulose (CHRONULAC) 10 gram/15 mL solution 30 mL  20 g Oral TID    famotidine (PF) (PEPCID) injection 20 mg  20 mg IntraVENous DAILY    cefTRIAXone (ROCEPHIN) 1 g in 0.9% sodium chloride 10 mL IV syringe  1 g IntraVENous Q24H    amiodarone (CORDARONE) tablet 200 mg  200 mg Oral DAILY    levothyroxine (SYNTHROID) tablet 150 mcg  150 mcg Oral ACB    metoprolol tartrate (LOPRESSOR) tablet 12.5 mg  12.5 mg Oral BID    [Held by provider] rivaroxaban (XARELTO) tablet 20 mg  20 mg Oral DAILY    timolol (TIMOPTIC) 0.5 % ophthalmic solution 1 Drop  1 Drop Both Eyes DAILY    sodium chloride (NS) flush 5-40 mL  5-40 mL IntraVENous Q8H    sodium chloride (NS) flush 5-40 mL  5-40 mL IntraVENous PRN    acetaminophen (TYLENOL) tablet 650 mg  650 mg Oral Q6H PRN    Or    acetaminophen (TYLENOL) suppository 650 mg  650 mg Rectal Q6H PRN    polyethylene glycol (MIRALAX) packet 17 g  17 g Oral DAILY PRN    ondansetron (ZOFRAN ODT) tablet 4 mg  4 mg Oral Q8H PRN    Or    ondansetron (ZOFRAN) injection 4 mg  4 mg IntraVENous Q6H PRN    cetirizine (ZYRTEC) tablet 10 mg  10 mg Oral DAILY    donepeziL (ARICEPT) tablet 10 mg  10 mg Oral QHS    [Held by provider] valsartan (DIOVAN) tablet 40 mg  40 mg Oral BID    sodium chloride (BRANDON 128) 2 % ophthalmic drops 1 Drop (Patient Supplied)  1 Drop Left Eye BID    0.9% sodium chloride infusion 250 mL  250 mL IntraVENous PRN    0.9% sodium chloride infusion 250 mL  250 mL IntraVENous PRN          Objective:     Vitals:  Blood pressure (!) 149/77, pulse 84, temperature 98 °F (36.7 °C), resp. rate 18, height 5' 4\" (1.626 m), weight 102.1 kg (225 lb), SpO2 93 %. Temp (24hrs), Av.6 °F (37 °C), Min:98 °F (36.7 °C), Max:99.2 °F (37.3 °C)      Intake and Output:  10/23 0701 - 10/23 1900  In: -   Out: 450 [Urine:450]  10/21 1901 - 10/23 0700  In: 470 [P.O.:430; I.V.:40]  Out: -     Physical Exam:                   Physical Examination:   GENERAL ASSESSMENT: confused   HEENT:Nontraumatic   CHEST: cta  HEART: S1S2  ABDOMEN: Soft,NT,  :purewic catheter  EXTREMITY: trace EDEMA  NEURO:unable to examined          ECG/rhythm:    Data Review      No results for input(s): TNIPOC in the last 72 hours. No lab exists for component: ITNL   No results for input(s): CPK, CKMB, TROIQ in the last 72 hours. Recent Labs     10/23/22  0412 10/22/22  1405 10/22/22  0457 10/21/22  0312   *  --  130*  129* 130*   K 4.7  --  4.6  4.5 4.6   CL 94*  --  90*  89* 86*   CO2 34*  --  35*  36* 37*   BUN 33*  --  42*  42* 38*   CREA 1.08*  --  1.72*  1.62* 1.58*   GLU 91  --  96  95 92   PHOS  --   --  5.9*  5.7* 5.8*   MG 2.3  --   --   --    CA 9.3  --  9.0  8.8 8.8   ALB  --   --  2.6*  2.7* 2.8*   WBC  --  11.8*  --  10.3   HGB  --  7.4*  --  7.5*   HCT  --  25.0*  --  25.3*   PLT  --  300  --  346      No results for input(s): INR, PTP, APTT, INREXT, INREXT in the last 72 hours. Needs: urine analysis, urine sodium, protein and creatinine  Lab Results   Component Value Date/Time    Sodium,urine random 15 10/22/2022 03:06 PM    Creatinine, urine random 68.00 10/22/2022 03:06 PM               Discussed with:  [id number] PHONE    : Ricardo Bang MD  10/23/2022        [facility]:  [email or web address] -[place]  Omar Wong office:  [address] [address]  Phone: [phone]  Fax :     [phone]    [place] office:  [address] [place] [address]  [place],  Lori Castillo  Phone - [phone]  Fax - [phone]

## 2022-10-24 ENCOUNTER — APPOINTMENT (OUTPATIENT)
Dept: GENERAL RADIOLOGY | Age: 82
DRG: 643 | End: 2022-10-24
Attending: INTERNAL MEDICINE
Payer: MEDICARE

## 2022-10-24 LAB
ANION GAP SERPL CALC-SCNC: 1 MMOL/L (ref 5–15)
BUN SERPL-MCNC: 28 MG/DL (ref 6–20)
BUN/CREAT SERPL: 32 (ref 12–20)
CALCIUM SERPL-MCNC: 9.4 MG/DL (ref 8.5–10.1)
CHLORIDE SERPL-SCNC: 93 MMOL/L (ref 97–108)
CO2 SERPL-SCNC: 39 MMOL/L (ref 21–32)
CREAT SERPL-MCNC: 0.87 MG/DL (ref 0.55–1.02)
GLUCOSE BLD STRIP.AUTO-MCNC: 93 MG/DL (ref 65–117)
GLUCOSE SERPL-MCNC: 99 MG/DL (ref 65–100)
MAGNESIUM SERPL-MCNC: 2.3 MG/DL (ref 1.6–2.4)
PHOSPHATE SERPL-MCNC: 3.5 MG/DL (ref 2.6–4.7)
POTASSIUM SERPL-SCNC: 5.4 MMOL/L (ref 3.5–5.1)
SERVICE CMNT-IMP: NORMAL
SODIUM SERPL-SCNC: 133 MMOL/L (ref 136–145)

## 2022-10-24 PROCEDURE — 74011000250 HC RX REV CODE- 250: Performed by: INTERNAL MEDICINE

## 2022-10-24 PROCEDURE — 74011636637 HC RX REV CODE- 636/637: Performed by: INTERNAL MEDICINE

## 2022-10-24 PROCEDURE — 74011250636 HC RX REV CODE- 250/636: Performed by: HOSPITALIST

## 2022-10-24 PROCEDURE — 77030038269 HC DRN EXT URIN PURWCK BARD -A

## 2022-10-24 PROCEDURE — 82962 GLUCOSE BLOOD TEST: CPT

## 2022-10-24 PROCEDURE — 77030005513 HC CATH URETH FOL11 MDII -B

## 2022-10-24 PROCEDURE — 83735 ASSAY OF MAGNESIUM: CPT

## 2022-10-24 PROCEDURE — 84100 ASSAY OF PHOSPHORUS: CPT

## 2022-10-24 PROCEDURE — 80048 BASIC METABOLIC PNL TOTAL CA: CPT

## 2022-10-24 PROCEDURE — 74011250637 HC RX REV CODE- 250/637: Performed by: HOSPITALIST

## 2022-10-24 PROCEDURE — 74011250637 HC RX REV CODE- 250/637: Performed by: INTERNAL MEDICINE

## 2022-10-24 PROCEDURE — 51798 US URINE CAPACITY MEASURE: CPT

## 2022-10-24 PROCEDURE — 65270000029 HC RM PRIVATE

## 2022-10-24 PROCEDURE — 94761 N-INVAS EAR/PLS OXIMETRY MLT: CPT

## 2022-10-24 PROCEDURE — 36415 COLL VENOUS BLD VENIPUNCTURE: CPT

## 2022-10-24 PROCEDURE — 74011000250 HC RX REV CODE- 250: Performed by: HOSPITALIST

## 2022-10-24 PROCEDURE — 74018 RADEX ABDOMEN 1 VIEW: CPT

## 2022-10-24 RX ORDER — SENNOSIDES 8.6 MG/1
1 TABLET ORAL 2 TIMES DAILY
Status: DISCONTINUED | OUTPATIENT
Start: 2022-10-24 | End: 2022-10-27 | Stop reason: HOSPADM

## 2022-10-24 RX ORDER — DEXTROMETHORPHAN POLISTIREX 30 MG/5 ML
SUSPENSION, EXTENDED RELEASE 12 HR ORAL
Status: COMPLETED | OUTPATIENT
Start: 2022-10-24 | End: 2022-10-24

## 2022-10-24 RX ORDER — DEXTROSE MONOHYDRATE 100 MG/ML
250 INJECTION, SOLUTION INTRAVENOUS ONCE
Status: COMPLETED | OUTPATIENT
Start: 2022-10-24 | End: 2022-10-24

## 2022-10-24 RX ORDER — FAMOTIDINE 20 MG/1
20 TABLET, FILM COATED ORAL DAILY
Status: DISCONTINUED | OUTPATIENT
Start: 2022-10-25 | End: 2022-10-27 | Stop reason: HOSPADM

## 2022-10-24 RX ADMIN — CETIRIZINE HYDROCHLORIDE 10 MG: 10 TABLET, FILM COATED ORAL at 09:12

## 2022-10-24 RX ADMIN — LACTULOSE 30 ML: 20 SOLUTION ORAL at 10:27

## 2022-10-24 RX ADMIN — SENNOSIDES 8.6 MG: 8.6 TABLET, FILM COATED ORAL at 17:05

## 2022-10-24 RX ADMIN — SENNOSIDES 8.6 MG: 8.6 TABLET, FILM COATED ORAL at 11:43

## 2022-10-24 RX ADMIN — SODIUM CHLORIDE, PRESERVATIVE FREE 10 ML: 5 INJECTION INTRAVENOUS at 05:50

## 2022-10-24 RX ADMIN — METOPROLOL TARTRATE 12.5 MG: 25 TABLET, FILM COATED ORAL at 17:05

## 2022-10-24 RX ADMIN — MINERAL OIL: 100 ENEMA RECTAL at 15:03

## 2022-10-24 RX ADMIN — LACTULOSE 30 ML: 20 SOLUTION ORAL at 21:41

## 2022-10-24 RX ADMIN — AMIODARONE HYDROCHLORIDE 200 MG: 200 TABLET ORAL at 09:12

## 2022-10-24 RX ADMIN — METOPROLOL TARTRATE 12.5 MG: 25 TABLET, FILM COATED ORAL at 09:12

## 2022-10-24 RX ADMIN — DEXTROSE 250 ML: 10 SOLUTION INTRAVENOUS at 11:11

## 2022-10-24 RX ADMIN — DONEPEZIL HYDROCHLORIDE 10 MG: 5 TABLET, FILM COATED ORAL at 21:41

## 2022-10-24 RX ADMIN — TIMOLOL MALEATE 1 DROP: 5 SOLUTION OPHTHALMIC at 10:35

## 2022-10-24 RX ADMIN — SODIUM CHLORIDE, PRESERVATIVE FREE 10 ML: 5 INJECTION INTRAVENOUS at 15:03

## 2022-10-24 RX ADMIN — Medication 5 UNITS: at 11:43

## 2022-10-24 RX ADMIN — LACTULOSE 30 ML: 20 SOLUTION ORAL at 17:05

## 2022-10-24 RX ADMIN — LEVOTHYROXINE SODIUM 150 MCG: 0.07 TABLET ORAL at 05:50

## 2022-10-24 RX ADMIN — SODIUM CHLORIDE, PRESERVATIVE FREE 10 ML: 5 INJECTION INTRAVENOUS at 21:43

## 2022-10-24 RX ADMIN — FAMOTIDINE 20 MG: 10 INJECTION INTRAVENOUS at 09:35

## 2022-10-24 NOTE — DISCHARGE INSTRUCTIONS
Patient Discharge Instructions    Donovan Eaton / [id number] : 1940    Admitted 10/17/2022 Discharged: 10/24/2022     Primary Diagnoses  @Rprob@    Take Home Medications     It is important that you take the medication exactly as they are prescribed. Keep your medication in the bottles provided by the pharmacist and keep a list of the medication names, dosages, and times to be taken in your wallet. Do not take other medications without consulting your doctor. What to do at Home    Recommended diet: Regular Diet    Recommended activity: Activity as tolerated and PT/OT per Home Health    If you experience worse symptoms, please follow up with your PCP. Follow-up with your PCP in a few weeks    [unfilled]     Information obtained by :  I understand that if any problems occur once I am at home I am to contact my physician. I understand and acknowledge receipt of the instructions indicated above.                                                                                                                                            Physician's or R.N.'s Signature                                                                  Date/Time                                                                                                                                              Patient or Representative Signature                                                          Date/Time

## 2022-10-24 NOTE — PROGRESS NOTES
10/24/2022  2:45 PM  Care Management Progress Note      ICD-10-CM ICD-9-CM    1. Hypoxia  R09.02 799.02       2. Anemia, unspecified type  D64.9 285.9       3. Hyponatremia  E87.1 276.1           RUR:  15%  Risk Level: []Low [x]Moderate []High  Value-based purchasing: [x] Yes [] No  Bundle patient: [] Yes [x] No   Specify:     Transition of care plan:  Awaiting medical clearance and DC order. Family concerned re stomach distention. PT/OT consulted. Nephrology following. Home with [address] spoke with Anita Mitchell with [facility] who reported that he met with pt and pt's family re hospice, and they are agreeable. GELY spoke with pt's DTR via p/cRosio, who reported they are agreeable. Attending reported hospice to be most appropriate. [facility] is making arrangements with pt's family re DME, and will be ready to admit when pt is medically stable. Outpatient follow-up. [facility] stretcher transport on will call.

## 2022-10-24 NOTE — DISCHARGE SUMMARY
Physician Discharge Summary     Patient ID:  Jamesetta Canavan  [id number]  79 y.o.  1940    Admit date: 10/17/2022    Discharge date of service and time: 10/24/2022  Greater than 30 minutes were spent providing discharge related services for this patient    Admission Diagnoses: Hyponatremia [E87.1]    Discharge Diagnoses:    Principal Diagnosis   <principal problem not specified>                                             Hospital Course and other diagnoses  Acute urinary tract infection / Fever- POA, cephalosporin sensitive E coli on culture. Complete empiric ceftriaxone. UA now clean. Fever resolved. Acute on chronic respiratory failure with hypoxia and hypercapnia - POA, likely due to decreased respiratory drive from altered mental status. RVP was negative. Perhaps some aspiration? I now doubt it was fluid overload, ECHO does not support Dx of CHF, and lasix led to RAN. Acute metabolic encephalopathy / dementia - POA, worse than baseline due to hypoNA, and UTI, and just progressive decline of dementia. Now a bit better. Continue donepazil. Family met with hospice for info     Hyponatremia - POA, mild and a bit better. Unclear etiology. Consulted renal. May be SIADH. Fluid restriction led to RAN. They may try tolvaptan or urea     Hypertension - Continue metoprolol. Stop losartan due to RAN and low BP. Stop Norvasc due to low BP. Iron deficiency anemia - POA and likely from GIB in setting of xarelto. However, hemoccult negative multiple times. Tolerated PRBC transfusion. Continue pepcid IV, getting IV iron. Stopped xarelto. GI and family were considering endoscopy. After a long discussion with the family, we have now decided to simply stop xarelto altogether and forego any endoscopy. Paroxysmal atrial fibrillation - Continue amiodarone, and low dose metoprolol, she has a low pulse. Stopped Xarelto due to anemia.      Constipation - Continue lactulose     Hypothyroidism - Continue synthroid. Normal TSH     Glaucoma - Continue timolol     Obese - Advise weight loss     Bladder spasms - Stop sanctura     PCP: Mainor Ziegler MD    Consults: GI and Nephrology    Significant Diagnostic Studies: See Hospital Course    Discharged home in improved condition. Discharge Exam:  BP (!) 157/66 (BP 1 Location: Right lower arm, BP Patient Position: At rest)   Pulse (!) 59   Temp 98.4 °F (36.9 °C)   Resp 16   Ht 5' 4\" (1.626 m)   Wt 102.1 kg (225 lb)   SpO2 99%   BMI 38.62 kg/m²      Gen:  Obese, in no acute distress  HEENT:  Pink conjunctivae, PERRL, hearing intact to voice, moist mucous membranes  Neck:  Supple, without masses, thyroid non-tender  Resp:  No accessory muscle use, clear breath sounds without wheezes rales or rhonchi  Card:  No murmurs, bradycardic S1, S2 without thrills, bruits or peripheral edema  Abd:  Soft, non-tender, non-distended, normoactive bowel sounds are present, no mass  Lymph:  No cervical or inguinal adenopathy  Musc:  No cyanosis or clubbing  Skin:  No rashes or ulcers, skin turgor is good  Neuro:  Cranial nerves are grossly intact, general motor weakness, follows commands vaguely  Psych:  Poor insight, oriented to person    Patient Instructions:   Current Discharge Medication List        CONTINUE these medications which have NOT CHANGED    Details   psyllium (METAMUCIL) pack (sugar free) packet Take 1 Packet by mouth daily. metoprolol tartrate (LOPRESSOR) 25 mg tablet Take 0.5 Tablets by mouth two (2) times a day. Qty: 90 Tablet, Refills: 3    Comments: Do NOT fill - just FYI dose change. amiodarone (CORDARONE) 200 mg tablet Take 1 Tablet by mouth daily. Qty: 90 Tablet, Refills: 3      multivit/folic acid/vit K1 (ONE-A-DAY WOMEN'S 50 PLUS PO) Take  by mouth. BETAMETHASONE by Does Not Apply route. cetirizine (ZYRTEC) 10 mg tablet Take  by mouth daily as needed for Allergies.       sodium chloride (Kemal 128) 2 % ophthalmic solution Administer 1 Drop to left eye two (2) times a day. Administer 1 Drop to left eye two (2) times a day. Morning and at night 15 minutes after timolol eye drops      Gemtesa 75 mg tab Take 75 mg by mouth every evening. levothyroxine (Synthroid) 150 mcg tablet Take 1 Tablet by mouth Daily (before breakfast). Qty: 90 Tablet, Refills: 0      donepeziL (ARICEPT) 10 mg tablet TAKE ONE TABLET BY MOUTH ONCE NIGHTLY  Qty: 90 Tablet, Refills: 3      vit A/vit C/vit E/zinc/copper (PRESERVISION AREDS PO) Take 2 Tablets by mouth two (2) times a day. Cholecalciferol, Vitamin D3, (VITAMIN D3) 2,000 unit cap capsule Take 2,000 Units by mouth two (2) times a day. Qty: 90 Cap, Refills: 1      timolol (TIMOPTIC) 0.5 % ophthalmic solution Administer 1 Drop to both eyes daily. In the morning      OTHER Move free 2 tabs daily      polyethylene glycol (MIRALAX) 17 gram packet Take 1 Packet by mouth daily as needed for Constipation for up to 30 doses. Indications: constipation  Qty: 30 Packet, Refills: 0           STOP taking these medications       valsartan (DIOVAN) 40 mg tablet Comments:   Reason for Stopping:         amLODIPine (NORVASC) 2.5 mg tablet Comments:   Reason for Stopping:         rivaroxaban (Xarelto) 20 mg tab tablet Comments:   Reason for Stopping:         fluticasone propionate (FLONASE NA) Comments:   Reason for Stopping:         melatonin 3 mg tablet Comments:   Reason for Stopping:         polyethylene glycol (MIRALAX) 17 gram/dose powder Comments:   Reason for Stopping:         triamcinolone acetonide (KENALOG) 0.1 % ointment Comments:   Reason for Stopping:         triamcinolone acetonide (KENALOG) 0.1 % topical cream Comments:   Reason for Stopping:             Activity: Activity as tolerated and PT/OT per Home Health  Diet: Regular Diet  Wound Care: None needed    Follow-up with your PCP in a few weeks.   Follow-up tests/labs - none    Signed:  Helena Melo MD  10/24/2022  8:04 AM

## 2022-10-24 NOTE — PROGRESS NOTES
Bedside and Verbal shift change report given to Patience (oncoming nurse) by Reed Avitia (offgoing nurse). Report included the following information SBAR, Kardex, and Recent Results.

## 2022-10-24 NOTE — PROGRESS NOTES
[facility]  [address] [facility], [facility] [address]  [phone]    Hospitalist Progress Note      NAME: Meghan Rangel   :  1940  MRM:  [id number]    Date/Time of service 10/24/2022  1:34 PM         Assessment and Plan:     Acute urinary tract infection / Fever- POA, cephalosporin sensitive E coli on culture. Complete empiric ceftriaxone. UA now clean. Fever resolved. Acute on chronic respiratory failure with hypoxia and hypercapnia - POA, likely due to decreased respiratory drive from altered mental status. RVP was negative. Perhaps some aspiration? I now doubt it was fluid overload, ECHO does not support Dx of CHF, and lasix led to RAN. Acute metabolic encephalopathy / dementia - POA, worse than baseline due to hypoNA, and UTI, and just progressive decline of dementia. Now a bit better. Continue donepazil. Family met with hospice for info    Fecal impaction - Worsening pain his AM.  KUB shows fecal impaction. I will give a mineral oil enema. RE consult GI if ineffective. Hyponatremia - POA, mild and a bit better. Unclear etiology. Consulted renal. May be SIADH. Fluid restriction led to RAN. They may try tolvaptan or urea    Hypertension - Continue metoprolol. Stop losartan due to RAN and low BP. Stop Norvasc due to low BP. Iron deficiency anemia - POA and likely from GIB in setting of xarelto. However, hemoccult negative multiple times. Tolerated PRBC transfusion. Continue pepcid IV, getting IV iron. Stopped xarelto. GI and family were considering endoscopy. After a long discussion with the family, we have now decided to simply stop xarelto altogether and forego any endoscopy. Paroxysmal atrial fibrillation - Continue amiodarone, and low dose metoprolol, she has a low pulse. Stopped Xarelto due to anemia. Constipation - Continue lactulose    Hypothyroidism - Continue synthroid.   Normal TSH    Glaucoma - Continue timolol     Obese - Advise weight loss    Bladder spasms - Stop sanctura       Subjective:     Chief Complaint:  weak, more alert, abd pain    ROS:  (bold if positive, if negative)    Abd Pain  Tolerating minimal PT  Tolerating minimal Diet        Objective:     Last 24hrs VS reviewed since prior progress note.  Most recent are:    Visit Vitals  BP (!) 155/72 (BP 1 Location: Right lower arm, BP Patient Position: At rest)   Pulse 100   Temp 98.7 °F (37.1 °C)   Resp 16   Ht 5' 4\" (1.626 m)   Wt 102.1 kg (225 lb)   SpO2 98%   BMI 38.62 kg/m²     SpO2 Readings from Last 6 Encounters:   10/24/22 98%   08/23/22 93%   07/25/22 97%   07/14/22 94%   06/14/22 100%   07/07/21 95%    O2 Flow Rate (L/min): 2 l/min     Intake/Output Summary (Last 24 hours) at 10/24/2022 1334  Last data filed at 10/24/2022 1028  Gross per 24 hour   Intake 280 ml   Output 1750 ml   Net -1470 ml        Physical Exam:    Gen:  Obese, in no acute distress  HEENT:  Pink conjunctivae, PERRL, hearing intact to voice, moist mucous membranes  Neck:  Supple, without masses, thyroid non-tender  Resp:  No accessory muscle use, clear breath sounds without wheezes rales or rhonchi  Card:  No murmurs, bradycardic S1, S2 without thrills, bruits or peripheral edema  Abd:  Soft, tender, distended, reduced bowel sounds are present, no mass  Lymph:  No cervical or inguinal adenopathy  Musc:  No cyanosis or clubbing  Skin:  No rashes or ulcers, skin turgor is good  Neuro:  Cranial nerves are grossly intact, general motor weakness, follows commands vaguely  Psych:  Poor insight, oriented to person    Telemetry reviewed:   normal sinus rhythm  __________________________________________________________________  Medications Reviewed: (see below)  Medications:     Current Facility-Administered Medications   Medication Dose Route Frequency    senna (SENOKOT) tablet 8.6 mg  1 Tablet Oral BID    mineral oil (FLEET) enema   Rectal NOW    vit C,Z-Zv-lhvny-lutein-zeaxan (PRESERVISION AREDS-2) capsule 1 Capsule (Patient Supplied)  1 Capsule Oral BID WITH MEALS    lactulose (CHRONULAC) 10 gram/15 mL solution 30 mL  20 g Oral TID    famotidine (PF) (PEPCID) injection 20 mg  20 mg IntraVENous DAILY    amiodarone (CORDARONE) tablet 200 mg  200 mg Oral DAILY    levothyroxine (SYNTHROID) tablet 150 mcg  150 mcg Oral ACB    metoprolol tartrate (LOPRESSOR) tablet 12.5 mg  12.5 mg Oral BID    [Held by provider] rivaroxaban (XARELTO) tablet 20 mg  20 mg Oral DAILY    timolol (TIMOPTIC) 0.5 % ophthalmic solution 1 Drop  1 Drop Both Eyes DAILY    sodium chloride (NS) flush 5-40 mL  5-40 mL IntraVENous Q8H    sodium chloride (NS) flush 5-40 mL  5-40 mL IntraVENous PRN    acetaminophen (TYLENOL) tablet 650 mg  650 mg Oral Q6H PRN    Or    acetaminophen (TYLENOL) suppository 650 mg  650 mg Rectal Q6H PRN    polyethylene glycol (MIRALAX) packet 17 g  17 g Oral DAILY PRN    ondansetron (ZOFRAN ODT) tablet 4 mg  4 mg Oral Q8H PRN    Or    ondansetron (ZOFRAN) injection 4 mg  4 mg IntraVENous Q6H PRN    cetirizine (ZYRTEC) tablet 10 mg  10 mg Oral DAILY    donepeziL (ARICEPT) tablet 10 mg  10 mg Oral QHS    [Held by provider] valsartan (DIOVAN) tablet 40 mg  40 mg Oral BID    sodium chloride (BRANDON 128) 2 % ophthalmic drops 1 Drop (Patient Supplied)  1 Drop Left Eye BID    0.9% sodium chloride infusion 250 mL  250 mL IntraVENous PRN    0.9% sodium chloride infusion 250 mL  250 mL IntraVENous PRN        Lab Data Reviewed: (see below)  Lab Review:     Recent Labs     10/22/22  1405   WBC 11.8*   HGB 7.4*   HCT 25.0*        Recent Labs     10/24/22  0839 10/23/22  0412 10/22/22  0457   * 133* 130*  129*   K 5.4* 4.7 4.6  4.5   CL 93* 94* 90*  89*   CO2 39* 34* 35*  36*   GLU 99 91 96  95   BUN 28* 33* 42*  42*   CREA 0.87 1.08* 1.72*  1.62*   CA 9.4 9.3 9.0  8.8   MG 2.3 2.3  --    PHOS 3.5  --  5.9*  5.7*   ALB  --   --  2.6*  2.7*     Lab Results   Component Value Date/Time    Glucose (POC) 93 10/24/2022 11:03 AM    Glucose (POC) 186 (H) 06/12/2022 08:18 PM    Glucose (POC) 85 06/05/2022 09:03 AM     Recent Labs     10/23/22  0515   PH 7.42   PCO2 53*   PO2 61*   HCO3 34*   FIO2 21     No results for input(s): INR, INREXT, INREXT in the last 72 hours. All Micro Results       Procedure Component Value Units Date/Time    CULTURE, URINE [[id number]] Collected: 10/22/22 1506    Order Status: Completed Specimen: Cath Urine Updated: 10/23/22 2010     Special Requests: NO SPECIAL REQUESTS        Culture result: No growth (<1,000 CFU/ML)       RESPIRATORY VIRUS PANEL W/COVID-19, PCR [[id number]] Collected: 10/22/22 1205    Order Status: Completed Specimen: Nasopharyngeal Updated: 10/22/22 1825     Adenovirus Not detected        Coronavirus 229E Not detected        Coronavirus HKU1 Not detected        Coronavirus CVNL63 Not detected        Coronavirus OC43 Not detected        SARS-CoV-2, PCR Not detected        Metapneumovirus Not detected        Rhinovirus and Enterovirus Not detected        Influenza A Not detected        Influenza B Not detected        Parainfluenza 1 Not detected        Parainfluenza 2 Not detected        Parainfluenza 3 Not detected        Parainfluenza virus 4 Not detected        RSV by PCR Not detected        B. parapertussis, PCR Not detected        Bordetella pertussis - PCR Not detected        Chlamydophila pneumoniae DNA, QL, PCR Not detected        Mycoplasma pneumoniae DNA, QL, PCR Not detected       CULTURE, URINE [[id number]]  (Abnormal)  (Susceptibility) Collected: 10/17/22 1946    Order Status: Completed Specimen: Cath Urine Updated: 10/21/22 1109     Special Requests: NO SPECIAL REQUESTS        [place] Count --        >100,000  COLONIES/mL       Culture result: ESCHERICHIA COLI               Other pertinent lab: none    Total time spent with patient: 30 Minutes I personally reviewed chart, notes, data and current medications in the medical record.   I have personally examined and treated the patient at bedside during this period. To assist coordination of care and communication with nursing and staff, this note may be preliminary early in the day, but finalized by end of the day.                  Care Plan discussed with: Patient, Family, Care Manager, Nursing Staff, Consultant/Specialist, and >50% of time spent in counseling and coordination of care    Discussed:  Care Plan and D/C Planning    Prophylaxis:  SCD's and H2B/PPI    Disposition:  Home w/Family and HH PT, OT, RN           ___________________________________________________    Attending Physician: Ian Beck MD

## 2022-10-24 NOTE — TELEPHONE ENCOUNTER
Called to schedule hospital follow up but  Bobby Madera said she is still in the hospital due to having some problems this morning. He said he will call to schedule the appt when she gets home.

## 2022-10-24 NOTE — PROGRESS NOTES
Sarah Morales  YOB: 1940      Assessment & Plan: RAN d/t V[facility]  Hyponatremia > imptoved  Change of MS likely d/t UTI in [facility]  Anemia  Hyperkalemia  Lower abdominal pain concern for retention     -cr/GFR better with relieve the obstruction  -na better 133  -K is up 5.4  -give insulin dextrose  -check bladder scan now  -usg reviewed. -daily labs  -plan d/w patient daughter over the phone.            Subjective:   CC: f/u RAN and hyponatremia  HPI: Patient seen, pleasantly c/o abdominal pain, cr 0.8  ROS:  Current Facility-Administered Medications   Medication Dose Route Frequency    insulin regular (NOVOLIN R, HUMULIN R) injection 5 Units  5 Units IntraVENous ONCE    dextrose 10% infusion 250 mL  250 mL IntraVENous ONCE    vit C,Y-Rt-sddew-lutein-zeaxan (PRESERVISION AREDS-2) capsule 1 Capsule (Patient Supplied)  1 Capsule Oral BID WITH MEALS    lactulose (CHRONULAC) 10 gram/15 mL solution 30 mL  20 g Oral TID    famotidine (PF) (PEPCID) injection 20 mg  20 mg IntraVENous DAILY    amiodarone (CORDARONE) tablet 200 mg  200 mg Oral DAILY    levothyroxine (SYNTHROID) tablet 150 mcg  150 mcg Oral ACB    metoprolol tartrate (LOPRESSOR) tablet 12.5 mg  12.5 mg Oral BID    [Held by provider] rivaroxaban (XARELTO) tablet 20 mg  20 mg Oral DAILY    timolol (TIMOPTIC) 0.5 % ophthalmic solution 1 Drop  1 Drop Both Eyes DAILY    sodium chloride (NS) flush 5-40 mL  5-40 mL IntraVENous Q8H    sodium chloride (NS) flush 5-40 mL  5-40 mL IntraVENous PRN    acetaminophen (TYLENOL) tablet 650 mg  650 mg Oral Q6H PRN    Or    acetaminophen (TYLENOL) suppository 650 mg  650 mg Rectal Q6H PRN    polyethylene glycol (MIRALAX) packet 17 g  17 g Oral DAILY PRN    ondansetron (ZOFRAN ODT) tablet 4 mg  4 mg Oral Q8H PRN    Or    ondansetron (ZOFRAN) injection 4 mg  4 mg IntraVENous Q6H PRN    cetirizine (ZYRTEC) tablet 10 mg  10 mg Oral DAILY    donepeziL (ARICEPT) tablet 10 mg  10 mg Oral QHS    [Held by provider] valsartan (DIOVAN) tablet 40 mg  40 mg Oral BID    sodium chloride (BRANDON 128) 2 % ophthalmic drops 1 Drop (Patient Supplied)  1 Drop Left Eye BID    0.9% sodium chloride infusion 250 mL  250 mL IntraVENous PRN    0.9% sodium chloride infusion 250 mL  250 mL IntraVENous PRN          Objective:     Vitals:  Blood pressure (!) 157/66, pulse (!) 59, temperature 98.4 °F (36.9 °C), resp. rate 16, height 5' 4\" (1.626 m), weight 102.1 kg (225 lb), SpO2 99 %. Temp (24hrs), Av.1 °F (36.7 °C), Min:97.6 °F (36.4 °C), Max:98.7 °F (37.1 °C)      Intake and Output:  10/24 07 - 10/24 1900  In: 180 [P.O.:180]  Out: 550 [Urine:550]  10/22 1901 - 10/24 0700  In: 740 [P.O.:700; I.V.:40]  Out: 1650 [Urine:1650]    Physical Exam:                   Physical Examination:   GENERAL ASSESSMENT: confused   HEENT:Nontraumatic   CHEST: cta  HEART: S1S2  ABDOMEN: Soft,diffuse tenderness on superficial palpation  :purewic catheter  EXTREMITY: trace EDEMA  NEURO:unable to examined          ECG/rhythm:    Data Review      No results for input(s): TNIPOC in the last 72 hours. No lab exists for component: ITNL   No results for input(s): CPK, CKMB, TROIQ in the last 72 hours. Recent Labs     10/24/22  0839 10/23/22  0412 10/22/22  1405 10/22/22  0457   * 133*  --  130*  129*   K 5.4* 4.7  --  4.6  4.5   CL 93* 94*  --  90*  89*   CO2 39* 34*  --  35*  36*   BUN 28* 33*  --  42*  42*   CREA 0.87 1.08*  --  1.72*  1.62*   GLU 99 91  --  96  95   PHOS 3.5  --   --  5.9*  5.7*   MG 2.3 2.3  --   --    CA 9.4 9.3  --  9.0  8.8   ALB  --   --   --  2.6*  2.7*   WBC  --   --  11.8*  --    HGB  --   --  7.4*  --    HCT  --   --  25.0*  --    PLT  --   --  300  --       No results for input(s): INR, PTP, APTT, INREXT, INREXT in the last 72 hours.   Needs: urine analysis, urine sodium, protein and creatinine  Lab Results   Component Value Date/Time    Sodium,urine random 15 10/22/2022 03:06 PM    Creatinine, urine random 68.00 10/22/2022 03:06 PM               Discussed with:  [id number]helps PHONE    : Allison Wilkinson MD  10/24/2022        [facility]:  [email or web address]"[employer]. [place]  [place] office:  [address], [address] [place] [address],[address]  [place], [address] [facility]  Phone: [phone]  Fax :     [phone]    [place] office:  [address] [facility], [address] [place] [address]  Phone - [phone]  Fax - [phone]

## 2022-10-24 NOTE — TELEPHONE ENCOUNTER
Returned call to [facility] - spoke with Yazmin Rowan. Notified Arsalan Jolley will follow patient for [place].

## 2022-10-25 PROCEDURE — 97110 THERAPEUTIC EXERCISES: CPT

## 2022-10-25 PROCEDURE — 94761 N-INVAS EAR/PLS OXIMETRY MLT: CPT

## 2022-10-25 PROCEDURE — 97530 THERAPEUTIC ACTIVITIES: CPT

## 2022-10-25 PROCEDURE — 74011000250 HC RX REV CODE- 250: Performed by: HOSPITALIST

## 2022-10-25 PROCEDURE — 74011250637 HC RX REV CODE- 250/637: Performed by: HOSPITALIST

## 2022-10-25 PROCEDURE — 77010033678 HC OXYGEN DAILY

## 2022-10-25 PROCEDURE — 97535 SELF CARE MNGMENT TRAINING: CPT

## 2022-10-25 PROCEDURE — 65270000029 HC RM PRIVATE

## 2022-10-25 PROCEDURE — 74011250637 HC RX REV CODE- 250/637: Performed by: INTERNAL MEDICINE

## 2022-10-25 RX ORDER — DEXTROMETHORPHAN POLISTIREX 30 MG/5 ML
SUSPENSION, EXTENDED RELEASE 12 HR ORAL
Status: COMPLETED | OUTPATIENT
Start: 2022-10-25 | End: 2022-10-25

## 2022-10-25 RX ADMIN — FAMOTIDINE 20 MG: 20 TABLET, FILM COATED ORAL at 09:52

## 2022-10-25 RX ADMIN — SODIUM CHLORIDE, PRESERVATIVE FREE 10 ML: 5 INJECTION INTRAVENOUS at 05:55

## 2022-10-25 RX ADMIN — MINERAL OIL: 100 ENEMA RECTAL at 13:06

## 2022-10-25 RX ADMIN — SODIUM CHLORIDE, PRESERVATIVE FREE 10 ML: 5 INJECTION INTRAVENOUS at 14:00

## 2022-10-25 RX ADMIN — DONEPEZIL HYDROCHLORIDE 10 MG: 5 TABLET, FILM COATED ORAL at 21:12

## 2022-10-25 RX ADMIN — SODIUM CHLORIDE, PRESERVATIVE FREE 10 ML: 5 INJECTION INTRAVENOUS at 21:13

## 2022-10-25 RX ADMIN — SENNOSIDES 8.6 MG: 8.6 TABLET, FILM COATED ORAL at 17:39

## 2022-10-25 RX ADMIN — LACTULOSE 30 ML: 20 SOLUTION ORAL at 17:38

## 2022-10-25 RX ADMIN — LACTULOSE 30 ML: 20 SOLUTION ORAL at 21:12

## 2022-10-25 RX ADMIN — LEVOTHYROXINE SODIUM 150 MCG: 0.07 TABLET ORAL at 05:54

## 2022-10-25 RX ADMIN — CETIRIZINE HYDROCHLORIDE 10 MG: 10 TABLET, FILM COATED ORAL at 09:52

## 2022-10-25 RX ADMIN — LACTULOSE 30 ML: 20 SOLUTION ORAL at 09:52

## 2022-10-25 RX ADMIN — SENNOSIDES 8.6 MG: 8.6 TABLET, FILM COATED ORAL at 09:52

## 2022-10-25 RX ADMIN — TIMOLOL MALEATE 1 DROP: 5 SOLUTION OPHTHALMIC at 09:53

## 2022-10-25 RX ADMIN — AMIODARONE HYDROCHLORIDE 200 MG: 200 TABLET ORAL at 09:52

## 2022-10-25 RX ADMIN — METOPROLOL TARTRATE 12.5 MG: 25 TABLET, FILM COATED ORAL at 09:52

## 2022-10-25 NOTE — PROGRESS NOTES
Problem: Self Care Deficits Care Plan (Adult)  Goal: *Acute Goals and Plan of Care (Insert Text)  Description: FUNCTIONAL STATUS PRIOR TO ADMISSION: At baseline, patient able to ambulate with RW. She requires assistance with some ADL tasks but has daughter, spouse, and caregiver assist as needed. HOME SUPPORT: The patient lived with family. Occupational Therapy Goals  Initiated 10/19/2022  1. Patient will perform self-feeding with supervision/set-up within 7 day(s). 2.  Patient will perform grooming with supervision/set-up within 7 day(s). 3.  Patient will perform toilet transfers with minimal assistance/contact guard assist within 7 day(s). 4.  Patient will perform all aspects of toileting with minimal assistance/contact guard assist within 7 day(s). 5.  Patient will participate in upper extremity therapeutic exercise/activities with supervision/set-up for 5 minutes within 7 day(s). Outcome: Progressing Towards Goal   OCCUPATIONAL THERAPY TREATMENT  Patient: Ruddy Mace (7 y.o. female)  Date: 10/25/2022  Diagnosis: Hyponatremia [E87.1] <principal problem not specified>      Precautions: Fall  Chart, occupational therapy assessment, plan of care, and goals were reviewed. ASSESSMENT  Patient continues with skilled OT services and is not progressing towards goals. Pt needs cues to open her eyes for participation, decreased command following. She sat edge of bed assist x 2 and after 3 attempts to stand stood with assist x 2 however had incontinence of BM, returned to bed and dep for hygiene and linen change. Current Level of Function Impacting Discharge (ADLs): dep toileting, LB dressing    Other factors to consider for discharge:          PLAN :  Patient continues to benefit from skilled intervention to address the above impairments. Continue treatment per established plan of care to address goals.     Recommend with staff: ADL's, there ex, there act    Recommend next OT session: cont towards goals    Recommendation for discharge: (in order for the patient to meet his/her long term goals)  To be determined: per MD and possibly hospice    This discharge recommendation:  Has not yet been discussed the attending provider and/or case management    IF patient discharges home will need the following DME:        SUBJECTIVE:   Patient stated .    OBJECTIVE DATA SUMMARY:   Cognitive/Behavioral Status:  Neurologic State: Alert;Confused  Orientation Level: Oriented to person;Disoriented to time;Disoriented to situation;Disoriented to place  Cognition: Follows commands             Functional Mobility and Transfers for ADLs:  Bed Mobility:  Rolling: Moderate assistance;Maximum assistance;Assist x2  Supine to Sit: Maximum assistance;Assist x2  Sit to Supine: Maximum assistance;Assist x2  Scooting: Moderate assistance    Transfers:  Sit to Stand: Moderate assistance;Maximum assistance;Assist x2          Balance:  Sitting: Impaired  Sitting - Static: Good (unsupported)  Sitting - Dynamic: Fair (occasional)  Standing: Impaired;Pull to stand  Standing - Static: Fair;Constant support  Standing - Dynamic : Poor;Constant support    ADL Intervention:                 Type of Bath: Chlorhexidine (CHG)         Upper Body Dressing Assistance  Dressing Assistance: Maximum assistance  Hospital Gown: Maximum assistance    Lower Body Dressing Assistance  Protective Undergarmet: Maximum assistance  Leg Crossed Method Used: No  Position Performed: Seated edge of bed    Toileting  Toileting Assistance: Total assistance(dependent)  Bowel Hygiene: Total assistance (dependent)         Activity Tolerance:   Poor    After treatment patient left in no apparent distress:   Supine in bed and Call bell within reach    COMMUNICATION/COLLABORATION:   The patients plan of care was discussed with: Physical therapist, Occupational therapist, and Registered nurse.      FRANKLIN Reyes  Time Calculation: 42 mins

## 2022-10-25 NOTE — PROGRESS NOTES
10/25/2022  3:33 PM  Update via chart review, pt is continuing to require medical management for Acute urinary tract infection / Fever, Acute on chronic respiratory failure with hypoxia and hypercapnia, Acute metabolic encephalopathy / dementia, Fecal impaction, Hyponatremia, Hypertension, Iron deficiency anemia, Paroxysmal atrial fibrillation, Constipation, Hypothyroidism, Glaucoma, Obese, Bladder spasms  Transitions of Care Plan:  RUR 14 % Low Risk of Readmission/Green  LOS  8 Days   Medical management continues  Fecal impaction, plan for mineral oil enema  PT,OT following  DC when stable to home w/ family assistance and [facility], spoke w/ Jorge Penny and advise pt not ready for DC today    Outpatient follow up Hospice MD HILDA [facility] on Will Call  CM will continue to follow and assist w/ DC needs  ERIC Gage

## 2022-10-25 NOTE — PROGRESS NOTES
Bedside shift change report given to Leticia RN (oncoming nurse) by Madeline Bolaños RN (offgoing nurse). Report included the following information SBAR, Kardex, MAR, Recent Results, and Med Rec Status.

## 2022-10-25 NOTE — PROGRESS NOTES
Problem: Pressure Injury - Risk of  Goal: *Prevention of pressure injury  Description: Document Ivan Scale and appropriate interventions in the flowsheet. Outcome: Progressing Towards Goal  Note: Pressure Injury Interventions:  Sensory Interventions: Assess changes in LOC    Moisture Interventions: Absorbent underpads    Activity Interventions: PT/OT evaluation    Mobility Interventions: HOB 30 degrees or less    Nutrition Interventions: Document food/fluid/supplement intake    Friction and Shear Interventions: Lift team/patient mobility team                Problem: Patient Education: Go to Patient Education Activity  Goal: Patient/Family Education  Outcome: Progressing Towards Goal     Problem: Falls - Risk of  Goal: *Absence of Falls  Description: Document Yung Fall Risk and appropriate interventions in the flowsheet.   Outcome: Progressing Towards Goal  Note: Fall Risk Interventions:  Mobility Interventions: Bed/chair exit alarm    Mentation Interventions: Adequate sleep, hydration, pain control    Medication Interventions: Bed/chair exit alarm    Elimination Interventions: Call light in reach, Bed/chair exit alarm    History of Falls Interventions: Bed/chair exit alarm

## 2022-10-25 NOTE — PROGRESS NOTES
Problem: Mobility Impaired (Adult and Pediatric)  Goal: *Acute Goals and Plan of Care (Insert Text)  Description: FUNCTIONAL STATUS PRIOR TO ADMISSION: Patient was modified independent using a rolling walker for functional mobility. HOME SUPPORT PRIOR TO ADMISSION: The patient lived with her daughter, spouse and . They report history of 3 falls in the past month, + buckling. Physical Therapy Goals  Initiated 10/19/2022  1. Patient will move from supine to sit and sit to supine , scoot up and down, and roll side to side in bed with minimal assistance/contact guard assist within 7 day(s). 2.  Patient will transfer from bed to chair and chair to bed with moderate assistance  using the least restrictive device within 7 day(s). 3.  Patient will perform sit to stand with minimal assistance/contact guard assist within 7 day(s). 4.  Patient will ambulate with moderate assistance  for 5 feet with the least restrictive device within 7 day(s). Outcome: Progressing Towards Goal   PHYSICAL THERAPY TREATMENT  Patient: Darylene Riddles (5 y.o. female)  Date: 10/25/2022  Diagnosis: Hyponatremia [E87.1] <principal problem not specified>      Precautions: Fall  Chart, physical therapy assessment, plan of care and goals were reviewed. ASSESSMENT  Patient received  semisupine in bed, 1:! Sitter in the room, agreeable for PT /OT treatment. Pt continues with skilled PT services and with minimal progress towards goals. Pt presents with drowsiness, needs cues to keep awake, requires maxA x2 for bed mobility,  able to tolerate sitting EOB ~ 7 minutes and participated in there ex's for b/l Le. Pt performed STS transfers with mod/maxAx 2 x 3 attempts, initially required maxAx2 but progressed to modAx 2. Pt with bowel incontinence, required change of linen, gown, chux pad and total assist for posterior pericare. Pt may be going under hospice care pending family decision.      Current Level of Function Impacting Discharge (mobility/balance): Pt requires maxA x 2 for bed mobility and transfers. Other factors to consider for discharge: severity of deficits, time since onset         PLAN :  Patient continues to benefit from skilled intervention to address the above impairments. Continue treatment per established plan of care to address goals. Recommend with staff: Encourage HEP in prep for ADLs/mobility and Frequent repositioning to prevent skin breakdown    Recommendation for discharge: (in order for the patient to meet his/her long term goals)  Therapy up to 5 days/week in SNF setting or HHPT 2x week for family edu if status unchanged; bc need 24/7 care        This discharge recommendation:  Has been made in collaboration with the attending provider and/or case management    IF patient discharges home will need the following DME: bedside commode, hospital bed, mechanical lift, wheelchair, and sliding/transfer board       SUBJECTIVE:   Patient stated I am OK.     OBJECTIVE DATA SUMMARY:   Critical Behavior:  Neurologic State: Alert, Confused  Orientation Level: Oriented to person, Disoriented to time, Disoriented to situation, Disoriented to place  Cognition: Follows commands     Functional Mobility Training:  Bed Mobility:  Rolling: Moderate assistance;Maximum assistance;Assist x2  Supine to Sit: Maximum assistance;Assist x2  Sit to Supine: Maximum assistance;Assist x2  Scooting: Moderate assistance    Transfers:  Sit to Stand:  Moderate assistance;Maximum assistance;Assist x2  Stand to Sit: Moderate assistance;Maximum assistance;Assist x2    Balance:  Sitting: Impaired  Sitting - Static: Good (unsupported)  Sitting - Dynamic: Fair (occasional)  Standing: Impaired;Pull to stand  Standing - Static: Fair;Constant support  Standing - Dynamic : Poor;Constant support    Therapeutic Exercises:       EXERCISE   Sets   Reps   Active Active Assist   Passive Self ROM   Comments   Ankle Pumps   [x] [] [] []    Quad Sets/Glut Sets   [] [] [] []    Hamstring Sets   [] [] [] []    Short Arc Quads   [] [] [] []    Heel Slides   [] [] [] []    Straight Leg Raises   [] [] [] []    Hip abd/add   [] [] [] []    Long Arc Quads   [x] [] [] []    Marching   [x] [x] [] []       [] [] [] []       Pain Ratin/10    Activity Tolerance:   Fair    After treatment patient left in no apparent distress:   Bed locked and returned to lowest position, Supine in bed, Heels elevated for pressure relief, Call bell within reach, and Caregiver / family present      COMMUNICATION/COLLABORATION:   The patients plan of care was discussed with: Occupational therapy assistant and Registered nurse. PT/OT session occurred together for increased pt's safety and maximum functional outcome.     Loyda Rowe, PT   Time Calculation: 50 mins

## 2022-10-25 NOTE — PROGRESS NOTES
[facility]  [address] [facility], [facility] [address]  [phone]    Hospitalist Progress Note      NAME: Nita Soto   :  1940  MRM:  [id number]    Date/Time of service 10/25/2022  1:29 PM         Assessment and Plan:     Acute urinary tract infection / Fever- POA, cephalosporin sensitive E coli on culture. Complete empiric ceftriaxone. UA now clean. Fever resolved. Acute on chronic respiratory failure with hypoxia and hypercapnia - POA, likely due to decreased respiratory drive from altered mental status. RVP was negative. Perhaps some aspiration? I now doubt it was fluid overload, ECHO does not support Dx of CHF, and lasix led to RAN. Acute metabolic encephalopathy / dementia - POA, worse than baseline due to hypoNA, and UTI, and just progressive decline of dementia. Now a bit better. Continue donepazil. Family met with hospice for info    Fecal impaction - Still pain his AM.  KUB shows fecal impaction. I will give another mineral oil enema. RE consult GI if ineffective. Hyponatremia - POA, mild and a bit better. Unclear etiology. Consulted renal. May be SIADH. Fluid restriction led to RAN. They may try tolvaptan or urea    Hypertension - Continue metoprolol. Stop losartan due to RAN and low BP. Stop Norvasc due to low BP. Iron deficiency anemia - POA and likely from GIB in setting of xarelto. However, hemoccult negative multiple times. Tolerated PRBC transfusion. Continue pepcid IV, getting IV iron. Stopped xarelto. GI and family were considering endoscopy. After a long discussion with the family, we have now decided to simply stop xarelto altogether and forego any endoscopy. Paroxysmal atrial fibrillation - Continue amiodarone, and low dose metoprolol, she has a low pulse. Stopped Xarelto due to anemia. Constipation - Continue lactulose    Hypothyroidism - Continue synthroid.   Normal TSH    Glaucoma - Continue timolol     Obese - Advise weight loss    Bladder spasms - Stop sanctura       Subjective:     Chief Complaint:  weak, more alert, abd pain    ROS:  (bold if positive, if negative)    Abd Pain  Tolerating minimal PT  Tolerating minimal Diet        Objective:     Last 24hrs VS reviewed since prior progress note.  Most recent are:    Visit Vitals  BP (!) 129/52 (BP 1 Location: Right lower arm, BP Patient Position: At rest)   Pulse (!) 55   Temp 97.9 °F (36.6 °C)   Resp 16   Ht 5' 4\" (1.626 m)   Wt 102.1 kg (225 lb)   SpO2 100%   BMI 38.62 kg/m²     SpO2 Readings from Last 6 Encounters:   10/25/22 100%   08/23/22 93%   07/25/22 97%   07/14/22 94%   06/14/22 100%   07/07/21 95%    O2 Flow Rate (L/min): 4 l/min     Intake/Output Summary (Last 24 hours) at 10/25/2022 1329  Last data filed at 10/25/2022 1205  Gross per 24 hour   Intake 300 ml   Output 1025 ml   Net -725 ml          Physical Exam:    Gen:  Obese, in no acute distress  HEENT:  Pink conjunctivae, PERRL, hearing intact to voice, moist mucous membranes  Neck:  Supple, without masses, thyroid non-tender  Resp:  No accessory muscle use, clear breath sounds without wheezes rales or rhonchi  Card:  No murmurs, bradycardic S1, S2 without thrills, bruits or peripheral edema  Abd:  Soft, tender, distended, reduced bowel sounds are present, no mass  Lymph:  No cervical or inguinal adenopathy  Musc:  No cyanosis or clubbing  Skin:  No rashes or ulcers, skin turgor is good  Neuro:  Cranial nerves are grossly intact, general motor weakness, follows commands vaguely  Psych:  Poor insight, oriented to person    Telemetry reviewed:   normal sinus rhythm  __________________________________________________________________  Medications Reviewed: (see below)  Medications:     Current Facility-Administered Medications   Medication Dose Route Frequency    senna (SENOKOT) tablet 8.6 mg  1 Tablet Oral BID    famotidine (PEPCID) tablet 20 mg  20 mg Oral DAILY    vit C,R-Ld-vnsay-lutein-zeaxan (PRESERVISION AREDS-2) capsule 1 Capsule (Patient Supplied)  1 Capsule Oral BID WITH MEALS    lactulose (CHRONULAC) 10 gram/15 mL solution 30 mL  20 g Oral TID    amiodarone (CORDARONE) tablet 200 mg  200 mg Oral DAILY    levothyroxine (SYNTHROID) tablet 150 mcg  150 mcg Oral ACB    metoprolol tartrate (LOPRESSOR) tablet 12.5 mg  12.5 mg Oral BID    [Held by provider] rivaroxaban (XARELTO) tablet 20 mg  20 mg Oral DAILY    timolol (TIMOPTIC) 0.5 % ophthalmic solution 1 Drop  1 Drop Both Eyes DAILY    sodium chloride (NS) flush 5-40 mL  5-40 mL IntraVENous Q8H    sodium chloride (NS) flush 5-40 mL  5-40 mL IntraVENous PRN    acetaminophen (TYLENOL) tablet 650 mg  650 mg Oral Q6H PRN    Or    acetaminophen (TYLENOL) suppository 650 mg  650 mg Rectal Q6H PRN    polyethylene glycol (MIRALAX) packet 17 g  17 g Oral DAILY PRN    ondansetron (ZOFRAN ODT) tablet 4 mg  4 mg Oral Q8H PRN    Or    ondansetron (ZOFRAN) injection 4 mg  4 mg IntraVENous Q6H PRN    cetirizine (ZYRTEC) tablet 10 mg  10 mg Oral DAILY    donepeziL (ARICEPT) tablet 10 mg  10 mg Oral QHS    [Held by provider] valsartan (DIOVAN) tablet 40 mg  40 mg Oral BID    sodium chloride (BRANDON 128) 2 % ophthalmic drops 1 Drop (Patient Supplied)  1 Drop Left Eye BID    0.9% sodium chloride infusion 250 mL  250 mL IntraVENous PRN    0.9% sodium chloride infusion 250 mL  250 mL IntraVENous PRN        Lab Data Reviewed: (see below)  Lab Review:     Recent Labs     10/22/22  1405   WBC 11.8*   HGB 7.4*   HCT 25.0*          Recent Labs     10/24/22  0839 10/23/22  0412   * 133*   K 5.4* 4.7   CL 93* 94*   CO2 39* 34*   GLU 99 91   BUN 28* 33*   CREA 0.87 1.08*   CA 9.4 9.3   MG 2.3 2.3   PHOS 3.5  --        Lab Results   Component Value Date/Time    Glucose (POC) 93 10/24/2022 11:03 AM    Glucose (POC) 186 (H) 06/12/2022 08:18 PM    Glucose (POC) 85 06/05/2022 09:03 AM     Recent Labs     10/23/22  0515   PH 7.42   PCO2 53*   PO2 61*   HCO3 34*   FIO2 21 No results for input(s): INR, INREXT, INREXT in the last 72 hours. All Micro Results       Procedure Component Value Units Date/Time    CULTURE, URINE [[id number]] Collected: 10/22/22 1506    Order Status: Completed Specimen: Cath Urine Updated: 10/23/22 2010     Special Requests: NO SPECIAL REQUESTS        Culture result: No growth (<1,000 CFU/ML)       RESPIRATORY VIRUS PANEL W/COVID-19, PCR [[id number]] Collected: 10/22/22 1205    Order Status: Completed Specimen: Nasopharyngeal Updated: 10/22/22 1825     Adenovirus Not detected        Coronavirus 229E Not detected        Coronavirus HKU1 Not detected        Coronavirus CVNL63 Not detected        Coronavirus OC43 Not detected        SARS-CoV-2, PCR Not detected        Metapneumovirus Not detected        Rhinovirus and Enterovirus Not detected        Influenza A Not detected        Influenza B Not detected        Parainfluenza 1 Not detected        Parainfluenza 2 Not detected        Parainfluenza 3 Not detected        Parainfluenza virus 4 Not detected        RSV by PCR Not detected        B. parapertussis, PCR Not detected        Bordetella pertussis - PCR Not detected        Chlamydophila pneumoniae DNA, QL, PCR Not detected        Mycoplasma pneumoniae DNA, QL, PCR Not detected       CULTURE, URINE [[id number]]  (Abnormal)  (Susceptibility) Collected: 10/17/22 1946    Order Status: Completed Specimen: Cath Urine Updated: 10/21/22 1109     Special Requests: NO SPECIAL REQUESTS        Johnson Count --        >100,000  COLONIES/mL       Culture result: ESCHERICHIA COLI               Other pertinent lab: none    Total time spent with patient: 30 Minutes I personally reviewed chart, notes, data and current medications in the medical record. I have personally examined and treated the patient at bedside during this period.  To assist coordination of care and communication with nursing and staff, this note may be preliminary early in the day, but finalized by end of the day.                  Care Plan discussed with: Patient, Family, Care Manager, Nursing Staff, Consultant/Specialist, and >50% of time spent in counseling and coordination of care    Discussed:  Care Plan and D/C Planning    Prophylaxis:  SCD's and H2B/PPI    Disposition:  Home w/Family and HH PT, OT, RN           ___________________________________________________    Attending Physician: Ana Cristina Barahona MD

## 2022-10-25 NOTE — PROGRESS NOTES
Problem: Pressure Injury - Risk of  Goal: *Prevention of pressure injury  Description: Document Ivan Scale and appropriate interventions in the flowsheet. Outcome: Progressing Towards Goal  Note: Pressure Injury Interventions:  Sensory Interventions: Assess changes in LOC, Assess need for specialty bed, Avoid rigorous massage over bony prominences, Discuss PT/OT consult with provider, Float heels, Keep linens dry and wrinkle-free, Maintain/enhance activity level, Minimize linen layers    Moisture Interventions: Apply protective barrier, creams and emollients, Assess need for specialty bed    Activity Interventions: Pressure redistribution bed/mattress(bed type), PT/OT evaluation    Mobility Interventions: Pressure redistribution bed/mattress (bed type), PT/OT evaluation    Nutrition Interventions: Document food/fluid/supplement intake, Offer support with meals,snacks and hydration    Friction and Shear Interventions: Apply protective barrier, creams and emollients, HOB 30 degrees or less                Problem: Patient Education: Go to Patient Education Activity  Goal: Patient/Family Education  Outcome: Progressing Towards Goal     Problem: Falls - Risk of  Goal: *Absence of Falls  Description: Document Yung Fall Risk and appropriate interventions in the flowsheet.   Outcome: Progressing Towards Goal  Note: Fall Risk Interventions:  Mobility Interventions: Assess mobility with egress test, Bed/chair exit alarm    Mentation Interventions: Bed/chair exit alarm    Medication Interventions: Bed/chair exit alarm, Patient to call before getting OOB    Elimination Interventions: Bed/chair exit alarm, Call light in reach, Elevated toilet seat    History of Falls Interventions: Bed/chair exit alarm, Assess for delayed presentation/identification of injury for 48 hrs (comment for end date)         Problem: Patient Education: Go to Patient Education Activity  Goal: Patient/Family Education  Outcome: Progressing Towards Goal     Problem: Patient Education: Go to Patient Education Activity  Goal: Patient/Family Education  Outcome: Progressing Towards Goal     Problem: Patient Education: Go to Patient Education Activity  Goal: Patient/Family Education  Outcome: Progressing Towards Goal

## 2022-10-26 LAB
ANION GAP SERPL CALC-SCNC: 2 MMOL/L (ref 5–15)
BUN SERPL-MCNC: 24 MG/DL (ref 6–20)
BUN/CREAT SERPL: 25 (ref 12–20)
CALCIUM SERPL-MCNC: 8.9 MG/DL (ref 8.5–10.1)
CHLORIDE SERPL-SCNC: 93 MMOL/L (ref 97–108)
CO2 SERPL-SCNC: 38 MMOL/L (ref 21–32)
CREAT SERPL-MCNC: 0.95 MG/DL (ref 0.55–1.02)
GLUCOSE SERPL-MCNC: 139 MG/DL (ref 65–100)
POTASSIUM SERPL-SCNC: 4.9 MMOL/L (ref 3.5–5.1)
SODIUM SERPL-SCNC: 133 MMOL/L (ref 136–145)

## 2022-10-26 PROCEDURE — 74011250637 HC RX REV CODE- 250/637: Performed by: HOSPITALIST

## 2022-10-26 PROCEDURE — 74011000250 HC RX REV CODE- 250: Performed by: HOSPITALIST

## 2022-10-26 PROCEDURE — 77030038269 HC DRN EXT URIN PURWCK BARD -A

## 2022-10-26 PROCEDURE — 94761 N-INVAS EAR/PLS OXIMETRY MLT: CPT

## 2022-10-26 PROCEDURE — 80048 BASIC METABOLIC PNL TOTAL CA: CPT

## 2022-10-26 PROCEDURE — 36415 COLL VENOUS BLD VENIPUNCTURE: CPT

## 2022-10-26 PROCEDURE — 74011250637 HC RX REV CODE- 250/637: Performed by: INTERNAL MEDICINE

## 2022-10-26 PROCEDURE — 65270000029 HC RM PRIVATE

## 2022-10-26 PROCEDURE — 77010033678 HC OXYGEN DAILY

## 2022-10-26 RX ADMIN — LEVOTHYROXINE SODIUM 150 MCG: 0.07 TABLET ORAL at 06:38

## 2022-10-26 RX ADMIN — ACETAMINOPHEN 650 MG: 325 TABLET, FILM COATED ORAL at 03:21

## 2022-10-26 RX ADMIN — FAMOTIDINE 20 MG: 20 TABLET, FILM COATED ORAL at 09:17

## 2022-10-26 RX ADMIN — SODIUM CHLORIDE, PRESERVATIVE FREE 10 ML: 5 INJECTION INTRAVENOUS at 15:42

## 2022-10-26 RX ADMIN — LACTULOSE 30 ML: 20 SOLUTION ORAL at 09:24

## 2022-10-26 RX ADMIN — LACTULOSE 30 ML: 20 SOLUTION ORAL at 18:40

## 2022-10-26 RX ADMIN — LACTULOSE 30 ML: 20 SOLUTION ORAL at 21:38

## 2022-10-26 RX ADMIN — SENNOSIDES 8.6 MG: 8.6 TABLET, FILM COATED ORAL at 09:17

## 2022-10-26 RX ADMIN — SODIUM CHLORIDE, PRESERVATIVE FREE 10 ML: 5 INJECTION INTRAVENOUS at 06:38

## 2022-10-26 RX ADMIN — SODIUM CHLORIDE, PRESERVATIVE FREE 10 ML: 5 INJECTION INTRAVENOUS at 21:39

## 2022-10-26 RX ADMIN — METOPROLOL TARTRATE 12.5 MG: 25 TABLET, FILM COATED ORAL at 09:17

## 2022-10-26 RX ADMIN — CETIRIZINE HYDROCHLORIDE 10 MG: 10 TABLET, FILM COATED ORAL at 09:17

## 2022-10-26 RX ADMIN — Medication 1 PACKET: at 09:25

## 2022-10-26 RX ADMIN — TIMOLOL MALEATE 1 DROP: 5 SOLUTION OPHTHALMIC at 09:26

## 2022-10-26 RX ADMIN — METOPROLOL TARTRATE 12.5 MG: 25 TABLET, FILM COATED ORAL at 18:40

## 2022-10-26 RX ADMIN — AMIODARONE HYDROCHLORIDE 200 MG: 200 TABLET ORAL at 09:16

## 2022-10-26 RX ADMIN — SENNOSIDES 8.6 MG: 8.6 TABLET, FILM COATED ORAL at 18:40

## 2022-10-26 RX ADMIN — DONEPEZIL HYDROCHLORIDE 10 MG: 5 TABLET, FILM COATED ORAL at 21:38

## 2022-10-26 NOTE — PROGRESS NOTES
Problem: Pressure Injury - Risk of  Goal: *Prevention of pressure injury  Description: Document Ivan Scale and appropriate interventions in the flowsheet.   Outcome: Progressing Towards Goal  Note: Pressure Injury Interventions:  Sensory Interventions: Assess changes in LOC, Float heels, Keep linens dry and wrinkle-free, Minimize linen layers    Moisture Interventions: Absorbent underpads, Minimize layers    Activity Interventions: Increase time out of bed, PT/OT evaluation    Mobility Interventions: Float heels, PT/OT evaluation    Nutrition Interventions: Document food/fluid/supplement intake    Friction and Shear Interventions: HOB 30 degrees or less, Minimize layers                Problem: Patient Education: Go to Patient Education Activity  Goal: Patient/Family Education  Outcome: Progressing Towards Goal

## 2022-10-26 NOTE — PROGRESS NOTES
Nutrition Assessment     Type and Reason for Visit: RD nutrition re-screen/LOS    Nutrition Recommendations/Plan:   Continue Current diet & fluid restriction  Obtain measured weight    Encourage prune juice and ordering meals she typically enjoys      Nutrition Assessment:     826: 79year old female admitted for Hyponatremia [E87.1] who has a past medical history of Arthritis, Calculus of kidney, Cancer, Dementia, Glaucoma, Hypertension, OA (osteoarthritis) (4/30/2010), Obesity hypoventilation syndrome, Obesity, morbid, TYRONE (obstructive sleep apnea), PAF (paroxysmal atrial fibrillation), and Psoriasis. Hyponatremia, hyperkalemia and elevated CO2 noted with poor sleep & hasn't been using cpap (available at bedside). RD visited patient and she remained asleep during assessment, caregiver at bedside. Nutrition Focused Physical Exam completed without significant findings other than sarcopenia. Provided education to caregiver for ordering meals. Reports she ate 100% of 2 of 3 meals Monday but hardly anything on Tuesday due to being asleep and not rousing enough to take PO. Pt usually usually has a very good appetite and cleans her plate. No new weight is available at this time. Currently has fecal impaction & bowel regimen is adjusted to promote BMs. Malnutrition Assessment:  Malnutrition Status: No malnutrition     Estimated Daily Nutrient Needs:  Energy (kcal):  1755 kcal/d (MSJ xAF 1.2)  Protein (g):  82 - 102 g (0.8-1 g/kg)       Fluid (ml/day):  1755 mL/d    Nutrition Related Findings:    Last Bowel Movement Date: 10/25/22  Stool Appearance: Soft  Abdominal Assessment: Distended, Tender  Appetite: Fair  Bowel Sounds: Active     Edema:LLE: Trace (10/25/2022  8:15 AM)  RUE: Trace (10/25/2022  8:15 AM)    Nutr.  Labs:  Lab Results   Component Value Date/Time    Sodium 133 (L) 10/26/2022 11:22 AM    Potassium 4.9 10/26/2022 11:22 AM    Chloride 93 (L) 10/26/2022 11:22 AM    CO2 38 (H) 10/26/2022 11:22 AM Lab Results   Component Value Date/Time    Glucose 139 (H) 10/26/2022 11:22 AM    Glucose (POC) 93 10/24/2022 11:03 AM     Lab Results   Component Value Date/Time    Hemoglobin A1c, External 6.1 11/19/2014 12:00 AM     Nutr. Meds:  Amiodarone, Pepcid, lactulose, metamucil, senokot, MVI (pt supplied)  PRN: Miralax    Current Nutrition Therapies:  ADULT DIET Regular; Low Sodium (2 gm); 1000 ml    Documented Meal intake:  Patient Vitals for the past 168 hrs:   % Diet Eaten   10/25/22 1740 76 - 100%   10/25/22 1615 1 - 25%   10/25/22 1400 1 - 25%   10/25/22 1205 1 - 25%   10/25/22 0815 1 - 25%   10/25/22 0037 0%   10/24/22 0953 26 - 50%   10/23/22 0920 51 - 75%   10/21/22 2003 51 - 75%   10/21/22 1633 0%   10/21/22 1207 1 - 25%   10/20/22 1805 76 - 100%   10/20/22 1157 76 - 100%   10/20/22 0949 51 - 75%   10/19/22 1835 1 - 25%   10/19/22 1310 26 - 50%       Anthropometric Measures:  Height:  5' 4\" (162.6 cm)  Current Body Wt:  102.1 kg (225 lb 1.4 oz)  BMI: 38.6    Wt Readings from Last 6 Encounters:   10/18/22 102.1 kg (225 lb)   08/23/22 94.8 kg (209 lb)   07/25/22 95.1 kg (209 lb 9.6 oz)   07/14/22 95.3 kg (210 lb)   06/12/22 98.1 kg (216 lb 4.3 oz)   07/07/21 97.1 kg (214 lb)     Nutrition Diagnosis:   Altered GI function related to impaired nutrient utilization as evidenced by constipation, KUB showing impaction.      Nutrition Interventions:   Food and/or Nutrient Delivery: Continue current diet  Nutrition Education/Counseling: No recommendations at this time  Coordination of Nutrition Care: No recommendation at this time  Plan of Care discussed with: caregiver and RN    Goals:     Goals: PO intake 75% or greater, by next RD assessment       Nutrition Monitoring and Evaluation:   Behavioral-Environmental Outcomes: None identified  Food/Nutrient Intake Outcomes: Food and nutrient intake  Physical Signs/Symptoms Outcomes: None identified    Discharge Planning:    No discharge needs at this time    Joel Mcgregor Demario Gonzalez MS, RD  Contact via [employer] or office [phone]

## 2022-10-26 NOTE — PROGRESS NOTES
Physical Therapy    Chart reviewed up to date. Note plan for pt d/c home with hospice services. Will sign off.      Kong Park, PT, MPT

## 2022-10-26 NOTE — PROGRESS NOTES
10/26/2022  2:35 PM  Pt discussed w/ MD Enma Dhillon and is stable for DC to home w/ [facility], family requesting 10/27 discharge in the morning and pt's Dtr Corinaphilomenaeffie Alessia will be available for the admission. CM scheduled [facility] transport @ 9:00 AM, PCS uploaded in [employer] and transport packet completed and placed w/ bedside chart. AVS was sent to [facility] in [facility] and CM spoke w/ lialuis miguel Biggs who will coordinate hospice nurse  intake. Pt is ready for DC from CM standpoint. Care Management Interventions  PCP Verified by CM: Yes  Mode of Transport at Discharge:  Other (see comment)  Transition of Care Consult (CM Consult): [place]: No  Reason Outside [place]:  (family preference)  MyChart Signup: No  Discharge Durable Medical Equipment: No  Physical Therapy Consult: Yes  Occupational Therapy Consult: Yes  Speech Therapy Consult: No  Support Systems: Spouse/Significant Other, Child(brooklynn)  Confirm Follow Up Transport: Family  The Plan for Transition of Care is Related to the Following Treatment Goals : Hyponatremia  The Patient and/or Patient Representative was Provided with a Choice of Provider and Agrees with the Discharge Plan?: (S) Yes  Name of the Patient Representative Who was Provided with a Choice of Provider and Agrees with the Discharge Plan: Self  Freedom of Choice List was Provided with Basic Dialogue that Supports the Patient's Individualized Plan of Care/Goals, Treatment Preferences and Shares the Quality Data Associated with the Providers?: (S) Yes  Discharge Location  Patient Expects to be Discharged to[de-identified] Home with hospice [facility] )  Darry Frankel, BS       9:27 AM  Update via chart review, pt is continuing to require medical management for Acute urinary tract infection / Fever, Acute on chronic respiratory failure with hypoxia and hypercapnia, Acute metabolic encephalopathy / dementia, Fecal impaction, Hyponatremia, Hypertension, Iron deficiency anemia, Paroxysmal atrial fibrillation, Constipation, Hypothyroidism, Glaucoma, Obese, Bladder spasms  Transitions of Care Plan:  RUR 14 % Low Risk of Readmission/Green  LOS   9 Days   Medical management continues  Fecal impaction, plan for mineral oil enema  PT,OT following  DC when stable to home w/ family assistance and [facility], spoke w/ Dylon Navarro advised awaiting labs and BM; discussed family insight into Hospice philosophy, he is confident based on conversation w/ her Dtr this AM family understands Hospice is to provide comfort at EOL   Outpatient follow up Hospice MD HILDA salinas on Will Call  CM will continue to follow and assist w/ DC needs  ERIC Becerra

## 2022-10-26 NOTE — PROGRESS NOTES
[facility]  [address] [facility], [facility] [address]  [phone]    Hospitalist Progress Note      NAME: Claudia Finnegan   :  1940  MRM:  [id number]    Date/Time of service 10/26/2022  1:29 PM         Assessment and Plan:     Acute urinary tract infection / Fever- POA, cephalosporin sensitive E coli on culture. Complete empiric ceftriaxone. UA now clean. Fever resolved. -Resolved     Acute on chronic respiratory failure with hypoxia and hypercapnia - POA, likely due to decreased respiratory drive from altered mental status. RVP was negative. Perhaps some aspiration? I now doubt it was fluid overload, ECHO does not support Dx of CHF, and lasix led to RAN. -Stable    Acute metabolic encephalopathy / dementia - POA, worse than baseline due to hypoNA, and UTI, and just progressive decline of dementia. Now a bit better. Continue donepazil. Family met with hospice for info  -Pt to go home with hospice    Fecal impaction - Still pain his AM.  KUB shows fecal impaction. I will give another mineral oil enema. RE consult GI if ineffective. -Resolved - pt had 3 Bms in last 24 h according to nurse    Hyponatremia - POA, mild and a bit better. Unclear etiology. Consulted renal. May be SIADH. Fluid restriction led to RAN. They may try tolvaptan or urea    Hypertension - Continue metoprolol. Stop losartan due to RAN and low BP. Stop Norvasc due to low BP. Iron deficiency anemia - POA and likely from GIB in setting of xarelto. However, hemoccult negative multiple times. Tolerated PRBC transfusion. Continue pepcid IV, getting IV iron. Stopped xarelto. GI and family were considering endoscopy. After a long discussion with the family, we have now decided to simply stop xarelto altogether and forego any endoscopy. Paroxysmal atrial fibrillation - Continue amiodarone, and low dose metoprolol, she has a low pulse. Stopped Xarelto due to anemia.     Constipation - Continue lactulose    Hypothyroidism - Continue synthroid. Normal TSH    Glaucoma - Continue timolol     Obese - Advise weight loss    Bladder spasms - Stop sanctura    Hyperkalemia - resolved    Elevated CO2  -Mild elevation, no anion gap.  -Pt is sleepy, this is likely mild retention due to low respiratory rate. No evidence that CO2 retention is the cause of drowsiness, rather it is a result of it. DC to home hospice in AM       Subjective:     Chief Complaint:  nonverbal          Objective:     Last 24hrs VS reviewed since prior progress note.  Most recent are:    Visit Vitals  BP (!) 129/54   Pulse (!) 54   Temp 97.6 °F (36.4 °C)   Resp 20   Ht 5' 4\" (1.626 m)   Wt 102.1 kg (225 lb)   SpO2 95%   BMI 38.62 kg/m²     SpO2 Readings from Last 6 Encounters:   10/26/22 95%   08/23/22 93%   07/25/22 97%   07/14/22 94%   06/14/22 100%   07/07/21 95%    O2 Flow Rate (L/min): 4 l/min     Intake/Output Summary (Last 24 hours) at 10/26/2022 1453  Last data filed at 10/26/2022 0313  Gross per 24 hour   Intake 500 ml   Output 1550 ml   Net -1050 ml          Physical Exam:    Gen:  Obese, sleeping, in no acute distress  HEENT:  Pink conjunctivae, PERRL, moist mucous membranes  Neck:  Supple, without masses, thyroid non-tender  Resp:  No accessory muscle use, clear breath sounds without wheezes rales or rhonchi  Card:  No murmurs, bradycardic S1, S2 without thrills, bruits or peripheral edema  Abd:  Soft, tender, distended, reduced bowel sounds are present, no mass  Musc:  No cyanosis or clubbing  Skin:  No rashes or ulcers, skin turgor is good  Neuro:  sleeping  __________________________________________________________________  Medications Reviewed: (see below)  Medications:     Current Facility-Administered Medications   Medication Dose Route Frequency    psyllium husk-aspartame (METAMUCIL FIBER) packet 1 Packet  1 Packet Oral DAILY    senna (SENOKOT) tablet 8.6 mg  1 Tablet Oral BID    famotidine (PEPCID) tablet 20 mg  20 mg Oral DAILY    vit C,W-Ph-fmxvd-lutein-zeaxan (PRESERVISION AREDS-2) capsule 1 Capsule (Patient Supplied)  1 Capsule Oral BID WITH MEALS    lactulose (CHRONULAC) 10 gram/15 mL solution 30 mL  20 g Oral TID    amiodarone (CORDARONE) tablet 200 mg  200 mg Oral DAILY    levothyroxine (SYNTHROID) tablet 150 mcg  150 mcg Oral ACB    metoprolol tartrate (LOPRESSOR) tablet 12.5 mg  12.5 mg Oral BID    [Held by provider] rivaroxaban (XARELTO) tablet 20 mg  20 mg Oral DAILY    timolol (TIMOPTIC) 0.5 % ophthalmic solution 1 Drop  1 Drop Both Eyes DAILY    sodium chloride (NS) flush 5-40 mL  5-40 mL IntraVENous Q8H    sodium chloride (NS) flush 5-40 mL  5-40 mL IntraVENous PRN    acetaminophen (TYLENOL) tablet 650 mg  650 mg Oral Q6H PRN    Or    acetaminophen (TYLENOL) suppository 650 mg  650 mg Rectal Q6H PRN    polyethylene glycol (MIRALAX) packet 17 g  17 g Oral DAILY PRN    ondansetron (ZOFRAN ODT) tablet 4 mg  4 mg Oral Q8H PRN    Or    ondansetron (ZOFRAN) injection 4 mg  4 mg IntraVENous Q6H PRN    cetirizine (ZYRTEC) tablet 10 mg  10 mg Oral DAILY    donepeziL (ARICEPT) tablet 10 mg  10 mg Oral QHS    [Held by provider] valsartan (DIOVAN) tablet 40 mg  40 mg Oral BID    sodium chloride (BRANDON 128) 2 % ophthalmic drops 1 Drop (Patient Supplied)  1 Drop Left Eye BID    0.9% sodium chloride infusion 250 mL  250 mL IntraVENous PRN    0.9% sodium chloride infusion 250 mL  250 mL IntraVENous PRN        Lab Data Reviewed: (see below)  Lab Review:     No results for input(s): WBC, HGB, HCT, PLT, HGBEXT, HCTEXT, PLTEXT, HGBEXT, HCTEXT, PLTEXT in the last 72 hours.     Recent Labs     10/26/22  1122 10/24/22  0839   * 133*   K 4.9 5.4*   CL 93* 93*   CO2 38* 39*   * 99   BUN 24* 28*   CREA 0.95 0.87   CA 8.9 9.4   MG  --  2.3   PHOS  --  3.5       Lab Results   Component Value Date/Time    Glucose (POC) 93 10/24/2022 11:03 AM    Glucose (POC) 186 (H) 06/12/2022 08:18 PM    Glucose (POC) 85 06/05/2022 09:03 AM     No results for input(s): PH, PCO2, PO2, HCO3, FIO2 in the last 72 hours. No results for input(s): INR, INREXT, INREXT in the last 72 hours. All Micro Results       Procedure Component Value Units Date/Time    CULTURE, URINE [[id number]] Collected: 10/22/22 1506    Order Status: Completed Specimen: Cath Urine Updated: 10/23/22 2010     Special Requests: NO SPECIAL REQUESTS        Culture result: No growth (<1,000 CFU/ML)       RESPIRATORY VIRUS PANEL W/COVID-19, PCR [[id number]] Collected: 10/22/22 1205    Order Status: Completed Specimen: Nasopharyngeal Updated: 10/22/22 1825     Adenovirus Not detected        Coronavirus 229E Not detected        Coronavirus HKU1 Not detected        Coronavirus CVNL63 Not detected        Coronavirus OC43 Not detected        SARS-CoV-2, PCR Not detected        Metapneumovirus Not detected        Rhinovirus and Enterovirus Not detected        Influenza A Not detected        Influenza B Not detected        Parainfluenza 1 Not detected        Parainfluenza 2 Not detected        Parainfluenza 3 Not detected        Parainfluenza virus 4 Not detected        RSV by PCR Not detected        B. parapertussis, PCR Not detected        Bordetella pertussis - PCR Not detected        Chlamydophila pneumoniae DNA, QL, PCR Not detected        Mycoplasma pneumoniae DNA, QL, PCR Not detected       CULTURE, URINE [[id number]]  (Abnormal)  (Susceptibility) Collected: 10/17/22 1946    Order Status: Completed Specimen: Cath Urine Updated: 10/21/22 1109     Special Requests: NO SPECIAL REQUESTS        Wing Count --        >100,000  COLONIES/mL       Culture result: ESCHERICHIA COLI               Other pertinent lab: none    Total time spent with patient: 30 Minutes I personally reviewed chart, notes, data and current medications in the medical record. I have personally examined and treated the patient at bedside during this period.  To assist coordination of care and communication with nursing and staff, this note may be preliminary early in the day, but finalized by end of the day.                  Care Plan discussed with: Patient, Family, Care Manager, Nursing Staff, Consultant/Specialist, and >50% of time spent in counseling and coordination of care    Discussed:  Care Plan and D/C Planning    Prophylaxis:  SCD's and H2B/PPI    Disposition:  Home w/Family and HH PT, OT, RN           ___________________________________________________    Attending Physician: Florencio Rosenbaum MD

## 2022-10-26 NOTE — PROGRESS NOTES
10/26/2022  1:36 PM  Medicare pt's Dtr has received, reviewed, and signed 2nd IM letter informing them of their right to appeal the discharge. Signed copied has been placed on pt bedside chart.   ERIC Luciano

## 2022-10-27 VITALS
BODY MASS INDEX: 38.41 KG/M2 | WEIGHT: 225 LBS | SYSTOLIC BLOOD PRESSURE: 156 MMHG | HEIGHT: 64 IN | RESPIRATION RATE: 18 BRPM | DIASTOLIC BLOOD PRESSURE: 67 MMHG | HEART RATE: 66 BPM | TEMPERATURE: 98.3 F | OXYGEN SATURATION: 95 %

## 2022-10-27 PROCEDURE — 74011250637 HC RX REV CODE- 250/637: Performed by: INTERNAL MEDICINE

## 2022-10-27 PROCEDURE — 77010033678 HC OXYGEN DAILY

## 2022-10-27 PROCEDURE — 74011250637 HC RX REV CODE- 250/637: Performed by: HOSPITALIST

## 2022-10-27 PROCEDURE — 74011000250 HC RX REV CODE- 250: Performed by: HOSPITALIST

## 2022-10-27 RX ADMIN — SENNOSIDES 8.6 MG: 8.6 TABLET, FILM COATED ORAL at 08:47

## 2022-10-27 RX ADMIN — LACTULOSE 30 ML: 20 SOLUTION ORAL at 08:47

## 2022-10-27 RX ADMIN — LEVOTHYROXINE SODIUM 150 MCG: 0.07 TABLET ORAL at 06:30

## 2022-10-27 RX ADMIN — METOPROLOL TARTRATE 12.5 MG: 25 TABLET, FILM COATED ORAL at 08:47

## 2022-10-27 RX ADMIN — SODIUM CHLORIDE, PRESERVATIVE FREE 10 ML: 5 INJECTION INTRAVENOUS at 06:27

## 2022-10-27 RX ADMIN — TIMOLOL MALEATE 1 DROP: 5 SOLUTION OPHTHALMIC at 08:49

## 2022-10-27 RX ADMIN — AMIODARONE HYDROCHLORIDE 200 MG: 200 TABLET ORAL at 08:47

## 2022-10-27 NOTE — PROGRESS NOTES
I have reviewed discharge instructions with the patient and daughter. Patient with dementia and inability to verbalize understanding, patient's daughter verbalized understanding of AVS and instructions. Two IV's removed without difficulty.

## 2022-10-27 NOTE — PROGRESS NOTES
Problem: Pressure Injury - Risk of  Goal: *Prevention of pressure injury  Description: Document Ivan Scale and appropriate interventions in the flowsheet.   Outcome: Progressing Towards Goal  Note: Pressure Injury Interventions:  Sensory Interventions: Assess changes in LOC, Float heels, Keep linens dry and wrinkle-free    Moisture Interventions: Absorbent underpads, Minimize layers    Activity Interventions: Increase time out of bed, PT/OT evaluation    Mobility Interventions: Float heels, HOB 30 degrees or less, PT/OT evaluation    Nutrition Interventions: Document food/fluid/supplement intake    Friction and Shear Interventions: Lift sheet, HOB 30 degrees or less, Minimize layers                Problem: Patient Education: Go to Patient Education Activity  Goal: Patient/Family Education  Outcome: Progressing Towards Goal

## 2022-10-27 NOTE — PROGRESS NOTES
10/27/2022  9:22 AM    Care Management Progress Note      ICD-10-CM ICD-9-CM    1. Hypoxia  R09.02 799.02       2. Anemia, unspecified type  D64.9 285.9       3. Hyponatremia  E87.1 276.1           RUR:  13%  Risk Level: [x]Low []Moderate []High  Value-based purchasing: [x] Yes [] No  Bundle patient: [] Yes [x] No   Specify:     Transition of care plan:  Discharge order noted  Home with [facility]  Outpatient follow-up. [facility] transport - 0930 arrival today  No further CM needs identified    Care Management Interventions  PCP Verified by CM: Yes  Mode of Transport at Discharge:  Other (see comment)  Transition of Care Consult (CM Consult): [place]: No  Reason Outside [place]:  (family preference)  MyChart Signup: No  Discharge Durable Medical Equipment: No  Physical Therapy Consult: Yes  Occupational Therapy Consult: Yes  Speech Therapy Consult: No  Support Systems: Spouse/Significant Other, Child(brooklynn)  Confirm Follow Up Transport: Family  The Plan for Transition of Care is Related to the Following Treatment Goals : Hyponatremia  The Patient and/or Patient Representative was Provided with a Choice of Provider and Agrees with the Discharge Plan?: (S) Yes  Name of the Patient Representative Who was Provided with a Choice of Provider and Agrees with the Discharge Plan: Self  Freedom of Choice List was Provided with Basic Dialogue that Supports the Patient's Individualized Plan of Care/Goals, Treatment Preferences and Shares the Quality Data Associated with the Providers?: (S) Yes  Discharge Location  Patient Expects to be Discharged to[de-identified] Home with hospice    Redd Alvarez RN

## 2022-10-27 NOTE — DISCHARGE SUMMARY
Physician Discharge Summary     Patient ID:    Jl Simon  [id number]  [unfilled]  1940    Admit date: 10/17/2022    Discharge date and time: 10/27/2022    Hospital Diagnoses and Treatment Rendered:     Greater than 30 minutes spent providing discharge related services for this patient    Ms. Morales is a 79 y.o.   with a past medical history significant for obstructive sleep apnea on CPAP, obesity hypoventilation syndrome known CO2 retainer, paroxysmal atrial fibrillation currently on Xarelto presented to the emergency department for a positive urine dipstick and also low sodium level that was obtained earlier today. Patient also stating that she is not feeling well. She has been having generalized weakness. In addition she has been having shortness of breath but is unable to give me more detailed information. Family endorses patient having worsening confusion lately. Patient was found to have a urinary tract infection which was treated with ceftriaxone, acute on chronic respiratory failure, acute metabolic encephalopathy likely worsened by UTI, and fecal impaction. The fecal impaction resolved. Patient's metabolic encephalopathy has continued and patient was made a hospice patient and is going home with home hospice. Patient has trace edema bilateral upper and lower extremities which was felt to be secondary to bedrest and third spacing redistribution of intravascular volume. Acute urinary tract infection / Fever- POA, cephalosporin sensitive E coli on culture. Complete empiric ceftriaxone. UA now clean. Fever resolved. -Resolved     Acute on chronic respiratory failure with hypoxia and hypercapnia - POA, likely due to decreased respiratory drive from altered mental status. RVP was negative. Perhaps some aspiration? I now doubt it was fluid overload, ECHO does not support Dx of CHF, and lasix led to RAN.   -Stable     Acute metabolic encephalopathy / dementia - POA, worse than baseline due to hypoNA, and UTI, and just progressive decline of dementia. Now a bit better. Continue donepazil. Family met with hospice for info  -Pt to go home with hospice     Fecal impaction - Still pain his AM.  KUB shows fecal impaction. I will give another mineral oil enema. RE consult GI if ineffective. -Resolved - pt had 3 Bms in last 24 h according to nurse     Hyponatremia - POA, mild and a bit better. Unclear etiology. Consulted renal. May be SIADH. Fluid restriction led to RAN. They may try tolvaptan or urea     Hypertension - Continue metoprolol. Stop losartan due to RAN and low BP. Stop Norvasc due to low BP. Iron deficiency anemia - POA and likely from GIB in setting of xarelto. However, hemoccult negative multiple times. Tolerated PRBC transfusion. Continue pepcid IV, getting IV iron. Stopped xarelto. GI and family were considering endoscopy. After a long discussion with the family, we have now decided to simply stop xarelto altogether and forego any endoscopy. Paroxysmal atrial fibrillation - Continue amiodarone, and low dose metoprolol, she has a low pulse. Stopped Xarelto due to anemia. Constipation - Continue lactulose     Hypothyroidism - Continue synthroid. Normal TSH     Glaucoma - Continue timolol     Obese - Advise weight loss     Bladder spasms - Stop sanctura     Hyperkalemia - resolved     Elevated CO2  -Mild elevation, no anion gap.  -Pt is sleepy, this is likely mild retention due to low respiratory rate. No evidence that CO2 retention is the cause of drowsiness, rather it is a result of it.         DC to home hospice    Chronic Diagnoses:    Problem List as of 10/27/2022 Date Reviewed: 8/23/2022            Codes Class Noted - Resolved    Hyponatremia ICD-10-CM: E87.1  ICD-9-CM: 276.1  10/17/2022 - Present        Acute on chronic respiratory failure with hypoxia and hypercapnia (HCC) ICD-10-CM: J96.21, J96.22  ICD-9-CM: 518.84, 786.09, 799.02  6/8/2022 - Present        Physical debility ICD-10-CM: R53.81  ICD-9-CM: 799.3  6/8/2022 - Present        Dementia ([facility] ICD-10-CM: F03.90  ICD-9-CM: 294.20  6/8/2022 - Present        Encephalopathy acute ICD-10-CM: G93.40  ICD-9-CM: 348.30  6/4/2022 - Present        Obesity, morbid ([facility] ICD-10-CM: E66.01  ICD-9-CM: 278.01  11/28/2017 - Present        Advanced directives, counseling/discussion ICD-10-CM: Z71.89  ICD-9-CM: V65.49  3/24/2016 - Present        Advance care planning ICD-10-CM: Z71.89  ICD-9-CM: V65.49  12/17/2015 - Present    Overview Signed 12/17/2015  2:18 PM by Cheron Homans, LPN     End of life planning discussed with patient. Patient states that they do not have an advance directive at this time. Pamphlet given to patient \"Your Right to Decide\" and NN's business card. Advised if help is need to contact office and ask to speak directly to a NN. Paroxysmal atrial flutter (HCC) ICD-10-CM: I48.92  ICD-9-CM: 427.32  11/23/2015 - Present        Mixed hyperlipidemia ICD-10-CM: E78.2  ICD-9-CM: 272.2  5/28/2014 - Present        Headache ICD-10-CM: R51.9  ICD-9-CM: 784.0  7/7/2010 - Present        Acquired hypothyroidism ICD-10-CM: E03.9  ICD-9-CM: 244.9  7/7/2010 - Present        Psoriasis ICD-10-CM: L40.9  ICD-9-CM: 696.1  4/30/2010 - Present        HTN (hypertension), benign ICD-10-CM: I10  ICD-9-CM: 401.1  4/30/2010 - Present        Allergic rhinitis, cause unspecified ICD-10-CM: J30.9  ICD-9-CM: 477.9  4/30/2010 - Present        OA (osteoarthritis) ICD-10-CM: M19.90  ICD-9-CM: 715.90  4/30/2010 - Present        Glaucoma ICD-10-CM: H40.9  ICD-9-CM: 365.9  4/30/2010 - Present           Discharge Medications:   Discharge Medication List as of 10/27/2022  8:28 AM        CONTINUE these medications which have NOT CHANGED    Details   psyllium (METAMUCIL) pack (sugar free) packet Take 1 Packet by mouth daily. , Historical Med      metoprolol tartrate (LOPRESSOR) 25 mg tablet Take 0.5 Tablets by mouth two (2) times a day., Normal, Disp-90 Tablet, R-3Do NOT fill - just FYI dose change. amiodarone (CORDARONE) 200 mg tablet Take 1 Tablet by mouth daily. , Normal, Disp-90 Tablet, R-3      OTHER Move free 2 tabs daily, Historical Med      multivit/folic acid/vit K1 (ONE-A-DAY WOMEN'S 50 PLUS PO) Take  by mouth., Historical Med      BETAMETHASONE by Does Not Apply route., Historical Med      cetirizine (ZYRTEC) 10 mg tablet Take  by mouth daily as needed for Allergies. , Historical Med      polyethylene glycol (MIRALAX) 17 gram packet Take 1 Packet by mouth daily as needed for Constipation for up to 30 doses. Indications: constipation, No Print, Disp-30 Packet, R-0      sodium chloride (Kemal 128) 2 % ophthalmic solution Administer 1 Drop to left eye two (2) times a day. Administer 1 Drop to left eye two (2) times a day. Morning and at night 15 minutes after timolol eye drops, Historical Med      Gemtesa 75 mg tab Take 75 mg by mouth every evening., Historical Med, JUANJOSE      levothyroxine (Synthroid) 150 mcg tablet Take 1 Tablet by mouth Daily (before breakfast). , Normal, Disp-90 Tablet, R-0      donepeziL (ARICEPT) 10 mg tablet TAKE ONE TABLET BY MOUTH ONCE NIGHTLY, Normal, Disp-90 Tablet, R-3      vit A/vit C/vit E/zinc/copper (PRESERVISION AREDS PO) Take 2 Tablets by mouth two (2) times a day., Historical Med      Cholecalciferol, Vitamin D3, (VITAMIN D3) 2,000 unit cap capsule Take 2,000 Units by mouth two (2) times a day., OTC, Disp-90 Cap, R-1      timolol (TIMOPTIC) 0.5 % ophthalmic solution Administer 1 Drop to both eyes daily.  In the morning, Historical Med           STOP taking these medications       valsartan (DIOVAN) 40 mg tablet Comments:   Reason for Stopping:         amLODIPine (NORVASC) 2.5 mg tablet Comments:   Reason for Stopping:         fluticasone propionate (FLONASE NA) Comments:   Reason for Stopping:         melatonin 3 mg tablet Comments:   Reason for Stopping: rivaroxaban (Xarelto) 20 mg tab tablet Comments:   Reason for Stopping:         polyethylene glycol (MIRALAX) 17 gram/dose powder Comments:   Reason for Stopping:         triamcinolone acetonide (KENALOG) 0.1 % ointment Comments:   Reason for Stopping:         triamcinolone acetonide (KENALOG) 0.1 % topical cream Comments:   Reason for Stopping: Follow up Care:    1. David Higuera MD in 1-2 weeks. Please call to set up an appointment shortly after discharge. Diet:  Regular Diet    Disposition:  Home hospice. Advanced Directive:   FULL    DNR x     Discharge Exam:  Gen:  Obese, in no acute distress  HEENT:  Pink conjunctivae, PERRL, moist mucous membranes  Neck:  Supple, without masses, thyroid non-tender  Resp:  No accessory muscle use, clear breath sounds without wheezes rales or rhonchi  Card:  No murmurs, bradycardic S1, S2 without thrills, bruits or peripheral edema  Abd:  Soft, tender, distended, reduced bowel sounds are present, no mass  Musc:  No cyanosis or clubbing  Ext: Trace edema bilateral lower extremities and bilateral upper extremities  Skin:  No rashes or ulcers, skin turgor is good  Neuro:  awake, oriented only to self    CONSULTATIONS: None    Significant Diagnostic Studies:   10/17/2022: BUN 23 MG/DL (H; Ref range: 6 - 20 MG/DL); Calcium 8.3 MG/DL (L; Ref range: 8.5 - 10.1 MG/DL); CO2 32 mmol/L (Ref range: 21 - 32 mmol/L); Creatinine 0.77 MG/DL (Ref range: 0.55 - 1.02 MG/DL); Glucose 94 mg/dL (Ref range: 65 - 100 mg/dL); HCT 21.9 % (L; Ref range: 35.0 - 47.0 %); HGB 6.6 g/dL (L; Ref range: 11.5 - 16.0 g/dL); Potassium 5.0 mmol/L (Ref range: 3.5 - 5.1 mmol/L); Sodium 124 mmol/L (L; Ref range: 136 - 145 mmol/L)  10/18/2022: BUN 19 MG/DL (Ref range: 6 - 20 MG/DL); BUN 16 MG/DL (Ref range: 6 - 20 MG/DL); BUN 17 MG/DL (Ref range: 6 - 20 MG/DL); Calcium 8.1 MG/DL (L; Ref range: 8.5 - 10.1 MG/DL); Calcium 8.3 MG/DL (L; Ref range: 8.5 - 10.1 MG/DL);  Calcium 8.5 MG/DL (Ref range: 8.5 - 10.1 MG/DL); CO2 35 mmol/L (H; Ref range: 21 - 32 mmol/L); CO2 35 mmol/L (H; Ref range: 21 - 32 mmol/L); CO2 37 mmol/L (H; Ref range: 21 - 32 mmol/L); Creatinine 0.78 MG/DL (Ref range: 0.55 - 1.02 MG/DL); Creatinine 0.78 MG/DL (Ref range: 0.55 - 1.02 MG/DL); Creatinine 0.90 MG/DL (Ref range: 0.55 - 1.02 MG/DL); Glucose 85 mg/dL (Ref range: 65 - 100 mg/dL); Glucose 93 mg/dL (Ref range: 65 - 100 mg/dL); Glucose 100 mg/dL (Ref range: 65 - 100 mg/dL); HCT 21.2 % (L; Ref range: 35.0 - 47.0 %); HCT 27.3 % (L; Ref range: 35.0 - 47.0 %); HGB 6.4 g/dL (L; Ref range: 11.5 - 16.0 g/dL); HGB 8.3 g/dL (L; Ref range: 11.5 - 16.0 g/dL); Potassium 4.5 mmol/L (Ref range: 3.5 - 5.1 mmol/L); Potassium 4.4 mmol/L (Ref range: 3.5 - 5.1 mmol/L); Potassium 4.5 mmol/L (Ref range: 3.5 - 5.1 mmol/L); Sodium 126 mmol/L (L; Ref range: 136 - 145 mmol/L); Sodium 129 mmol/L (L; Ref range: 136 - 145 mmol/L); Sodium 128 mmol/L (L; Ref range: 136 - 145 mmol/L)  No results for input(s): WBC, HGB, HCT, PLT, HGBEXT, HCTEXT, PLTEXT in the last 72 hours. Recent Labs     10/26/22  1122   *   K 4.9   CL 93*   CO2 38*   BUN 24*   CREA 0.95   *   CA 8.9     No results for input(s): AP, TBIL, TP, ALB, GLOB, GGT, AML, LPSE in the last 72 hours. No lab exists for component: SGOT, GPT, AMYP, HLPSE  No results for input(s): INR, PTP, APTT, INREXT in the last 72 hours. No results for input(s): FE, TIBC, PSAT, FERR in the last 72 hours. No results for input(s): PH, PCO2, PO2 in the last 72 hours. No results for input(s): CPK, CKMB in the last 72 hours.     No lab exists for component: TROPONINI  No components found for: [place]          Signed:  Mariann Poe MD  10/27/2022  3:15 PM

## 2022-10-28 ENCOUNTER — TELEPHONE (OUTPATIENT)
Dept: CARDIOLOGY CLINIC | Age: 82
End: 2022-10-28

## 2022-10-28 NOTE — TELEPHONE ENCOUNTER
Patient's daughter, Carlos Rae, called on today regarding patient's medication now that patient is out of the hospital.     [phone]

## 2022-10-28 NOTE — TELEPHONE ENCOUNTER
R/t call to pt's daughter (HIPAA approved),    She is concerned that while in hospital, they d/c amlodipine, xarelto, valsartan d/t low BP, low hgb. They didn't find GI bleed. Now that she's home, walking around a lot more, she is worried about her having higher BP's now. Home BP this am was 161/57, p 65 supine. Prior to hospital, pulse was 53-63. Discussed norms of pulse/BP and what to watch out for over the weekend. Also discussed supine vs sitting/standing BP's. Discussed having pt come in next week for hospital follow up. She stated that she is not mobile currently, so made a VV for Thursday (due to daughter's work schedule).   Let her know that I would call Monday to check on BP's over the weekend and possibly look at having med instructions for pt?

## 2022-10-31 ENCOUNTER — TELEPHONE (OUTPATIENT)
Dept: INTERNAL MEDICINE CLINIC | Age: 82
End: 2022-10-31

## 2022-11-02 ENCOUNTER — TELEPHONE (OUTPATIENT)
Dept: INTERNAL MEDICINE CLINIC | Age: 82
End: 2022-11-02

## 2022-11-02 NOTE — TELEPHONE ENCOUNTER
Pt has been using 1 liter of oxygen at home. Oxygen stats were at 92 when [facility] visited.

## 2022-11-03 ENCOUNTER — VIRTUAL VISIT (OUTPATIENT)
Dept: CARDIOLOGY CLINIC | Age: 82
End: 2022-11-03
Payer: MEDICARE

## 2022-11-03 DIAGNOSIS — D64.9 ANEMIA, UNSPECIFIED TYPE: Primary | ICD-10-CM

## 2022-11-03 DIAGNOSIS — E66.01 SEVERE OBESITY (BMI 35.0-39.9) WITH COMORBIDITY (HCC): ICD-10-CM

## 2022-11-03 DIAGNOSIS — I48.92 PAROXYSMAL ATRIAL FLUTTER (HCC): ICD-10-CM

## 2022-11-03 DIAGNOSIS — N17.9 AKI (ACUTE KIDNEY INJURY) (HCC): ICD-10-CM

## 2022-11-03 PROCEDURE — 1090F PRES/ABSN URINE INCON ASSESS: CPT | Performed by: SPECIALIST

## 2022-11-03 PROCEDURE — 1123F ACP DISCUSS/DSCN MKR DOCD: CPT | Performed by: SPECIALIST

## 2022-11-03 PROCEDURE — 99215 OFFICE O/P EST HI 40 MIN: CPT | Performed by: SPECIALIST

## 2022-11-03 PROCEDURE — G8432 DEP SCR NOT DOC, RNG: HCPCS | Performed by: SPECIALIST

## 2022-11-03 PROCEDURE — G8756 NO BP MEASURE DOC: HCPCS | Performed by: SPECIALIST

## 2022-11-03 PROCEDURE — 1101F PT FALLS ASSESS-DOCD LE1/YR: CPT | Performed by: SPECIALIST

## 2022-11-03 PROCEDURE — G0463 HOSPITAL OUTPT CLINIC VISIT: HCPCS | Performed by: SPECIALIST

## 2022-11-03 PROCEDURE — G8417 CALC BMI ABV UP PARAM F/U: HCPCS | Performed by: SPECIALIST

## 2022-11-03 PROCEDURE — G8399 PT W/DXA RESULTS DOCUMENT: HCPCS | Performed by: SPECIALIST

## 2022-11-03 PROCEDURE — 1111F DSCHRG MED/CURRENT MED MERGE: CPT | Performed by: SPECIALIST

## 2022-11-03 PROCEDURE — G8427 DOCREV CUR MEDS BY ELIG CLIN: HCPCS | Performed by: SPECIALIST

## 2022-11-03 PROCEDURE — G8536 NO DOC ELDER MAL SCRN: HCPCS | Performed by: SPECIALIST

## 2022-11-03 NOTE — PROGRESS NOTES
Verified number/email to use for VV. Reviewed PCP, allergens, meds, pharmacy. Took off of because laying around, BP was fine.   Now that home, may need to restart:  Amlodipine   Valsartan  Xarelto    Home BP/pulse:  10/28 home from [facility] 161/57 pm 160/65  10/29 171/71 am  10/30 153/79 am 151/75 pm  10/31 125/61 am 172/68 pm  142/62 am 257/65  142/72 am 136/58  Today 129/63 am    Physical therapy: 100/58   Sitting for all  Now back up after movement 129/63    Pulse:  28th 65 57  56   59 am 54 pm  55 am 54 pm  56 52  56 55  59 this am    4-6 Liters in hospital  1 liter home and 95%

## 2022-11-03 NOTE — PROGRESS NOTES
Dominguez Luu MD. [address] [facility]  [address] [facility] [address] [facility], [address] [place] [address] [phone]; Fax [phone]        Patient: Jessica Andre  : 1940      Today's Date: 11/3/2022        [facility] Cardiology Telemedicine Encounter                                                         Pursuant to the emergency declaration under the [address] [place] [address] waiver authority and the [employer] Act, this Virtual  Visit was conducted, with patient's consent, to reduce the patient's risk of exposure to COVID-19 and provide continuity of care for an established patient. Services were provided through a video synchronous discussion virtually to substitute for in-person clinic visit. HISTORY OF PRESENT ILLNESS:     History of Present Illness:    Jessica Andre is a 79 y.o. female who was seen by synchronous (real-time) audio-video technology on 11/3/2022. Recent admission for UTI, AMS. Xarelto was stopped due to anemia. She seems to be better at home - walking some and more interactive - getting home PT. Patient chose not to go into hospice.            PAST MEDICAL HISTORY:     Past Medical History:   Diagnosis Date    Arthritis     Basal cell carcinoma 2012    Calculus of kidney     Cancer ([address]     skin    Cancer ([address]     thyroid    Dementia ([address]     Glaucoma 2010    Hypertension     OA (osteoarthritis) 2010    Obesity hypoventilation syndrome (HCC)     Obesity, morbid (HCC)     TYRONE (obstructive sleep apnea)     PAF (paroxysmal atrial fibrillation) ([address]     Psoriasis 2010           Past Surgical History:   Procedure Laterality Date    HX CATARACT REMOVAL      bilat    HX CHOLECYSTECTOMY      HX CRANIOTOMY      HX DILATION AND CURETTAGE      HX HYSTERECTOMY      HX KNEE ARTHROSCOPY      HX KNEE REPLACEMENT      HX TONSILLECTOMY      HX WRIST FRACTURE TX Left 8/21/15    VT THYROIDECTOMY             Patient Active Problem List   Diagnosis Code    Psoriasis L40.9    HTN (hypertension), benign I10    Allergic rhinitis, cause unspecified J30.9    OA (osteoarthritis) M19.90    Glaucoma H40.9    Headache R51.9    Acquired hypothyroidism E03.9    Mixed hyperlipidemia E78.2    Paroxysmal atrial flutter ([facility]) I48.92    Advance care planning Z71.89    Advanced directives, counseling/discussion Z71.89    Obesity, morbid ([facility] E66.01    Encephalopathy acute G93.40    Acute on chronic respiratory failure with hypoxia and hypercapnia ([facility]) J96.21, J96.22    Physical debility R53.81    Dementia ([facility]) F03.90    Hyponatremia E87.1             MEDICATIONS:     Current Outpatient Medications   Medication Sig Dispense Refill    psyllium (METAMUCIL) pack (sugar free) packet Take 1 Packet by mouth daily. metoprolol tartrate (LOPRESSOR) 25 mg tablet Take 0.5 Tablets by mouth two (2) times a day. 90 Tablet 3    amiodarone (CORDARONE) 200 mg tablet Take 1 Tablet by mouth daily. 90 Tablet 3    OTHER Move free 2 tabs daily      multivit/folic acid/vit K1 (ONE-A-DAY WOMEN'S 50 PLUS PO) Take  by mouth. BETAMETHASONE by Does Not Apply route. cetirizine (ZYRTEC) 10 mg tablet Take  by mouth daily as needed for Allergies. polyethylene glycol (MIRALAX) 17 gram packet Take 1 Packet by mouth daily as needed for Constipation for up to 30 doses. Indications: constipation 30 Packet 0    sodium chloride ([address]) 2 % ophthalmic solution Administer 1 Drop to left eye two (2) times a day. Administer 1 Drop to left eye two (2) times a day. Morning and at night 15 minutes after timolol eye drops      levothyroxine (Synthroid) 150 mcg tablet Take 1 Tablet by mouth Daily (before breakfast). 90 Tablet 0    donepeziL (ARICEPT) 10 mg tablet TAKE ONE TABLET BY MOUTH ONCE NIGHTLY 90 Tablet 3    vit A/vit C/vit E/zinc/copper (PRESERVISION AREDS PO) Take 2 Tablets by mouth two (2) times a day.       Cholecalciferol, Vitamin D3, (VITAMIN D3) 2,000 unit cap capsule Take 2,000 Units by mouth two (2) times a day. 90 Cap 1    timolol (TIMOPTIC) 0.5 % ophthalmic solution Administer 1 Drop to both eyes daily. In the morning      Gemtesa 75 mg tab Take 75 mg by mouth every evening. (Patient not taking: Reported on 11/3/2022)             Allergies   Allergen Reactions    Cephalexin Itching      took a Zyrtec with derivative and was ok. Codeine Rash    Gabapentin Other (comments)    Neomycin Rash    Polysporin [Bacitracin-Polymyxin B] Rash    Protonix [Pantoprazole] Other (comments)     Gi upset               SOCIAL HISTORY:     Social History     Tobacco Use    Smoking status: Former     Packs/day: 0.50     Years: 4.00     Pack years: 2.00     Types: Cigarettes     Quit date: 1961     Years since quittin.8    Smokeless tobacco: Never   Vaping Use    Vaping Use: Never used   Substance Use Topics    Alcohol use: No     Alcohol/week: 0.0 standard drinks    Drug use: No               FAMILY HISTORY:     Family History   Problem Relation Age of Onset    Hypertension Mother     Heart Disease Mother     Heart Disease Father     Hypertension Father     Elevated Lipids Father     Heart Disease Brother     Psychiatric Disorder Brother     Diabetes Brother     Breast Cancer Maternal Aunt     Breast Cancer Paternal Aunt                REVIEW OF SYMPTOMS:     Review of Symptoms:  Constitutional: Negative for fever, chills  HEENT: Negative for nosebleeds, vision changes. Respiratory: Negative for cough, wheezing  Cardiovascular: Negative for claudication, syncope  Gastrointestinal: Negative for abdominal pain, diarrhea, melena. Genitourinary: Negative for dysuria  Musculoskeletal: Negative for myalgias. Skin: Negative for rash  Heme: No problems bleeding. Neurological: Negative for speech change and focal weakness.                  PHYSICAL EXAM:       Physical Exam:    Due to this being a TeleHealth evaluation, many elements of the physical examination are unable to be assessed. General: Well developed, in no acute distress, cooperative and alert  HEENT: Hearing intact, non-icteric, normocephalic, atraumatic. Pupils equal/round. Moist mucous membranes. Lungs / Respiratory: No audible wheezing, no signs of respiratory distress, lips non cyanotic  Cardiac: No marked JVD visible on video. Extremities:  No edema  Neuro: A&Ox3, speech clear, no facial droop, answering questions appropriately  Skin: Skin color is normal. No rashes or lesions. Non diaphoretic on visible skin during exam  Psych: Appropriate affect         LABS / OTHER STUDIES reviewed:         Lab Results   Component Value Date/Time    Cholesterol, total 209 (H) 07/21/2021 10:32 AM    HDL Cholesterol 51 07/21/2021 10:32 AM    LDL, calculated 134 (H) 07/21/2021 10:32 AM    LDL, calculated 121 (H) 11/19/2018 12:09 PM    Triglyceride 135 07/21/2021 10:32 AM    CHOL/HDL Ratio 3.8 01/08/2010 10:35 AM       Lab Results   Component Value Date/Time    Sodium 133 (L) 10/26/2022 11:22 AM    Potassium 4.9 10/26/2022 11:22 AM    Chloride 93 (L) 10/26/2022 11:22 AM    CO2 38 (H) 10/26/2022 11:22 AM    Anion gap 2 (L) 10/26/2022 11:22 AM    Glucose 139 (H) 10/26/2022 11:22 AM    BUN 24 (H) 10/26/2022 11:22 AM    Creatinine 0.95 10/26/2022 11:22 AM    BUN/Creatinine ratio 25 (H) 10/26/2022 11:22 AM    GFR est AA >60 07/25/2022 02:10 PM    GFR est non-AA 52 (L) 07/25/2022 02:10 PM    Calcium 8.9 10/26/2022 11:22 AM    Bilirubin, total 0.6 10/19/2022 04:42 AM    Alk.  phosphatase 72 10/19/2022 04:42 AM    Protein, total 6.3 (L) 10/19/2022 04:42 AM    Albumin 2.6 (L) 10/22/2022 04:57 AM    Albumin 2.7 (L) 10/22/2022 04:57 AM    Globulin 3.3 10/19/2022 04:42 AM    A-G Ratio 0.9 (L) 10/19/2022 04:42 AM    ALT (SGPT) 22 10/19/2022 04:42 AM        Lab Results   Component Value Date/Time    WBC 11.8 (H) 10/22/2022 02:05 PM    HGB 7.4 (L) 10/22/2022 02:05 PM    HCT 25.0 (L) 10/22/2022 02:05 PM PLATELET 882 31/72/7960 02:05 PM    MCV 87.7 10/22/2022 02:05 PM       Lab Results   Component Value Date/Time    TSH 11.70 (H) 10/19/2022 09:23 AM              CARDIAC DIAGNOSTICS:      Cardiac Evaluation Includes:  I reviewed the results below. Echo 9/2020 - LVEF 60-65%, PASP 38   CXR 6/4/22 - Moderate pulmonary edema pattern. No consolidative pneumonia. Echo 6/5/22 - LVEF 55-60%, grade 1 diastology, mild MICHELLE, mild AI   CTA chest 6/6/22 -  No evidence of acute pulmonary embolus. Bilateral pleural-based pulmonary Nodules,  Bilateral dependent atelectasis. Mild cardiomegaly. EKG 6/4/22 - sinus la nena, LVH   EKG 6/6/22 - afib, , LVH  EKG 7/14/22 - sinus la nena, 51 bpm         ASSESSMENT AND PLAN:      Assessment and Plan:       1) Anemia 10/22  - Dr. Rashad Villalobos noted \"Iron deficiency anemia - POA and likely from GIB in setting of xarelto. However, hemoccult negative multiple times. Tolerated PRBC transfusion. Continue pepcid IV, getting IV iron. Stopped xarelto. GI and family were considering endoscopy. After a long discussion with the family, we have now decided to simply stop xarelto altogether and forego any endoscopy. \"  - On 11/3/22 - Spoke to family - Admission Hgb was 6.6 --> will hold off on resuming [address] until we can make sure Hgb is stable   - looking at the bigger picture - family prefers a conservative approach in general    2) UTI and AMS admission 10/22  - family was considering hospice but decided against it since  wanted to give patient a chance to get better  - getting home PT and nursing on discharge      3) PAF  - she had been on sotalol for many years until admitted with acute resp failure, RAN, and bradycardia on 6/4/22   --> Cont amiodarone and low dose metoprolol    - [address] stopped 10/22 due to severe anemia (see above)      4) HTN   - diovan stopped given low BP during 6/22 admission   - on 11/22 - BP is up and down at home - continue metoprolol   - goal BP on average < 140/90      5) Dementia:      6) TYRONE on CPAP     7) VV in 2 months     Lives with 78 yo  - daughter helps care for her             Moises Banda MD, [address], [address]  [place] [address] [address], [address] [place] [address] Drive  [address]  Ph: [phone]   Ph [phone]          We discussed the expected course, resolution and complications of the diagnosis(es) in detail. Medication risks, benefits, costs, interactions, and alternatives were discussed as indicated. I advised her to contact the office if her condition worsens, changes or fails to improve as anticipated. She expressed understanding with the diagnosis(es) and plan    Shabbir Brantley MD    This visit was conducted using [employer] telemedicine services. Total time (preparing to see the patient, reviewing data, seeing patient face to face, taking history, examining patient, counseling patient, documenting information, coordinating care, etc) was  45  min.

## 2022-11-07 RX ORDER — LEVOTHYROXINE SODIUM 150 UG/1
150 TABLET ORAL
Qty: 90 TABLET | Refills: 0 | Status: SHIPPED | OUTPATIENT
Start: 2022-11-07

## 2022-11-08 ENCOUNTER — TELEPHONE (OUTPATIENT)
Dept: INTERNAL MEDICINE CLINIC | Age: 82
End: 2022-11-08

## 2022-11-08 DIAGNOSIS — R39.9 UTI SYMPTOMS: Primary | ICD-10-CM

## 2022-11-08 NOTE — TELEPHONE ENCOUNTER
Order faxed to Luis Solorzano @ Fax # [phone] - confirmation received. Negrito shabazz/ Arnulfo Solorzano notified.

## 2022-11-08 NOTE — TELEPHONE ENCOUNTER
Reason for call:  Spoke with Werner Jeong    [facility]. They need  verbal consent for treatment on Pt.     Is this a new problem: yes     Date of last appointment:  7/25/2022     Can we respond via [employer]: no    Best call back number: Werner Varshaanette

## 2022-11-08 NOTE — TELEPHONE ENCOUNTER
Reason for call:  patient is calling stating she is having symptoms of UTI and they are requesting a urinalysis and culture    Is this a new problem: yes     Date of last appointment:  7/25/2022     Can we respond via [employer]: no    Best call back number:   kayla rizo  [phone]

## 2022-11-08 NOTE — TELEPHONE ENCOUNTER
Orders Placed This Encounter    CULTURE, [address] [facility]   Fax # [phone]     Standing Status:   Future     Standing Expiration Date:   11/8/2023    URINALYSIS W/MICROSCOPIC     Standing Status:   Future     Standing Expiration Date:   11/8/2023     The above orders were approved via [facility] per Dr. Marquita Kaur, III.

## 2022-11-09 ENCOUNTER — TELEPHONE (OUTPATIENT)
Dept: INTERNAL MEDICINE CLINIC | Age: 82
End: 2022-11-09

## 2022-11-09 RX ORDER — SULFAMETHOXAZOLE AND TRIMETHOPRIM 800; 160 MG/1; MG/1
1 TABLET ORAL 2 TIMES DAILY
Qty: 14 TABLET | Refills: 0 | Status: SHIPPED
Start: 2022-11-09 | End: 2022-11-15 | Stop reason: ALTCHOICE

## 2022-11-09 NOTE — TELEPHONE ENCOUNTER
Returned call to [facility]. States pt obtained urine with hat and specimen was dropped off at [facility] last night. Pratik Chaidez states pt has hard time giving clean catch sample (she did use a hat) and would like to obtain VO for a straight cath if the sample comes back as contaminated. Advised Cookie CONDE from [facility] to obtain straight cath if sample is contaminated, Pratik Chaidez voiced understanding.

## 2022-11-10 NOTE — TELEPHONE ENCOUNTER
Spoke with patient spouse Yudy Smith, notified urinalysis + for UTI, will treat with bactrim ds bid x 7 days. Advised pt that we will return call if culture shows need for alternative antibiotics. Yudy Smith voiced understanding.

## 2022-11-15 ENCOUNTER — TELEPHONE (OUTPATIENT)
Dept: INTERNAL MEDICINE CLINIC | Age: 82
End: 2022-11-15

## 2022-11-15 RX ORDER — NITROFURANTOIN 25; 75 MG/1; MG/1
100 CAPSULE ORAL 2 TIMES DAILY
Qty: 10 CAPSULE | Refills: 0 | Status: SHIPPED | OUTPATIENT
Start: 2022-11-15 | End: 2022-11-20

## 2022-11-15 NOTE — TELEPHONE ENCOUNTER
Reason for call:  Spoke with Pt . He would like to know the result on Pt urine test.  They want to know if there is anything over the counter he can give her so she can sleep at night.     Is this a new problem: yes     Date of last appointment:  7/25/2022     Can we respond via [employer]: no    Best call back number: Butch Morales   [phone]

## 2022-11-15 NOTE — TELEPHONE ENCOUNTER
Spoke with pt spouse Sharon Andre (on HIPAA). Notified antibiotics need to be changed d/t type of bacteria in urine. Sharon Andre will d/c pts bactrim and  new rx from [facility]. Debra Moralesed that Dr. Jose De Jesus Max has said in past no good meds for sleep that will not impact her breathing, also advised spoke with pt daughter Anny Soto 8/23/22 and info was provided for Dr. Yossi Irizarry for neuro appointment to further discuss her sleep issues. Sharon Andre states this was never done but will defer for now.

## 2022-11-25 ENCOUNTER — TELEPHONE (OUTPATIENT)
Dept: INTERNAL MEDICINE CLINIC | Age: 82
End: 2022-11-25

## 2022-11-25 NOTE — TELEPHONE ENCOUNTER
Reason for call:  Spoke with Margo Mcadams from [facility], he call to inform Dr Jamshid Vides   Pt passed away yesterday.     Is this a new problem: yes     Date of last appointment:  7/25/2022     Can we respond via [employer]: no    Best call back number: [facility]   [phone]

## 2025-04-15 NOTE — TELEPHONE ENCOUNTER
Pt spouse notified via [facility] he sent earlier today.
Reason for call:   PT is still not sleeping what else can she take?     Is this a new problem: yes     Date of last appointment:  7/25/2022     Can we respond via [employer]: yes      Best call back number:  [phone]
PAST SURGICAL HISTORY:  S/P colonoscopy     S/P hernia repair